# Patient Record
Sex: MALE | Race: WHITE | NOT HISPANIC OR LATINO | Employment: FULL TIME | URBAN - METROPOLITAN AREA
[De-identification: names, ages, dates, MRNs, and addresses within clinical notes are randomized per-mention and may not be internally consistent; named-entity substitution may affect disease eponyms.]

---

## 2017-03-20 ENCOUNTER — LAB CONVERSION - ENCOUNTER (OUTPATIENT)
Dept: OTHER | Facility: OTHER | Age: 57
End: 2017-03-20

## 2017-03-20 ENCOUNTER — ALLSCRIPTS OFFICE VISIT (OUTPATIENT)
Dept: OTHER | Facility: CLINIC | Age: 57
End: 2017-03-20

## 2017-03-20 DIAGNOSIS — K73.9 CHRONIC HEPATITIS (HCC): ICD-10-CM

## 2017-03-20 DIAGNOSIS — B19.20 VIRAL HEPATITIS C WITHOUT HEPATIC COMA: ICD-10-CM

## 2017-03-20 LAB
% CD4 (HELPER CELLS) (HISTORICAL): 19 % (ref 30–61)
ABSOLUTE CD4+ CELLS: 244 CELLS/UL (ref 490–1740)
LYMPHOCYTES # BLD AUTO: 1267 CELLS/UL (ref 850–3900)

## 2017-03-21 ENCOUNTER — LAB CONVERSION - ENCOUNTER (OUTPATIENT)
Dept: OTHER | Facility: OTHER | Age: 57
End: 2017-03-21

## 2017-03-21 ENCOUNTER — GENERIC CONVERSION - ENCOUNTER (OUTPATIENT)
Dept: OTHER | Facility: OTHER | Age: 57
End: 2017-03-21

## 2017-03-21 LAB
% CD4 (HELPER CELLS) (HISTORICAL): 19 % (ref 30–61)
ABSOLUTE CD4+ CELLS: 244 CELLS/UL (ref 490–1740)
HIV 1 RNA, QUANT. MOLECULAR METHOD (HISTORICAL): ABNORMAL LOG COPIES/ML
HIV-1 RNA BY PCR, QN (HISTORICAL): ABNORMAL COPIES/ML
LYMPHOCYTES # BLD AUTO: 1267 CELLS/UL (ref 850–3900)

## 2017-03-22 ENCOUNTER — GENERIC CONVERSION - ENCOUNTER (OUTPATIENT)
Dept: OTHER | Facility: OTHER | Age: 57
End: 2017-03-22

## 2017-03-28 ENCOUNTER — ALLSCRIPTS OFFICE VISIT (OUTPATIENT)
Dept: OTHER | Facility: CLINIC | Age: 57
End: 2017-03-28

## 2017-04-24 ENCOUNTER — GENERIC CONVERSION - ENCOUNTER (OUTPATIENT)
Dept: OTHER | Facility: OTHER | Age: 57
End: 2017-04-24

## 2017-04-24 DIAGNOSIS — E78.5 HYPERLIPIDEMIA: ICD-10-CM

## 2017-04-24 DIAGNOSIS — B20 HUMAN IMMUNODEFICIENCY VIRUS (HIV) DISEASE (HCC): ICD-10-CM

## 2017-04-24 DIAGNOSIS — I10 ESSENTIAL (PRIMARY) HYPERTENSION: ICD-10-CM

## 2017-04-24 DIAGNOSIS — Z11.3 ENCOUNTER FOR SCREENING FOR INFECTIONS WITH PREDOMINANTLY SEXUAL MODE OF TRANSMISSION: ICD-10-CM

## 2017-05-06 ENCOUNTER — HOSPITAL ENCOUNTER (OUTPATIENT)
Dept: ULTRASOUND IMAGING | Facility: HOSPITAL | Age: 57
Discharge: HOME | End: 2017-05-06
Payer: COMMERCIAL

## 2017-05-06 DIAGNOSIS — B19.20 VIRAL HEPATITIS C WITHOUT HEPATIC COMA: ICD-10-CM

## 2017-05-06 PROCEDURE — 76705 ECHO EXAM OF ABDOMEN: CPT

## 2017-06-02 ENCOUNTER — GENERIC CONVERSION - ENCOUNTER (OUTPATIENT)
Dept: OTHER | Facility: OTHER | Age: 57
End: 2017-06-02

## 2017-07-15 ENCOUNTER — APPOINTMENT (OUTPATIENT)
Dept: LAB | Facility: HOSPITAL | Age: 57
End: 2017-07-15
Payer: COMMERCIAL

## 2017-07-15 ENCOUNTER — TRANSCRIBE ORDERS (OUTPATIENT)
Dept: ADMINISTRATIVE | Facility: HOSPITAL | Age: 57
End: 2017-07-15

## 2017-07-15 DIAGNOSIS — I10 ESSENTIAL (PRIMARY) HYPERTENSION: ICD-10-CM

## 2017-07-15 DIAGNOSIS — B20 HUMAN IMMUNODEFICIENCY VIRUS (HIV) DISEASE (HCC): ICD-10-CM

## 2017-07-15 DIAGNOSIS — E78.5 HYPERLIPIDEMIA: ICD-10-CM

## 2017-07-15 DIAGNOSIS — Z11.3 ENCOUNTER FOR SCREENING FOR INFECTIONS WITH PREDOMINANTLY SEXUAL MODE OF TRANSMISSION: ICD-10-CM

## 2017-07-15 DIAGNOSIS — K73.9 CHRONIC HEPATITIS (HCC): ICD-10-CM

## 2017-07-15 DIAGNOSIS — B19.20 VIRAL HEPATITIS C WITHOUT HEPATIC COMA: ICD-10-CM

## 2017-07-15 LAB
ALBUMIN SERPL BCP-MCNC: 3.8 G/DL (ref 3.5–5)
ALP SERPL-CCNC: 95 U/L (ref 46–116)
ALT SERPL W P-5'-P-CCNC: 23 U/L (ref 12–78)
ANION GAP SERPL CALCULATED.3IONS-SCNC: 6 MMOL/L (ref 4–13)
AST SERPL W P-5'-P-CCNC: 26 U/L (ref 5–45)
BASOPHILS # BLD AUTO: 0.04 THOUSANDS/ΜL (ref 0–0.1)
BASOPHILS NFR BLD AUTO: 1 % (ref 0–1)
BILIRUB SERPL-MCNC: 0.6 MG/DL (ref 0.2–1)
BILIRUB UR QL STRIP: NEGATIVE
BUN SERPL-MCNC: 17 MG/DL (ref 5–25)
CALCIUM SERPL-MCNC: 8.9 MG/DL (ref 8.3–10.1)
CHLORIDE SERPL-SCNC: 105 MMOL/L (ref 100–108)
CHOLEST SERPL-MCNC: 159 MG/DL (ref 50–200)
CLARITY UR: CLEAR
CO2 SERPL-SCNC: 28 MMOL/L (ref 21–32)
COLOR UR: YELLOW
CREAT SERPL-MCNC: 0.91 MG/DL (ref 0.6–1.3)
EOSINOPHIL # BLD AUTO: 0.12 THOUSAND/ΜL (ref 0–0.61)
EOSINOPHIL NFR BLD AUTO: 2 % (ref 0–6)
ERYTHROCYTE [DISTWIDTH] IN BLOOD BY AUTOMATED COUNT: 13.5 % (ref 11.6–15.1)
GFR SERPL CREATININE-BSD FRML MDRD: >60 ML/MIN/1.73SQ M
GLUCOSE P FAST SERPL-MCNC: 107 MG/DL (ref 65–99)
GLUCOSE UR STRIP-MCNC: NEGATIVE MG/DL
HCT VFR BLD AUTO: 52.7 % (ref 36.5–49.3)
HDLC SERPL-MCNC: 35 MG/DL (ref 40–60)
HGB BLD-MCNC: 17.8 G/DL (ref 12–17)
HGB UR QL STRIP.AUTO: NEGATIVE
KETONES UR STRIP-MCNC: NEGATIVE MG/DL
LDLC SERPL CALC-MCNC: 105 MG/DL (ref 0–100)
LEUKOCYTE ESTERASE UR QL STRIP: NEGATIVE
LYMPHOCYTES # BLD AUTO: 1.11 THOUSANDS/ΜL (ref 0.6–4.47)
LYMPHOCYTES NFR BLD AUTO: 16 % (ref 14–44)
MCH RBC QN AUTO: 30.1 PG (ref 26.8–34.3)
MCHC RBC AUTO-ENTMCNC: 33.8 G/DL (ref 31.4–37.4)
MCV RBC AUTO: 89 FL (ref 82–98)
MONOCYTES # BLD AUTO: 0.85 THOUSAND/ΜL (ref 0.17–1.22)
MONOCYTES NFR BLD AUTO: 12 % (ref 4–12)
NEUTROPHILS # BLD AUTO: 4.68 THOUSANDS/ΜL (ref 1.85–7.62)
NEUTS SEG NFR BLD AUTO: 69 % (ref 43–75)
NITRITE UR QL STRIP: NEGATIVE
NRBC BLD AUTO-RTO: 0 /100 WBCS
PH UR STRIP.AUTO: 5.5 [PH] (ref 4.5–8)
PLATELET # BLD AUTO: 182 THOUSANDS/UL (ref 149–390)
PMV BLD AUTO: 9.7 FL (ref 8.9–12.7)
POTASSIUM SERPL-SCNC: 4.3 MMOL/L (ref 3.5–5.3)
PROT SERPL-MCNC: 8.1 G/DL (ref 6.4–8.2)
PROT UR STRIP-MCNC: NEGATIVE MG/DL
RBC # BLD AUTO: 5.92 MILLION/UL (ref 3.88–5.62)
SODIUM SERPL-SCNC: 139 MMOL/L (ref 136–145)
SP GR UR STRIP.AUTO: >=1.03 (ref 1–1.03)
TRIGL SERPL-MCNC: 97 MG/DL
UROBILINOGEN UR QL STRIP.AUTO: 0.2 E.U./DL
WBC # BLD AUTO: 6.83 THOUSAND/UL (ref 4.31–10.16)

## 2017-07-15 PROCEDURE — 86361 T CELL ABSOLUTE COUNT: CPT

## 2017-07-15 PROCEDURE — 87536 HIV-1 QUANT&REVRSE TRNSCRPJ: CPT

## 2017-07-15 PROCEDURE — 80061 LIPID PANEL: CPT

## 2017-07-15 PROCEDURE — 80053 COMPREHEN METABOLIC PANEL: CPT

## 2017-07-15 PROCEDURE — 85025 COMPLETE CBC W/AUTO DIFF WBC: CPT

## 2017-07-15 PROCEDURE — 87591 N.GONORRHOEAE DNA AMP PROB: CPT

## 2017-07-15 PROCEDURE — 87491 CHLMYD TRACH DNA AMP PROBE: CPT

## 2017-07-15 PROCEDURE — 36415 COLL VENOUS BLD VENIPUNCTURE: CPT

## 2017-07-15 PROCEDURE — 86592 SYPHILIS TEST NON-TREP QUAL: CPT

## 2017-07-15 PROCEDURE — 81003 URINALYSIS AUTO W/O SCOPE: CPT

## 2017-07-15 PROCEDURE — 82105 ALPHA-FETOPROTEIN SERUM: CPT

## 2017-07-16 LAB — RPR SER QL: NORMAL

## 2017-07-17 LAB
BASOPHILS # BLD AUTO: 0 X10E3/UL (ref 0–0.2)
BASOPHILS NFR BLD AUTO: 0 %
CD3+CD4+ CELLS # BLD: 172 /UL (ref 359–1519)
CD3+CD4+ CELLS NFR BLD: 15.6 % (ref 30.8–58.5)
CHLAMYDIA DNA CVX QL NAA+PROBE: NORMAL
EOSINOPHIL # BLD AUTO: 0.1 X10E3/UL (ref 0–0.4)
EOSINOPHIL NFR BLD AUTO: 1 %
ERYTHROCYTE [DISTWIDTH] IN BLOOD BY AUTOMATED COUNT: 14.1 % (ref 12.3–15.4)
HCT VFR BLD AUTO: 52.3 % (ref 37.5–51)
HGB BLD-MCNC: 18.1 G/DL (ref 12.6–17.7)
IMM GRANULOCYTES # BLD: 0 X10E3/UL (ref 0–0.1)
IMM GRANULOCYTES NFR BLD: 0 %
LYMPHOCYTES # BLD AUTO: 1.1 X10E3/UL (ref 0.7–3.1)
LYMPHOCYTES NFR BLD AUTO: 17 %
MCH RBC QN AUTO: 30.9 PG (ref 26.6–33)
MCHC RBC AUTO-ENTMCNC: 34.6 G/DL (ref 31.5–35.7)
MCV RBC AUTO: 89 FL (ref 79–97)
MONOCYTES # BLD AUTO: 0.8 X10E3/UL (ref 0.1–0.9)
MONOCYTES NFR BLD AUTO: 12 %
N GONORRHOEA DNA GENITAL QL NAA+PROBE: NORMAL
NEUTROPHILS # BLD AUTO: 4.6 X10E3/UL (ref 1.4–7)
NEUTROPHILS NFR BLD AUTO: 70 %
PLATELET # BLD AUTO: 170 X10E3/UL (ref 150–379)
RBC # BLD AUTO: 5.86 X10E6/UL (ref 4.14–5.8)
WBC # BLD AUTO: 6.6 X10E3/UL (ref 3.4–10.8)

## 2017-07-18 LAB
AFP-TM SERPL-MCNC: 4.8 NG/ML (ref 0–8.3)
HIV1 RNA # SERPL NAA+PROBE: 4080 COPIES/ML
HIV1 RNA SERPL NAA+PROBE-LOG#: 3.61 LOG10COPY/ML

## 2017-07-19 ENCOUNTER — GENERIC CONVERSION - ENCOUNTER (OUTPATIENT)
Dept: OTHER | Facility: OTHER | Age: 57
End: 2017-07-19

## 2017-07-24 ENCOUNTER — ALLSCRIPTS OFFICE VISIT (OUTPATIENT)
Dept: OTHER | Facility: CLINIC | Age: 57
End: 2017-07-24

## 2017-07-24 ENCOUNTER — GENERIC CONVERSION - ENCOUNTER (OUTPATIENT)
Dept: OTHER | Facility: OTHER | Age: 57
End: 2017-07-24

## 2017-08-05 ENCOUNTER — APPOINTMENT (OUTPATIENT)
Dept: LAB | Facility: HOSPITAL | Age: 57
End: 2017-08-05
Payer: COMMERCIAL

## 2017-08-05 ENCOUNTER — TRANSCRIBE ORDERS (OUTPATIENT)
Dept: ADMINISTRATIVE | Facility: HOSPITAL | Age: 57
End: 2017-08-05

## 2017-08-05 DIAGNOSIS — B20 HUMAN IMMUNODEFICIENCY VIRUS (HIV) DISEASE (HCC): Primary | ICD-10-CM

## 2017-08-05 DIAGNOSIS — B20 HUMAN IMMUNODEFICIENCY VIRUS (HIV) DISEASE (HCC): ICD-10-CM

## 2017-08-05 PROCEDURE — 87536 HIV-1 QUANT&REVRSE TRNSCRPJ: CPT

## 2017-08-05 PROCEDURE — 87900 PHENOTYPE INFECT AGENT DRUG: CPT

## 2017-08-05 PROCEDURE — 87901 NFCT AGT GNTYP ALYS HIV1 REV: CPT

## 2017-08-05 PROCEDURE — 36415 COLL VENOUS BLD VENIPUNCTURE: CPT

## 2017-08-08 ENCOUNTER — ALLSCRIPTS OFFICE VISIT (OUTPATIENT)
Dept: OTHER | Facility: CLINIC | Age: 57
End: 2017-08-08

## 2017-08-08 LAB
HIV1 RNA # SERPL NAA+PROBE: 70 COPIES/ML
HIV1 RNA SERPL NAA+PROBE-LOG#: 1.84 LOG10COPY/ML

## 2017-08-17 LAB
HIV GENOSURE: NORMAL
HIV RT+PR MUT DET ISLT: NORMAL

## 2017-08-23 ENCOUNTER — GENERIC CONVERSION - ENCOUNTER (OUTPATIENT)
Dept: OTHER | Facility: OTHER | Age: 57
End: 2017-08-23

## 2017-08-24 ENCOUNTER — ALLSCRIPTS OFFICE VISIT (OUTPATIENT)
Dept: OTHER | Facility: OTHER | Age: 57
End: 2017-08-24

## 2017-09-11 ENCOUNTER — GENERIC CONVERSION - ENCOUNTER (OUTPATIENT)
Dept: OTHER | Facility: OTHER | Age: 57
End: 2017-09-11

## 2017-09-15 ENCOUNTER — ALLSCRIPTS OFFICE VISIT (OUTPATIENT)
Dept: OTHER | Facility: CLINIC | Age: 57
End: 2017-09-15

## 2017-10-01 DIAGNOSIS — K73.9 CHRONIC HEPATITIS (HCC): ICD-10-CM

## 2017-10-01 DIAGNOSIS — K74.00 HEPATIC FIBROSIS: ICD-10-CM

## 2017-10-01 DIAGNOSIS — B19.20 VIRAL HEPATITIS C WITHOUT HEPATIC COMA: ICD-10-CM

## 2017-10-01 DIAGNOSIS — B20 HUMAN IMMUNODEFICIENCY VIRUS (HIV) DISEASE (HCC): ICD-10-CM

## 2017-10-28 ENCOUNTER — APPOINTMENT (OUTPATIENT)
Dept: LAB | Facility: HOSPITAL | Age: 57
End: 2017-10-28
Attending: INTERNAL MEDICINE
Payer: COMMERCIAL

## 2017-10-28 ENCOUNTER — TRANSCRIBE ORDERS (OUTPATIENT)
Dept: ADMINISTRATIVE | Facility: HOSPITAL | Age: 57
End: 2017-10-28

## 2017-10-28 DIAGNOSIS — B20 HUMAN IMMUNODEFICIENCY VIRUS (HIV) DISEASE (HCC): ICD-10-CM

## 2017-10-28 LAB
ALBUMIN SERPL BCP-MCNC: 3.8 G/DL (ref 3.5–5)
ALP SERPL-CCNC: 99 U/L (ref 46–116)
ALT SERPL W P-5'-P-CCNC: 23 U/L (ref 12–78)
ANION GAP SERPL CALCULATED.3IONS-SCNC: 6 MMOL/L (ref 4–13)
AST SERPL W P-5'-P-CCNC: 19 U/L (ref 5–45)
BASOPHILS # BLD AUTO: 0.04 THOUSANDS/ΜL (ref 0–0.1)
BASOPHILS NFR BLD AUTO: 1 % (ref 0–1)
BILIRUB SERPL-MCNC: 0.6 MG/DL (ref 0.2–1)
BUN SERPL-MCNC: 16 MG/DL (ref 5–25)
CALCIUM SERPL-MCNC: 8.9 MG/DL (ref 8.3–10.1)
CHLORIDE SERPL-SCNC: 104 MMOL/L (ref 100–108)
CO2 SERPL-SCNC: 30 MMOL/L (ref 21–32)
CREAT SERPL-MCNC: 1.08 MG/DL (ref 0.6–1.3)
EOSINOPHIL # BLD AUTO: 0.15 THOUSAND/ΜL (ref 0–0.61)
EOSINOPHIL NFR BLD AUTO: 3 % (ref 0–6)
ERYTHROCYTE [DISTWIDTH] IN BLOOD BY AUTOMATED COUNT: 13.8 % (ref 11.6–15.1)
GFR SERPL CREATININE-BSD FRML MDRD: 76 ML/MIN/1.73SQ M
GLUCOSE P FAST SERPL-MCNC: 99 MG/DL (ref 65–99)
HCT VFR BLD AUTO: 52.5 % (ref 36.5–49.3)
HGB BLD-MCNC: 17.8 G/DL (ref 12–17)
LYMPHOCYTES # BLD AUTO: 1.19 THOUSANDS/ΜL (ref 0.6–4.47)
LYMPHOCYTES NFR BLD AUTO: 22 % (ref 14–44)
MCH RBC QN AUTO: 29.8 PG (ref 26.8–34.3)
MCHC RBC AUTO-ENTMCNC: 33.9 G/DL (ref 31.4–37.4)
MCV RBC AUTO: 88 FL (ref 82–98)
MONOCYTES # BLD AUTO: 0.6 THOUSAND/ΜL (ref 0.17–1.22)
MONOCYTES NFR BLD AUTO: 11 % (ref 4–12)
NEUTROPHILS # BLD AUTO: 3.46 THOUSANDS/ΜL (ref 1.85–7.62)
NEUTS SEG NFR BLD AUTO: 63 % (ref 43–75)
NRBC BLD AUTO-RTO: 0 /100 WBCS
PLATELET # BLD AUTO: 196 THOUSANDS/UL (ref 149–390)
PMV BLD AUTO: 9.6 FL (ref 8.9–12.7)
POTASSIUM SERPL-SCNC: 4.2 MMOL/L (ref 3.5–5.3)
PROT SERPL-MCNC: 8.1 G/DL (ref 6.4–8.2)
RBC # BLD AUTO: 5.97 MILLION/UL (ref 3.88–5.62)
SODIUM SERPL-SCNC: 140 MMOL/L (ref 136–145)
WBC # BLD AUTO: 5.46 THOUSAND/UL (ref 4.31–10.16)

## 2017-10-28 PROCEDURE — 85025 COMPLETE CBC W/AUTO DIFF WBC: CPT

## 2017-10-28 PROCEDURE — 80053 COMPREHEN METABOLIC PANEL: CPT

## 2017-10-28 PROCEDURE — 86480 TB TEST CELL IMMUN MEASURE: CPT

## 2017-10-28 PROCEDURE — 36415 COLL VENOUS BLD VENIPUNCTURE: CPT

## 2017-10-28 PROCEDURE — 86361 T CELL ABSOLUTE COUNT: CPT

## 2017-10-28 PROCEDURE — 87536 HIV-1 QUANT&REVRSE TRNSCRPJ: CPT

## 2017-10-30 ENCOUNTER — GENERIC CONVERSION - ENCOUNTER (OUTPATIENT)
Dept: OTHER | Facility: OTHER | Age: 57
End: 2017-10-30

## 2017-10-30 LAB
BASOPHILS # BLD AUTO: 0 X10E3/UL (ref 0–0.2)
BASOPHILS NFR BLD AUTO: 1 %
CD3+CD4+ CELLS # BLD: 188 /UL (ref 359–1519)
CD3+CD4+ CELLS NFR BLD: 15.7 % (ref 30.8–58.5)
EOSINOPHIL # BLD AUTO: 0.1 X10E3/UL (ref 0–0.4)
EOSINOPHIL NFR BLD AUTO: 3 %
ERYTHROCYTE [DISTWIDTH] IN BLOOD BY AUTOMATED COUNT: 14.1 % (ref 12.3–15.4)
HCT VFR BLD AUTO: 51.4 % (ref 37.5–51)
HGB BLD-MCNC: 18.2 G/DL (ref 12.6–17.7)
IMM GRANULOCYTES # BLD: 0 X10E3/UL (ref 0–0.1)
IMM GRANULOCYTES NFR BLD: 0 %
LYMPHOCYTES # BLD AUTO: 1.2 X10E3/UL (ref 0.7–3.1)
LYMPHOCYTES NFR BLD AUTO: 23 %
MCH RBC QN AUTO: 30.9 PG (ref 26.6–33)
MCHC RBC AUTO-ENTMCNC: 35.4 G/DL (ref 31.5–35.7)
MCV RBC AUTO: 87 FL (ref 79–97)
MONOCYTES # BLD AUTO: 0.5 X10E3/UL (ref 0.1–0.9)
MONOCYTES NFR BLD AUTO: 10 %
NEUTROPHILS # BLD AUTO: 3.3 X10E3/UL (ref 1.4–7)
NEUTROPHILS NFR BLD AUTO: 63 %
PLATELET # BLD AUTO: 186 X10E3/UL (ref 150–379)
RBC # BLD AUTO: 5.89 X10E6/UL (ref 4.14–5.8)
WBC # BLD AUTO: 5.1 X10E3/UL (ref 3.4–10.8)

## 2017-10-31 ENCOUNTER — ALLSCRIPTS OFFICE VISIT (OUTPATIENT)
Dept: OTHER | Facility: CLINIC | Age: 57
End: 2017-10-31

## 2017-10-31 LAB
ANNOTATION COMMENT IMP: NORMAL
GAMMA INTERFERON BACKGROUND BLD IA-ACNC: 0.04 IU/ML
HIV1 RNA # SERPL NAA+PROBE: <20 COPIES/ML
HIV1 RNA SERPL NAA+PROBE-LOG#: NORMAL LOG10COPY/ML
M TB IFN-G BLD-IMP: NEGATIVE
M TB IFN-G CD4+ BCKGRND COR BLD-ACNC: <0.01 IU/ML
M TB IFN-G CD4+ T-CELLS BLD-ACNC: 0.03 IU/ML
MITOGEN IGNF BLD-ACNC: 9.69 IU/ML
QUANTIFERON-TB GOLD IN TUBE: NORMAL
SERVICE CMNT-IMP: NORMAL

## 2017-11-01 ENCOUNTER — TRANSCRIBE ORDERS (OUTPATIENT)
Dept: ADMINISTRATIVE | Facility: HOSPITAL | Age: 57
End: 2017-11-01

## 2017-11-01 DIAGNOSIS — F17.200 TOBACCO USE DISORDER: Primary | ICD-10-CM

## 2017-11-08 ENCOUNTER — HOSPITAL ENCOUNTER (OUTPATIENT)
Dept: ULTRASOUND IMAGING | Facility: HOSPITAL | Age: 57
Discharge: HOME | End: 2017-11-08
Payer: COMMERCIAL

## 2017-11-08 ENCOUNTER — HOSPITAL ENCOUNTER (OUTPATIENT)
Dept: CT IMAGING | Facility: HOSPITAL | Age: 57
Discharge: HOME | End: 2017-11-08
Payer: COMMERCIAL

## 2017-11-08 DIAGNOSIS — F17.200 TOBACCO USE DISORDER: ICD-10-CM

## 2017-11-08 DIAGNOSIS — B19.20 VIRAL HEPATITIS C WITHOUT HEPATIC COMA: ICD-10-CM

## 2017-11-08 DIAGNOSIS — K74.00 HEPATIC FIBROSIS: ICD-10-CM

## 2017-11-08 PROCEDURE — 76705 ECHO EXAM OF ABDOMEN: CPT

## 2017-11-14 ENCOUNTER — ALLSCRIPTS OFFICE VISIT (OUTPATIENT)
Dept: OTHER | Facility: CLINIC | Age: 57
End: 2017-11-14

## 2017-11-14 DIAGNOSIS — B20 HUMAN IMMUNODEFICIENCY VIRUS (HIV) DISEASE (HCC): ICD-10-CM

## 2018-01-01 DIAGNOSIS — B20 HUMAN IMMUNODEFICIENCY VIRUS (HIV) DISEASE (HCC): ICD-10-CM

## 2018-01-09 NOTE — PROGRESS NOTES
Assessment    1  HIV disease (042) (B20)   2  Nicotine dependence (305 1) (F17 200)    Plan    1  (1) LIPID PANEL, FASTING; Status:Active; Requested for:01May2017;     2  (1) CHLAMYDIA/GC AMPLIFIED DNA, PCR; Source:Urine, Unspecified Source;   Status:Active; Requested for:01May2017;     3  (1) RPR; Status:Active; Requested for:01May2017;     4  (1) CBC/PLT/DIFF; Status:Active; Requested for:01May2017;    5  (1) COMPREHENSIVE METABOLIC PANEL; Status:Active; Requested for:01May2017;    6  (1) HIV-1 RNA QUANTITATIVE; [Do Not Release]; Status:Active; Requested   for:01May2017;    7  (1) T LYMPH SUBSET (CD4); Status:Active; Requested for:01May2017;    8  (1) URINALYSIS (will reflex a microscopy if leukocytes, occult blood, protein or nitrites are   not within normal limits); Status:Active; Requested for:01May2017;     9  Follow-up visit in 3 months Evaluation and Treatment  Follow-up  Status: Hold For -   Scheduling  Requested for: 28Mar2017    Discussion/Summary    HIV-doing well on ART with an undetectable viral load and a CD4 count that remains in the mid 200s  He is a bit discouraged about the CD4 count not going higher  I explained to him that while it is not optimal, he will remain safe as long as the CD4 count stays above 200  He asked about how he can try to get the CD4 count higher  I explained to him that there is no well-defined medical intervention, however healthy lifestyle approaches may help  Nicotine dependence-patient not interested in quitting for now  I encouraged abstinence from tobacco use as soon as he is ready  Possible side effects of new medications were reviewed with the patient/guardian today  The treatment plan was reviewed with the patient/guardian  The patient/guardian understands and agrees with the treatment plan     Education   general HIV education  adherence  prevention care  Chief Complaint  Pt here for routine f/u  Pt c/o congestion x 4 days        History of Present Illness  Routine follow-up for HIV  Patient claims 100% adherence with Tivicay/Descovy/Prezista/Norvir  He denies any notable side effects  Since he quit smoking cigarettes he has now taken up cigars  He does not have any interest in stopping the cigars for now  Pain Assessment   the patient states they do not have pain  Abuse And Domestic Violence Screen    Yes, the patient is safe at home  The patient states no one is hurting them  Depression And Suicide Screen  No, the patient has not had thoughts of hurting themself  No, the patient has not felt depressed in the past 7 days  no fever no lethargy no depression no weight loss no cough no shortness of breath no thrush no nausea no vomiting no diarrhea      Active Problems    1  Acute bronchitis (466 0) (J20 9)   2  Chronic hepatitis (571 40) (K73 9)   3  Condyloma (078 11) (A63 0)   4  Dyslipidemia (272 4) (E78 5)   5  Encounter for screening examination for sexually transmitted disease (V74 5) (Z11 3)   6  Essential hypertension (401 9) (I10)   7  Fibrosis of liver (571 5) (K74 0)   8  Hepatitis B core antibody positive (795 79) (R76 8)   9  Hepatitis C virus infection (070 70) (B19 20)   10  HIV disease (042) (B20)   11  Impaired fasting glucose (790 21) (R73 01)   12  Need for prophylactic vaccination and inoculation against influenza (V04 81) (Z23)    Past Medical History    1  History of acute bronchitis (V12 69) (Z87 09)   2  History of Opioid dependence (304 00) (F11 20)    Surgical History    1  History of Laminectomy Lumbar    Family History  Mother    1  Family history of Alzheimer's disease (V17 2) (Z82 0)  Father    2  Family history of myocardial infarction (V17 3) (Z82 49)  Brother    3  Family history of prostate carcinoma (V16 42) (Z80 42)    Social History    · Current every day smoker (305 1) (F17 200)   · Non drinker / no alcohol use   · Sexually active    Current Meds   1  Descovy 200-25 MG Oral Tablet;  Take 1 tablet daily; Therapy: 63IBI8352 to (Evaluate:14May2017)  Requested for: 41DMZ2039; Last   Rx:49Bxi6618 Ordered   2  Imiquimod 5 % External Cream; APPLY TO AFFECTED AREAS THREE TIMES WEEKLY  [address] OFF AFTER 6-10 HOURS; Therapy: 70FOM2863 to (Last Rx:20Mar2017)  Requested for: 20Mar2017 Ordered   3  Lisinopril-Hydrochlorothiazide 10-12 5 MG Oral Tablet; TAKE 1 TABLET DAILY; Therapy: 83NZL8304 to (Evaluate:08Gms1121)  Requested for: 20Mar2017; Last   Rx:20Mar2017 Ordered   4  Norvir 100 MG Oral Tablet; TAKE 1 TABLET BY MOUTH EVERY TWELVE HOURS; Therapy: 70TWZ6983 to (Hellen Contras)  Requested for: 95FHP9472; Last   Rx:10Nov2016 Ordered   5  Prezista 600 MG Oral Tablet; TAKE 1 TABLET BY MOUTH EVERY TWELVE HOURS; Therapy: 64YLM5088 to (Hellen Contras)  Requested for: 76KUJ6967; Last   Rx:10Nov2016 Ordered   6  Tivicay 50 MG Oral Tablet; take 1 tablet by mouth twice a day; Therapy: 94FFQ0759 to (Hellen Contras)  Requested for: 60LHS9184; Last   Rx:10Nov2016 Ordered    Allergies    1  No Known Drug Allergies    Vitals  Signs   Recorded: 28Mar2017 05:05PM   Temperature: 97 7 F  Heart Rate: 85  Systolic: 092  Diastolic: 88  Height: 5 ft 8 in  Weight: 183 lb 2 oz  BMI Calculated: 27 84  BSA Calculated: 1 97  O2 Saturation: 97    Physical Exam    Constitutional   General appearance: No acute distress, well appearing and well nourished  Ears, Nose, Mouth, and Throat   Oropharynx: Normal with no erythema, edema, exudate or lesions  Pulmonary   Respiratory effort: No increased work of breathing or signs of respiratory distress  Auscultation of lungs: Clear to auscultation  Cardiovascular   Auscultation of heart: Normal rate and rhythm, normal S1 and S2, without murmurs  Examination of extremities for edema and/or varicosities: Normal     Abdomen   Abdomen: Non-tender, no masses  Liver and spleen: No hepatomegaly or splenomegaly  Lymphatic   Palpation of lymph nodes in neck: No lymphadenopathy  Future Appointments    Date/Time Provider Specialty Site   06/19/2017 02:00 PM ALLIE Chin Epidemiology/Public [facility] AT [facility]     Signatures   Electronically signed by : Ebony Mohr MD; Mar 28 2017  5:37PM EST                       (Author)

## 2018-01-10 NOTE — PROGRESS NOTES
Assessment    1  HIV disease (042) (B20)   2  Acute bronchitis (466 0) (J20 9)   3  Nicotine dependence (305 1) (F17 200)   4  Essential hypertension (401 9) (I10)    Plan    1  Mucus Relief ER 1200 MG Oral Tablet Extended Release 12 Hour; TAKE 1 TABLET   EVERY 12 HOURS AS NEEDED FOR CONGESTION    Discussion/Summary  Discussion Summary:   Lico Burch is a 62year old male here today for acute visit due to acute bronchitis  Continue supportive therapy  Ordered guaifenesin ER for congestion and cough  Instructed to use acetaminophen for pain  Educated to avoid NSAIDs and OTC cough medicine due to high BP  Instructed to return to clinic if symptoms worsen or do not continue to improve  Provided with return to work note  HIV: VL 70 CD4 172 ART Tivicay, Descovy, Norvir, and Prezista  Stressed the importance of adherence  HTN: /80  Continue Lisinopril/HCTZ  BP most likely elevated due to NSAID use  Will monitor and adjust Lisinopril/HCTZ dose accordingly at next PCP visit  Nicotine dependence: Counseled for greater the 15 minutes on the importance of smoking cessation  Educated that cough will last longer due to smoking  Advised to quit  Provided with nicotine gum at last PCP visit but has not quit at this time  Refer to smoking cessation program for additional support  PCP f/u scheduled 10/23/17  Counseling Documentation With Imm: The patient was counseled regarding instructions for management, prognosis, importance of compliance with treatment  Medication SE Review and Pt Understands Tx: Possible side effects of new medications were reviewed with the patient/guardian today  The treatment plan was reviewed with the patient/guardian  The patient/guardian understands and agrees with the treatment plan      Chief Complaint  Chief Complaint Free Text Note Form: Pt c/o body aches, chills, fever, nonproductive cough, headache and congestion x 4 days        History of Present Illness  HPI: Adriana Diaz is a 62year old  male who presents to the clinic today for acute evaluation of body aches, chills, subjective fever, nonproductive cough, headache and congestion x 4 days  PMHx signifcant for HIV, HCV with SVR s/p treatment, nicotine dependence and HTN  Symptoms are slowly improving  Is currently afebrile but continues to have a deep, barking cough and congestion  Hospital Based Practices Required Assessment:   Pain Assessment   the patient states they do not have pain  Abuse And Domestic Violence Screen    Yes, the patient is safe at home  The patient states no one is hurting them  Depression And Suicide Screen  No, the patient has not had thoughts of hurting themself  No, the patient has not felt depressed in the past 7 days  Bronchitis, Acute, Adult (Brief): The patient is being seen for an initial evaluation of acute bronchitis  Symptoms:  non-productive cough and stuffy nose, but no wheezing, no shortness of breath, no fatigue, no fever and no sore throat    The patient presents with complaints of chest pain (with cough)  The patient is currently experiencing symptoms  Associated symptoms:  no headache  Current treatment includes non-prescription cough suppressants and nonsteroidal anti-inflammatory drugs  Smoking Cessation (Brief): The patient is being seen for clinic follow-up for tobacco cessation assistance  The patient has low interest in quitting  Patient perceived smoking benefits include stress management  Patient recognizes that smoking cessation benefits include improved health  HIV Follow-up (Brief): The patient is being seen for a routine clinic follow-up of HIV infection  Symptoms:  cough, but no fever, no night sweats, no shortness of breath, no thrush, no nausea, no vomiting, no diarrhea and no headache  Current treatment includes antiretroviral regimen  By report, there is good compliance with treatment and good tolerance of treatment  Hypertension (Follow-Up):  The patient presents for follow-up of essential hypertension  He has no comorbid illnesses  Symptoms: denies dyspnea and denies lower extremity edema  Associated symptoms include no headache  Medications: Medication(s): a diuretic and an ACE inhibitor  Review of Systems  Focused-Male:   Constitutional: feeling poorly, but no fever and no chills  ENT: as noted in HPI  Cardiovascular: no complaints of slow or fast heart rate, no chest pain, no palpitations, no leg claudication or lower extremity edema  Respiratory: no complaints of shortness of breath, no wheezing or cough, no dyspnea on exertion, no orthopnea or PND  Gastrointestinal: no complaints of abdominal pain, no constipation, no nausea or vomiting, no diarrhea or bloody stools  Genitourinary: no complaints of dysuria or incontinence, no hesitancy, no nocturia, no genital lesion, no inadequacy of penile erection  Musculoskeletal: no complaints of arthralgia, no myalgia, no joint swelling or stiffness, no limb pain or swelling  Integumentary: no complaints of skin rash or lesion, no itching or dry skin, no skin wounds  Neurological: no complaints of headache, no confusion, no numbness or tingling, no dizziness or fainting  Active Problems    1  Acute bronchitis (466 0) (J20 9)   2  Chronic hepatitis (571 40) (K73 9)   3  Condyloma (078 11) (A63 0)   4  Dyslipidemia (272 4) (E78 5)   5  Encounter for screening colonoscopy (V76 51) (Z12 11)   6  Encounter for screening examination for sexually transmitted disease (V74 5) (Z11 3)   7  Essential hypertension (401 9) (I10)   8  Fibrosis of liver (571 5) (K74 0)   9  Hepatitis B core antibody positive (795 79) (R76 8)   10  Hepatitis C virus infection (070 70) (B19 20)   11  HIV disease (042) (B20)   12  Impaired fasting glucose (790 21) (R73 01)   13  Need for prophylactic vaccination and inoculation against influenza (V04 81) (Z23)   14   Nicotine dependence (305 1) (F17 200)    Past Medical History    1  History of acute bronchitis (V12 69) (Z87 09)   2  History of Opioid dependence (304 00) (F11 20)  Active Problems And Past Medical History Reviewed: The active problems and past medical history were reviewed and updated today  Family History  Mother    1  Family history of Alzheimer's disease (V17 2) (Z82 0)  Father    2  Family history of myocardial infarction (V17 3) (Z82 49)  Brother    3  Family history of prostate carcinoma (V16 42) (Z80 42)  Family History Reviewed: The family history was reviewed and updated today  Social History    · Current every day smoker (305 1) (F17 200)   · Non drinker / no alcohol use   · Sexually active  Social History Reviewed: The social history was reviewed and updated today  The social history was reviewed and is unchanged  Surgical History    1  History of Gastric Surgery   2  History of Laminectomy Lumbar  Surgical History Reviewed: The surgical history was reviewed and updated today  Current Meds   1  Descovy 200-25 MG Oral Tablet; take 1 tablet by mouth daily; Therapy: 61HGC5919 to (Evaluate:25Caw9020)  Requested for: 35Kvc6573; Last   Rx:22Aug2017 Ordered   2  Imiquimod 5 % External Cream; APPLY TO AFFECTED AREAS THREE TIMES WEEKLY  [address] OFF AFTER 6-10 HOURS; Therapy: 14TAJ8462 to (Last Rx:20Mar2017)  Requested for: 20Mar2017 Ordered   3  Lisinopril-Hydrochlorothiazide 10-12 5 MG Oral Tablet; take 1 tablet by mouth daily; Therapy: 63VJF6449 to (77 873 135)  Requested for: 62LHP7825; Last   Rx:79Fgw9098 Ordered   4  Nicotine Polacrilex 4 MG Mouth/Throat Gum; CHEW 1 PIECE SLOWLY AND   INTERMITTENTLY FOR 30 MINUTES  REPEAT EVERY 1-2 HOURS; MAXIMUM OF 24   PIECES/DAY; Therapy: 30VGO0701 to (77 873 135)  Requested for: 38ZXP2904; Last   Rx:22Nbe8598 Ordered   5  Norvir 100 MG Oral Tablet; TAKE 1 TABLET BY MOUTH EVERY TWELVE HOURS;    Therapy: 16GMX0357 to (Evaluate:42Gcm2033)  Requested for: 95Hdl9029; Last Rx: 52Qkc0954 Ordered   6  Prezista 600 MG Oral Tablet; TAKE 1 TABLET BY MOUTH EVERY TWELVE HOURS; Therapy: 48CAV5573 to (Evaluate:95Dkj2515)  Requested for: 68Mhn4214; Last   Rx:83Icw7912 Ordered   7  Tivicay 50 MG Oral Tablet; take 1 tablet by mouth twice a day; Therapy: 25SUC4138 to (Evaluate:74Hoh1832)  Requested for: 21Wjq9465; Last   Rx:59Pif2784 Ordered  Medication List Reviewed: The medication list was reviewed and updated today  Allergies    1  No Known Drug Allergies    Vitals  Signs   Recorded: 15Sep2017 09:52AM   Temperature: 97 9 F  Heart Rate: 92  Systolic: 833  Diastolic: 80  Height: 5 ft 8 in  Weight: 183 lb   BMI Calculated: 27 83  BSA Calculated: 1 97  O2 Saturation: 96    Physical Exam    Constitutional   General appearance: No acute distress, well appearing and well nourished  Eyes   Conjunctiva and lids: No swelling, erythema or discharge  Wears corrective lens  Ears, Nose, Mouth, and Throat   External inspection of ears and nose: Normal     Otoscopic examination: Tympanic membranes translucent with normal light reflex  Canals patent without erythema  Nasal mucosa, septum, and turbinates: Abnormal   There was clear rhinorrhea from both nares  The bilateral nasal mucosa was edematous and red  Oropharynx: Abnormal   Oral mucosa was moist, but was normal  The tongue was normal  The tonsils were normal    Pulmonary   Respiratory effort: No increased work of breathing or signs of respiratory distress  Auscultation of lungs: Clear to auscultation  Cardiovascular   Auscultation of heart: Normal rate and rhythm, normal S1 and S2, without murmurs  Examination of extremities for edema and/or varicosities: Normal     Abdomen   Abdomen: Non-tender, no masses  Liver and spleen: No hepatomegaly or splenomegaly  Lymphatic   Palpation of lymph nodes in neck: No lymphadenopathy      Psychiatric   Orientation to person, place, and time: Normal     Mood and affect: Normal  Attending Note  Collaborating Physician Note: Collaborating Physician: I agree with the Advanced Practitioner note        Future Appointments    Date/Time Provider Specialty Site   11/14/2017 04:45 PM Mel Candelario MD Infectious Disease [facility] AT [facility]   10/23/2017 02:00 PM ALLIE Stevens Epidemiology/Public [facility] [address] [place] [address]   Electronically signed by : Kiko Rangel; Sep 15 2017  1:48PM EST                       (Author)    Electronically signed by : Charly Abbott DO; Sep 15 2017  2:49PM EST                       (Author)

## 2018-01-10 NOTE — PROGRESS NOTES
History of Present Illness  [facility]: The patient is being seen regarding smoking cessation   He states the duration to be years   Smoking Cessation [facility]: The patient is being seen for clinic follow-up for tobacco cessation assistance  Current treatment includes: tobacco cessation program  The patient has not been able to quit  Physical Exam    Objective: Orientation: oriented to person, oriented to place and oriented to time  Appearance: well developed and appears healthy  Observed mood and affect: euthymic, but appropriate  Harm to self or others: denied  Substance abuse: none reported or observed  Assessment    1  Essential hypertension (401 9) (I10)   2  HIV disease (042) (B20)   3  Hepatitis C virus infection (070 70) (B19 20)   4  Nicotine dependence (305 1) (F17 200)   5  Dyslipidemia (272 4) (E78 5)    Plan    1  Mucus Relief ER 1200 MG Oral Tablet Extended Release 12 Hour    2  (1) AFP, SERUM; Status:Active; Requested PID:[id number];     3  Lisinopril-Hydrochlorothiazide 10-12 5 MG Oral Tablet; take 1 tablet by mouth   daily    4  US RIGHT UPPER QUADRANT; Status:Resulted - Requires Verification;   Done:   47SFU1697 08:04AM    5  Aspirin 81 MG Oral Tablet Delayed Release; TAKE 1 TABLET DAILY    6  Hepatitis B   7  Menactra Intramuscular Injectable   8  Menactra Intramuscular Injectable    9  * CT LUNG SCREENING PROGRAM; Status:Hold For - Scheduling; Requested   OOI:[id number];     [address] [facility]: Today, patient presents with desire to quit smoking but no success in doing so  Discussion Summary [facility]: The focus of [facility] consultation this day was smoking cessation  PT reported lack of success in quitting which was something he promised he would do before his next appt at his last one  [facility] empathized with PT and refrained from judging/shaming him   PT went on to express disappointment in himself for being unable to quit and said he will not promise anymore but is still very determined to quit  LEXI DICKSON [facility] provided some education on the addictive nature of the substances and encouragement never to stop trying  PT agreed  PT reported that the NRT supplies are on his dresser and he sees them regularly, he just has not been able to begin using them to quit        Future Appointments    Date/Time Provider Specialty Site   11/14/2017 04:45 PM Justin Martinez MD Infectious Disease [facility] AT [facility]   12/29/2017 11:30 AM ALLIE Gibson Epidemiology[facility] AT [id number] [place] [id number]   Electronically signed by : Mariajose Duran; Nov 10 2017  9:55AM EST                       (Author)

## 2018-01-10 NOTE — PROGRESS NOTES
Patient Health Assessment    Date:            08/23/2017  Blood Pressure:  142/95  Pulse:           76  Age:             62  Weight:          183 lbs  Height/Length:   5' 7"  Body Mass Index: 28 7  Provider:        [email or web address]  Clinic:          [facility]        Medical Alert: Tobacco User    ASC    High Blood Pressure    HIV/AIDS  Medications: Unknow ( Patient to bring Medication list)    Tivicay    Norvir    Prezista    OTHER    LISINOPRIL-HCTZ 10-12 5 MG TAB 10-12 5MG  Allergies:  Since Last Visit: Medical Alert: No Change    Medications: No Change    Allergies:        No Change  Pain Scale Type: Numeric Pain ScalePain Level: 0  Description:    Pt presented with a small lingual chip of resin done on #29  Pt has a very  heavy bite and severe grinding on his teeth  Etched and rinsed  Dried and  bonded  Placed bulk Alpha II A2 composite and cured  Reduced occlusion and  advised pt to wear his mouth guard everyday  Pt said that he usually doesn't  use his mouthguard very often  Emphasized the importance to use it to avoid  grinding his teeth and breaking restorations in the future  Pt agreed and  left in good health  NV: recall    LEE Greenberg    ----- Signed on Wednesday, August 23, 2017 at 9:04:21 AM  -----  ----- Provider: Daniel Carrington Dentist -- Clinic: [facility] -----

## 2018-01-11 NOTE — PROGRESS NOTES
Dx casts taken today  I will review after they are poured  Pt to set up  prophy and films, it is difficult for him to get off work so he will call  with his schedule and we will try to fit him into ours      ----- Signed on Friday, January 22, 2016 at 11:54:15 AM  -----  ----- Provider: [id number] - Garry Harrison, LOLA -- Clinic: [facility] -----

## 2018-01-11 NOTE — PROGRESS NOTES
Pt gave no indication of wanting to do the fixed prosthetics so the study  models were discarded      ----- Signed on Wednesday, May 11, 2016 at 2:31:43 PM  -----  ----- Provider: [id number] - Lv Narvaez DMD -- Clinic: [place] -----

## 2018-01-11 NOTE — PROGRESS NOTES
Assessment    1  HIV disease (042) (B20)   2  Nicotine dependence (305 1) (F17 200)   3  Cirrhosis (571 5) (K74 60)   4  Polycythemia (238 4) (D75 1)   5  Essential hypertension (401 9) (I10)    Plan    1  Follow-up visit in 3 months Evaluation and Treatment  Follow-up  Status: Hold For -   Scheduling  Requested for: 03AIS2832    2  (1) CBC/PLT/DIFF; Status:Active; Requested OSS:85GIT5651;    3  (1) COMPREHENSIVE METABOLIC PANEL; Status:Active; Requested XLZ:[id number];    4  (1) HEP B SURFACE ANTIBODY; Status:Active; Requested RTC:[id number];    5  (1) HIV-1 RNA QUANTITATIVE; [Do Not Release]; Status:Active; Requested   JQX:[id number];    6  (1) T LYMPH SUBSET (CD4); Status:Active; Requested DXD:[id number]; Discussion/Summary    HIV-improved adherence with ART with an undetectable viral load  His CD4 count has drifted below 200, however I expect a quick recovery and therefore will hold on Pneumocystis prophylaxis for now  Will continue the Prezista/Norvir/Tivicay/Descovy  Recheck labs in 2 months and follow up in 3 months  Stressed adherence  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  Right upper quadrant ultrasound for screening was negative and the alpha fetoprotein is pending  Will continue [facility]  screening for now  Nicotine dependence-he continues to smoke  I stressed the importance of tobacco cessation  He will meet with our smoking cessation coordinator to come up with a plan  Polycythemia-likely secondary to the nicotine dependence  Once again stressed the importance of tobacco cessation  We will continue to monitor the CBC with diff closely  Hypertension-asymptomatic  Discussed in detail with the primary who will address this issue  Possible side effects of new medications were reviewed with the patient/guardian today  The treatment plan was reviewed with the patient/guardian   The patient/guardian understands and agrees with the treatment plan   The patient was counseled regarding diagnostic results, instructions for management, prognosis, risks and benefits of treatment options, importance of compliance with treatment  Education   general HIV education  adherence  prevention care  Chief Complaint  Pt here for routine f/u  SPNS = 15      History of Present Illness  Routine follow-up for HIV  Patient claims 100% adherence with Prezista/Norvir/Tivicay/Descovy  He denies any notable side effects  He has continued to smoke but has a genuine interest in quitting  He recently had a CT scan for lung cancer screening  Pain Assessment   the patient states they do not have pain  Abuse And Domestic Violence Screen    Yes, the patient is safe at home  The patient states no one is hurting them  Depression And Suicide Screen  No, the patient has not had thoughts of hurting themself  No, the patient has not felt depressed in the past 7 days  The patient is being seen for a routine clinic follow-up of HIV infection  The patient is currently asymptomatic  Active Problems    1  Acute bronchitis (466 0) (J20 9)   2  Chronic hepatitis (571 40) (K73 9)   3  Condyloma (078 11) (A63 0)   4  Dyslipidemia (272 4) (E78 5)   5  Encounter for screening colonoscopy (V76 51) (Z12 11)   6  Encounter for screening examination for sexually transmitted disease (V74 5) (Z11 3)   7  Essential hypertension (401 9) (I10)   8  Fibrosis of liver (571 5) (K74 0)   9  Hepatitis B core antibody positive (795 79) (R76 8)   10  Hepatitis C virus infection (070 70) (B19 20)   11  HIV disease (042) (B20)   12  Impaired fasting glucose (790 21) (R73 01)   13  Need for prophylactic vaccination and inoculation against influenza (V04 81) (Z23)   14  Nicotine dependence (305 1) (F17 200)    Past Medical History    1  History of acute bronchitis (V12 69) (Z87 09)   2  History of Opioid dependence (304 00) (F11 20)    Surgical History    1  History of Gastric Surgery   2   History of Laminectomy Lumbar    Family History  Mother    1  Family history of Alzheimer's disease (V17 2) (Z82 0)  Father    2  Family history of myocardial infarction (V17 3) (Z82 49)  Brother    3  Family history of prostate carcinoma (V16 42) (Z80 42)    Social History    · Current every day smoker (305 1) (F17 200)   · Non drinker / no alcohol use   · Sexually active    Current Meds   1  Aspirin 81 MG Oral Tablet Delayed Release; TAKE 1 TABLET DAILY; Therapy: 70WDB9376 to (Sade Mendez)  Requested for: 31Oct2017; Last   Rx:31Oct2017 Ordered   2  Descovy 200-25 MG Oral Tablet; take 1 tablet by mouth daily; Therapy: 07ERR6385 to (Evaluate:29Wzt8121)  Requested for: 52Fll1811; Last   Rx:82Hdq6829 Ordered   3  Lisinopril-Hydrochlorothiazide 10-12 5 MG Oral Tablet; take 1 tablet by mouth daily; Therapy: 43BDJ5272 to (Evaluate:29Jan2018)  Requested for: 31Oct2017; Last   Rx:31Oct2017 Ordered   4  Nicotine Polacrilex 4 MG Mouth/Throat Gum; CHEW 1 PIECE SLOWLY AND   INTERMITTENTLY FOR 30 MINUTES  REPEAT EVERY 1-2 HOURS; MAXIMUM OF 24   PIECES/DAY; Therapy: 02UGQ4336 to ([id number])  Requested for: 92IIA6699; Last   Rx:[id number] Ordered   5  Norvir 100 MG Oral Tablet; TAKE 1 TABLET BY MOUTH EVERY TWELVE HOURS; Therapy: 66BRV0184 to (Evaluate:30Yof3717)  Requested for: 27Xrn1629; Last   Rx:61Tnf3788 Ordered   6  Prezista 600 MG Oral Tablet; TAKE 1 TABLET BY MOUTH EVERY TWELVE HOURS; Therapy: 78CAV3189 to (Evaluate:30Hqd1694)  Requested for: 33Nyr1012; Last   Rx:54Mbn5543 Ordered   7  Tivicay 50 MG Oral Tablet; take 1 tablet by mouth twice a day; Therapy: 76YOL8079 to (Reema Daly)  Requested for: 46Ywk1947; Last   Rx:50Eaf0267 Ordered    Allergies    1   No Known Drug Allergies    Vitals  Signs   Recorded: 88LHA9168 04:40PM   Temperature: 97 8 F  Heart Rate: 89  Systolic: 321  Diastolic: 94  Height: 5 ft 8 in  Weight: 188 lb 2 oz  BMI Calculated: 28 6  BSA Calculated: 1 99  O2 Saturation: 97    Physical Exam    Constitutional   General appearance: No acute distress, well appearing and well nourished  Ears, Nose, Mouth, and Throat   Nasal mucosa, septum, and turbinates: Normal without edema or erythema  Oropharynx: Normal with no erythema, edema, exudate or lesions  Pulmonary   Respiratory effort: No increased work of breathing or signs of respiratory distress  Auscultation of lungs: Clear to auscultation  Cardiovascular   Auscultation of heart: Normal rate and rhythm, normal S1 and S2, without murmurs  Examination of extremities for edema and/or varicosities: Normal     Abdomen   Abdomen: Non-tender, no masses  Liver and spleen: No hepatomegaly or splenomegaly  Lymphatic   Palpation of lymph nodes in neck: No lymphadenopathy         Future Appointments    Date/Time Provider Specialty Site   12/29/2017 11:30 AM ALLIE Chin [facility]iology[facility] AT [address] [facility]   Electronically signed by : Ebony Mohr MD; Nov 14 2017  5:08PM EST                       (Author)

## 2018-01-11 NOTE — PROGRESS NOTES
Patient Health Assessment    Date:            11/14/2016  Blood Pressure:  148/105  Pulse:           75  Age:             64  Weight:          180 lbs  Height/Length:   5' 9"  Body Mass Index: 26 6  Provider:        [id number]  Clinic:          [facility]: Tobacco User    ASC    Hepatitis C  Medications: Unknow ( Patient to bring Medication list)  Allergies:  Since Last Visit: Medical Alert: No Change    Medications: No Change    Allergies:        No Change  Pain Scale Type: Numeric Pain ScalePain Level: 0  Description:    S: Pt presented as emergency with pain on #13  Pt said that its been hurting  for a long time now but it got worse few days ago  O: Took PA and #13 widen PDL and bone loss  Clinically tooth is mobile  A: #13 Localized Severe Periodontitis    P: Explained to pt that tooth is very mobile, has lost lots of bone and it  was hopeless  Pt decided to extract it today  Ext #13    Patient presents for Ext #13  [address], patient denies any changes  Obtained consent, and Pre-Op BP WNL  Administered 2 carpules of 2 % Lidocaine w/ 1:100,000 epi via infiltrations  Adequate anesthesia obtained, reflected gingiva, elevated, and extracted # 13  with no complications   Upon dismissal, patient received POI, gauze  Pt has pain meds at home  Pt  left in good health  NV: recall    LEE Meredith    ----- Signed on Monday, November 14, 2016 at 3:22:05 PM  -----  ----- Provider: Rivera Cordero Dentist -- Clinic: [facility] -----

## 2018-01-11 NOTE — PROGRESS NOTES
Assessment    1  HIV disease (042) (B20)   2  Hepatitis C virus infection (070 70) (B19 20)   3  Impaired fasting glucose (790 21) (R73 01)   4  Fibrosis of liver (571 5) (K74 0)    Plan  Chronic hepatitis    · US RIGHT UPPER QUADRANT; Status:Active; Requested for:29Apr2016;   Chronic hepatitis, Fibrosis of liver    · (1) AFP, SERUM; Status:Active; Requested OTX:[id number];     Discussion/Summary    HIV: CD4 257 VL <20  Compliant with Norvir, Prezista, Tivicay, and Vired  Next ID appoint 6/14/16  HepC: On 24 week course of daclatasvir+SBV  FInishes 5/18  Compliant with treatment course  AFP and RUQ US ordered today for surveillance  Experiences mild GI symptoms; diarrhea X 3 days then constipation X 3 days  Uses Imodium PRN with good relief  Educated on increasing fiber and adding yogurt to diet to help promote a healthy bowel cycle  Fasting BGS 94 and HgbA1C 5 4  Improved from previous studies  Discussed eating a diet low in processed foods  Corrinne Ina enjoys baked goods as a snack quite frequently  Encouraged to substitute fresh fruit and vegetables to improve health  BP elevated 156/100, repeat 152/90  Last visit in March BP was 158/102  Mario states BP is always elevated when he is here because he is nervous  ,Requested BP be measured at home  Called in to report BP of 133/80 two hours later  Will review BP log at next visit  Health Maintenance: Has regular dental and vision care  Scheduled for tooth extraction 5/11/2016  Discussed colonoscopy, but currently uninsured  Willing to have screening done once health care insurance reinstated  Possible side effects of new medications were reviewed with the patient/guardian today  The treatment plan was reviewed with the patient/guardian   The patient/guardian understands and agrees with the treatment plan   The patient was counseled regarding diagnostic results, instructions for management, risk factor reductions, prognosis, impressions, risks and benefits of treatment options, importance of compliance with treatment  Topics Covered: reviewed motivations  Education   general HIV education  adherence  prevention care  Chief Complaint  Pt offers no c/o at this time  History of Present Illness  Kamryn Mendieta is here today for three month follow up  He is doing well and offers no complaints  Pain Assessment   the patient states they do not have pain  Abuse And Domestic Violence Screen    Yes, the patient is safe at home  The patient states no one is hurting them  Depression And Suicide Screen  No, the patient has not had thoughts of hurting themself  No, the patient has not felt depressed in the past 7 days  CD4: 257  VL: <20  Time spent: 15 minutes  Strength: good insight into disease process  Weakness: salvage ART therapy, multiple medications  Plan of Action: reveiwed the importance of adherece  SUBSTANCE ABUSE: not using ETOH  not using drugs  SMOKING: He is a current smoker, uses cigars, 5 cigars/week packs per day, for 41 years years and has thought about quitting  He has the following barriers: no will power  HOUSING: He has stable housing  There are 2 people living in the household (including children)  The household income is $1400 00/month  HEALTH MAINTENANCE: His last dental exam was scheduled 5/11/16  His last eye exam was 2015  Review of Systems    Constitutional: No fever or chills, feels well, no tiredness, no recent weight gain or weight loss  Eyes: No complaints of eye pain, no red eyes, no discharge from eyes, no itchy eyes  ENT: no complaints of earache, no hearing loss, no nosebleeds, no nasal discharge, no sore throat, no hoarseness  Cardiovascular: No complaints of slow heart rate, no fast heart rate, no chest pain, no palpitations, no leg claudication, no lower extremity  Respiratory: No complaints of shortness of breath, no wheezing, no cough, no SOB on exertion, no orthopnea or PND  Gastrointestinal: constipation, diarrhea and side effects of Hep C treatment  Genitourinary: No complaints of dysuria, no incontinence, no hesitancy, no nocturia, no genital lesion, no testicular pain  Musculoskeletal: No complaints of arthralgia, no myalgias, no joint swelling or stiffness, no limb pain or swelling  Integumentary: No complaints of skin rash or skin lesions, no itching, no skin wound, no dry skin  Neurological: No compliants of headache, no confusion, no convulsions, no numbness or tingling, no dizziness or fainting, no limb weakness, no difficulty walking  Psychiatric: Is not suicidal, no sleep disturbances, no anxiety or depression, no change in personality, no emotional problems  Endocrine: No complaints of proptosis, no hot flashes, no muscle weakness, no erectile dysfunction, no deepening of the voice, no feelings of weakness  Hematologic/Lymphatic: No complaints of swollen glands, no swollen glands in the neck, does not bleed easily, no easy bruising  Active Problems    1  Acute bronchitis (466 0) (J20 9)   2  Chronic hepatitis (571 40) (K73 9)   3  Dyslipidemia (272 4) (E78 5)   4  Fibrosis of liver (571 5) (K74 0)   5  Hepatitis B core antibody positive (795 79) (R76 8)   6  Hepatitis C virus infection (070 70) (B19 20)   7  HIV disease (042) (B20)   8  Impaired fasting glucose (790 21) (R73 01)    Past Medical History    1  History of acute bronchitis (V12 69) (Z87 09)   2  History of Opioid dependence (304 00) (F11 20)    Surgical History    1  History of Laminectomy Lumbar    Family History  Mother    1  Family history of Alzheimer's disease (V17 2) (Z82 0)  Father    2  Family history of myocardial infarction (V17 3) (Z82 49)  Brother    3  Family history of prostate carcinoma (V16 42) (Z80 42)    Social History    · Current every day smoker (305 1) (F17 200)   · Non drinker / no alcohol use   · Sexually active    Current Meds   1  Daklinza 60 MG Oral Tablet;  Take 1 tablet daily; Therapy: 21WBX5565 to (Evaluate:27Jan2016); Last Rx:90Fbu6877 Ordered   2  Loperamide HCl - 2 MG Oral Tablet; TAKE 2 TABLETS AFTER 1ST LOOSE STOOL, THEN   1 TABLET AFTER EACH SUBSEQUENT LOOSE STOOL (MAX 16MG/DAY); Therapy: 42NAB4698 to (Last Rx:62Fpx6652)  Requested for: 53BBA7438 Ordered   3  Norvir 100 MG Oral Tablet; TAKE 1 TABLET BY MOUTH EVERY TWELVE HOURS; Therapy: 48AKO6058 to (Evaluate:02Oct2016)  Requested for: 05Apr2016; Last   Rx:05Apr2016 Ordered   4  Prezista 600 MG Oral Tablet; TAKE 1 TABLET BY MOUTH EVERY TWELVE HOURS; Therapy: 02RSC2368 to (Evaluate:02Oct2016)  Requested for: 05Apr2016; Last   Rx:35Suo9050 Ordered   5  Sovaldi 400 MG Oral Tablet; Take 1 tablet daily; Therapy: 45UAF1717 to (Evaluate:27Jan2016); Last Rx:81Kwu0495 Ordered   6  Tivicay 50 MG Oral Tablet; take 1 tablet by mouth twice a day; Therapy: 23OAO3114 to (Evaluate:02Oct2016)  Requested for: 05Apr2016; Last   Rx:05Apr2016 Ordered   7  Viread 300 MG Oral Tablet; take 1 tablet by mouth daily; Therapy: 82VWE6646 to (Evaluate:02Oct2016)  Requested for: 05Apr2016; Last   Rx:74Eva8002 Ordered    Allergies    1  No Known Drug Allergies    Vitals  Signs [Data Includes: Current Encounter]   Recorded: 43VTA9195 09:29AM   Temperature: 97 9 F  Heart Rate: 76  Systolic: 583  Diastolic: 418  Weight: 582 lb 4 oz  BMI Calculated: 28 17  BSA Calculated: 1 98    Physical Exam    Constitutional   General appearance: No acute distress, well appearing and well nourished  Eyes   Conjunctiva and lids: No swelling, erythema or discharge  wears corrective lens  Pupils and irises: Equal, round and reactive to light  Ears, Nose, Mouth, and Throat   External inspection of ears and nose: Normal     Otoscopic examination: Tympanic membranes translucent with normal light reflex  Canals patent without erythema  Nasal mucosa, septum, and turbinates: Normal without edema or erythema      Oropharynx: Normal with no erythema, edema, exudate or lesions  Pulmonary   Respiratory effort: No increased work of breathing or signs of respiratory distress  Auscultation of lungs: Clear to auscultation  Cardiovascular   Auscultation of heart: Normal rate and rhythm, normal S1 and S2, without murmurs  Examination of extremities for edema and/or varicosities: Normal     Carotid pulses: Normal     Abdomen   Abdomen: Non-tender, no masses  Liver and spleen: No hepatomegaly or splenomegaly  Lymphatic   Palpation of lymph nodes in neck: No lymphadenopathy  Musculoskeletal   Gait and station: Normal     Digits and nails: Normal without clubbing or cyanosis  Inspection/palpation of joints, bones, and muscles: Normal     Muscle strength: Normal strength throughout  Skin   Skin and subcutaneous tissue: Normal without rashes or lesions  Neurologic   Cranial nerves: Cranial nerves 2-12 intact  Reflexes: 2+ and symmetric  Sensation: No sensory loss  Psychiatric   Orientation to person, place, and time: Normal     Mood and affect: Normal     Lips, Teeth and Gums: The lips were normal with no lesions  Examination of the teeth revealed dental caries and missing teeth  Examination of the gingiva showed no abnormalities  Attending Note  Collaborating Physician: I agree with the Advanced Practitioner note  Future Appointments    Date/Time Provider Specialty Site   06/14/2016 04:15 PM Kamla Brumfield MD Internal Medicine [facility] [place]   07/22/2016 09:30 AM ALLIE Oneal [facility]iology[facility] [place]     Signatures   Electronically signed by : Farzana Barrera;  Apr 29 2016  2:26PM EST                       (Author)    Electronically signed by : Anais Gerard DO; May  2 2016 10:06AM EST                       (Author)

## 2018-01-12 NOTE — PROGRESS NOTES
Assessment    1  Chronic hepatitis (571 40) (K73 9)   2  HIV disease (042) (B20)    Plan  Chronic hepatitis    · Loperamide HCl 2 MG TABS    Discussion/Summary    Jacqueline Bledsoe is a 55-year-old male who is here today for primary care follow-up  Offers no acute complaints and is feeling well  Recently started a new job working for [employer] and is happy with his new occupation  HIV: CD4 254 VL <20 Salvage ART Norvir, Prezista, Tivicay and Viread  Missed 4 doses of ART due to lapse in insurance coverage  Currently has SPBP but is working with case management to obtain more comprehensive medical coverage  Hepatitis C: Successfully completed 24 week treatment with Daclatasvir and Sofosbuvir  HCV VL < 15  HCV surveillance completed; AFP and RUQ US negative  HTN: Most likely related to anxiety associated with medical visits  Has taken blood pressure at different locations in the community and at home  Reports -130 and DBP 78-86  Denies family history of heart disease  Jacqueline Bledsoe is reluctant to start antihypertensives  Denies HA, vision changes, or CP  Will continue to monitor for now  Health Maintenance: Colonoscopy screening delayed due to lack of health insurance  Will assess for coverage at next PCP visit  Educated on the need to eat a diet high in fresh fruits, vegetables, and protein  Advised to avoid processed and high sodium foods  Encouraged to get daily exercise  Counseled in smoking cessation and advised to quit  Previously smoked cigarettes but has since transitioned to smoking 2 cigars a day  Reluctant to give up habit at this time  Declines further intervention  Primary care follow-up scheduled for 4 months  Possible side effects of new medications were reviewed with the patient/guardian today  The treatment plan was reviewed with the patient/guardian   The patient/guardian understands and agrees with the treatment plan   The patient was counseled regarding diagnostic results, risk factor reductions, patient and family education, impressions  Topics Covered: reviewed motivations, advised patient not to smoke while using NRT and reviewed NRT dosing information  Counseling   Patient reports there are no people in their life who should be tested for HIV  Education   general HIV education  adherence  prevention care  Chief Complaint  Pt offers no c/o at this time  Pt did miss approximately 4 days of HIV meds while waiting for SPBP coverage  History of Present Illness    Pain Assessment   the patient states they do not have pain  Abuse And Domestic Violence Screen    Yes, the patient is safe at home  The patient states no one is hurting them  Depression And Suicide Screen  No, the patient has not had thoughts of hurting themself  No, the patient has not felt depressed in the past 7 days  CD4: 254  VL: <20  Time spent: 20 minutes  Strength: Understands these process, strong desire to be healthy  Weakness: Fluctuating insurance coverage  Plan of Action: Encourage cooperation with case management to establish stable insurance coverage  SUBSTANCE ABUSE: ETOH use  amount: rarely  not using drugs  SMOKING: He is a current smoker, uses cigars, 10 cigars/week packs per day and for 30 years  SEXUALLY ACTIVE: He is sexually active and having vaginal sex  He never uses condoms  He has had one partners in the last 90 days  HOUSING: He has stable housing  There are 2 people living in the household (including children)  The household income is $2400 00/month  HEALTH MAINTENANCE: His last dental exam was 2016  His last eye exam was needs  Review of Systems    Constitutional: No fever or chills, feels well, no tiredness, no recent weight gain or weight loss  Eyes: No complaints of eye pain, no red eyes, no discharge from eyes, no itchy eyes  ENT: no complaints of earache, no hearing loss, no nosebleeds, no nasal discharge, no sore throat, no hoarseness  Cardiovascular: No complaints of slow heart rate, no fast heart rate, no chest pain, no palpitations, no leg claudication, no lower extremity  Respiratory: No complaints of shortness of breath, no wheezing, no cough, no SOB on exertion, no orthopnea or PND  Gastrointestinal: No complaints of abdominal pain, no constipation, no nausea or vomiting, no diarrhea or bloody stools  Genitourinary: No complaints of dysuria, no incontinence, no hesitancy, no nocturia, no genital lesion, no testicular pain  Musculoskeletal: No complaints of arthralgia, no myalgias, no joint swelling or stiffness, no limb pain or swelling  Integumentary: No complaints of skin rash or skin lesions, no itching, no skin wound, no dry skin  Neurological: No compliants of headache, no confusion, no convulsions, no numbness or tingling, no dizziness or fainting, no limb weakness, no difficulty walking  Psychiatric: Is not suicidal, no sleep disturbances, no anxiety or depression, no change in personality, no emotional problems  Endocrine: No complaints of proptosis, no hot flashes, no muscle weakness, no erectile dysfunction, no deepening of the voice, no feelings of weakness  Hematologic/Lymphatic: No complaints of swollen glands, no swollen glands in the neck, does not bleed easily, no easy bruising  Active Problems    1  Acute bronchitis (466 0) (J20 9)   2  Chronic hepatitis (571 40) (K73 9)   3  Dyslipidemia (272 4) (E78 5)   4  Fibrosis of liver (571 5) (K74 0)   5  Hepatitis B core antibody positive (795 79) (R76 8)   6  Hepatitis C virus infection (070 70) (B19 20)   7  HIV disease (042) (B20)   8  Impaired fasting glucose (790 21) (R73 01)    Past Medical History    1  History of acute bronchitis (V12 69) (Z87 09)   2  History of Opioid dependence (304 00) (F11 20)    The active problems and past medical history were reviewed and updated today  Surgical History    1   History of Laminectomy Lumbar    The surgical history was reviewed and updated today  Family History  Mother    1  Family history of Alzheimer's disease (V17 2) (Z82 0)  Father    2  Family history of myocardial infarction (V17 3) (Z82 49)  Brother    3  Family history of prostate carcinoma (A61 98) (Z80 42)    The family history was reviewed and updated today  Social History    · Current every day smoker (305 1) (F17 200)   · Non drinker / no alcohol use   · Sexually active  The social history was reviewed and updated today  The social history was reviewed and is unchanged  Current Meds   1  Loperamide HCl 2 MG TABS; TAKE 2 TABLETS AFTER 1ST LOOSE STOOL, THEN 1   TABLET AFTER EACH SUBSEQUENT LOOSE STOOL (MAX 16MG/DAY); Therapy: [id number] to (Last Rx:[id number])  Requested for: 54PNG7177 Ordered   2  Norvir 100 MG Oral Tablet; TAKE 1 TABLET BY MOUTH EVERY TWELVE HOURS; Therapy: 26KMI2396 to (Ruddy Lie)  Requested for: 05Apr2016; Last   Rx:[id number] Ordered   3  Prezista 600 MG Oral Tablet; TAKE 1 TABLET BY MOUTH EVERY TWELVE HOURS; Therapy: 65UXD6973 to (Evaluate:02Oct2016)  Requested for: 05Apr2016; Last   Rx:[id number] Ordered   4  Tivicay 50 MG Oral Tablet; take 1 tablet by mouth twice a day; Therapy: 80BKS8301 to (Evaluate:02Oct2016)  Requested for: 05Apr2016; Last   Rx:[id number] Ordered   5  Viread 300 MG Oral Tablet; take 1 tablet by mouth daily; Therapy: 81ZJU7988 to (Evaluate:02Oct2016)  Requested for: 05Apr2016; Last   Rx:[id number] Ordered    The medication list was reviewed and updated today  Allergies    1  No Known Drug Allergies    Vitals  Signs   Recorded: 61BFJ7014 60:73AF   Systolic: 965  Diastolic: 90  Recorded: 59QBL5060 36:04DA   Systolic: 528  Diastolic: 90  Heart Rate: 80  Temperature: 98 F  Weight: 188 lb 6 oz  BMI Calculated: 28 64  BSA Calculated: 1 99    Physical Exam    Constitutional   General appearance: No acute distress, well appearing and well nourished      Eyes   Conjunctiva and lids: No swelling, erythema or discharge  Wears corrective lenses  Pupils and irises: Equal, round and reactive to light  Ears, Nose, Mouth, and Throat   External inspection of ears and nose: Normal     Otoscopic examination: Tympanic membranes translucent with normal light reflex  Canals patent without erythema  Nasal mucosa, septum, and turbinates: Normal without edema or erythema  Oropharynx: Normal with no erythema, edema, exudate or lesions  Pulmonary   Respiratory effort: No increased work of breathing or signs of respiratory distress  Auscultation of lungs: Clear to auscultation  Cardiovascular   Auscultation of heart: Normal rate and rhythm, normal S1 and S2, without murmurs  Examination of extremities for edema and/or varicosities: Normal     Carotid pulses: Normal     Abdomen   Abdomen: Non-tender, no masses  Liver and spleen: No hepatomegaly or splenomegaly  Lymphatic   Palpation of lymph nodes in neck: No lymphadenopathy  Musculoskeletal   Gait and station: Normal     Digits and nails: Normal without clubbing or cyanosis  Inspection/palpation of joints, bones, and muscles: Normal     Muscle strength: Normal strength throughout  Skin   Skin and subcutaneous tissue: Normal without rashes or lesions  Neurologic   Cranial nerves: Cranial nerves 2-12 intact  Psychiatric   Orientation to person, place, and time: Normal     Mood and affect: Normal     Lips, Teeth and Gums: The lips were normal with no lesions  Examination of the teeth revealed dental caries and missing teeth  Examination of the gingiva showed no abnormalities        Results/Data  (Q) HCV RNA, QUANTITATIVE REAL TIME PCR [id number] 08:09AM Kelechi Thomas   REPORT COMMENT:  FASTING:YES     Test Name Result Flag Reference   HCV RNA PCR QT (IU/ML)   < 15   <15 Not Detected IU/mL   HCV RNA PCR QT $(LOG IU/ML)   <1 18   <1 18 Not Detected log IU/mL   This test was performed using the GIOVANA(R) AmpliPrep/GIOVANA(R)  TaqMan(R) HCV Test, v2 0  The performance characteristics of this assay have been determined by  [facility]  Performance characteristics refer to the analytical  performance of the test    For more information on this test, go to:  [email or web address]     (1) CBC/PLT/DIFF [id number] 08:00AM Donis Miranda     Test Name Result Flag Reference   WHITE BLOOD CELL COUNT 5 9 Thousand/uL  3 8-10 8   RED BLOOD CELL COUNT 6 02 Million/uL H 4 20-5 80   HEMOGLOBIN 18 1 g/dL H 13 2-17 1   HEMATOCRIT 55 0 % H 38 5-50 0   MCV 91 3 fL  80 0-100 0   MCH 30 1 pg  27 0-33 0   MCHC 32 9 g/dL  32 0-36 0   RDW 14 8 %  11 0-15 0   PLATELET COUNT 289 Thousand/uL  140-400   MPV 8 3 fL  7 5-11 5   ABSOLUTE NEUTROPHILS 3605 cells/uL  0775-5725   ABSOLUTE LYMPHOCYTES 1499 cells/uL  850-3900   ABSOLUTE MONOCYTES 690 cells/uL  200-950   ABSOLUTE EOSINOPHILS 94 cells/uL     ABSOLUTE BASOPHILS 12 cells/uL  0-200   NEUTROPHILS 61 1 %     LYMPHOCYTES 25 4 %     MONOCYTES 11 7 %     EOSINOPHILS 1 6 %     BASOPHILS 0 2 %       (1) COMPREHENSIVE METABOLIC PANEL [id number] 53:69OK Donis Miranda      Test Name Result Flag Reference   GLUCOSE 97 mg/dL  65-99   Fasting reference interval   UREA NITROGEN (BUN) 14 mg/dL  7-25   CREATININE 0 90 mg/dL  0 70-1 33   For patients >52years of age, the reference limit  for Creatinine is approximately 13% higher for people  identified as -American  eGFR NON-AFR   AMERICAN 95 mL/min/1 73m2  > OR = 60   eGFR AFRICAN AMERICAN 110 mL/min/1 73m2  > OR = 60   BUN/CREATININE RATIO   5-10   NOT APPLICABLE (calc)   SODIUM 137 mmol/L  135-146   POTASSIUM 4 2 mmol/L  3 5-5 3   CHLORIDE 105 mmol/L     CARBON DIOXIDE 25 mmol/L  20-31   CALCIUM 9 3 mg/dL  8 6-10 3   PROTEIN, TOTAL 7 7 g/dL  6 1-8 1   ALBUMIN 4 4 g/dL  3 6-5 1   GLOBULIN 3 3 g/dL (calc)  1 9-3 7   ALBUMIN/GLOBULIN RATIO 1 3 (calc)  1 0-2 5   BILIRUBIN, TOTAL 0 9 mg/dL  0 2-1 2   ALKALINE PHOSPHATASE 87 U/L     AST 30 U/L  10-35   ALT 18 U/L  9-46     (Q) LYMPHOCYTE SUBSET PANEL 5 [id number] 08:00AM Dustin Miranda     Test Name Result Flag Reference   % CD4 (HELPER CELLS) 16 % L 30-61   ABSOLUTE CD4+ CELLS 254 cells/uL L 490-1740   ABSOLUTE LYMPHOCYTES 1614 cells/uL  850-3900     (Q) HIV 1 RNA, QUANTITATIVE REAL TIME PCR [id number] 08:00AM Jim Miranda   REPORT COMMENT:  FASTING:YES     Test Name Result Flag Reference   HIV 1 RNA QN RT PCR$(COPIES/ML)  A <20   <20 Detected Copies/mL   HIV 1 RNA QN RT PCR$(LOG COPIES/ML)   <1 30   <1 30 Detected Log/cps/mL   This test was performed using the GIOVANA(R) AmpliPrep/ GIOVANA(R)   TaqMan(R) HIV-1 test kit version 2 0 ([facility]  )  (1) AFP, SERUM 10Jun2016 10:32AM Tavo Saul Order Number: [id number]     Test Name Result Flag Reference   AFP 6 9 ng/mL  0 0 - 8 3   Normal values apply only to males and to nonpregnant females  These results are not interpretable for pregnant females  Roche ECLIA methodology  Values obtained with different assay methods or kits cannot beused interchangeably  Results cannot be interpreted as absoluteevidence of the presence or absence of malignant disease  Performed at:  [address]  [id number]  : Yola Mcbride MD, Phone:  [phone]  [facility] [employer] [id number] 09:04AM Tavo Saul Order Number: [id number]   Performing Comments: HCV Surveilence   - Patient Instructions: To schedule this appointment, please contact Central Scheduling at [phone]  Test Name Result Flag Reference   US RIGHT UPPER QUADRANT (Report)     RIGHT UPPER QUADRANT ULTRASOUND     INDICATION: 59-year-old with history of chronic hepatitis        COMPARISON: None  TECHNIQUE:  Real-time ultrasound of the right upper quadrant was performed with a curvilinear transducer with both volumetric sweeps and still imaging techniques       FINDINGS: PANCREAS: Visualized portions of the pancreas are within normal limits  AORTA AND IVC: Visualized portions are normal for patient age  LIVER:   Size: Mildly enlarged  The liver measures 19 1 cm in the midclavicular line  Contour: Surface contour is smooth  Parenchyma: Echogenicity remains within normal limits  There is very mild coarsened echotexture  But otherwise normal hepatic echogenicity and contour, this is a nonspecific finding  Mild underlying fibrotic changes cannot be excluded  No evidence of suspicious mass  The main portal vein is patent and hepatopetal       BILIARY:   The gallbladder is normal in caliber  No wall thickening or pericholecystic fluid  No stones or sludge identified  No sonographic Sarmiento's sign  No intrahepatic biliary dilatation  CBD measures 4 mm  No choledocholithiasis  KIDNEY:    Right kidney measures 11 4 x 4 6 cm  Within normal limits  ASCITES:  None  IMPRESSION:     Mild hepatomegaly  Questionable mild coarsened echotexture with otherwise normal echogenicity  No evidence for underlying liver lesion or vascular alteration  Workstation performed: [id number]     Signed by:   Omari Jacobo MD   5/6/16     Attending Note  Collaborating Physician: I agree with the Advanced Practitioner note  Future Appointments    Date/Time Provider Specialty Site   09/27/2016 05:30 PM Hardie Phoenix, MD Internal Medicine [facility] [address] [address] AT [facility]   01/09/2017 02:00 PM ALLIE Rivas Epidemiology/Public Health [address] [facility] [place]   Electronically signed by : Con Randolph;  Aug 30 2016  3:08PM EST                       (Author)    Electronically signed by : Mario Acosta DO; Aug 30 2016  3:27PM EST                       (Author)

## 2018-01-12 NOTE — PROGRESS NOTES
History of Present Illness  [facility]:   He is being seen for an initial consultation  The patient is being seen regarding meeting new [facility] and reviewing [facility] services  Support/Coping: wife, daughter and in-laws  Physical Exam    Objective: Orientation: oriented to person, oriented to place and oriented to time  Appearance: well developed, well nourished and well groomed  Observed mood and affect: appropriate  Harm to self or others: none reported or observed  Substance abuse: denied current use but confirmed drug use in the past       Assessment    1  Essential hypertension (401 9) (I10)   2  Condyloma (078 11) (A63 0)   3  HIV disease (042) (B20)   4  Hepatitis C virus infection (070 70) (B19 20)    Plan    1  (1) AFP, SERUM; Status:Active; Requested for:20Mar2017;     2  Imiquimod 5 % External Cream; APPLY TO AFFECTED AREAS THREE TIMES   WEEKLY  [address] OFF AFTER 6-10 HOURS   3  Lisinopril-Hydrochlorothiazide 10-12 5 MG Oral Tablet; TAKE 1 TABLET DAILY   4  (1) COMPREHENSIVE METABOLIC PANEL; Status:Active; Requested for:24Apr2017;     5  US RIGHT UPPER QUADRANT; Status:Hold For - Scheduling; Requested   for:20Mar2017;     6  Hepatitis B    Discussion/Summary  [facility]: Today, patient presents with no major issues or concerns  Consider/focus/continue use natural supports regularly  The stage of change is maintenance  Behavioral recommendations: 1  Check in with [facility] at future appointments as needed  Discussion Summary [facility]:   [facility] introduced self to PT as well as reviewed available [facility] services  PT stated that he does not have any issues at this time  PT reported being safe at home and denies current substance use  PT stated that he had used drugs in the past and that was how he got HIV  PT concluded by stating that he likes [facility] and has had only positive experiences with everyone here        Future Appointments    Date/Time Provider Specialty Site   03/28/2017 05:30 PM [facility]   06/19/2017 02:00 PM ALLIE Tiwari Epidemiology/Public Health [facility]   Electronically signed by : Ellen Fallon Julien 87; Mar 21 2017  4:17PM EST                       (Author)

## 2018-01-13 VITALS
BODY MASS INDEX: 28.38 KG/M2 | HEIGHT: 68 IN | HEART RATE: 75 BPM | SYSTOLIC BLOOD PRESSURE: 150 MMHG | TEMPERATURE: 98 F | OXYGEN SATURATION: 98 % | DIASTOLIC BLOOD PRESSURE: 96 MMHG | WEIGHT: 187.25 LBS

## 2018-01-13 VITALS
HEIGHT: 68 IN | WEIGHT: 184.25 LBS | DIASTOLIC BLOOD PRESSURE: 78 MMHG | BODY MASS INDEX: 27.92 KG/M2 | SYSTOLIC BLOOD PRESSURE: 132 MMHG

## 2018-01-13 NOTE — PROGRESS NOTES
History of Present Illness  [facility]:   He is being seen in follow-up  The patient is being seen regarding wellness screener   Support/Coping: taking time for self care  [facility]:         1  I like who I am  Yes, describes me exactly (12)   2  I am not an easy person to get along with    No, doesn't describe me at all (22)   3  I am basically a healthy person    Somewhat describes me (31)   4  I give up to easily    Somewhat describes me (41)   5  I have difficulty concentrating    Somewhat describes me (51)   6  I am happy with my family relationships    Yes, describes me exactly (62)   7  I am comfortable being around people    Somewhat describes me (71)     8  Walking up a flight of stairs    Some (81)   9  Running the length of a football field    Lajean Cassette A Lot (90)     10  Sleeping    None (102)   11  Hurting or aching in any part of your body    Lajean Cassette A Lot (110)   12  Getting tired easily    Lajean Cassette A Lot (120)   13  Feeling depressed or sad    None (132)   14  Nervousness    None (142)     15  Socialize with other people (talk or visit with friends or relatives Some ([phone])   12  Take part in social, [employer], or recreation activities (meetings, Advent, movies, sports, parties)    Some (161)   17  Stay in your home, nursing home, or hospital because of sickness, injury, or other health problem None (172)   [address]     8 = 2   9 = 1   10 = 2   11 = 2   12 = 2   Sum = 9   PHYSICAL HEALTH SCORE 90      1 = 2   4 = 2   5 = 2   13 = 2   14 = 2   Sum = 10   MENTAL HEALTH SCORE 100      2 = 2   6 = 2   7 = 2   15 = 2   16 = 1   Sum = 9   SOCIAL HEALTH SCORE 90  Physical Health score = 90   Mental Health score = 100   Social Health score = 90   Sum = 280   GENERAL HEALTH SCORE 93 333      3 = 2   PERCEIVED HEALTH SCORE 100      1 = 2   2 = 2   4 = 2   6 = 2   7 = 2   Sum = 10   SELF-ESTEEM SCORE 100            2 = 2 and 0   5 = 2 and 0   7 = 2 and 0   10 = 2 and 0   12 = 2 and 0   14 = 2 and 0   Sum = 0   ANXIETY SCORE 0      4 = 2 and 0   5 = 2 and 0   10 = 2 and 0   12 = 2 and 0   13 = 2 and 0   Sum = 0   DEPRESSION SCORE 0      4 = 2, 0, 2 and 0   7 = 2 and 0   10 = 2 and 0   12 = 2 and 0   13 = 2 and 0   14 = 2 and 0   Sum = 0   ANXIETY-DEPRESSION ([facility]-AD) SCORE 0      11 = 2 and 0   PAIN SCORE 0      17 = 2 and 0   DISABILITY SCORE 0  Physical Exam    Objective: Orientation: oriented to person, oriented to place and oriented to time  Appearance: well developed, well nourished and appearance reflects stated age  Observed mood and affect: euthymic, but appropriate  Harm to self or others: None reported or observed  Substance abuse: None reported or observed  Plan    1  US RIGHT UPPER QUADRANT; Status:Active; Requested for:90Knj2696;     2  (1) AFP, SERUM; Status:Active; Requested RCU:[id number];     [address] [facility]: Today, patient presents with no major concerns  Patient will likely benefit from continued maintenance of wellbeing  The stage of change is maintenance  Behavioral recommendations: 1  F/U with [facility] as needed  2  Continue to use positive coping skills for stress reduction  3  Take time for self-care   Discussion Summary [facility]:   PT was seen for a behavioral health consultation with 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)  PT said he is also now able to take his medication regularly and feels that this will help to improve his overall health  [facility] encouraged PT to continue looking/applying for jobs and to use positive thinking and self-talk throughout his search  PT was asked to call [facility] if he notices that his stress leave is increasing  PT agreed to this plan        Future Appointments    Date/Time Provider Specialty Site   06/14/2016 04:15 PM Kamla Brumfield MD Internal Medicine [facility]   07/22/2016 09:30 AM ALLIE Oneal Epidemiology/Public Health [facility] [place]     Signatures   Electronically signed by : Yaneth Hernandez MS; Apr 29 2016 12:46PM EST                       (Author)

## 2018-01-13 NOTE — PROGRESS NOTES
Patient Health Assessment    Date:            04/24/2017  Blood Pressure:  133/81  Pulse:           68  Age:             56  Weight:          187 lbs  Height/Length:   5' 9"  Body Mass Index: 27 6  Provider:        [id number]  Clinic:          [facility]        Medical Alert: Tobacco User    [facility]    High Blood Pressure    HIV/AIDS  Medications: Unknow ( Patient to bring Medication list)    Tivicay    Norvir    Prezista    OTHER  Allergies:  Since Last Visit: Medical Alert: No Change    Medications: No Change    Allergies:        No Change  Pain Scale Type: Numeric Pain ScalePain Level: 0  Description:      Resin #28-O           #29-ODL    Pt presents for restorative #28,29    [address],  Patient denies any changes    Recurrent Caries/ defective restorations #28,29      Administered 1 carpule of 4% Septocaine with 1:100k epi via infiltration  Defective restoration removed and Caries excavated  Filling was Deep on #28 but   no pulp involvement noted  Placed LimeLite and cured  Etched and rinsed;  bond placed and light cured  Restored both teeth with Beautifil and bulk Alpha   II A-2 composite  Occlusion verified, contacts adequate, and restoration  polished with finishing burs  Explained that some sensitivity might happen  on #28 due to depth of the filling  If pain gets constant then the tooth  might need root canal  Pt agreed and left in good health  NV: rhiannon Ramirez    ----- Signed on Monday, April 24, 2017 at 2:38:32 PM  -----  ----- Provider: Naheed Hillman Dentist -- Clinic: [facility] -----

## 2018-01-13 NOTE — PROGRESS NOTES
Patient Health Assessment    Date:            02/19/2016  Blood Pressure:  129/82  Pulse:           85  Age:             55  Weight:          180 lbs  Height/Length:   5' 9"  Body Mass Index: 26 6  Provider:        [email or web address]  Clinic:          [facility] exam, adult rico, 5 pas  Medical Alert: Tobacco User    ASC    Hepatitis C  Medications: Unknown ( Patient to bring Medication list)  Allergies:      none  Since Last Visit: Medical Alert: Change    Medications: Change    Allergies:        No Change  Pain Scale Type: Numeric Pain ScalePain Level: 0  Description: UL pt reports gets loose and then tightens/ no dental pain  scaled, polished, piezo and flossed- moderate stains removed  IOE: soft palate appears very red, Left border tongue small white dot/ pt  reports biting tongue and cheeks a lot  moved to room #2 for dr Alan Raymundo exam    ----- Signed on Friday, February 19, 2016 at 9:52:05 AM  -----  ----- Provider: [email or web address] - Dustin Molina RDH -- Clinic: [facility] -----

## 2018-01-14 VITALS
DIASTOLIC BLOOD PRESSURE: 94 MMHG | OXYGEN SATURATION: 97 % | WEIGHT: 188.13 LBS | SYSTOLIC BLOOD PRESSURE: 142 MMHG | TEMPERATURE: 97.8 F | HEART RATE: 89 BPM | HEIGHT: 68 IN | BODY MASS INDEX: 28.51 KG/M2

## 2018-01-14 VITALS
WEIGHT: 183.13 LBS | SYSTOLIC BLOOD PRESSURE: 138 MMHG | TEMPERATURE: 97.7 F | HEIGHT: 68 IN | OXYGEN SATURATION: 97 % | BODY MASS INDEX: 27.76 KG/M2 | DIASTOLIC BLOOD PRESSURE: 88 MMHG | HEART RATE: 85 BPM

## 2018-01-14 VITALS
HEIGHT: 68 IN | DIASTOLIC BLOOD PRESSURE: 82 MMHG | TEMPERATURE: 97.8 F | WEIGHT: 184.13 LBS | BODY MASS INDEX: 27.91 KG/M2 | SYSTOLIC BLOOD PRESSURE: 154 MMHG | HEART RATE: 84 BPM

## 2018-01-14 VITALS
SYSTOLIC BLOOD PRESSURE: 146 MMHG | TEMPERATURE: 97.9 F | OXYGEN SATURATION: 96 % | WEIGHT: 183 LBS | BODY MASS INDEX: 27.74 KG/M2 | HEART RATE: 92 BPM | HEIGHT: 68 IN | DIASTOLIC BLOOD PRESSURE: 80 MMHG

## 2018-01-14 NOTE — PROGRESS NOTES
Assessment    1  Dyslipidemia (272 4) (E78 5)   2  HIV disease (042) (B20)    Discussion/Summary    Intervention Diet Prescription   Energy 1850 kcal   25kcal /74kg   Protein 80g   1 1g /74kg   Fluid 1850ml   25ml /74kg   Snack/Supplement Recommendations: cut back on portions of late night snacks Estimated Intake: 2200kcal 90g Protein   His current intake is meeting estimated nutrition needs  Goal #1 - Improve/Maintain  comprehend education  Goal initiated  Time Frame For Accomplishment: By next follow-up  Intervention Nutrition Education Provided  Person Educated: patient   Topics Discussed: Lab results and Weight management   Barriers To Learning: none  Readiness to Learn: Marginal Reception  Teaching Method: verbal   Evaluation Of Learning: verbalized/demonstrated understanding       History of Present Illness  Assessment: Clinical Data/Client History HIV - Yes  His socio-economic status includes  cooks and eats out  He lives in [facility]  Living Environment - He has access to stove and microwave  His functional status is  ambulatory, able to food shop, prepares own meals and & goes to his daughter's for meals  His activity level is  normal    He eats breakfast at  Breakfast sandwich or bagel & eggs & [employer], coffee  He eats lunch at  [place], water  He eats dinner at  [place], beans, salad, chips, water  He snacks Loves to snack @ night: cereal & milk,sandwich  His appetite is  good  Nutrition Diagnosis   Problem related to unintended weight gain  As evidenced by  excess calorie intake  Signs/Symptoms:  Patient Interview  Active Problems    1  Acute bronchitis (466 0) (J20 9)   2  Chronic hepatitis (571 40) (K73 9)   3  Dyslipidemia (272 4) (E78 5)   4  Fibrosis of liver (571 5) (K74 0)   5  Hepatitis B core antibody positive (795 79) (R76 8)   6  Hepatitis C virus infection (070 70) (B19 20)   7  HIV disease (042) (B20)   8   Impaired fasting glucose (790 21) (R73 01)    Past Medical History    1  History of acute bronchitis (V12 69) (Z87 09)   2  History of Opioid dependence (304 00) (F11 20)    Surgical History    1  History of Laminectomy Lumbar    Family History  Mother    1  Family history of Alzheimer's disease (V17 2) (Z82 0)  Father    2  Family history of myocardial infarction (V17 3) (Z82 49)  Brother    3  Family history of prostate carcinoma (V16 42) (Z80 42)    Social History    · Current every day smoker (305 1) (F17 200)   · Non drinker / no alcohol use   · Sexually active    Current Meds   1  Daklinza 60 MG Oral Tablet; Take 1 tablet daily; Therapy: 21TAR4508 to (Evaluate:27Jan2016); Last Rx:03Wsl2318 Ordered   2  Loperamide HCl - 2 MG Oral Tablet; TAKE 2 TABLETS AFTER 1ST LOOSE STOOL, THEN 1 TABLET   AFTER EACH SUBSEQUENT LOOSE STOOL (MAX 16MG/DAY); Therapy: 72ABJ6722 to (Last Rx:24Hoh4869)  Requested for: 69FRH2726 Ordered   3  Norvir 100 MG Oral Tablet; TAKE 1 TABLET BY MOUTH EVERY TWELVE HOURS; Therapy: 74SHW9158 to (Evaluate:02Oct2016)  Requested for: 05Apr2016; Last Rx:05Apr2016   Ordered   4  Prezista 600 MG Oral Tablet; TAKE 1 TABLET BY MOUTH EVERY TWELVE HOURS; Therapy: 21ILQ7151 to (Evaluate:02Oct2016)  Requested for: 05Apr2016; Last Rx:82Swv6017   Ordered   5  Sovaldi 400 MG Oral Tablet; Take 1 tablet daily; Therapy: 53BOB0936 to (Evaluate:27Jan2016); Last Rx:98Dyj6562 Ordered   6  Tivicay 50 MG Oral Tablet; take 1 tablet by mouth twice a day; Therapy: 81KKT9249 to (Evaluate:02Oct2016)  Requested for: 05Apr2016; Last Rx:05Apr2016   Ordered   7  Viread 300 MG Oral Tablet; take 1 tablet by mouth daily; Therapy: 77MVZ3776 to (Evaluate:02Oct2016)  Requested for: 48Xby6728; Last Rx:05Apr2016   Ordered    Allergies    1   No Known Drug Allergies    Vitals  Vitals [Data Includes: All]   Recorded: 12OXE5549 58:26KB   Systolic: 236  Diastolic: 92  Temperature: 98 1 F  Heart Rate: 104  Weight: 189 lb 5 oz  BMI Calculated: 28 79  BSA Calculated: 2  Recorded: 57YLD9672 31:31QO   Systolic: 761  Diastolic: 485  Temperature: 97 9 F  Heart Rate: 76  Weight: 185 lb 4 oz  BMI Calculated: 28 17  BSA Calculated: 1 98  Recorded: 39LLT5685 76:70PK   Systolic: 096  Diastolic: 025  Temperature: 97 7 F  Heart Rate: 92  Weight: 182 lb 6 oz  BMI Calculated: 27 73  BSA Calculated: 1 97  Recorded: 34OAZ6246 63:59OO   Systolic: 202  Diastolic: 86  Temperature: 98 2 F  Heart Rate: 80  Weight: 175 lb 2 oz  BMI Calculated: 26 63  BSA Calculated: 1 93  Recorded: 30UYE4628 13:28MH   Systolic: 822  Diastolic: 88  Temperature: 98 1 F  Heart Rate: 72  Weight: 178 lb 2 oz  BMI Calculated: 27 08  BSA Calculated: 1 95  Recorded: 41LRK4829 29:92WR   Systolic: 675  Diastolic: 80  Temperature: 98 2 F  Heart Rate: 92  Height: 5 ft 8 in  Weight: 173 lb 4 oz  BMI Calculated: 26 34  BSA Calculated: 1 92    Future Appointments    Date/Time Provider Specialty Site   07/19/2016 01:00 PM ALLIE [facility] [place]     Signatures   Electronically signed by : Angie Goncalves, YKVXORVILLE,ROLAN,QUKATHERIN; Jun 14 2016  4:32PM EST                       (Author)

## 2018-01-14 NOTE — PROGRESS NOTES
Patient Health Assessment    Date:            06/02/2017  Blood Pressure:  133/81  Pulse:           86  Age:             56  Weight:          187 lbs  Height/Length:   5' 9"  Body Mass Index: 27 6  Provider:        [id number]  Clinic:          [facility]  Patient Health Assessment    Date:            06/02/2017  Blood Pressure:  133/81  Pulse:           86  Age:             56  Weight:          187 lbs  Height/Length:   5' 9"  Body Mass Index: 27 6  Provider:        [email or web address]  Clinic:          [facility]        Medical Alert: Tobacco User    ASC    High Blood Pressure    HIV/AIDS  Medications: Unknow ( Patient to bring Medication list)    Tivicay    Norvir    Prezista    OTHER  Allergies:  Since Last Visit: Medical Alert: No Change    Medications: No Change    Allergies:        No Change  Pain Scale Type: Numeric Pain ScalePain Level: 0  Description:      Pt presents for MF resin #8, B(V) resin #19  PMH review, no changes  Applied  topical benzocaine, administered 1 0 carps 4% articaine 1:100k epi via local  infiltration  Preps with 245 carbide on high speed  Caries removed removed with   round carbide on slow speed  Isolation with cotton rolls and dri-angles  Etch with 37% H2PO4, rinse, dry  Applied Vividbond with 15 second scrubx2,  gentle air dry and light cured  Restored with Beautifil flowable and Alpha II  composite shade A3 and light cured  Refined with finishing burs, polished  with enhance point  Verified occlusion and contacts  Pt left satisfied and  ambulatory      NV: periodic exam, rico Milligan/HUGO    ----- Signed on Friday, June 02, 2017 at 3:49:14 PM  -----  ----- Provider: [email or web address] - Resident Two, Dentist -- Clinic: [facility] -----

## 2018-01-15 NOTE — PROGRESS NOTES
History of Present Illness  [facility]: The patient is being seen regarding checking in on [address] and completing annual [facility] screener   [facility] Profile[facility]:         1  I like who I am  Yes, describes me exactly (12)   2  I am not an easy person to get along with    No, doesn't describe me at all (22)   3  I am basically a healthy person    Somewhat describes me (31)   4  I give up to easily    No, doesn't describe me at all (42)   5  I have difficulty concentrating    No, doesn't describe me at all (52)   6  I am happy with my family relationships    Yes, describes me exactly (62)   7  I am comfortable being around people    Yes, describes me exactly (72)     8  Walking up a flight of stairs    None (82)   9  Running the length of a football field    Fabiana Skipper A Lot (90)     10  Sleeping    None (102)   11  Hurting or aching in any part of your body    Some (111)   12  Getting tired easily    None (122)   13  Feeling depressed or sad    None (132)   14  Nervousness    None (142)     15  Socialize with other people (talk or visit with friends or relatives A Lot ()   12  Take part in social, Yazidi, or recreation activities (meetings, Mormon, movies, sports, parties)    Some (161)   17  Stay in your home, nursing home, or hospital because of sickness, injury, or other health problem None (172)   [address]     8 = 2   9 = 0   10 = 2   11 = 1   12 = 2   Sum = 7   PHYSICAL HEALTH SCORE 70      1 = 2   4 = 2   5 = 2   13 = 2   14 = 2   Sum = 10   MENTAL HEALTH SCORE 100      2 = 2   6 = 2   7 = 2   15 = 2   16 = 1   Sum = 9   SOCIAL HEALTH SCORE 90     Physical Health score = 70   Mental Health score = 100   Social Health score = 90   Sum = 260   GENERAL HEALTH SCORE 87      3 = 1   PERCEIVED HEALTH SCORE 50      1 = 2   2 = 2   4 = 2   6 = 2   7 = 2   Sum = 10   SELF-ESTEEM SCORE 100            2 = 2 and 0   5 = 2 and 0   7 = 2 and 0   10 = 2 and 0   12 = 2 and 0   14 = 2 and 0   Sum = 0   ANXIETY SCORE 0      4 = 2 and 0   5 = 2 and 0   10 = 2 and 0   12 = 2 and 0   13 = 2 and 0   Sum = 0   DEPRESSION SCORE 0      4 = 2, 0, 2 and 0   7 = 2 and 0   10 = 2 and 0   12 = 2 and 0   13 = 2 and 0   14 = 2 and 0   Sum = 0   ANXIETY-DEPRESSION ([facility]-AD) SCORE 0      11 = 1 and 1   PAIN SCORE 50      17 = 2 and 0   DISABILITY SCORE 0  Smoking Cessation (Brief): The patient is being seen for a consultation regarding tobacco cessation assistance  The patient smokes cigars  The patient smokes 1-2 cigars per day  The patient has high interest in quitting  He has tried to quit several times  The patient has reduced tobacco use substantially  (PT reported being determined to quit smoking this time and will do it after his weekend away with friends coming up that very weekend)      Physical Exam    Objective: Orientation: oriented to person, oriented to place and oriented to time  Appearance: well developed, appears healthy, well nourished, well groomed and no decreased eye contact  Observed mood and affect: euthymic, but appropriate  Harm to self or others: none reported or observed  Substance abuse: none reported or observed  Assessment    1  HIV disease (042) (B20)   2  Nicotine dependence (305 1) (F17 200)   3  Hepatitis C virus infection (070 70) (B19 20)    Plan    1  Follow-up visit in 3 months Evaluation and Treatment  Follow-up  Status: Complete    Done: 77VXS3292    2  (1) CBC/PLT/DIFF; Status:Active; Requested for:01Oct2017;    3  (1) COMPREHENSIVE METABOLIC PANEL; Status:Active;  Requested for:01Oct2017;    4  (1) HIV-1 RNA QUANTITATIVE; [Do Not Release]; Status:Active; Requested for:01Oct2017;      5  (1) HIV-1 RNA QUANTITATIVE; [Do Not Release]; Status:Active; Requested for:15Oct2016;      6  (1) QUANTIFERON - TB GOLD; Status:Active; Requested for:01Oct2017;    7  (1) T LYMPH SUBSET (CD4); Status:Active; Requested for:01Oct2017;    8  (1) T LYMPH SUBSET (CD4); Status:Active; Requested AFZ:90NJK6951;    9  Hepatitis B    Discussion/Summary  [facility]: Today, patient presents with no issues or concerns  Discussion Summary [facility]:   [facility] met with PT for his [facility] consultation  [facility] services were reviewed  PT stated he has no issues or concerns  [facility] brought up smoking cessation per Dr's referral  PT stated that he has tried to quit multiple times with varying degrees of success however he is very determined this time and believes it will happen (See smoking cessation notes for details)  Future Appointments    Date/Time Provider Specialty Site   11/14/2017 04:45 PM Gil Matos MD Infectious Disease [facility] AT [facility]   10/23/2017 02:00 PM [address] AT [facility]   08/24/2017 04:00 PM DEANDRE Del Real   Gastroenterology Adult [facility]     Signatures   Electronically signed by : Norvell Snellen, Luite Julien 87; Aug 21 2017 10:55AM EST                       (Author)

## 2018-01-15 NOTE — PROGRESS NOTES
Review of casts:      Pt would do well w an implant in the area of 5 and one in the area of 21  He is functioning well w his present occlusion and this would complete the  arch integrity  He has a very heavy grinding bite and the prognosis on any prosthetic work will   be guarded  Second option would be to complete the arches w fixed bridges to replace 5 and   21, keeping the occlusion flat to allow for lateral motion without occlusal  interferences  The third option is to try to make two partials   The upper partial will  have 5 cracked off it frequently and the lower will not be worn for long before   the pt gives up on it  Grinders do not tolerate rpds well  The final option is to not fill the spaces and tx the other teeth prn  symptoms                      nv  prophy and selected pa films of the future abutments and or   the implant sites     ----- Signed on Wednesday, February 10, 2016 at 1:34:45 PM  -----  ----- Provider: [email or web address] Justo Jimenez DMD -- Clinic: [facility] -----

## 2018-01-15 NOTE — PROGRESS NOTES
Assessment    1  HIV disease (042) (B20)    Plan    1  (1) HEP C RNA PCR, QUANTITATIVE; Status:Active; Requested for:10Aug2016;     2  (1) HEP C PCR, QUALITATIVE; Status:Canceled;     3  Follow-up visit in 3 months Evaluation and Treatment  Follow-up  Status: Hold For -   Scheduling  Requested for: 55SWA7912   4  (1) CBC/PLT/DIFF; Status:Active; Requested for:43Tui4345;    5  (1) COMPREHENSIVE METABOLIC PANEL; Status:Active; Requested for:85Gwu9020;    6  (1) HIV-1 RNA QUANTITATIVE; [Do Not Release]; Status:Active; Requested   for:01Sep2016;    7  (1) HLA ; Status:Active; Requested for:01Sep2016;    8  (1) T LYMPH SUBSET (CD4); Status:Active; Requested for:01Sep2016;     Discussion/Summary    HIV-seems to be doing well on his salvage regimen with a rising CD4 count  His viral load is still pending, however he is tolerating the anti-retrovirals without notable side effects  Continue new current ART, follow-up pending HIV RNA, recheck labs in 2 months, and follow up in 3 months    Hep C virus-status post 24 week treatment with DAA regimen  The patient did not have a four-week hep C RNA check  Therefore we'll check a hep C RNA now, and then 12 week post treatment  Continue ultrasound and alpha-fetoprotein screening  Education   general HIV education  adherence  prevention care  Chief Complaint  Pt here for routine f/u  History of Present Illness  Routine follow-up for AIDS with HIV hep C coinfection  Patient is on a salvage regimen with Viread, Prezista, Norvir, Tivicay  He claims 100% adherence  He just completed a 24 week course of daclatasvir and sovaldi  [place] He feels well without any side effects  His alternating constipation and diarrhea has now resolved  Pain Assessment   the patient states they do not have pain  Abuse And Domestic Violence Screen    Yes, the patient is safe at home  The patient states no one is hurting them  Depression And Suicide Screen   No, the patient has not had thoughts of hurting themself  No, the patient has not felt depressed in the past 7 days  no fever no lethargy no depression no night sweats no cough no shortness of breath no thrush no nausea no vomiting no diarrhea      Active Problems     1  Acute bronchitis (466 0) (J20 9)   2  Chronic hepatitis (571 40) (K73 9)   3  Fibrosis of liver (571 5) (K74 0)   4  Hepatitis B core antibody positive (795 79) (R76 8)   5  Hepatitis C virus infection (070 70) (B19 20)   6  Impaired fasting glucose (790 21) (R73 01)    HIV disease (042) (B20)       Dyslipidemia (272 4) (E78 5)          Past Medical History    1  History of acute bronchitis (V12 69) (Z87 09)   2  History of Opioid dependence (304 00) (F11 20)    Surgical History    1  History of Laminectomy Lumbar    Family History  Mother    1  Family history of Alzheimer's disease (V17 2) (Z82 0)  Father    2  Family history of myocardial infarction (V17 3) (Z82 49)  Brother    3  Family history of prostate carcinoma (V16 42) (Z80 42)    Social History    · Current every day smoker (305 1) (F17 200)   · Non drinker / no alcohol use   · Sexually active    Current Meds   1  Daklinza 60 MG Oral Tablet; Take 1 tablet daily; Therapy: 53DMR8335 to (Evaluate:27Jan2016); Last Rx:47Sjl3292 Ordered   2  Loperamide HCl - 2 MG Oral Tablet; TAKE 2 TABLETS AFTER 1ST LOOSE STOOL, THEN   1 TABLET AFTER EACH SUBSEQUENT LOOSE STOOL (MAX 16MG/DAY); Therapy: 83AYO9593 to (Last Rx:58Ike8114)  Requested for: 84VZL0246 Ordered   3  Norvir 100 MG Oral Tablet; TAKE 1 TABLET BY MOUTH EVERY TWELVE HOURS; Therapy: 53ZOP8445 to (Evaluate:02Oct2016)  Requested for: 05Apr2016; Last   Rx:05Apr2016 Ordered   4  Prezista 600 MG Oral Tablet; TAKE 1 TABLET BY MOUTH EVERY TWELVE HOURS; Therapy: 10GFA2406 to (Evaluate:02Oct2016)  Requested for: 05Apr2016; Last   Rx:05Apr2016 Ordered   5  Sovaldi 400 MG Oral Tablet; Take 1 tablet daily; Therapy: 53OQI9439 to (Evaluate:27Jan2016);  Last Rx:30Dec2015 Ordered   6  Tivicay 50 MG Oral Tablet; take 1 tablet by mouth twice a day; Therapy: 16KEX5476 to (Evaluate:02Oct2016)  Requested for: 05Apr2016; Last   Rx:43Adt7798 Ordered   7  Viread 300 MG Oral Tablet; take 1 tablet by mouth daily; Therapy: 41TLK8347 to (Evaluate:02Oct2016)  Requested for: 05Apr2016; Last   Rx:70Oqk7778 Ordered    Allergies    1  No Known Drug Allergies    Vitals  Signs [Data Includes: Current Encounter]   Recorded: 68AWF4638 04:07PM   Temperature: 98 1 F  Heart Rate: 180  Systolic: 130  Diastolic: 92  Weight: 484 lb 5 oz  BMI Calculated: 28 79  BSA Calculated: 2    Physical Exam    Constitutional   General appearance: No acute distress, well appearing and well nourished  Ears, Nose, Mouth, and Throat   Oropharynx: Normal with no erythema, edema, exudate or lesions  Pulmonary   Respiratory effort: No increased work of breathing or signs of respiratory distress  Auscultation of lungs: Clear to auscultation  Cardiovascular   Auscultation of heart: Normal rate and rhythm, normal S1 and S2, without murmurs  Examination of extremities for edema and/or varicosities: Normal     Abdomen   Abdomen: Non-tender, no masses  Lymphatic   Palpation of lymph nodes in neck: No lymphadenopathy         Future Appointments    Date/Time Provider Specialty Site   07/19/2016 01:00 PM ALLIE [facility] [place]     Signatures   Electronically signed by : Rachel Lozada MD; Jun 14 2016  4:48PM EST                       (Author)

## 2018-01-15 NOTE — PROGRESS NOTES
Assessment    1  Chronic hepatitis (571 40) (K73 9)   2  Dyslipidemia (272 4) (E78 5)   3  Fibrosis of liver (571 5) (K74 0)   4  Hepatitis C virus infection (070 70) (B19 20)   5  Hepatitis B core antibody positive (795 79) (R76 8)   6  HIV disease (042) (B20)    Discussion/Summary    Mr Bria Kerr is a 51yo man with HIV/HCV co-infection    1  HIV on salvage with twice daily DRV/r and DTG plus TDF  HIV VL pending, CD4 rising to 257     2  HCV with severe fibrosis score 0 85 of FibroTest  Plts at 184  Initiated HCV treatment with daclatasvir + SBV, he has h/o failure versus futility of treatment with interferon and RBV 2001  For severe fibrosis and non-response to interferon/RBV will plan to treat for 24 weeks  Will review number of days remaining and calculate timing of SVR lab draw  3  Diarrhea occasional since starting HCV tx  Imodium for symptomatic control  4  Headache occasional since starting HCV tx, will use advil 200mg x1 prn     5  HCC surveillance due for AFP and RUQ u/s  Possible side effects of new medications were reviewed with the patient/guardian today  The treatment plan was reviewed with the patient/guardian  The patient/guardian understands and agrees with the treatment plan     Education   general HIV education  adherence  prevention care  Chief Complaint  Pt c/o productive cough and chest congestion x 2 weeks  Patient is here today for follow up of chronic conditions described in HPI  History of Present Illness    Pain Assessment   the patient states they do not have pain  Abuse And Domestic Violence Screen    Yes, the patient is safe at home  The patient states no one is hurting them  Depression And Suicide Screen  No, the patient has not had thoughts of hurting themself  Yes, the patient has felt depressed in the past 7 days  The patient is being seen for a routine clinic follow-up of HIV infection  The last clinic visit was 3 month(s) ago   Management changes made at the last visit include ordering HIV VL and CD4 count  The patient is currently asymptomatic  No exacerbating factors are noted  No relieving factors are noted  No associated symptoms are reported  Current treatment includes antiretroviral regimen and HCV tx  By report, there is good compliance with treatment, good tolerance of treatment and good symptom control  The initial diagnosis of HIV was 24 year(s) ago  The last CD4 count was 257 and performed on 3/11/16  The CD4 trend has been increasing  The last viral load was <20 and performed on 12/30/15  The viral load has been stable  Since diagnosis the disease has been improving  Recently, the disease has been improving  Disease complications:  cirrhosis  Pertinent medical history:  hepatitis B and hepatitis C, but no syphilis, no tuberculosis, no pneumonia, no herpes simplex, no alcohol dependency and no drug dependency  The patient is currently able to do activities of daily living without limitations  He was previously evaluated in this clinic 3 month(s) ago  Presenting symptoms included cough, shortness of breath and treated with azithromycin  Past evaluation has included CD4 count, plasma viral load, complete blood count, metabolic profile, liver function panel and hepatitis panel  Past treatment has included antiretroviral regimen of DRV/r bid/RAL/TDF  The patient is being seen for a routine clinic follow-up of cirrhosis of the liver  The last clinic visit was 3 month(s) ago  Management changes made at the last visit include adding HCV direct-acting antivirals and ordering CMP  The patient is currently asymptomatic  No associated symptoms are reported  Current treatment includes alcohol avoidance, hepatotoxic medications avoidance and antivirals  By report, there is good compliance with treatment, good tolerance of treatment and good symptom control  Initial diagnosis of cirrhosis was 15 year(s) ago and 2001 treated with interferon stopped futility  Since diagnosis the disease has been improving  Recently, the disease has been improving  There are no known disease complications  (Improved AST/ALT to 21 and 19)   The patient is being seen for follow-up of chronic hepatitis C  The patient reports doing well  He has had no significant interval events  The patient is currently asymptomatic  Associated symptoms: no pruritus, no depression, no confusion, no difficulty concentrating, no weight loss, no weight gain, no edema, no abdominal distention and no melena    (SBV/daclatasvir)   Disease management:  the patient is doing well with his goals  Due for: alpha-fetoprotein and abdominal ultrasound  The patient states his hyperlipidemia has been under good control since the last visit  He has no comorbid illnesses  He has no significant interval events  Symptoms: The patient is currently asymptomatic  Associated symptoms include no focal neurologic deficits and no memory loss  Medications: The patient is not currently on any medications for his hyperlipidemia  The patient is doing well with his hyperlipidemia goals  Review of Systems    Constitutional: No fever or chills, feels well, no tiredness, no recent weight gain or weight loss  Eyes: No complaints of eye pain, no red eyes, no discharge from eyes, no itchy eyes  ENT: no complaints of earache, no hearing loss, no nosebleeds, no nasal discharge, no sore throat, no hoarseness  Cardiovascular: No complaints of slow heart rate, no fast heart rate, no chest pain, no palpitations, no leg claudication, no lower extremity  Respiratory: No complaints of shortness of breath, no wheezing, no cough, no SOB on exertion, no orthopnea or PND  Gastrointestinal: No complaints of abdominal pain, no constipation, no nausea or vomiting, no diarrhea or bloody stools  Genitourinary: No complaints of dysuria, no incontinence, no hesitancy, no nocturia, no genital lesion, no testicular pain     Musculoskeletal: No complaints of arthralgia, no myalgias, no joint swelling or stiffness, no limb pain or swelling  Integumentary: No complaints of skin rash or skin lesions, no itching, no skin wound, no dry skin  Neurological: No compliants of headache, no confusion, no convulsions, no numbness or tingling, no dizziness or fainting, no limb weakness, no difficulty walking  Psychiatric: Is not suicidal, no sleep disturbances, no anxiety or depression, no change in personality, no emotional problems  Endocrine: No complaints of proptosis, no hot flashes, no muscle weakness, no erectile dysfunction, no deepening of the voice, no feelings of weakness  Hematologic/Lymphatic: No complaints of swollen glands, no swollen glands in the neck, does not bleed easily, no easy bruising  Active Problems    1  Acute bronchitis (466 0) (J20 9)   2  Chronic hepatitis (571 40) (K73 9)   3  Dyslipidemia (272 4) (E78 5)   4  Fibrosis of liver (571 5) (K74 0)   5  Hepatitis B core antibody positive (795 79) (R76 8)   6  Hepatitis C virus infection (070 70) (B19 20)   7  HIV disease (042) (B20)   8  Impaired fasting glucose (790 21) (R73 01)    Past Medical History    1  History of acute bronchitis (V12 69) (Z87 09)   2  History of Opioid dependence (304 00) (F11 20)    The active problems and past medical history were reviewed and updated today  Surgical History    1  History of Laminectomy Lumbar    The surgical history was reviewed and updated today  Family History    1  Family history of Alzheimer's disease (V17 2) (Z82 0)    2  Family history of myocardial infarction (V17 3) (Z82 49)    3  Family history of prostate carcinoma (P93 84) (Z80 42)    The family history was reviewed and updated today  Social History    · Current every day smoker (305 1) (F17 200)   · Non drinker / no alcohol use   · Sexually active  The social history was reviewed and updated today  The social history was reviewed and is unchanged  Current Meds   1  Daklinza 60 MG Oral Tablet; Take 1 tablet daily; Therapy: 67LBK8864 to (Evaluate:27Jan2016); Last Rx:[id number] Ordered   2  Loperamide HCl - 2 MG Oral Tablet; TAKE 2 TABLETS AFTER 1ST LOOSE STOOL, THEN   1 TABLET AFTER EACH SUBSEQUENT LOOSE STOOL (MAX 16MG/DAY); Therapy: 89XOZ9510 to (Last Rx:73Hjb9030)  Requested for: 05WWC0731 Ordered   3  Norvir 100 MG Oral Tablet; take 1 tablet by mouth twice a day; Therapy: 51DEA7907 to (60-26-81-34)  Requested for: 0490 39 07 81; Last   Rx:29Oct2015 Ordered   4  Prezista 600 MG Oral Tablet; TAKE 1 TABLET BY MOUTH EVERY TWELVE HOURS; Therapy: 32YRD0091 to (Rebecca Harper)  Requested for: 99VZR4628; Last   Rx:29Oct2015 Ordered   5  Sovaldi 400 MG Oral Tablet; Take 1 tablet daily; Therapy: 24USB9200 to (Evaluate:27Jan2016); Last Rx:59Ehk7368 Ordered   6  Tivicay 50 MG Oral Tablet; take 1 tablet by mouth twice a day; Therapy: 58QCB0586 to (Evaluate:22Dec2015)  Requested for: 93XLF9208; Last   Rx:25Jun2015 Ordered   7  Viread 300 MG Oral Tablet; take 1 tablet by mouth daily; Therapy: 25JTB0803 to (60-26-81-34)  Requested for: 0490 39 07 81; Last   Rx:29Oct2015 Ordered    The medication list was reviewed and updated today  Allergies    1  No Known Drug Allergies    Vitals  Signs [Data Includes: Current Encounter]   Recorded: 62TCU0608 12:59PM   Temperature: 97 7 F  Heart Rate: 92  Systolic: 333  Diastolic: 504  Weight: 900 lb 6 oz  BMI Calculated: 27 73  BSA Calculated: 1 97    Physical Exam    Constitutional   General appearance: No acute distress, well appearing and well nourished  Eyes   Conjunctiva and lids: No swelling, erythema or discharge  Pupils and irises: Equal, round and reactive to light  Ears, Nose, Mouth, and Throat   External inspection of ears and nose: Normal     Otoscopic examination: Tympanic membranes translucent with normal light reflex  Canals patent without erythema      Nasal mucosa, septum, and turbinates: Normal without edema or erythema  Oropharynx: Normal with no erythema, edema, exudate or lesions  Pulmonary   Respiratory effort: No increased work of breathing or signs of respiratory distress  Auscultation of lungs: Clear to auscultation  Cardiovascular   Palpation of heart: Normal PMI, no thrills  Auscultation of heart: Normal rate and rhythm, normal S1 and S2, without murmurs  Examination of extremities for edema and/or varicosities: Normal     Carotid pulses: Normal     Abdomen   Abdomen: Non-tender, no masses  Liver and spleen: No hepatomegaly or splenomegaly  Lymphatic   Palpation of lymph nodes in neck: No lymphadenopathy  Musculoskeletal   Gait and station: Normal     Digits and nails: Normal without clubbing or cyanosis  Inspection/palpation of joints, bones, and muscles: Normal     Muscle strength: Normal strength throughout  Skin   Skin and subcutaneous tissue: Normal without rashes or lesions  Neurologic   Cranial nerves: Cranial nerves 2-12 intact  Reflexes: 2+ and symmetric  Sensation: No sensory loss  Psychiatric   Orientation to person, place, and time: Normal     Mood and affect: Normal        Future Appointments    Date/Time Provider Specialty Site   04/29/2016 09:30 AM Earlyne Friday, [facility] [place]     HIV Health Forms    1  How often do you feel that you have difficulty taking your HIV medications on time? By 'on time' we mean no more than 2 hours before or 2 hours after the time your doctor told you to take it  (Que tan an menudo tiene usted dificulatad tomando dariusz medicamentos de VIH a tiempo? "A tiempo" queremos decir no mas de 2 horas antes o dos horas despues del tiempo que portillo doctor le dijo los tomara )   4  Never (Nunce)     2  On average, how many days per week would you say that you missed at least one dose of your HIV medication?  (En promedio, cuantos silva a la semana diria useted que dejo de wu al [facility] dosis de portillo medicamento de VIH?   6  Never (Nunce)     3  When was the last time you missed at least one dose of your HIV medication? (Cucando fue la ultima vez que usted dego de wu por lo menos jordan dosis de portillo medicamento de VIH?   6  Never (Nunce)   Score (Resultado) = 16        Signatures   Electronically signed by : Melisa Kumar MD PhD; Mar 15 2016  1:54PM EST                       (Author)

## 2018-01-15 NOTE — PROGRESS NOTES
History of Present Illness  [facility]: The patient is being seen regarding [facility] consultation and smoking cessation   Smoking Cessation [facility]: The patient is being seen for clinic follow-up for tobacco cessation assistance  Current treatment includes: tobacco cessation program and will use nicotine gum and Big Red cinnamon gum when quitting  By report, there is fair compliance with treatment  Patient interest in quitting is high  The patient has plans to quit in the near future  Physical Exam    Objective: Orientation: oriented to person, oriented to place and oriented to time  Appearance: well developed, appears healthy, well nourished, well groomed and no decreased eye contact  Observed mood and affect: euthymic, but appropriate  Harm to self or others: none reported or observed  Substance abuse: none reported or observed  Assessment    1  HIV disease (042) (B20)   2  Acute bronchitis (466 0) (J20 9)   3  Nicotine dependence (305 1) (F17 200)   4  Essential hypertension (401 9) (I10)    Plan    1  Mucus Relief ER 1200 MG Oral Tablet Extended Release 12 Hour; TAKE 1 TABLET   EVERY 12 HOURS AS NEEDED FOR CONGESTION    Discussion/Summary  [facility]: Today, patient presents with no major issues or concerns  Discussion Summary [facility]:   [facility] met with PT for his consultation  PT stated he has no issues or concerns  [facility] followed up on conversation from previous appointment by checking with PT how his weekend away with friends went  PT reported that it went very well and it was exactly what he needed i e  getting away for some rest/relaxation  [facility] then transitioned in to smoking cessation discussion as PT had stated that he was going to quit once he returns from his getaway as it would be too hard to be in close proximity with friends who still smoke while in his early stage of quitting   PT reported that he was unable to quit smoking as he said he would, however he promised that he will have quit by the next time he comes here which he says will be in October [facility] said she will make note of this promise and follow up at next appt  PT stated that he is very determined but had a lot going on in the last few weeks preventing him from following through on his previous promise  [facility] encouraged PT to take care of his health sooner rather than later as he reported that he has been sick for about 3 days before he called the clinic for this appt  [address] [place] [address] reminded PT that everyone at the clinic is here to help him and they will work to get him in as soon as possible if/when he calls  PT agreed but admitted to thinking the illness will just go away by him using Robitussin        Future Appointments    Date/Time Provider Specialty Site   11/14/2017 04:45 PM Lauren Chaudhary MD Infectious Disease [facility]   10/23/2017 02:00 PM [facility]   Electronically signed by : Braxton Astudillo MS; Sep 18 2017 10:51AM EST                       (Author)

## 2018-01-15 NOTE — PROGRESS NOTES
Patient Health Assessment    Date:            05/11/2016  Blood Pressure:  148/89  Pulse:           68  Age:             55  Weight:          180 lbs  Height/Length:   5' 9"  Body Mass Index: 26 6  Provider:        RONALD  Clinic:          [facility]  Medical Alert: Tobacco User    [facility]    Hepatitis C  Medications: Unknow ( Patient to bring Medication list)  Allergies:  Since Last Visit: Medical Alert: No Change    Medications: No Change    Allergies:        No Change  Pain Scale Type: Numeric Pain ScalePain Level: 0  Description:     Resin rebuilds of 23 and 29 etch vivid bond osmin A2  3 4 ml 4% barbara 100  epi  Pt let me know that he will always smoke and stopping will not happen    We will try to maintain the case w resins          nv  six month recare or pt will call    ----- Signed on Wednesday, May 11, 2016 at 9:47:13 AM  -----  ----- Provider: RONALD - Parminder Jin DMD -- Clinic: [address] [place] [address] -----

## 2018-01-15 NOTE — PROGRESS NOTES
History of Present Illness    Assessment:  Current dietary intake r/t unintentional wt gain  Nutrition Diagnosis Continue Previous Nutrition Diagnosis   Intervention Nutrition Education   Monitor And Evaluation Goal #1 - Reduce excessive calorie intake after dinner meal, Goal #1 is extended  Discussed wt gain as noted from previous dietitian encounter  Pt admits to excessive calorie intake "late night snacking" mostly on poor nutritional quality foods  Discussed need for energy during the day while active, options for different snack choices  BMI elevated to overweight, A1c WNL  Maintains he is a physically active person  Monitor labs, wt at next encounter  Active Problems    1  Acute bronchitis (466 0) (J20 9)   2  Chronic hepatitis (571 40) (K73 9)   3  Dyslipidemia (272 4) (E78 5)   4  Fibrosis of liver (571 5) (K74 0)   5  Hepatitis B core antibody positive (795 79) (R76 8)   6  Hepatitis C virus infection (070 70) (B19 20)   7  HIV disease (042) (B20)   8  Impaired fasting glucose (790 21) (R73 01)    Past Medical History    1  History of acute bronchitis (V12 69) (Z87 09)   2  History of Opioid dependence (304 00) (F11 20)    Surgical History    1  History of Laminectomy Lumbar    Family History  Mother    1  Family history of Alzheimer's disease (V17 2) (Z82 0)  Father    2  Family history of myocardial infarction (V17 3) (Z82 49)  Brother    3  Family history of prostate carcinoma (V16 42) (Z80 42)    Social History    · Current every day smoker (305 1) (F17 200)   · Non drinker / no alcohol use   · Sexually active    Current Meds   1  Norvir 100 MG Oral Tablet; TAKE 1 TABLET BY MOUTH EVERY TWELVE HOURS; Therapy: 31JHV2404 to [phone])  Requested for: 05Apr2016; Last Rx:05Apr2016   Ordered   2  Prezista 600 MG Oral Tablet; TAKE 1 TABLET BY MOUTH EVERY TWELVE HOURS; Therapy: 12JDY3339 to [phone])  Requested for: 05Apr2016; Last Rx:05Apr2016   Ordered   3   Tivicay 50 MG Oral Tablet; take 1 tablet by mouth twice a day; Therapy: 34GPB3279 to (Evaluate:02Oct2016)  Requested for: 05Apr2016; Last Rx:05Apr2016   Ordered   4  Viread 300 MG Oral Tablet; take 1 tablet by mouth daily; Therapy: 05IYT2056 to (Evaluate:02Oct2016)  Requested for: 05Apr2016; Last Rx:84Pck1926   Ordered    Allergies    1  No Known Drug Allergies    Future Appointments    Date/Time Provider Specialty Site   09/27/2016 05:30 PM Adithya Ford MD Internal Medicine [facility]    01/09/2017 02:00 PM [facility]     Signatures   Electronically signed by : ALEXANDER Joseph;  Aug 31 2016 12:19PM EST                       (Author)

## 2018-01-16 NOTE — PROGRESS NOTES
History of Present Illness    Assessment:  Oral Health Questionnaire  Nutrition Diagnosis No Nutrition Diagnosis Now   Pt currently reports no oral issues r/t pain, sensitivity, or bleeding gums  Pt indicates difficulty chewing r/t loss of teeth and poor dentition  Pt reports have 6 teeth removed >1 year r/t caps falling off and teeth in bad health  Pt has recorded encounters in [employer] r/t dental care over the past year  Pt is seen at [address] [place] [address] dental clinic   is Verónica Worley  Active Problems    1  Acute bronchitis (466 0) (J20 9)   2  Chronic hepatitis (571 40) (K73 9)   3  Dyslipidemia (272 4) (E78 5)   4  Fibrosis of liver (571 5) (K74 0)   5  Hepatitis B core antibody positive (795 79) (R76 8)   6  Hepatitis C virus infection (070 70) (B19 20)   7  HIV disease (042) (B20)   8  Impaired fasting glucose (790 21) (R73 01)   9  Need for prophylactic vaccination and inoculation against influenza (V04 81) (Z23)    Past Medical History    1  History of acute bronchitis (V12 69) (Z87 09)   2  History of Opioid dependence (304 00) (F11 20)    Surgical History    1  History of Laminectomy Lumbar    Family History  Mother    1  Family history of Alzheimer's disease (V17 2) (Z82 0)  Father    2  Family history of myocardial infarction (V17 3) (Z82 49)  Brother    3  Family history of prostate carcinoma (V16 42) (Z80 42)    Social History    · Current every day smoker (305 1) (F17 200)   · Non drinker / no alcohol use   · Sexually active    Current Meds   1  Descovy 200-25 MG Oral Tablet; Take 1 tablet daily; Therapy: 92PZX0433 to (Evaluate:77Lmb7225)  Requested for: 22BPZ8224; Last Rx:47Lec7302   Ordered   2  Norvir 100 MG Oral Tablet; TAKE 1 TABLET BY MOUTH EVERY TWELVE HOURS; Therapy: 29AUL4739 to (Carmen Mustafa)  Requested for: 69LWY8255; Last Rx:44Blj6670   Ordered   3  Prezista 600 MG Oral Tablet; TAKE 1 TABLET BY MOUTH EVERY TWELVE HOURS;    Therapy: 45QXQ9567 to (Dariela Melgoza)  Requested for: 70WQM0915; Last Rx:10Nov2016   Ordered   4  Tivicay 50 MG Oral Tablet; take 1 tablet by mouth twice a day; Therapy: 19NCA4930 to (Dariela Melgoza)  Requested for: 09OZJ6431; Last Rx:10Nov2016   Ordered    Allergies    1  No Known Drug Allergies    Future Appointments    Date/Time Provider Specialty Site   02/14/2017 05:15 PM Luis M Miranda MD Internal Medicine [facility]   01/09/2017 02:00 PM [facility]iology[facility]     Signatures   Electronically signed by :  ALEXANDER Longo; Nov 16 2016 12:44PM EST                       (Author)

## 2018-01-16 NOTE — PROGRESS NOTES
Assessment    1  Encounter for screening colonoscopy (V76 51) (Z12 11)   2  Fibrosis of liver (571 5) (K74 0)   3  Hepatitis C virus infection (070 70) (B19 20)   4  Nicotine dependence (305 1) (F17 200)   5  Dyslipidemia (272 4) (E78 5)   6  Essential hypertension (401 9) (I10)   7  HIV disease (042) (B20)    Plan   Dyslipidemia, Impaired fasting glucose    · 3 - Mary PASTOR, Rylee Lopezitian Co-Management  *  Status: Hold For - Scheduling   Requested for: 10TRU7955  are Referring to a non- Preferred Provider : Established Patient  Care Summary provided  : Yes  Encounter for screening colonoscopy    · *1 - Jus Hennessy 89  *Please schedule after August,  Pt would like to be seen in [facility]  Status: Hold For  - Scheduling  Requested for: 88DUW2849  Care Summary provided  : Yes  Essential hypertension    · Lisinopril-Hydrochlorothiazide 10-12 5 MG Oral Tablet; take 1 tablet by mouth  daily  HIV disease    · (Q) HIV 1 RNA, QN PCR W/REFLEX TO GENOTYPE; [Do Not Release]; Status:Active; Requested for:47Wwj9525;    · (Q) HIV1 INTEGRASE GENOTYPE; [Do Not Release]; Status:Active; Requested  for:16Rma0962;    · Menactra Intramuscular Injectable  Nicotine dependence    · Nicotine Polacrilex 4 MG Mouth/Throat Gum; CHEW 1 PIECE SLOWLY AND  INTERMITTENTLY FOR 30 MINUTES  REPEAT EVERY 1-2 HOURS; MAXIMUM OF 24  PIECES/DAY   · *1 - [facility]-Management  *  Status: Hold For -  Scheduling  Requested for: 49EXZ4805  Care Summary provided  : Yes    Follow-up visit in 3 months Evaluation and Treatment  Follow-up  Status: Hold For - Scheduling  Requested for: 59PAT4038  Ordered; For: HIV disease;  Ordered By: Carrillo Rodriguez  Performed:   Due: 28QPW0282  HIV disease (042) (B20)       Essential hypertension (401 9) (I10)          Discussion/Summary    Mick Rodriguez is a 59-year-old male who presents to the clinic for three-month follow-up of chronic conditions      HIV: CD4 172 VL 4080 ART Descovy, Tivicay, Norvir, and Prezista  Discussed elevated VL in detail  Alethea Borges denies missing any doses of medication  Recently switched from [facility] to [facility] but this should be irrelevant pertaining to bump in VL  Will check genotype and integrase to r/o resistance  Instructed to have lab work completed prior to ID appointment scheduled 8/1/17  HCV: SVR s/p treatment with Dakinza and sovaldi  Continue 6 month [facility] [address]  surveillance due to fibrotic liver disease  AFP 4 8 and RUQ US negative for mass or lesion  Next [facility] [address]  screening due in November  HTN: /90  Continue Lisinopril-HCTZ  Renal function stable  Creatinine 0 91, eGFR greater than 60  UA negative for protein  Educated to limit the amount of salt in his diet  Encouraged smoking cessation  Nicotine dependence: educated on the health risks associated with smoking, especially in the setting of hypertension and elevated lipids  Advised to quit  Discussed the need for behavioral modification in order to quit successfully  Ordered Nicorette gum today  Will refer [facility] with [facility]  Hyperlipidemia: total cholesterol 159 HDL 35 LDL 15 triglycerides 97  Educated on the importance of eating a heart healthy diet and getting adequate cardiac exercise  Health maintenance: Refer to GI today for colonoscopy  Up to date with dental and eye exam     PCP f/u scheduled for 3 months  Possible side effects of new medications were reviewed with the patient/guardian today  The treatment plan was reviewed with the patient/guardian  The patient/guardian understands and agrees with the treatment plan   The patient was counseled regarding instructions for management, risk factor reductions, risks and benefits of treatment options, importance of compliance with treatment  Topics Covered: advised patient not to smoke while using NRT  Education   general HIV education  adherence  prevention care        Chief Complaint  Pt c/o left side sore throat x 1 week that ended 2 days ago  History of Present Illness  Mr Minor Tristan Is a pleasant 80-year-old gentleman who presents to the clinic today with his 10year-old grandson for three-month primary care follow-up  Gely Blum describes an incident 2 weeks ago in which he experienced throat pain  Took an OTC NSAID and symptoms were relieved  Denies any additional symptoms  Past medical history significant for HIV, HCV SVR status post treatment, nicotine dependence, HTN, and hyperlipidemia  Has no acute complaints today and states he is feeling quite well  Pain Assessment   the patient states they do not have pain  Abuse And Domestic Violence Screen    Yes, the patient is safe at home  The patient states no one is hurting them  Depression And Suicide Screen  No, the patient has not had thoughts of hurting themself  No, the patient has not felt depressed in the past 7 days  The patient is being seen for a routine clinic follow-up of HIV infection  no fever no lethargy no depression no night sweats no weight loss no decreased appetite no cough no shortness of breath no thrush no nausea no vomiting no diarrhea no headache Current treatment includes antiretroviral regimen  By report, there is good compliance with treatment and good tolerance of treatment  CD4: 172  VL: 4080  Strength: Strong desire to be well  Weakness: On a multivitamin pill regiment  Plan of Action: Continued education on the importance of compliance  SUBSTANCE ABUSE: ETOH use  amount: rarely  not using drugs  SMOKING: He is a current smoker, uses cigars, 3/day packs per day, for 35 years and has thought about quitting  He has the following barriers:   SEXUALLY ACTIVE: He is sexually active and having vaginal sex, but not having oral sex and not having anal sex  He always uses condoms  He has had 1 partners in the last 90 days  HOUSING: He has stable housing   There are 2 people living in the household (including children)  The household income is $2200 00/month  HEALTH MAINTENANCE: His last dental exam was 6/2/2017  His last eye exam was 2016  The patient is being seen for 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  The patient reports doing well  There are no comorbid illnesses  Interval symptoms:  denies abdominal pain, denies abdominal swelling, denies nausea and denies vomiting  The patient is not currently on medication for this problem  Due for: alpha-fetoprotein, abdominal ultrasound and in November  The patient is being seen for clinic follow-up for tobacco cessation assistance  The patient has low interest in quitting  He has tried to quit several times  Patient perceived smoking benefits include stress management  Patient recognizes that smoking cessation benefits include saving money and improved health  Comorbid Illnesses: tobacco use and hypertension  Symptoms: denies chest pain, denies muscle pain and denies muscle weakness  Medications: The patient is not currently on any medications for his hyperlipidemia  Review of Systems    Constitutional: No fever or chills, feels well, no tiredness, no recent weight gain or weight loss  Eyes: No complaints of eye pain, no red eyes, no discharge from eyes, no itchy eyes  ENT: no complaints of earache, no hearing loss, no nosebleeds, no nasal discharge, no sore throat, no hoarseness  Cardiovascular: No complaints of slow heart rate, no fast heart rate, no chest pain, no palpitations, no leg claudication, no lower extremity  Respiratory: No complaints of shortness of breath, no wheezing, no cough, no SOB on exertion, no orthopnea or PND  Gastrointestinal: No complaints of abdominal pain, no constipation, no nausea or vomiting, no diarrhea or bloody stools  Genitourinary: No complaints of dysuria, no incontinence, no hesitancy, no nocturia, no genital lesion, no testicular pain     Musculoskeletal: No complaints of arthralgia, no myalgias, no joint swelling or stiffness, no limb pain or swelling  Integumentary: No complaints of skin rash or skin lesions, no itching, no skin wound, no dry skin  Neurological: No compliants of headache, no confusion, no convulsions, no numbness or tingling, no dizziness or fainting, no limb weakness, no difficulty walking  Psychiatric: Is not suicidal, no sleep disturbances, no anxiety or depression, no change in personality, no emotional problems  Endocrine: No complaints of proptosis, no hot flashes, no muscle weakness, no erectile dysfunction, no deepening of the voice, no feelings of weakness  Hematologic/Lymphatic: No complaints of swollen glands, no swollen glands in the neck, does not bleed easily, no easy bruising  Active Problems     1  Acute bronchitis (466 0) (J20 9)   2  Chronic hepatitis (571 40) (K73 9)   3  Condyloma (078 11) (A63 0)   4  Dyslipidemia (272 4) (E78 5)   5  Encounter for screening examination for sexually transmitted disease (V74 5) (Z11 3)   6  Fibrosis of liver (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 5) (K74 0)   7  Hepatitis B core antibody positive (795 79) (R76 8)   8  Hepatitis C virus infection (070 70) (B19 20)   9  Impaired fasting glucose (790 21) (R73 01)   10  Need for prophylactic vaccination and inoculation against influenza (V04 81) (Z23)   11  Nicotine dependence (305 1) (F17 200)    HIV disease (042) (B20)       Essential hypertension (401 9) (I10)          Past Medical History    1  History of acute bronchitis (V12 69) (Z87 09)   2  History of Opioid dependence (304 00) (F11 20)    The active problems and past medical history were reviewed and updated today  Surgical History    1  History of Laminectomy Lumbar    The surgical history was reviewed and updated today  Family History  Mother    1  Family history of Alzheimer's disease (V17 2) (Z82 0)  Father    2  Family history of myocardial infarction (V17 3) (Z82 49)  Brother    3   Family history of prostate carcinoma (V16 42) (Z80 42)    The family history was reviewed and updated today  Social History    · Current every day smoker (305 1) (F17 200)   · Non drinker / no alcohol use   · Sexually active  The social history was reviewed and updated today  The social history was reviewed and is unchanged  Current Meds   1  Descovy 200-25 MG Oral Tablet; take 1 tablet by mouth daily; Therapy: 95PPU1907 to (Evaluate:12Awf1711)  Requested for: 91FCV2039; Last   Rx:03May2017 Ordered   2  Imiquimod 5 % External Cream; APPLY TO AFFECTED AREAS THREE TIMES WEEKLY  [address] OFF AFTER 6-10 HOURS; Therapy: 06AIX5945 to (Last Rx:20Mar2017)  Requested for: 47Pie1442 Ordered   3  Lisinopril-Hydrochlorothiazide 10-12 5 MG Oral Tablet; take 1 tablet by mouth daily; Therapy: 23OCE3817 to (Evaluate:11Qwn4558)  Requested for: 24Eyd1880; Last   Rx:60Oxc9974 Ordered   4  Norvir 100 MG Oral Tablet; TAKE 1 TABLET BY MOUTH EVERY TWELVE HOURS; Therapy: 28YLZ6454 to (Evaluate:17Ucw9330)  Requested for: 48YXW7359; Last   Rx:03May2017 Ordered   5  Prezista 600 MG Oral Tablet; TAKE 1 TABLET BY MOUTH EVERY TWELVE HOURS; Therapy: 26IMD3178 to (Evaluate:99Fsp6896)  Requested for: 80UBY8957; Last   Rx:53Ncl7735 Ordered   6  Tivicay 50 MG Oral Tablet; take 1 tablet by mouth twice a day; Therapy: 25NLJ1718 to (Evaluate:93Vls9842)  Requested for: 54JAD4924; Last   IO:41GBA4148 Ordered    The medication list was reviewed and updated today  Allergies    1  No Known Drug Allergies    Vitals  Signs   Recorded: 24Jul2017 01:37PM   Temperature: 98 F  Heart Rate: 80  Systolic: 725  Diastolic: 90  Height: 5 ft 8 in  Weight: 182 lb 2 oz  BMI Calculated: 27 69  BSA Calculated: 1 96    Physical Exam    Constitutional   General appearance: No acute distress, well appearing and well nourished  Eyes   Conjunctiva and lids: No swelling, erythema or discharge  Pupils and irises: Equal, round and reactive to light      Ears, Nose, Mouth, and Throat External inspection of ears and nose: Normal     Otoscopic examination: Tympanic membranes translucent with normal light reflex  Canals patent without erythema  Nasal mucosa, septum, and turbinates: Normal without edema or erythema  Oropharynx: Normal with no erythema, edema, exudate or lesions  Pulmonary   Respiratory effort: No increased work of breathing or signs of respiratory distress  Auscultation of lungs: Clear to auscultation  Cardiovascular   Auscultation of heart: Normal rate and rhythm, normal S1 and S2, without murmurs  Examination of extremities for edema and/or varicosities: Normal     Abdomen   Abdomen: Non-tender, no masses  Liver and spleen: No hepatomegaly or splenomegaly  Lymphatic   Palpation of lymph nodes in neck: No lymphadenopathy  Musculoskeletal   Gait and station: Normal     Digits and nails: Normal without clubbing or cyanosis  Inspection/palpation of joints, bones, and muscles: Normal     Muscle strength: Normal strength throughout  Skin   Skin and subcutaneous tissue: Normal without rashes or lesions  Psychiatric   Orientation to person, place, and time: Normal     Mood and affect: Normal     Lips, Teeth and Gums: The lips were normal with no lesions  Examination of the teeth revealed normal dentition  Examination of the gingiva showed no abnormalities  Results/Data  (1) AFP, SERUM 30Cwl7477 07:53AM [facility] Order Number: [id number]     Test Name Result Flag Reference   AFP 4 8 ng/mL  0 0 - 8 3   Normal values apply only to males and to nonpregnant females  These results are not interpretable for pregnant females  Roche ECLIA methodology  Values obtained with different assay methods or kits cannot be  used interchangeably  Results cannot be interpreted as absolute  evidence of the presence or absence of malignant disease      Performed at:  [address] [place] [address]  [id number]  : Janna Prather MD, Phone:  [phone]     (1) CBC/PLT/DIFF [id number] 07:53AM Glory Melara Order Number: [id number]     Test Name Result Flag Reference   WBC COUNT 6 83 Thousand/uL  4 31-10 16   RBC COUNT 5 92 Million/uL H 3 88-5 62   HEMOGLOBIN 17 8 g/dL H 12 0-17 0   HEMATOCRIT 52 7 % H 36 5-49 3   MCV 89 fL  82-98   MCH 30 1 pg  26 8-34 3   MCHC 33 8 g/dL  31 4-37 4   RDW 13 5 %  11 6-15 1   MPV 9 7 fL  8 9-12 7   PLATELET COUNT 806 Thousands/uL  149-390   nRBC AUTOMATED 0 /100 WBCs     NEUTROPHILS RELATIVE PERCENT 69 %  43-75   LYMPHOCYTES RELATIVE PERCENT 16 %  14-44   MONOCYTES RELATIVE PERCENT 12 %  4-12   EOSINOPHILS RELATIVE PERCENT 2 %  0-6   BASOPHILS RELATIVE PERCENT 1 %  0-1   NEUTROPHILS ABSOLUTE COUNT 4 68 Thousands/? ??L  1 85-7 62   LYMPHOCYTES ABSOLUTE COUNT 1 11 Thousands/? ??L  0 60-4 47   MONOCYTES ABSOLUTE COUNT 0 85 Thousand/? ??L  0 17-1 22   EOSINOPHILS ABSOLUTE COUNT 0 12 Thousand/? ??L  0 00-0 61   BASOPHILS ABSOLUTE COUNT 0 04 Thousands/? ??L  0 00-0 10   - Patient Instructions: This bloodwork is non-fasting  Please drink two glasses of water morning of bloodwork       (1) COMPREHENSIVE METABOLIC PANEL 60RBJ3302 10:90QG Glory Adamsn Order Number: [id number]     Test Name Result Flag Reference   SODIUM 139 mmol/L  136-145   POTASSIUM 4 3 mmol/L  3 5-5 3   CHLORIDE 105 mmol/L  100-108   CARBON DIOXIDE 28 mmol/L  21-32   ANION GAP (CALC) 6 mmol/L  4-13   BLOOD UREA NITROGEN 17 mg/dL  5-25   CREATININE 0 91 mg/dL  0 60-1 30   Standardized to IDMS reference method   CALCIUM 8 9 mg/dL  8 3-10 1   BILI, TOTAL 0 60 mg/dL  0 20-1 00   ALK PHOSPHATAS 95 U/L     ALT (SGPT) 23 U/L  12-78   AST(SGOT) 26 U/L  5-45   ALBUMIN 3 8 g/dL  3 5-5 0   TOTAL PROTEIN 8 1 g/dL  6 4-8 2   eGFR Non-African American      >60 0 ml/min/1 [address] [facility] [employer] Disease Education Program recommendations are as follows:  GFR calculation is accurate only with a steady state creatinine  Chronic Kidney disease less than 60 ml/min/1 73 sq  meters  Kidney failure less than 15 ml/min/1 73 sq  meters  GLUCOSE FASTING 107 mg/dL H 65-99     (1) HIV-1 RNA QUANTITATIVE 22Lpm1938 07:53AM Romona Osgood Order Number: [id number]     Test Name Result Flag Reference   HIV-1 RNA QUANT 4080 copies/mL     The reportable range for this assay is 20 to 10,000,000  copies HIV-1 RNA/mL  HIV-1 RNA VIRAL LOAD LOG 3 611 koj93mmix/mL     Performed at:  [employer] [facility] [email or web address] [address]  [id number]  : Herminio Zepeda MD, Phone:  [phone]     (1) LIPID PANEL, FASTING [id number] 07:53AM Romona Osgood Order Number: [id number]     Test Name Result Flag Reference   CHOLESTEROL 159 mg/dL     HDL,DIRECT 35 mg/dL L 40-60   Specimen collection should occur prior to Metamizole administration due to the potential for falsely depressed results  LDL CHOLESTEROL CALCULATED 105 mg/dL H 0-100   - Patient Instructions: This is a fasting blood test  Water,black tea or black  coffee only after 9:00pm the night before test   Drink 2 glasses of water the morning of test       Triglyceride:         Normal              <150 mg/dl       Borderline High    150-199 mg/dl       High               200-499 mg/dl       Very High          >499 mg/dl  Cholesterol:         Desirable        <200 mg/dl      Borderline High  200-239 mg/dl      High             >239 mg/dl  HDL Cholesterol:        High    >59 mg/dL      Low     <41 mg/dL  LDL CALCULATED:    This screening LDL is a calculated result  It does not have the accuracy of the Direct Measured LDL in the monitoring of patients with hyperlipidemia and/or statin therapy  Direct Measure LDL (PID789) must be ordered separately in these patients  TRIGLYCERIDES 97 mg/dL  <=150   Specimen collection should occur prior to N-Acetylcysteine or Metamizole administration due to the potential for falsely depressed results       (1) T LYMPH SUBSET (CD4) Kate Yusuf 07:53AM [facility] Order Number: [id number]     Test Name Result Flag Reference   CD4 T CELL ABSOLUTE 172 /uL L 359 - 1519   CD4 % HELPER T CELL 15 6 % L 30 8 - 58 5   WBC (CD4/8) 6 6 x10E3/uL  3 4 - 10 8   RBC 5 86 x10E6/uL H 4 14 - 5 80   HGB (CD4/8) 18 1 g/dL H 12 6 - 17 7   HCT (CD4/8) 52 3 % H 37 5 - 51 0   MCV (CD4/8) 89 fL  79 - 97   MCH (CD4/8) 30 9 pg  26 6 - 33 0   MCHC (CD4/8) 34 6 g/dL  31 5 - 35 7   RDW (CD4/8) 14 1 %  12 3 - 15 4   PLT (CD4/8) 170 x10E3/uL  150 - 379   NEUTS (CD4/8) 70 %     LYMPHS (CD4/8) 17 %     MONOS (CD4/8) 12 %     EOS (CD4/8) 1 %     BASOS (CD4/8) 0 %     NEUTS,ABS  (CD4/8) 4 6 x10E3/uL  1 4 - 7 0   LYMP,ABS (CD4/8) 1 1 x10E3/uL  0 7 - 3 1   MONOS,ABS (CD4/8) 0 8 x10E3/uL  0 1 - 0 9   EOS, ABS (CD4/8) 0 1 x10E3/uL  0 0 - 0 4   BASO, ABS (CD4/8) 0 0 x10E3/uL  0 0 - 0 2   IIMM  GRANS,ABS (CD4/8) 0 0 x10E3/uL  0 0 - 0 1   IMM  GRANULOCYTES (CD4/8) 0 %     Performed at:  [email or web address] [address]  [id number]  : Francis Nettles MD, Phone:  [phone]     (1) URINALYSIS (will reflex a microscopy if leukocytes, occult blood, protein or nitrites are not within normal limits) [id number] 07:53AM [facility] Order Number: [id number]     Test Name Result Flag Reference   COLOR Yellow     CLARITY Clear     SPECIFIC GRAVITY UA >=1 030  1 003-1 030   PH UA 5 5  4 5-8 0   LEUKOCYTE ESTERASE UA Negative  Negative   NITRITE UA Negative  Negative   PROTEIN UA Negative mg/dl  Negative   GLUCOSE UA Negative mg/dl  Negative   KETONES UA Negative mg/dl  Negative   UROBILINOGEN UA 0 2 E U /dl  0 2, 1 0 E U /dl   BILIRUBIN UA Negative  Negative   BLOOD UA Negative  Negative     Attending Note  Agree with Advanced Practitioner Note Except: Appt with ID in <1 week        Future Appointments    Date/Time Provider Specialty Site   08/01/2017 05:30 PM Randy Galan MD Infectious Disease [facility] 10/23/2017 02:00 PM ALLIE Mensah Epidemiology/Public Health [address] [facility]   Electronically signed by : Simin Saavedra; Jul 24 2017  4:49PM EST                       (Author)    Electronically signed by : Tony Brand DO; Jul 25 2017 12:43PM EST                       (Author)

## 2018-01-17 NOTE — PROGRESS NOTES
Assessment    1  Essential hypertension (401 9) (I10)   2  HIV disease (042) (B20)   3  Hepatitis C virus infection (070 70) (B19 20)   4  Nicotine dependence (305 1) (F17 200)   5  Dyslipidemia (272 4) (E78 5)    Plan  Acute bronchitis    · Mucus Relief ER 1200 MG Oral Tablet Extended Release 12 Hour  Chronic hepatitis, Fibrosis of liver    · (1) AFP, SERUM; Status:Active; Requested TPU:[id number];   Essential hypertension    · Lisinopril-Hydrochlorothiazide 10-12 5 MG Oral Tablet; take 1 tablet by mouth  daily  Fibrosis of liver, Hepatitis C virus infection    · US RIGHT UPPER QUADRANT; Status:Hold For - Scheduling; Requested for:31Oct2017; Health Maintenance    · Aspirin 81 MG Oral Tablet Delayed Release; TAKE 1 TABLET DAILY  HIV disease    · Menactra Intramuscular Injectable  Nicotine dependence    · * CT LUNG SCREENING PROGRAM; Status:Hold For - Scheduling; Requested  for:31Oct2017;     Discussion/Summary    Ema Mariscal is a 62year old male here today for 3 month PCP f/u of chronic conditions  HIV: CD4 188 VL pending  ART Descovy, Norvir, Tivicay, and Prezista  Ema Mariscal denies missing any doses  Previous VL was elevated  Genotype and integrase negative for resistance  Stressed the importance of adherence  Reminded of scheduled ID appointment 11/14/17  HCV: SVR s/p treatment  Continue 6 month [address]  surveillance due to fibrotic liver disease  RUQ US and AFP ordered today  HTN: BP elevated 150/96  Mario states BP is 130/120-70/80 at home  Discussed the need for better BP control due to cardiac risk factors  Ema Mariscal currently take Lisinopril/HCTZ at HS  Would like to take medication in the AM to see if that makes BP more stable  Educated to eliminate salt in diet  Also educated Ema Mariscal that quitting smoking would improve BP  Advised to avoid NSAIDs  If BP continues to be elevated at upcoming ID clinic appointment, will increase lisinopril dose  Start low dose ASA for cardiac protection  Hyperlipidemia: Stable  Total cholesterol 159 HDL 35  triglycerides 97  Encouraged to eat a low fat/low cholesterol diet  Refer to dietician for additional education  Nicotine Dependence: Continues to smoke  Did not start nicotine replacement therapy  Counseled for greater then 15 minutes on the health risks associated with smoking  Advised to quit  Will order LDCT to screen for lung CA, as Jacqueline Bledsoe has smoke the equivalent of a pack a day for greater then 30 years and is at high risk for developing lung disease  Health Maintenance; Colonoscopy completed 9/11/17  Repeat in 5 years  Dental UTD> Needs eye exam      PCP f/u in 2 months  Possible side effects of new medications were reviewed with the patient/guardian today  The treatment plan was reviewed with the patient/guardian  The patient/guardian understands and agrees with the treatment plan   The patient was counseled regarding instructions for management, risks and benefits of treatment options, importance of compliance with treatment  Education   general HIV education  adherence  prevention care  Chief Complaint  Pt c/o headaches x few weeks  Patient is here today for follow up of chronic conditions described in HPI  History of Present Illness  Mr Buffy Simpson is a pleasant 62year old male here today for 3 month f/u of chronic conditions  PMHx significant for   HIV, HCV SVR s/p treatment, nicotine dependence, HTN, and hyperlipidemia  Jacqueline Bledsoe was last seen at the clinic 9/15/17 for URI with lingering cough  Prescribed Mucinex and advised to quit smoking  Jacqueline Bledsoe states symptoms have improved and he is no longer using Mucinex  However, he continues to struggle with smoking cessation and has not started nicotine replacement therapy  Pain Assessment   the patient states they do not have pain  Abuse And Domestic Violence Screen    Yes, the patient is safe at home  The patient states no one is hurting them  Depression And Suicide Screen   No, the patient has not had thoughts of hurting themself  No, the patient has not felt depressed in the past 7 days  The patient is being seen for a routine clinic follow-up of HIV infection  no fever no lethargy no depression no weight loss no decreased appetite no cough no shortness of breath no nausea no vomiting no diarrhea no headache Current treatment includes antiretroviral regimen  By report, there is good compliance with treatment and good tolerance of treatment  CD4: 188  VL: pending  Time spent: 20 minutes  Strength: strong desire to be well  Weakness: multiple pill regiment  Plan of Action: Close PCP f/u  SUBSTANCE ABUSE: ETOH use  amount: occas  not using drugs  SMOKING: He is a current smoker, uses cigars, 2 5 cigars/day packs per day, for 30 years and has thought about quitting  He has the following barriers:   HOUSING: He has stable housing  There are 2 people living in the household (including children)  The household income is $2200 00/month  HEALTH MAINTENANCE: His last dental exam was 8/2017  His last eye exam was needs  The patient is being seen for clinic follow-up for tobacco cessation assistance  The patient has high interest in quitting  He has tried to quit several times  Patient perceived smoking benefits include stress management and fitting in with peers  Patient recognizes that smoking cessation benefits include improved health, improved breathing and improved health of family members  Symptoms:  smoking addiction and cough  Current treatment includes nicotine gum and tobacco cessation program  By report, there is poor compliance with treatment  The patient has not been able to quit  The patient presents for follow-up of essential hypertension  Symptoms: denies impaired vision, denies dyspnea, denies chest pain and denies lower extremity edema  Associated symptoms include no headache  Home monitoring: The patient checks his blood pressure sporadically  Medications: the patient is adherent with his medication regimen  Review of Systems    Constitutional: No fever or chills, feels well, no tiredness, no recent weight gain or weight loss  Eyes: No complaints of eye pain, no red eyes, no discharge from eyes, no itchy eyes  ENT: no complaints of earache, no hearing loss, no nosebleeds, no nasal discharge, no sore throat, no hoarseness  Cardiovascular: No complaints of slow heart rate, no fast heart rate, no chest pain, no palpitations, no leg claudication, no lower extremity  Respiratory: No complaints of shortness of breath, no wheezing, no cough, no SOB on exertion, no orthopnea or PND  Gastrointestinal: No complaints of abdominal pain, no constipation, no nausea or vomiting, no diarrhea or bloody stools  Genitourinary: No complaints of dysuria, no incontinence, no hesitancy, no nocturia, no genital lesion, no testicular pain  Musculoskeletal: No complaints of arthralgia, no myalgias, no joint swelling or stiffness, no limb pain or swelling  Integumentary: No complaints of skin rash or skin lesions, no itching, no skin wound, no dry skin  Neurological: No compliants of headache, no confusion, no convulsions, no numbness or tingling, no dizziness or fainting, no limb weakness, no difficulty walking  Psychiatric: Is not suicidal, no sleep disturbances, no anxiety or depression, no change in personality, no emotional problems  Endocrine: No complaints of proptosis, no hot flashes, no muscle weakness, no erectile dysfunction, no deepening of the voice, no feelings of weakness  Hematologic/Lymphatic: No complaints of swollen glands, no swollen glands in the neck, does not bleed easily, no easy bruising  Active Problems    1  Acute bronchitis (466 0) (J20 9)   2  Chronic hepatitis (571 40) (K73 9)   3  Condyloma (078 11) (A63 0)   4  Dyslipidemia (272 4) (E78 5)   5  Encounter for screening colonoscopy (V76 51) (Z12 11)   6   Encounter for screening examination for sexually transmitted disease (V74 5) (Z11 3)   7  Essential hypertension (401 9) (I10)   8  Fibrosis of liver (571 5) (K74 0)   9  Hepatitis B core antibody positive (795 79) (R76 8)   10  Hepatitis C virus infection (070 70) (B19 20)   11  HIV disease (042) (B20)   12  Impaired fasting glucose (790 21) (R73 01)   13  Need for prophylactic vaccination and inoculation against influenza (V04 81) (Z23)   14  Nicotine dependence (305 1) (F17 200)    Past Medical History    1  History of acute bronchitis (V12 69) (Z87 09)   2  History of Opioid dependence (304 00) (F11 20)    The active problems and past medical history were reviewed and updated today  Surgical History    1  History of Gastric Surgery   2  History of Laminectomy Lumbar    The surgical history was reviewed and updated today  Family History  Mother    1  Family history of Alzheimer's disease (V17 2) (Z82 0)  Father    2  Family history of myocardial infarction (V17 3) (Z82 49)  Brother    3  Family history of prostate carcinoma (W70 22) (Z80 42)    The family history was reviewed and updated today  Social History    · Current every day smoker (305 1) (F17 200)   · Non drinker / no alcohol use   · Sexually active  The social history was reviewed and updated today  The social history was reviewed and is unchanged  Current Meds   1  Descovy 200-25 MG Oral Tablet; take 1 tablet by mouth daily; Therapy: 54MCQ4591 to (Evaluate:46Aqg6337)  Requested for: 47Tjm3563; Last   Rx:53Ppr6198 Ordered   2  Imiquimod 5 % External Cream; APPLY TO AFFECTED AREAS THREE TIMES WEEKLY  [address] OFF AFTER 6-10 HOURS; Therapy: 09XDC9093 to (Last Rx:20Mar2017)  Requested for: 20Mar2017 Ordered   3  Lisinopril-Hydrochlorothiazide 10-12 5 MG Oral Tablet; take 1 tablet by mouth daily; Therapy: 56QFF9134 to (0431 35 06 90)  Requested for: 69GPR2938; Last   Rx:75Chx3381 Ordered   4   Mucus Relief ER 1200 MG Oral Tablet Extended Release 12 Hour; TAKE 1 TABLET   EVERY 12 HOURS AS NEEDED FOR CONGESTION; Therapy: 85Nna3816 to (Evaluate:98Cdl7663)  Requested for: 53Ogk6087; Last   Rx:49Ygf8228 Ordered   5  Nicotine Polacrilex 4 MG Mouth/Throat Gum; CHEW 1 PIECE SLOWLY AND   INTERMITTENTLY FOR 30 MINUTES  REPEAT EVERY 1-2 HOURS; MAXIMUM OF 24   PIECES/DAY; Therapy: 48RZP3791 to (03 17 74 30 53)  Requested for: 23TGA8947; Last   Rx:86Mak2590 Ordered   6  Norvir 100 MG Oral Tablet; TAKE 1 TABLET BY MOUTH EVERY TWELVE HOURS; Therapy: 27ZKN5350 to (Evaluate:99Jjy2380)  Requested for: 60Bqy5479; Last   Rx:04Haj1097 Ordered   7  Prezista 600 MG Oral Tablet; TAKE 1 TABLET BY MOUTH EVERY TWELVE HOURS; Therapy: 01NOR6437 to (Evaluate:22Wom0431)  Requested for: 88Ffp6117; Last   Rx:74Yoh4354 Ordered   8  Tivicay 50 MG Oral Tablet; take 1 tablet by mouth twice a day; Therapy: 04ITE4444 to (Lilian Dietz)  Requested for: 43Kua7166; Last   Rx:95Dna6192 Ordered    The medication list was reviewed and updated today  Allergies    1  No Known Drug Allergies    Vitals  Signs   Recorded: 04GGD7910 01:04PM   Temperature: 98 F  Heart Rate: 75  Systolic: 339  Diastolic: 96  Height: 5 ft 8 in  Weight: 187 lb 4 oz  BMI Calculated: 28 47  BSA Calculated: 1 99  O2 Saturation: 98    Physical Exam    Constitutional   General appearance: No acute distress, well appearing and well nourished  Eyes   Conjunctiva and lids: No swelling, erythema or discharge  Pupils and irises: Equal, round and reactive to light  Ears, Nose, Mouth, and Throat   External inspection of ears and nose: Normal     Otoscopic examination: Tympanic membranes translucent with normal light reflex  Canals patent without erythema  Nasal mucosa, septum, and turbinates: Normal without edema or erythema  Oropharynx: Normal with no erythema, edema, exudate or lesions  Pulmonary   Respiratory effort: No increased work of breathing or signs of respiratory distress  Auscultation of lungs: Clear to auscultation  Cardiovascular   Auscultation of heart: Normal rate and rhythm, normal S1 and S2, without murmurs  Examination of extremities for edema and/or varicosities: Normal     Abdomen   Abdomen: Non-tender, no masses  Liver and spleen: No hepatomegaly or splenomegaly  Lymphatic   Palpation of lymph nodes in neck: No lymphadenopathy  Psychiatric   Orientation to person, place, and time: Normal     Mood and affect: Normal     Lips, Teeth and Gums: The lips were normal with no lesions  Examination of the teeth revealed missing teeth  Examination of the gingiva showed no abnormalities        Future Appointments    Date/Time Provider Specialty Site   11/14/2017 04:45 PM Elisha Ortega MD Infectious Disease ASC AT [facility]   12/29/2017 11:30 AM ALLIE No Epidemiology/Public Health [facility] AT [address] [address]   Electronically signed by : Jose Jensen; Oct 31 2017  4:04PM EST                       (Author)    Electronically signed by : Fredo Morales DO; Oct 31 2017  7:14PM EST                       (Author)

## 2018-01-17 NOTE — PROGRESS NOTES
Assessment   1  HIV disease (042) (B20)  2  Nicotine dependence (305 1) (F17 200)  3  Hepatitis C virus infection (070 70) (B19 20)    Plan   1  Follow-up visit in 3 months Evaluation and Treatment  Follow-up  Status: Hold For -   Scheduling  Requested for: 81Taj7153   2  (1) CBC/PLT/DIFF; Status:Active; Requested for:01Oct2017;   3  (1) COMPREHENSIVE METABOLIC PANEL; Status:Active; Requested for:01Oct2017;   4  (1) HIV-1 RNA QUANTITATIVE; [Do Not Release]; Status:Active; Requested for:01Oct2017;     5  (1) HIV-1 RNA QUANTITATIVE; [Do Not Release]; Status:Active; Requested for:15Oct2016;     6  (1) QUANTIFERON - TB GOLD; Status:Active; Requested for:01Oct2017;   7  (1) T LYMPH SUBSET (CD4); Status:Active; Requested for:01Oct2017;   8  (1) T LYMPH SUBSET (CD4); Status:Active; Requested YOU:77ETY2898;   9  Administer: Hepatitis B; inject 1  ml intramuscular; To Be Done: 07NMC3131    Discussion/Summary    AIDS-patient with bout of nonadherence per the labs  This certainly could explain his viral load going up to 4000 copies  His CD4 count has dropped to low 200 once again  I stressed the importance of adherence  The patient states that he will never let that happen again  We'll continue the current salvage regimen of Descovy/Norvir/Prezista and Tivicay  Follow-up on the resistance testing which is pending  If no resistance found, we'll continue the current ART, recheck labs in 2 months, follow-up in 3 months  Nicotine dependence-patient is now interested in smoking cessation  He will begin nicotine replacement therapy after a pending vacation with friends  Hepatitis C infection-patient with SVR after treatment  Patient has cirrhosis, so need to continue q 6 month [facility] [address]  screening  1    Possible side effects of new medications were reviewed with the patient/guardian today  The treatment plan was reviewed with the patient/guardian   The patient/guardian understands and agrees with the treatment plan   The patient was counseled regarding diagnostic results, instructions for management, prognosis, risks and benefits of treatment options, importance of compliance with treatment  Education   general HIV education  adherence  prevention care  1 Amended By: Alejandro Lang; Aug 08 2017 5:48 PM EST    Chief Complaint  Patient here for an HIV f/u  His SPNS is 16      History of Present Illness  Routine follow-up for HIV and AIDS  Patient missed 4 days in a row of ART before getting his labs  He is now back on treatment with 100% adherence  He denies any notable side effects  He only missed one day, and then got irritated that he continue taking medications, so he decided to stop altogether for the full 4 days  Pain Assessment   the patient states they do not have pain  Abuse And Domestic Violence Screen    Yes, the patient is safe at home  The patient states no one is hurting them  Depression And Suicide Screen  No, the patient has not had thoughts of hurting themself  No, the patient has not felt depressed in the past 7 days  The patient is being seen for a routine clinic follow-up of HIV infection  The patient is currently asymptomatic  Active Problems   1  Acute bronchitis (466 0) (J20 9)  2  Chronic hepatitis (571 40) (K73 9)  3  Condyloma (078 11) (A63 0)  4  Dyslipidemia (272 4) (E78 5)  5  Encounter for screening colonoscopy (V76 51) (Z12 11)  6  Encounter for screening examination for sexually transmitted disease (V74 5) (Z11 3)  7  Essential hypertension (401 9) (I10)  8  Fibrosis of liver (571 5) (K74 0)  9  Hepatitis B core antibody positive (795 79) (R76 8)  10  Hepatitis C virus infection (070 70) (B19 20)  11  HIV disease (042) (B20)  12  Impaired fasting glucose (790 21) (R73 01)  13  Need for prophylactic vaccination and inoculation against influenza (V04 81) (Z23)  14  Nicotine dependence (305 1) (F17 200)    Past Medical History   1  History of acute bronchitis (V12 69) (Z87 09)  2   History of Opioid dependence (304 00) (F11 20)    Surgical History   1  History of Laminectomy Lumbar    Family History  Mother   1  Family history of Alzheimer's disease (V17 2) (Z82 0)  Father   2  Family history of myocardial infarction (V17 3) (Z82 49)  Brother   3  Family history of prostate carcinoma (V16 42) (Z80 42)    Social History    · Current every day smoker (305 1) (F17 200)   · Non drinker / no alcohol use   · Sexually active    Current Meds  1  Descovy 200-25 MG Oral Tablet; take 1 tablet by mouth daily; Therapy: 68RLT2120 to (Evaluate:47Tls0502)  Requested for: 83OVP4983; Last   Rx:03May2017 Ordered  2  Imiquimod 5 % External Cream; APPLY TO AFFECTED AREAS THREE TIMES WEEKLY  [address] OFF AFTER 6-10 HOURS; Therapy: 14ERF2136 to (Last Rx:20Mar2017)  Requested for: 20Mar2017 Ordered  3  Lisinopril-Hydrochlorothiazide 10-12 5 MG Oral Tablet; take 1 tablet by mouth daily; Therapy: 79JYC5281 to (96 641935)  Requested for: 95QIU5164; Last   Rx:40Rkc3006 Ordered  4  Nicotine Polacrilex 4 MG Mouth/Throat Gum; CHEW 1 PIECE SLOWLY AND   INTERMITTENTLY FOR 30 MINUTES  REPEAT EVERY 1-2 HOURS; MAXIMUM OF 24   PIECES/DAY; Therapy: 31BCA2347 to (96 [id number])  Requested for: 62YGV9216; Last   Rx:26Vtr0763 Ordered  5  Norvir 100 MG Oral Tablet; TAKE 1 TABLET BY MOUTH EVERY TWELVE HOURS; Therapy: 22ZSV0147 to (Evaluate:84Mfr1989)  Requested for: 30ZYO1726; Last   Rx:03May2017 Ordered  6  Prezista 600 MG Oral Tablet; TAKE 1 TABLET BY MOUTH EVERY TWELVE HOURS; Therapy: 65QSZ5452 to (Evaluate:26Lna4940)  Requested for: 65WIT7550; Last   Rx:03May2017 Ordered  7  Tivicay 50 MG Oral Tablet; take 1 tablet by mouth twice a day; Therapy: 37QMA1201 to (Evaluate:96Pmt4587)  Requested for: 48OPU2272; Last   DQ:81GPE0503 Ordered    Allergies   1   No Known Drug Allergies    Vitals  Signs   Recorded: 75Hbn0376 05:23PM   Temperature: 98 1 F  Heart Rate: 90  Systolic: 034  Diastolic: 81  Height: 5 ft 8 in  Weight: 182 lb   BMI Calculated: 27 67  BSA Calculated: 1 96  O2 Saturation: 96    Physical Exam    Constitutional   General appearance: No acute distress, well appearing and well nourished  Ears, Nose, Mouth, and Throat   Oropharynx: Normal with no erythema, edema, exudate or lesions  Pulmonary   Respiratory effort: No increased work of breathing or signs of respiratory distress  Auscultation of lungs: Clear to auscultation  Cardiovascular   Auscultation of heart: Normal rate and rhythm, normal S1 and S2, without murmurs  Examination of extremities for edema and/or varicosities: Normal     Abdomen   Abdomen: Non-tender, no masses  Liver and spleen: No hepatomegaly or splenomegaly  Lymphatic   Palpation of lymph nodes in neck: No lymphadenopathy  Future Appointments    Date/Time Provider Specialty Site   10/23/2017 02:00 PM [address] AT [facility]   08/24/2017 04:00 PM DEANDRE Garnica   Gastroenterology Adult [facility]     Signatures   Electronically signed by : Karla Litten, MD; Aug  8 2017  5:47PM EST                       (Author)    Electronically signed by : Karla Litten, MD; Aug  8 2017  5:48PM EST                       (Author)

## 2018-01-17 NOTE — MISCELLANEOUS
Message  Return to work or school:   Rosangela Salazar is under my professional care   He was seen in my office on 9/15/17   He is able to return to work on  9/18/17            Signatures   Electronically signed by : Jose Daniel Granados; Sep 15 2017 10:10AM EST                       (Author)

## 2018-01-17 NOTE — PROGRESS NOTES
Assessment    1  HIV disease (042) (B20)   2  Essential hypertension (401 9) (I10)    Discussion/Summary    Intervention Diet Prescription   Energy 2050 kcal   25kcal /82kg   Protein 82g   1 0g /82kg   Fluid 2050ml   25ml /82kg <1 8g Na  Estimated Intake: 2200kcal 90g Protein   His current intake is meeting estimated nutrition needs  Goal #1 - Improve/Maintain  Reduce sodium intake  Goal initiated  Time Frame For Accomplishment: By next follow-up  Intervention Nutrition Education Provided  Person Educated: patient   Topics Discussed: Sodium   Barriers To Learning: none  Readiness to Learn: Marginal Reception  Teaching Method: verbal   Evaluation Of Learning: needs reinforcement   Merry Pena has maintained a steady body wt over year time period  # (BMI 27)  PT dietary recall indicates excessive sodium intake r/t needs and HTN  Merry Pena maintains physical activity through his job, golfing, and swimming  Encouraged PT to increase fruits, vegetables, reduce high sodium processed foods  Minimal reception at this time, Mario's young grandson was present in the room and somewhat of a distraction during encounter  PT has followed up on needed dental care, currently has a cracked tooth which he indicates he will obtain care for  Will continue to follow and provide interventions as needed  History of Present Illness  Assessment: Clinical Data/Client History HIV - Yes  AIDS - No    His socio-economic status includes  cooks and eats out  He lives in [facility] house  Living Environment - He has access to refrigerator, stove and microwave  His functional status is  ambulatory, able to food shop and prepares own meals  His activity level is  normal and Golfs, swims, physically active at work daily  He eats breakfast at  Does not eat breakfast regularly/ bagel with butter if he does  He eats lunch at  Floored sandwich, roast beef, cheese, large submarine size, water     He eats dinner at  Chicken or steak, pasta, salads, green beans  Soda 1-2x per week  His appetite is  good   He does not take supplements  He has problems with  PT has followed through with getting needed dental care as per last encounter  Eloy Erwin has a cracked tooth which he is going to obtain additional care for  Nutrition Diagnosis   Decreased sodium need r/t to altered BP regulation as evidenced by current HTN diagnosis, daily intake of lisinopril, PT dietary recall  Active Problems    1  Acute bronchitis (466 0) (J20 9)   2  Chronic hepatitis (571 40) (K73 9)   3  Condyloma (078 11) (A63 0)   4  Dyslipidemia (272 4) (E78 5)   5  Encounter for screening colonoscopy (V76 51) (Z12 11)   6  Encounter for screening examination for sexually transmitted disease (V74 5) (Z11 3)   7  Essential hypertension (401 9) (I10)   8  Fibrosis of liver (571 5) (K74 0)   9  Hepatitis B core antibody positive (795 79) (R76 8)   10  Hepatitis C virus infection (070 70) (B19 20)   11  HIV disease (042) (B20)   12  Impaired fasting glucose (790 21) (R73 01)   13  Need for prophylactic vaccination and inoculation against influenza (V04 81) (Z23)   14  Nicotine dependence (305 1) (F17 200)    Past Medical History    1  History of acute bronchitis (V12 69) (Z87 09)   2  History of Opioid dependence (304 00) (F11 20)    Surgical History    1  History of Laminectomy Lumbar    Family History  Mother    1  Family history of Alzheimer's disease (V17 2) (Z82 0)  Father    2  Family history of myocardial infarction (V17 3) (Z82 49)  Brother    3  Family history of prostate carcinoma (V16 42) (Z80 42)    Social History    · Current every day smoker (305 1) (F17 200)   · Non drinker / no alcohol use   · Sexually active    Current Meds   1  Descovy 200-25 MG Oral Tablet; take 1 tablet by mouth daily; Therapy: 16ONR8076 to (Evaluate:61Vwq8315)  Requested for: 80QMO4934; Last Rx:03May2017   Ordered   2   Imiquimod 5 % External Cream; APPLY TO AFFECTED AREAS THREE TIMES WEEKLY  [facility] OFF   AFTER 6-10 HOURS; Therapy: 87OJW6204 to (Last Rx:20Mar2017)  Requested for: 20Mar2017 Ordered   3  Lisinopril-Hydrochlorothiazide 10-12 5 MG Oral Tablet; take 1 tablet by mouth daily; Therapy: 50RLV6252 to ([id number])  Requested for: 52KDY3779; Last Rx:06Bzr5164   Ordered   4  Nicotine Polacrilex 4 MG Mouth/Throat Gum; CHEW 1 PIECE SLOWLY AND INTERMITTENTLY FOR 30   MINUTES  REPEAT EVERY 1-2 HOURS; MAXIMUM OF 24 PIECES/DAY; Therapy: 36DJP4331 to ([id number])  Requested for: 47CJM1778; Last Rx:81Jol9326 Ordered   5  Norvir 100 MG Oral Tablet; TAKE 1 TABLET BY MOUTH EVERY TWELVE HOURS; Therapy: 52KCD6247 to (Evaluate:76Yxa5860)  Requested for: 18CKU0139; Last Rx:56Qcv0122   Ordered   6  Prezista 600 MG Oral Tablet; TAKE 1 TABLET BY MOUTH EVERY TWELVE HOURS; Therapy: 90KGR4183 to (Evaluate:47Fbh3252)  Requested for: 25HCX6883; Last Rx:50Bvs9263   Ordered   7  Tivicay 50 MG Oral Tablet; take 1 tablet by mouth twice a day; Therapy: 81HNK5587 to (Evaluate:49Zef3704)  Requested for: 86JAU1978; Last MU:95OIY5184   Ordered    Allergies    1   No Known Drug Allergies    Vitals  Vitals   Recorded: 79CQQ6261 15:32JR   Systolic: 984  Diastolic: 90  Temperature: 98 F  Heart Rate: 80  Height: 5 ft 8 in  Weight: 182 lb 2 oz  BMI Calculated: 27 69  BSA Calculated: 1 96  Recorded: 07AJV6640 20:91WN   Systolic: 921  Diastolic: 88  Temperature: 97 7 F  Heart Rate: 85  Height: 5 ft 8 in  Weight: 183 lb 2 oz  BMI Calculated: 27 84  BSA Calculated: 1 97  O2 Saturation: 97  Recorded: 30NWO3579 76:42BW   Systolic: 782  Diastolic: 82  Temperature: 97 8 F  Heart Rate: 84  Height: 5 ft 8 in  Weight: 184 lb 2 oz  BMI Calculated: 28  BSA Calculated: 1 97  Recorded: 92CJB0041 18:75AF   Systolic: 979  Diastolic: 964  Temperature: 97 9 F  Heart Rate: 76  Weight: 187 lb 2 oz  BMI Calculated: 28 45  BSA Calculated: 1 99  O2 Saturation: 97  Recorded: 77MEI0368 38:76XM   Systolic: 325  Diastolic: 90  Recorded: 36QHV8630 61:45ZA   Systolic: 460  Diastolic: 90  Temperature: 98 F  Heart Rate: 80  Weight: 188 lb 6 oz  BMI Calculated: 28 64  BSA Calculated: 1 99  Recorded: 03YJL5572 04:94HL   Systolic: 641  Diastolic: 92  Temperature: 98 1 F  Heart Rate: 104  Weight: 189 lb 5 oz  BMI Calculated: 28 79  BSA Calculated: 2  Recorded: 09JUC2756 84:15NB   Systolic: 292  Diastolic: 360  Temperature: 97 9 F  Heart Rate: 76  Weight: 185 lb 4 oz  BMI Calculated: 28 17  BSA Calculated: 1 98  Recorded: 04SPO0948 11:50JO   Systolic: 958  Diastolic: 463  Temperature: 97 7 F  Heart Rate: 92  Weight: 182 lb 6 oz  BMI Calculated: 27 73  BSA Calculated: 1 97  Recorded: 65MIR8233 51:84BJ   Systolic: 254  Diastolic: 86  Temperature: 98 2 F  Heart Rate: 80  Weight: 175 lb 2 oz  BMI Calculated: 26 63  BSA Calculated: 1 93  Recorded: 63YXC0929 58:25TN   Systolic: 807  Diastolic: 88  Temperature: 98 1 F  Heart Rate: 72  Weight: 178 lb 2 oz  BMI Calculated: 27 08  BSA Calculated: 1 95  Recorded: 01FEM8413 47:92AR   Systolic: 353  Diastolic: 80  Temperature: 98 2 F  Heart Rate: 92  Height: 5 ft 8 in  Weight: 173 lb 4 oz  BMI Calculated: 26 34  BSA Calculated: 1 92    Future Appointments    Date/Time Provider Specialty Site   08/01/2017 05:30 PM Felipe Acosta MD Infectious Disease [facility] AT [facility]   10/23/2017 02:00 PM ALLIE Bal Epidemiology/Public Health [facility]     Signatures   Electronically signed by :  ALEXANDER Bernal; Jul 25 2017 11:45AM EST                       (Author)

## 2018-01-18 NOTE — PROGRESS NOTES
Assessment    1  Essential hypertension (401 9) (I10)   2  Condyloma (078 11) (A63 0)   3  HIV disease (042) (B20)   4  Hepatitis C virus infection (070 70) (B19 20)    Plan  Chronic hepatitis, Hepatitis C virus infection    · (1) AFP, SERUM; Status:Active; Requested for:20Mar2017;   Essential hypertension    · Imiquimod 5 % External Cream; APPLY TO AFFECTED AREAS THREE TIMES  WEEKLY  [address] OFF AFTER 6-10 HOURS   · Lisinopril-Hydrochlorothiazide 10-12 5 MG Oral Tablet; TAKE 1 TABLET DAILY   · (1) COMPREHENSIVE METABOLIC PANEL; Status:Active; Requested for:24Apr2017;   Hepatitis C virus infection    · US RIGHT UPPER QUADRANT; Status:Hold For - Scheduling; Requested  for:20Mar2017; HIV disease    · Hepatitis B    Discussion/Summary    Francine Webster is a 64year old male who presents to the clinic for PCP f/u  Is doing well and offers no acute complaints  HIV: CD4 and VL pending  Adherent with ART Descovy, Norvir, Prezista, and Truvada  Reminded of upcoming ID clinic appointment scheduled March 28  HCV: SVR status post treatment with Daklinza and Sovaldi  [address]  surveillance ordered  Check AFP and RUQ US  Getting medical insurance in one month  Will delay US into insurance coverage is available  HTN: BP continues to be elevated  Over the past year SBP has ranged between 150-170  Start Lisinorpil-HCTZ  Check CMP in one month  Instructed to take at night to prevent dizziness  May switch to taking in AM if side effects are manageable  Health maintenance: Due for colonoscopy  We'll schedule follow-up with GI in 2 months at patient's request Due for annual dental and eye exam  Patient will schedule appointments  Counseled on the importance of smoking cessation for greater than 15 minutes  Currently smokes 5 small cigars a day  Educated on the health risks of daily smoking  Advised to quit  Declines further intervention at this time  Assess for readiness at follow-up visits  PCP f/u scheduled for 3 months  Possible side effects of new medications were reviewed with the patient/guardian today  The treatment plan was reviewed with the patient/guardian  The patient/guardian understands and agrees with the treatment plan   The patient was counseled regarding instructions for management, risks and benefits of treatment options, importance of compliance with treatment  Education   general HIV education  adherence  prevention care  Chief Complaint  Pt offers no c/o at this time  History of Present Illness  Gely Blum is a pleasant 59-year-old gentleman who presents to clinic today for primary care follow-up  Past medical history significant for HIV, HCV  Gely Blum states he is doing well and offers no acute complaints  Blood pressure is elevated today 154/82  Denies family history of hypertension or heart disease  Pain Assessment   the patient states they do not have pain  Abuse And Domestic Violence Screen    Yes, the patient is safe at home  The patient states no one is hurting them  Depression And Suicide Screen  No, the patient has not had thoughts of hurting themself  No, the patient has not felt depressed in the past 7 days  The patient is being seen for a routine clinic follow-up of HIV infection  no fever no lethargy no depression no night sweats no weight loss no decreased appetite no cough no shortness of breath no skin lesions no mouth sores no thrush no nausea no vomiting no diarrhea no headache no visual changes Current treatment includes antiretroviral regimen  By report, there is good compliance with treatment and good tolerance of treatment  CD4: pending  VL: pending  Strength: strong desire to be well  Weakness: works long hours, complicated cocktail  Plan of Action: Reinforce the importance of adherence  SUBSTANCE ABUSE: ETOH use  amount: 6 pack/month  not using drugs     SMOKING: He is a current smoker, uses cigars, 4-5 cigars/day packs per day, for 30 years and has thought about quitting  He has the following barriers: "need something"  HOUSING: He has stable housing  There are 2 people living in the household (including children)  The household income is $2200 00/month  HEALTH MAINTENANCE: His last dental exam was 3/2016  His last eye exam was 3/2016  The patient is being seen for a routine clinic follow-up of condyloma  Symptoms:  multiple skin lesions and localized bleeding  The patient is currently experiencing symptoms  Current treatment includes cryotherapy  By report, there is fair tolerance of treatment and fair symptom control  The patient presents for follow-up of essential hypertension  He has no comorbid illnesses  Symptoms: denies impaired vision, denies dyspnea, denies chest pain, denies intermittent leg claudication and denies lower extremity edema  Associated symptoms include no headache  Home monitoring: The patient checks his blood pressure sporadically  Medications: The patient is not currently on any medications for his hypertension--lifestyle modification only  The patient is due for a serum creatinine  Review of Systems    Constitutional: No fever or chills, feels well, no tiredness, no recent weight gain or weight loss  Eyes: No complaints of eye pain, no red eyes, no discharge from eyes, no itchy eyes  ENT: no complaints of earache, no hearing loss, no nosebleeds, no nasal discharge, no sore throat, no hoarseness  Cardiovascular: No complaints of slow heart rate, no fast heart rate, no chest pain, no palpitations, no leg claudication, no lower extremity  Respiratory: No complaints of shortness of breath, no wheezing, no cough, no SOB on exertion, no orthopnea or PND  Gastrointestinal: No complaints of abdominal pain, no constipation, no nausea or vomiting, no diarrhea or bloody stools  Genitourinary: No complaints of dysuria, no incontinence, no hesitancy, no nocturia, no genital lesion, no testicular pain  Musculoskeletal: No complaints of arthralgia, no myalgias, no joint swelling or stiffness, no limb pain or swelling  Integumentary: No complaints of skin rash or skin lesions, no itching, no skin wound, no dry skin  Neurological: No compliants of headache, no confusion, no convulsions, no numbness or tingling, no dizziness or fainting, no limb weakness, no difficulty walking  Psychiatric: Is not suicidal, no sleep disturbances, no anxiety or depression, no change in personality, no emotional problems  Endocrine: No complaints of proptosis, no hot flashes, no muscle weakness, no erectile dysfunction, no deepening of the voice, no feelings of weakness  Hematologic/Lymphatic: No complaints of swollen glands, no swollen glands in the neck, does not bleed easily, no easy bruising  Active Problems    1  Acute bronchitis (466 0) (J20 9)   2  Chronic hepatitis (571 40) (K73 9)   3  Dyslipidemia (272 4) (E78 5)   4  Fibrosis of liver (571 5) (K74 0)   5  Hepatitis B core antibody positive (795 79) (R76 8)   6  Hepatitis C virus infection (070 70) (B19 20)   7  HIV disease (042) (B20)   8  Impaired fasting glucose (790 21) (R73 01)   9  Need for prophylactic vaccination and inoculation against influenza (V04 81) (Z23)    Past Medical History    1  History of acute bronchitis (V12 69) (Z87 09)   2  History of Opioid dependence (304 00) (F11 20)    The active problems and past medical history were reviewed and updated today  Surgical History    1  History of Laminectomy Lumbar    The surgical history was reviewed and updated today  Family History  Mother    1  Family history of Alzheimer's disease (V17 2) (Z82 0)  Father    2  Family history of myocardial infarction (V17 3) (Z82 49)  Brother    3  Family history of prostate carcinoma (G21 75) (Z80 42)    The family history was reviewed and updated today         Social History    · Current every day smoker (305 1) (F17 200)   · Non drinker / no alcohol use   · Sexually active  The social history was reviewed and updated today  The social history was reviewed and is unchanged  Current Meds   1  Descovy 200-25 MG Oral Tablet; Take 1 tablet daily; Therapy: 98UIM9809 to (Evaluate:49Saq8665)  Requested for: 22WDW8980; Last   Rx:75Pzb4490 Ordered   2  Norvir 100 MG Oral Tablet; TAKE 1 TABLET BY MOUTH EVERY TWELVE HOURS; Therapy: 77DJE9470 to (Andrei Gracie)  Requested for: 82GBZ4352; Last   Rx:10Nov2016 Ordered   3  Prezista 600 MG Oral Tablet; TAKE 1 TABLET BY MOUTH EVERY TWELVE HOURS; Therapy: 89IWO7577 to (Andrei Gracie)  Requested for: 59HZT0592; Last   Rx:10Nov2016 Ordered   4  Tivicay 50 MG Oral Tablet; take 1 tablet by mouth twice a day; Therapy: 97HJH0089 to (Covel Gracie)  Requested for: 21OJZ2816; Last   Rx:32Dgt5112 Ordered    The medication list was reviewed and updated today  Allergies    1  No Known Drug Allergies    Vitals  Signs   Recorded: 20Mar2017 01:57PM   Temperature: 97 8 F  Heart Rate: 84  Systolic: 071  Diastolic: 82  Height: 5 ft 8 in  Weight: 184 lb 2 oz  BMI Calculated: 28  BSA Calculated: 1 97    Physical Exam    Constitutional   General appearance: No acute distress, well appearing and well nourished  Eyes   Conjunctiva and lids: No swelling, erythema or discharge  Pupils and irises: Equal, round and reactive to light  Ears, Nose, Mouth, and Throat   External inspection of ears and nose: Normal     Otoscopic examination: Tympanic membranes translucent with normal light reflex  Canals patent without erythema  Nasal mucosa, septum, and turbinates: Normal without edema or erythema  Oropharynx: Normal with no erythema, edema, exudate or lesions  Pulmonary   Respiratory effort: No increased work of breathing or signs of respiratory distress  Auscultation of lungs: Clear to auscultation  Cardiovascular   Auscultation of heart: Normal rate and rhythm, normal S1 and S2, without murmurs  Examination of extremities for edema and/or varicosities: Normal     Abdomen   Abdomen: Non-tender, no masses  Liver and spleen: No hepatomegaly or splenomegaly  Lymphatic   Palpation of lymph nodes in neck: No lymphadenopathy  Musculoskeletal   Gait and station: Normal     Digits and nails: Normal without clubbing or cyanosis  Inspection/palpation of joints, bones, and muscles: Normal     Muscle strength: Normal strength throughout  Skin   Skin and subcutaneous tissue: Normal without rashes or lesions  Psychiatric   Orientation to person, place, and time: Normal     Mood and affect: Normal     Lips, Teeth and Gums: The lips were normal with no lesions  Examination of the teeth revealed dental caries and teeth discoloration  Examination of the gingiva showed no abnormalities  Attending Note  Collaborating Physician: I agree with the Advanced Practitioner note        Future Appointments    Date/Time Provider Specialty Site   03/28/2017 05:30 PM Mima Rashid MD Infectious Disease [address] AT [facility]   06/19/2017 02:00 PM ALLIE Altamirano Epidemiology/Public Health [address] [facility]   Electronically signed by : Alonzo Pang; Mar 20 2017  3:40PM EST                       (Author)    Electronically signed by : Iris Mendiola DO; Mar 21 2017 11:05AM EST                       (Author)

## 2018-01-18 NOTE — PROGRESS NOTES
Assessment    1  HIV disease (042) (B20)   2  Hepatitis C virus infection (070 70) (B19 20)   3  Need for prophylactic vaccination and inoculation against influenza (V04 81) (Z23)    Plan    1  (1) CBC/PLT/DIFF; Status:Active; Requested JNF:20OUO7195;    2  (1) COMPREHENSIVE METABOLIC PANEL; Status:Active; Requested UYT:33SDP3182;    3  (1) HIV-1 RNA QUANTITATIVE; [Do Not Release]; Status:Active; Requested   DTO:98MDI9878;    4  (1) T LYMPH SUBSET (CD4); Status:Active; Requested TYU:86HGA1843;    5  Hepatitis B; inject 1  ml intramuscular; To Be Done: 66JYF9956    Discussion/Summary    HIV-doing well on ART with an undetectable viral load and a CD4 count of greater than 250  His creatinine has risen slightly  We'll attempt to change the Viread to Descovy  Continue Prezista/Norvir/Tivicay for now  Recheck labs in 2 months and follow-up in 3 months  HCV-SVR status post 24 weeks of Solvadi/Dacladisvir  We'll need to continue [facility]  surveillance  Education   general HIV education  adherence  prevention care  Chief Complaint  Pt here for routine f/u  History of Present Illness  Routine follow-up for HIV/hep C coinfection  Patient completed his 24 weeks of DAA  He remains on Prezista/Norvir/Tivicay/Viread  He denies any missed doses  He denies any notable side effects  Pain Assessment   the patient states they do not have pain  Abuse And Domestic Violence Screen    Yes, the patient is safe at home  The patient states no one is hurting them  Depression And Suicide Screen  No, the patient has not had thoughts of hurting themself  No, the patient has not felt depressed in the past 7 days  no fever no night sweats no weight loss no cough no shortness of breath no thrush no nausea no vomiting no diarrhea      Active Problems    1  Acute bronchitis (466 0) (J20 9)   2  Chronic hepatitis (571 40) (K73 9)   3  Dyslipidemia (272 4) (E78 5)   4  Fibrosis of liver (571 5) (K74 0)   5   Hepatitis B core antibody positive (795 79) (R76 8)   6  Hepatitis C virus infection (070 70) (B19 20)   7  HIV disease (042) (B20)   8  Impaired fasting glucose (790 21) (R73 01)   9  Need for prophylactic vaccination and inoculation against influenza (V04 81) (Z23)    Past Medical History    1  History of acute bronchitis (V12 69) (Z87 09)   2  History of Opioid dependence (304 00) (F11 20)    Surgical History    1  History of Laminectomy Lumbar    Family History  Mother    1  Family history of Alzheimer's disease (V17 2) (Z82 0)  Father    2  Family history of myocardial infarction (V17 3) (Z82 49)  Brother    3  Family history of prostate carcinoma (V16 42) (Z80 42)    Social History    · Current every day smoker (305 1) (F17 200)   · Non drinker / no alcohol use   · Sexually active    Current Meds   1  Norvir 100 MG Oral Tablet; TAKE 1 TABLET BY MOUTH EVERY TWELVE HOURS; Therapy: 18SWP4016 to ([facility])  Requested for: 86HQD4719; Last   Rx:10Nov2016 Ordered   2  Prezista 600 MG Oral Tablet; TAKE 1 TABLET BY MOUTH EVERY TWELVE HOURS; Therapy: 42WLV7237 to ([facility])  Requested for: 18IVQ5394; Last   Rx:10Nov2016 Ordered   3  Tivicay 50 MG Oral Tablet; take 1 tablet by mouth twice a day; Therapy: 86NON6307 to ([facility])  Requested for: 43BLC2998; Last   Rx:10Nov2016 Ordered   4  Viread 300 MG Oral Tablet; take 1 tablet by mouth daily; Therapy: 59QFS0725 to ([facility])  Requested for: 95PSH0942; Last   Rx:10Nov2016 Ordered    Allergies    1  No Known Drug Allergies    Vitals  Signs   Recorded: 21CVQ5166 69:00QB   Systolic: 659  Diastolic: 411  Heart Rate: 76  Temperature: 97 9 F  O2 Saturation: 97  Weight: 187 lb 2 oz  BMI Calculated: 28 45  BSA Calculated: 1 99    Physical Exam    Constitutional   General appearance: No acute distress, well appearing and well nourished  Ears, Nose, Mouth, and Throat   Oropharynx: Normal with no erythema, edema, exudate or lesions      Pulmonary Respiratory effort: No increased work of breathing or signs of respiratory distress  Auscultation of lungs: Clear to auscultation  Cardiovascular   Auscultation of heart: Normal rate and rhythm, normal S1 and S2, without murmurs  Examination of extremities for edema and/or varicosities: Normal     Abdomen   Abdomen: Non-tender, no masses  Liver and spleen: No hepatomegaly or splenomegaly  Lymphatic   Palpation of lymph nodes in neck: No lymphadenopathy         Future Appointments    Date/Time Provider Specialty Site   01/09/2017 02:00 PM ALLIE Puckett Epidemiology/Public Health [address] [place] [address]   Electronically signed by : Mag Vargas MD; Nov 15 2016  5:46PM EST                       (Author)

## 2018-01-22 VITALS
BODY MASS INDEX: 27.58 KG/M2 | HEIGHT: 68 IN | WEIGHT: 182 LBS | DIASTOLIC BLOOD PRESSURE: 81 MMHG | TEMPERATURE: 98.1 F | SYSTOLIC BLOOD PRESSURE: 131 MMHG | HEART RATE: 90 BPM | OXYGEN SATURATION: 96 %

## 2018-01-22 VITALS
BODY MASS INDEX: 27.6 KG/M2 | SYSTOLIC BLOOD PRESSURE: 134 MMHG | WEIGHT: 182.13 LBS | DIASTOLIC BLOOD PRESSURE: 90 MMHG | TEMPERATURE: 98 F | HEART RATE: 80 BPM | HEIGHT: 68 IN

## 2018-02-10 ENCOUNTER — TRANSCRIBE ORDERS (OUTPATIENT)
Dept: ADMINISTRATIVE | Facility: HOSPITAL | Age: 58
End: 2018-02-10

## 2018-02-10 ENCOUNTER — APPOINTMENT (OUTPATIENT)
Dept: LAB | Facility: HOSPITAL | Age: 58
End: 2018-02-10
Payer: COMMERCIAL

## 2018-02-10 DIAGNOSIS — K74.00 HEPATIC FIBROSIS: ICD-10-CM

## 2018-02-10 DIAGNOSIS — B20 HUMAN IMMUNODEFICIENCY VIRUS (HIV) DISEASE (HCC): ICD-10-CM

## 2018-02-10 DIAGNOSIS — K73.9 CHRONIC HEPATITIS (HCC): ICD-10-CM

## 2018-02-10 LAB
AFP-TM SERPL-MCNC: 5 NG/ML (ref 0.5–8)
ALBUMIN SERPL BCP-MCNC: 3.7 G/DL (ref 3.5–5)
ALP SERPL-CCNC: 102 U/L (ref 46–116)
ALT SERPL W P-5'-P-CCNC: 31 U/L (ref 12–78)
ANION GAP SERPL CALCULATED.3IONS-SCNC: 9 MMOL/L (ref 4–13)
AST SERPL W P-5'-P-CCNC: 26 U/L (ref 5–45)
BASOPHILS # BLD AUTO: 0.04 THOUSANDS/ΜL (ref 0–0.1)
BASOPHILS NFR BLD AUTO: 1 % (ref 0–1)
BILIRUB SERPL-MCNC: 0.5 MG/DL (ref 0.2–1)
BUN SERPL-MCNC: 16 MG/DL (ref 5–25)
CALCIUM SERPL-MCNC: 8.9 MG/DL (ref 8.3–10.1)
CHLORIDE SERPL-SCNC: 105 MMOL/L (ref 100–108)
CO2 SERPL-SCNC: 26 MMOL/L (ref 21–32)
CREAT SERPL-MCNC: 1.09 MG/DL (ref 0.6–1.3)
EOSINOPHIL # BLD AUTO: 0.1 THOUSAND/ΜL (ref 0–0.61)
EOSINOPHIL NFR BLD AUTO: 2 % (ref 0–6)
ERYTHROCYTE [DISTWIDTH] IN BLOOD BY AUTOMATED COUNT: 13.5 % (ref 11.6–15.1)
GFR SERPL CREATININE-BSD FRML MDRD: 75 ML/MIN/1.73SQ M
GLUCOSE P FAST SERPL-MCNC: 114 MG/DL (ref 65–99)
HCT VFR BLD AUTO: 51.2 % (ref 36.5–49.3)
HGB BLD-MCNC: 17.4 G/DL (ref 12–17)
LYMPHOCYTES # BLD AUTO: 1.05 THOUSANDS/ΜL (ref 0.6–4.47)
LYMPHOCYTES NFR BLD AUTO: 23 % (ref 14–44)
MCH RBC QN AUTO: 30.1 PG (ref 26.8–34.3)
MCHC RBC AUTO-ENTMCNC: 34 G/DL (ref 31.4–37.4)
MCV RBC AUTO: 88 FL (ref 82–98)
MONOCYTES # BLD AUTO: 0.53 THOUSAND/ΜL (ref 0.17–1.22)
MONOCYTES NFR BLD AUTO: 12 % (ref 4–12)
NEUTROPHILS # BLD AUTO: 2.78 THOUSANDS/ΜL (ref 1.85–7.62)
NEUTS SEG NFR BLD AUTO: 61 % (ref 43–75)
NRBC BLD AUTO-RTO: 0 /100 WBCS
PLATELET # BLD AUTO: 211 THOUSANDS/UL (ref 149–390)
PMV BLD AUTO: 9.7 FL (ref 8.9–12.7)
POTASSIUM SERPL-SCNC: 4 MMOL/L (ref 3.5–5.3)
PROT SERPL-MCNC: 7.8 G/DL (ref 6.4–8.2)
RBC # BLD AUTO: 5.79 MILLION/UL (ref 3.88–5.62)
SODIUM SERPL-SCNC: 140 MMOL/L (ref 136–145)
WBC # BLD AUTO: 4.53 THOUSAND/UL (ref 4.31–10.16)

## 2018-02-10 PROCEDURE — 85025 COMPLETE CBC W/AUTO DIFF WBC: CPT

## 2018-02-10 PROCEDURE — 86706 HEP B SURFACE ANTIBODY: CPT

## 2018-02-10 PROCEDURE — 87536 HIV-1 QUANT&REVRSE TRNSCRPJ: CPT

## 2018-02-10 PROCEDURE — 80053 COMPREHEN METABOLIC PANEL: CPT

## 2018-02-10 PROCEDURE — 82105 ALPHA-FETOPROTEIN SERUM: CPT

## 2018-02-10 PROCEDURE — 36415 COLL VENOUS BLD VENIPUNCTURE: CPT

## 2018-02-10 PROCEDURE — 86361 T CELL ABSOLUTE COUNT: CPT

## 2018-02-11 LAB
BASOPHILS # BLD AUTO: 0 X10E3/UL (ref 0–0.2)
BASOPHILS NFR BLD AUTO: 1 %
CD3+CD4+ CELLS # BLD: 224 /UL (ref 359–1519)
CD3+CD4+ CELLS NFR BLD: 20.4 % (ref 30.8–58.5)
EOSINOPHIL # BLD AUTO: 0.1 X10E3/UL (ref 0–0.4)
EOSINOPHIL NFR BLD AUTO: 3 %
ERYTHROCYTE [DISTWIDTH] IN BLOOD BY AUTOMATED COUNT: 14.1 % (ref 12.3–15.4)
HBV SURFACE AB SER-ACNC: 5.14 MIU/ML
HCT VFR BLD AUTO: 50.7 % (ref 37.5–51)
HGB BLD-MCNC: 17.8 G/DL (ref 13–17.7)
IMM GRANULOCYTES # BLD: 0 X10E3/UL (ref 0–0.1)
IMM GRANULOCYTES NFR BLD: 1 %
LYMPHOCYTES # BLD AUTO: 1.1 X10E3/UL (ref 0.7–3.1)
LYMPHOCYTES NFR BLD AUTO: 24 %
MCH RBC QN AUTO: 31.3 PG (ref 26.6–33)
MCHC RBC AUTO-ENTMCNC: 35.1 G/DL (ref 31.5–35.7)
MCV RBC AUTO: 89 FL (ref 79–97)
MONOCYTES # BLD AUTO: 0.5 X10E3/UL (ref 0.1–0.9)
MONOCYTES NFR BLD AUTO: 12 %
NEUTROPHILS # BLD AUTO: 2.7 X10E3/UL (ref 1.4–7)
NEUTROPHILS NFR BLD AUTO: 59 %
PLATELET # BLD AUTO: 202 X10E3/UL (ref 150–379)
RBC # BLD AUTO: 5.68 X10E6/UL (ref 4.14–5.8)
WBC # BLD AUTO: 4.4 X10E3/UL (ref 3.4–10.8)

## 2018-02-13 LAB
HIV1 RNA # SERPL NAA+PROBE: <20 COPIES/ML
HIV1 RNA SERPL NAA+PROBE-LOG#: NORMAL LOG10COPY/ML

## 2018-02-14 RX ORDER — LISINOPRIL AND HYDROCHLOROTHIAZIDE 12.5; 1 MG/1; MG/1
1 TABLET ORAL DAILY
COMMUNITY
Start: 2017-03-20 | End: 2018-02-27 | Stop reason: SDUPTHER

## 2018-02-14 RX ORDER — ASPIRIN 81 MG/1
1 TABLET ORAL DAILY
COMMUNITY
Start: 2017-12-27 | End: 2018-10-03 | Stop reason: SDUPTHER

## 2018-02-14 RX ORDER — RITONAVIR 100 MG/1
TABLET ORAL 2 TIMES DAILY
COMMUNITY
Start: 2015-06-01 | End: 2018-04-10 | Stop reason: HOSPADM

## 2018-02-19 ENCOUNTER — OFFICE VISIT (OUTPATIENT)
Dept: SURGERY | Facility: CLINIC | Age: 58
End: 2018-02-19
Payer: COMMERCIAL

## 2018-02-19 VITALS
OXYGEN SATURATION: 98 % | SYSTOLIC BLOOD PRESSURE: 140 MMHG | WEIGHT: 189 LBS | HEIGHT: 69 IN | HEART RATE: 64 BPM | TEMPERATURE: 97.9 F | BODY MASS INDEX: 27.99 KG/M2 | DIASTOLIC BLOOD PRESSURE: 90 MMHG

## 2018-02-19 DIAGNOSIS — F17.200 NICOTINE DEPENDENCE, UNCOMPLICATED, UNSPECIFIED NICOTINE PRODUCT TYPE: Primary | ICD-10-CM

## 2018-02-19 DIAGNOSIS — K73.9 CHRONIC HEPATITIS (HCC): ICD-10-CM

## 2018-02-19 DIAGNOSIS — I10 ESSENTIAL HYPERTENSION: ICD-10-CM

## 2018-02-19 DIAGNOSIS — E78.5 DYSLIPIDEMIA: ICD-10-CM

## 2018-02-19 DIAGNOSIS — D75.1 POLYCYTHEMIA: ICD-10-CM

## 2018-02-19 DIAGNOSIS — B20 HIV DISEASE (HCC): ICD-10-CM

## 2018-02-19 PROBLEM — K74.60 CIRRHOSIS (HCC): Status: RESOLVED | Noted: 2017-11-14 | Resolved: 2018-02-19

## 2018-02-19 PROBLEM — A63.0 CONDYLOMA: Status: ACTIVE | Noted: 2017-03-20

## 2018-02-19 PROBLEM — K74.60 CIRRHOSIS (HCC): Status: ACTIVE | Noted: 2017-11-14

## 2018-02-19 PROCEDURE — 99214 OFFICE O/P EST MOD 30 MIN: CPT | Performed by: NURSE PRACTITIONER

## 2018-02-19 NOTE — ASSESSMENT & PLAN NOTE
Secondary to Hepatitis C  SVR s/p treatment  Continue 6 month [facility] [address]  surveillance  RUQ US negative for mass, completed 11/2017  AFP 5 0 completed 2/2018

## 2018-02-19 NOTE — ASSESSMENT & PLAN NOTE
Stable  Total cholesterol 159 HDL 35  triglycerides 97  Eat a low fat/low cholesterol diet  Follow up with dietician for additional education

## 2018-02-19 NOTE — PROGRESS NOTES
Assessment/Plan:  Problem List Items Addressed This Visit     Chronic hepatitis ([facility] )     Secondary to Hepatitis C  SVR s/p treatment  Continue 6 month [place] [address]  surveillance  RUQ US negative for mass, completed 2017  AFP 5 0 completed 2018  Dyslipidemia     Stable  Total cholesterol 159 HDL 35  triglycerides 97  Eat a low fat/low cholesterol diet  Follow up with dietician for additional education  Essential hypertension     Blood pressure:   BP today 130/90  BP Readings from Last 3 Encounters:   17 142/94   10/31/17 150/96   09/15/17 146/80     Improved  Lisinopril increased at last PCP visit  Continue current antihypertensive  Educated on the following lifestyle modifications to lower BP and decrease cardiovascular risk factors  limit alcohol intake, reduce salt in diet, maintain a healthy weight, engage in 30 minutes of cardiovascular exercise daily, and stressed the importance of smoking cessation  Relevant Medications    lisinopril-hydrochlorothiazide (PRINZIDE,ZESTORETIC) 10-12 5 MG per tablet    HIV disease ([place] [address] )       Cd4:  224  Viral load: <20   ART: Kristy Silva Jaymes Caul  Denies missing any doses  Denies side effects  Stressed the importance of adherence  Continue follow up with ID clinic  Reviewed most recent labs, including Cd4 and viral load  Discussed the risks and benefits of treatment options, instructions for management, importance of treatment adherence, and reduction of risk factor  Educated on possible  medication side effects  Counseled on routes of HIV transmission, including the risk of  infection  Emphasized that viral suppression is the best method to prevent HIV transmission  At this time pt denies the need for HIV testing of anyone in their life  Total encounter time was 45 minutes  Greater then 20 minutes were spent on counseling and patient education   Pt voices understanding and agreement with treatment plan  Relevant Medications    emtricitabine-tenofovir AF (DESCOVY) 200-25 MG tablet    ritonavir (NORVIR) 100 mg tablet    darunavir (PREZISTA) 600 mg tablet    dolutegravir (TIVICAY) 50 MG TABS    Nicotine dependence - Primary     Cut back on nicorette gum  Down to one cigar a day  Counseled for greater than 15 minutes on the importance of smoking cessation  Advised to quit  Educated on the increased risk of heart and lung disease associated with smoking  Referred to Gurwinder Aviles [address] [place] [address] for enrollment in smoking cessation program             Relevant Medications    nicotine polacrilex (NICORETTE) 4 mg gum    Polycythemia     Most likely due to nicotine dependence  Stressed the importance of cessation  Monitor CBC closely  Subjective:      Patient ID: John Louie is a 62 y o  male  Rojas Waer is here today for PCP f/u  Denied acute complaints  The following portions of the patient's history were reviewed and updated as appropriate: allergies, current medications, past family history, past medical history, past social history, past surgical history and problem list     Review of Systems   Constitutional: Negative for activity change, appetite change, chills, diaphoresis, fatigue, fever and unexpected weight change  HENT: Negative for congestion, dental problem, ear pain, hearing loss, mouth sores, rhinorrhea and sore throat  Eyes: Negative for pain, redness and visual disturbance  Respiratory: Negative for shortness of breath and wheezing  Cardiovascular: Negative for chest pain and leg swelling  Gastrointestinal: Negative for abdominal pain, constipation, diarrhea, nausea and vomiting  Endocrine: Negative for polydipsia, polyphagia and polyuria  Genitourinary: Negative for difficulty urinating and dysuria  Musculoskeletal: Negative for back pain, joint swelling and myalgias  Skin: Negative for rash  Neurological: Negative for syncope and headaches  Psychiatric/Behavioral: Negative for behavioral problems and suicidal ideas  Objective:      /90   Pulse 64   Temp 97 9 °F (36 6 °C)   Ht 5' 9" (1 753 m)   Wt 85 7 kg (189 lb)   SpO2 98%   BMI 27 91 kg/m²          Physical Exam   Constitutional: He is oriented to person, place, and time  He appears well-developed and well-nourished  No distress  HENT:   Head: Normocephalic  Right Ear: External ear normal    Left Ear: External ear normal    Nose: Nose normal    Mouth/Throat: Oropharynx is clear and moist  No oropharyngeal exudate  Eyes: Conjunctivae are normal  Pupils are equal, round, and reactive to light  Right eye exhibits no discharge  Left eye exhibits no discharge  Neck: Normal range of motion  No thyromegaly present  Cardiovascular: Normal rate, regular rhythm, normal heart sounds and intact distal pulses  No murmur heard  Pulmonary/Chest: Effort normal and breath sounds normal  He has no wheezes  Abdominal: Soft  Bowel sounds are normal  He exhibits no mass  There is no tenderness  Musculoskeletal: Normal range of motion  He exhibits no edema or tenderness  Lymphadenopathy:     He has no cervical adenopathy  Neurological: He is alert and oriented to person, place, and time  Skin: Skin is warm and dry  No rash noted  Psychiatric: He has a normal mood and affect   His behavior is normal

## 2018-02-19 NOTE — ASSESSMENT & PLAN NOTE
Cut back on nicorette gum  Down to one cigar a day  Counseled for greater than 15 minutes on the importance of smoking cessation  Advised to quit  Educated on the increased risk of heart and lung disease associated with smoking    Referred to Gurwinder Aviles [address] [place] [address] for enrollment in smoking cessation program

## 2018-02-19 NOTE — ASSESSMENT & PLAN NOTE
Blood pressure:   BP today 130/90  BP Readings from Last 3 Encounters:   11/14/17 142/94   10/31/17 150/96   09/15/17 146/80     Improved  Lisinopril increased at last PCP visit  Continue current antihypertensive  Educated on the following lifestyle modifications to lower BP and decrease cardiovascular risk factors  limit alcohol intake, reduce salt in diet, maintain a healthy weight, engage in 30 minutes of cardiovascular exercise daily, and stressed the importance of smoking cessation

## 2018-02-19 NOTE — PATIENT INSTRUCTIONS
Problem List Items Addressed This Visit     Chronic hepatitis ([place] [address] )     Secondary to Hepatitis C  SVR s/p treatment  Continue 6 month [place] [address]  surveillance  RUQ US negative for mass, completed 2017  AFP 5 0 completed 2018  Dyslipidemia     Stable  Total cholesterol 159 HDL 35  triglycerides 97  Eat a low fat/low cholesterol diet  Follow up with dietician for additional education  Essential hypertension     Blood pressure:   BP today 130/90  BP Readings from Last 3 Encounters:   17 142/94   10/31/17 150/96   09/15/17 146/80     Improved  Lisinopril increased at last PCP visit  Continue current antihypertensive  Educated on the following lifestyle modifications to lower BP and decrease cardiovascular risk factors  limit alcohol intake, reduce salt in diet, maintain a healthy weight, engage in 30 minutes of cardiovascular exercise daily, and stressed the importance of smoking cessation  Relevant Medications    lisinopril-hydrochlorothiazide (PRINZIDE,ZESTORETIC) 10-12 5 MG per tablet    HIV disease ([place] [address] )       Cd4:  224  Viral load: <20   ART: Kristy Silva Fidel Ni  Denies missing any doses  Denies side effects  Stressed the importance of adherence  Continue follow up with ID clinic  Reviewed most recent labs, including Cd4 and viral load  Discussed the risks and benefits of treatment options, instructions for management, importance of treatment adherence, and reduction of risk factor  Educated on possible  medication side effects  Counseled on routes of HIV transmission, including the risk of  infection  Emphasized that viral suppression is the best method to prevent HIV transmission  At this time pt denies the need for HIV testing of anyone in their life  Total encounter time was 45 minutes  Greater then 20 minutes were spent on counseling and patient education  Pt voices understanding and agreement with treatment plan  Relevant Medications    emtricitabine-tenofovir AF (DESCOVY) 200-25 MG tablet    ritonavir (NORVIR) 100 mg tablet    darunavir (PREZISTA) 600 mg tablet    dolutegravir (TIVICAY) 50 MG TABS    Nicotine dependence - Primary     Cut back on nicorette gum  Down to one cigar a day  Counseled for greater than 15 minutes on the importance of smoking cessation  Advised to quit  Educated on the increased risk of heart and lung disease associated with smoking  Referred to [facility] for enrollment in smoking cessation program             Relevant Medications    nicotine polacrilex (NICORETTE) 4 mg gum    Polycythemia     Most likely due to nicotine dependence  Stressed the importance of cessation  Monitor CBC closely  Chronic Hypertension   AMBULATORY CARE:   Hypertension  is high blood pressure (BP)  Your BP is the force of your blood moving against the walls of your arteries  Normal BP is less than 120/80  Prehypertension is between 120/80 and 139/89  Hypertension is 140/90 or higher  Hypertension causes your BP to get so high that your heart has to work much harder than normal  This can damage your heart  Chronic hypertension is a long-term condition that you can control with a healthy lifestyle or medicines  A controlled blood pressure helps protect your organs, such as your heart, lungs, brain, and kidneys  Common symptoms include the following:   · Headache     · Blurred vision    · Chest pain     · Dizziness or weakness     · Trouble breathing     · Nosebleeds  Call 911 for any of the following:   · You have discomfort in your chest that feels like squeezing, pressure, fullness, or pain  · You become confused or have difficulty speaking  · You suddenly feel lightheaded or have trouble breathing  · You have pain or discomfort in your back, neck, jaw, stomach, or arm  Seek care immediately if:   · You have a severe headache or vision loss      · You have weakness in an arm or leg   Contact your healthcare provider if:   · You feel faint, dizzy, confused, or drowsy  · You have been taking your BP medicine and your BP is still higher than your healthcare provider says it should be  · You have questions or concerns about your condition or care  Treatment for chronic hypertension  may include medicine to lower your BP and lower your cholesterol level  A low cholesterol level helps prevent heart disease and makes it easier to control your blood pressure  Heart disease can make your blood pressure harder to control  You may also need to make lifestyle changes  Take your medicine exactly as directed  Manage chronic hypertension:  Talk with your healthcare provider about these and other ways to manage hypertension:  · Take your BP at home  Sit and rest for 5 minutes before you take your BP  Extend your arm and support it on a flat surface  Your arm should be at the same level as your heart  Follow the directions that came with your BP monitor  If possible, take at least 2 BP readings each time  Take your BP at least twice a day at the same times each day, such as morning and evening  Keep a record of your BP readings and bring it to your follow-up visits  Ask your healthcare provider what your blood pressure should be  · Limit sodium (salt) as directed  Too much sodium can affect your fluid balance  Check labels to find low-sodium or no-salt-added foods  Some low-sodium foods use potassium salts for flavor  Too much potassium can also cause health problems  Your healthcare provider will tell you how much sodium and potassium are safe for you to have in a day  He or she may recommend that you limit sodium to 2,300 mg a day  · Follow the meal plan recommended by your healthcare provider  A dietitian or your provider can give you more information on low-sodium plans or the DASH (Dietary Approaches to Stop Hypertension) eating plan   The DASH plan is low in sodium, unhealthy fats, and total fat  It is high in potassium, calcium, and fiber  · Exercise to maintain a healthy weight  Exercise at least 30 minutes per day, on most days of the week  This will help decrease your blood pressure  Ask about the best exercise plan for you  · Decrease stress  This may help lower your BP  Learn ways to relax, such as deep breathing or listening to music  · Limit alcohol  Women should limit alcohol to 1 drink a day  Men should limit alcohol to 2 drinks a day  A drink of alcohol is 12 ounces of beer, 5 ounces of wine, or 1½ ounces of liquor  · Do not smoke  Nicotine and other chemicals in cigarettes and cigars can increase your BP and also cause lung damage  Ask your healthcare provider for information if you currently smoke and need help to quit  E-cigarettes or smokeless tobacco still contain nicotine  Talk to your healthcare provider before you use these products  Follow up with your healthcare provider as directed: You will need to return to have your BP checked and to have other lab tests done  Write down your questions so you remember to ask them during your visits  © 2017 [facility] Information is for End User's use only and may not be sold, redistributed or otherwise used for commercial purposes  All illustrations and images included in CareNotes® are the copyrighted property of A D A M , Inc  or Saud Galarza  The above information is an  only  It is not intended as medical advice for individual conditions or treatments  Talk to your doctor, nurse or pharmacist before following any medical regimen to see if it is safe and effective for you

## 2018-02-19 NOTE — PROGRESS NOTES
Assessment/Plan:      Diagnoses and all orders for this visit:    Nicotine dependence, uncomplicated, unspecified nicotine product type  -     nicotine polacrilex (NICORETTE) 4 mg gum; Chew 1 each (4 mg total) as needed for smoking cessation    HIV disease ([facility] )    Polycythemia    Essential hypertension    Chronic hepatitis ([facility] )    Dyslipidemia    Other orders  -     aspirin (ASPIRIN LOW DOSE) 81 mg EC tablet; Take 1 tablet by mouth daily  -     emtricitabine-tenofovir AF (DESCOVY) 200-25 MG tablet; Take 1 tablet by mouth daily  -     lisinopril-hydrochlorothiazide (PRINZIDE,ZESTORETIC) 10-12 5 MG per tablet; Take 1 tablet by mouth daily  -     Discontinue: nicotine polacrilex (NICORETTE) 4 mg gum; Chew  -     ritonavir (NORVIR) 100 mg tablet; Take by mouth Twice daily  -     darunavir (PREZISTA) 600 mg tablet; Take by mouth Twice daily  -     dolutegravir (TIVICAY) 50 MG TABS; Take 1 tablet by mouth 2 (two) times a day          Subjective:     Patient ID: Marilu Mahajan is a 62 y o  male  Reason for Visit: Nicotine Dependence    HPI    Smoking Cessation:     Impression: tobacco smoking uses cigars     Current Status: trying to quit and smoking less    Treatment Plan: uses nicotine gum to reduce urges and cigars used     Additional Treatments: tobacco cessation program    Patient Discussion: patient    Discussion     [place] [facility] with PT [facility] and smoking cessation  PT reported no new issues or concerns  PT stated that he knows [facility] [place] would be glad to learn that he is in the process of quitting  [facility] expressed gladness and encouraged continued discussion  PT stated that for the past 2-3 weeks he has been using the nicotine gum to help him reduce his cravings  With this PT has been able to reduce down to only 1-1 5 cigars per day  He has essentially eliminated the cigar he used to have with coffee after dinner and not only has 0 5 cigar later   PT is finding it difficult to eliminate his morning cigar which he uses on his 45 minute drive to work along with his morning coffee  PT tried eliminating his morning coffee but found that difficult to do  [address] [place] [address] congratulated PT for the progress made so far and encouraged sticking with it to attain complete cessation  [address] [place] [address] St and PT brainstormed alternative activities to engage in in place of smoking and to help break existing smoking associations  [address] [place] [address] also explored PT's motivation and elicited more change talk  PT reported begining by reducing smoking around his pregnant daughter who comes around often  He is also now concerned about being a good role model for his grandson and not having him think that smoking is cool  [address] [place] [address] affirmed those reasons to PT and encouraged continued efforts      Review of Systems      Objective:     Physical Exam

## 2018-02-19 NOTE — ASSESSMENT & PLAN NOTE
Cd4:  224  Viral load: <20   ART: Prezista, Tivicay, Descovy,Norvir  Denies missing any doses  Denies side effects  Stressed the importance of adherence  Continue follow up with ID clinic  Reviewed most recent labs, including Cd4 and viral load  Discussed the risks and benefits of treatment options, instructions for management, importance of treatment adherence, and reduction of risk factor  Educated on possible  medication side effects  Counseled on routes of HIV transmission, including the risk of  infection  Emphasized that viral suppression is the best method to prevent HIV transmission  At this time pt denies the need for HIV testing of anyone in their life  Total encounter time was 45 minutes  Greater then 20 minutes were spent on counseling and patient education  Pt voices understanding and agreement with treatment plan

## 2018-02-19 NOTE — PROGRESS NOTES
Assessment/Plan:     [facility]     Today patient present with   Chief Complaint   Patient presents with    Follow-up     Pt requesting refill on Nicorette  Patient would likely benefit from move from just reducing to quitting smoking  Stage of change: Preparation  Plan/ Behavioral Recommendations: replace smoking habits with healthier ones     Diagnoses and all orders for this visit:    Nicotine dependence, uncomplicated, unspecified nicotine product type  -     nicotine polacrilex (NICORETTE) 4 mg gum; Chew 1 each (4 mg total) as needed for smoking cessation    HIV disease ([facility] )    Polycythemia    Essential hypertension    [facility]ronic [facility] )    Dyslipidemia    Other orders  -     aspirin (ASPIRIN LOW DOSE) 81 mg EC tablet; Take 1 tablet by mouth daily  -     emtricitabine-tenofovir AF (DESCOVY) 200-25 MG tablet; Take 1 tablet by mouth daily  -     lisinopril-hydrochlorothiazide (PRINZIDE,ZESTORETIC) 10-12 5 MG per tablet; Take 1 tablet by mouth daily  -     Discontinue: nicotine polacrilex (NICORETTE) 4 mg gum; Chew  -     ritonavir (NORVIR) 100 mg tablet; Take by mouth Twice daily  -     darunavir (PREZISTA) 600 mg tablet; Take by mouth Twice daily  -     dolutegravir (TIVICAY) 50 MG TABS; Take 1 tablet by mouth 2 (two) times a day          Discussion:     Patient mood is stable  He is excited with anticipation for his new granddaughter to be born in June (2nd grandchild)  Daughter and her family live near him so he is involved in their lives  Subjective:     Patient ID: Eulalio Houston is a 62 y o  male  HPI    History of Present Illness: The patient is seeing the Gurwinder [facility] today for a routine behavioral health follow up      Review of Systems      Objective:     Physical Exam      [facility]    Orientation     Person: yes    Place: yes    Time: yes    Appearance    Well Developed: yes healthy    Uncomfortable: no    Normal Body Odor: yes    Smells of Feces: no    Smells of Urine: no    Disheveled: no    Well Nourished: yes weight WNL of ideal    Grooming Unkempt: no    Poor Eye Contact: no    Hirsute: no    Looks Tired: no    Acutely Exhausted: no    Mood and Affect:     Appropriate: yes    Euthymicyes    Irritable: no    Angry: no    Anxious: no    Depressed:no    Blunted:no    Labile: no    Restricted: no    Harm to Self or Others: denied     Substance Abuse: none reported or observed

## 2018-02-27 DIAGNOSIS — I10 ESSENTIAL HYPERTENSION: Primary | ICD-10-CM

## 2018-02-27 RX ORDER — LISINOPRIL AND HYDROCHLOROTHIAZIDE 12.5; 1 MG/1; MG/1
1 TABLET ORAL DAILY
Qty: 30 TABLET | Refills: 3 | Status: SHIPPED | OUTPATIENT
Start: 2018-02-27 | End: 2018-06-20 | Stop reason: SDUPTHER

## 2018-03-20 ENCOUNTER — TELEPHONE (OUTPATIENT)
Dept: SURGERY | Facility: CLINIC | Age: 58
End: 2018-03-20

## 2018-03-22 DIAGNOSIS — F17.200 NICOTINE DEPENDENCE, UNCOMPLICATED, UNSPECIFIED NICOTINE PRODUCT TYPE: ICD-10-CM

## 2018-04-09 RX ORDER — AMOXICILLIN 500 MG/1
CAPSULE ORAL
Refills: 0 | COMMUNITY
Start: 2018-03-19 | End: 2019-01-28 | Stop reason: HOSPADM

## 2018-04-09 RX ORDER — IBUPROFEN 600 MG/1
600 TABLET ORAL EVERY 6 HOURS PRN
Refills: 0 | COMMUNITY
Start: 2018-03-19 | End: 2019-02-01 | Stop reason: HOSPADM

## 2018-04-10 ENCOUNTER — OFFICE VISIT (OUTPATIENT)
Dept: SURGERY | Facility: CLINIC | Age: 58
End: 2018-04-10
Payer: COMMERCIAL

## 2018-04-10 VITALS
SYSTOLIC BLOOD PRESSURE: 130 MMHG | TEMPERATURE: 98.2 F | HEIGHT: 68 IN | OXYGEN SATURATION: 96 % | HEART RATE: 90 BPM | DIASTOLIC BLOOD PRESSURE: 90 MMHG | WEIGHT: 188.6 LBS | BODY MASS INDEX: 28.58 KG/M2

## 2018-04-10 DIAGNOSIS — B20 HIV DISEASE (HCC): Primary | ICD-10-CM

## 2018-04-10 DIAGNOSIS — D75.1 POLYCYTHEMIA: ICD-10-CM

## 2018-04-10 DIAGNOSIS — I10 ESSENTIAL HYPERTENSION: ICD-10-CM

## 2018-04-10 DIAGNOSIS — F17.298 OTHER TOBACCO PRODUCT NICOTINE DEPENDENCE WITH OTHER NICOTINE-INDUCED DISORDER: ICD-10-CM

## 2018-04-10 DIAGNOSIS — K74.69 OTHER CIRRHOSIS OF LIVER (HCC): ICD-10-CM

## 2018-04-10 PROCEDURE — 99215 OFFICE O/P EST HI 40 MIN: CPT | Performed by: INTERNAL MEDICINE

## 2018-04-10 NOTE — PROGRESS NOTES
Assessment:     Dyslipidemia, Hypertension    Nutrition Diagnosis    Problem: Altered nutrition related laboratory values    Related to: Lack of nutrition related education and Denial of need to change    As Evidenced By: Patient Interview, Dietary Recall, Elevated lipid panel and Elevated blood pressure    Nutrition Education Intervention: Provided     Person Educated: Patient    Topics Discussed: Nutrition intake as it relates to dyslipidemia and hypertension    Teaching Method: Verbal and Written    Goals    Goal #1 Improved quality of intake r/t elevated lipids and blood pressure    Initiated     Goal #2 N/A    N/A    Visit Summary    Continued with nutrition education r/t to [facility] elevated lipids and blood pressure  PT admits to high intake of processed foods like cookies, cakes, sausage, and other processed foods  Limited intake of adequate amounts of fruits and vegetables  PT is contemplative regarding topics and was engaged in discussion  Provided [facility] educational material on hearth healthy eating with reducing sodium  Discussed the possibility of he and his spouse attempting dietary changes together to increase success  Offered services through clinic for this, PT declined at this time  Will continue to provided education and interventions      Rylee Hernandez RD,LDN,CHC

## 2018-04-10 NOTE — PROGRESS NOTES
Progress Note - Infectious Disease   Joselito Chavez 62 y o  male MRN: [id number]  Unit/Bed#:  Encounter: [id number]      Impression/Plan:  1  HIV-improved adherence with ART with an undetectable viral load  His CD4 count is now above 200 again  Will continue the Tivicay and Descovy, but change the Prezista/Norvir to Prezcobix  Recheck labs in 2 months and follow up in 3 months  Stressed adherence      2  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  Right upper quadrant ultrasound for screening was negative and the alpha fetoprotein negative on the last check  Will continue [facility]  screening for now      3  Nicotine dependence-he continues to smoke  I stressed the importance of tobacco cessation       4   Polycythemia-likely secondary to the nicotine dependence  Once again stressed the importance of tobacco cessation  We will continue to monitor the CBC with diff closely      5  Hypertension-asymptomatic  Discussed in detail with the primary  Patient was provided medication, adherence and prevention education    Subjective:  Routine follow-up for HIV  Patient claims 100% adherence with Tivicay/Descovy/Prezista/Norvir  Patient denies any notable side effects  Overall the feeling well  The patient denies any fever chills or sweats, denies any nausea vomiting or diarrhea, denies any cough or shortness of breath  He has occasional abdominal pain when he takes his meds  He is still smoking cigars  ROS: A complete 12 point ROS is negative other than that noted in the HPI    Objective:  Vitals:  Vitals:    04/10/18 1702   BP: 130/90   Pulse: 90   Temp: 98 2 °F (36 8 °C)   SpO2: 96%   Weight: 85 5 kg (188 lb 9 6 oz)   Height: 5' 7 75" (1 721 m)       Physical Exam:   General Appearance:  Alert, interactive, appearing well,  nontoxic, no acute distress  Neck:   Supple without lymphadenopathy, no thyromegaly or masses   Throat: Oropharynx moist without lesions      Lungs:   Clear to auscultation bilaterally; no wheezes, rhonchi or rales; respirations unlabored   Heart:  RRR; no murmur, rub or gallop   Abdomen:   Soft, non-tender, non-distended, positive bowel sounds  Extremities: No clubbing, cyanosis or edema   Skin: No new rashes or lesions  No draining wounds noted         Lab Results   Component Value Date     02/10/2018    K 4 0 02/10/2018     02/10/2018    CO2 26 02/10/2018    ANIONGAP 9 02/10/2018    BUN 16 02/10/2018    CREATININE 1 09 02/10/2018    GLUCOSE 102 06/10/2016    GLUF 114 (H) 02/10/2018    CALCIUM 8 9 02/10/2018    AST 26 02/10/2018    ALT 31 02/10/2018    ALKPHOS 102 02/10/2018    PROT 7 8 02/10/2018    BILITOT 0 50 02/10/2018    EGFR 75 02/10/2018     Lab Results   Component Value Date    WBC 4 53 02/10/2018    HGB 17 4 (H) 02/10/2018    HGB 17 8 (H) 02/10/2018    HCT 51 2 (H) 02/10/2018    HCT 50 7 02/10/2018    MCV 88 02/10/2018    MCV 89 02/10/2018     02/10/2018     02/10/2018     No results found for: HEPCAB  Lab Results   Component Value Date    HEPBCAB REACTIVE (A) 10/08/2015    HEPBCAB REACTIVE (A) 10/08/2015    HEPBCAB REACTIVE (A) 10/08/2015    HEPBCAB REACTIVE (A) 10/08/2015     Lab Results   Component Value Date    RPR Non-Reactive 07/15/2017     CD4 T Cell Abs   Date/Time Value Ref Range Status   02/10/2018 08:05  (L) 359 - 1519 /uL Final     HIV-1 RNA Viral Load Log   Date/Time Value Ref Range Status   02/10/2018 08:05 AM COMMENT [id number]/mL Final     Comment:     Unable to calculate result since non-numeric result obtained for  component test      Labs, Imaging, & Other studies:   All pertinent labs and imaging studies were personally reviewed      Current Outpatient Prescriptions:     aspirin (ASPIRIN LOW DOSE) 81 mg EC tablet, Take 1 tablet by mouth daily, Disp: , Rfl:     darunavir (PREZISTA) 600 mg tablet, Take by mouth Twice daily, Disp: , Rfl:     dolutegravir (TIVICAY) 50 MG TABS, Take 1 tablet by mouth 2 (two) times a day, Disp: , Rfl:     emtricitabine-tenofovir AF (DESCOVY) 200-25 MG tablet, Take 1 tablet by mouth daily, Disp: , Rfl:     ibuprofen (MOTRIN) 600 mg tablet, Take 600 mg by mouth every 6 (six) hours as needed, Disp: , Rfl: 0    lisinopril-hydrochlorothiazide (PRINZIDE,ZESTORETIC) 10-12 5 MG per tablet, TAKE 1 TABLET BY MOUTH DAILY, Disp: 30 tablet, Rfl: 3    nicotine polacrilex (NICORETTE) 4 mg gum, CHEW 1 PIECE OF GUM AS DIRECTED AS NEEDED FOR SMOKING CESSATION, Disp: 90 each, Rfl: 3    ritonavir (NORVIR) 100 mg tablet, Take by mouth Twice daily, Disp: , Rfl:     amoxicillin (AMOXIL) 500 mg capsule, TAKE ONE CAPSULE BY MOUTH EVERY 8 HOURS UNTIL FINISHED, Disp: , Rfl: 0

## 2018-04-16 ENCOUNTER — TELEPHONE (OUTPATIENT)
Dept: SURGERY | Facility: CLINIC | Age: 58
End: 2018-04-16

## 2018-04-17 NOTE — TELEPHONE ENCOUNTER
Called and left message for patient to move his appointment with [facility] from May until the end of June

## 2018-06-20 DIAGNOSIS — B20 HIV (HUMAN IMMUNODEFICIENCY VIRUS INFECTION) (HCC): Primary | ICD-10-CM

## 2018-06-20 DIAGNOSIS — I10 ESSENTIAL HYPERTENSION: ICD-10-CM

## 2018-06-22 RX ORDER — LISINOPRIL AND HYDROCHLOROTHIAZIDE 12.5; 1 MG/1; MG/1
1 TABLET ORAL DAILY
Qty: 30 TABLET | Refills: 3 | Status: SHIPPED | OUTPATIENT
Start: 2018-06-22 | End: 2018-10-10 | Stop reason: SDUPTHER

## 2018-06-22 RX ORDER — DOLUTEGRAVIR SODIUM 50 MG/1
TABLET, FILM COATED ORAL
Qty: 60 TABLET | Refills: 3 | Status: SHIPPED | OUTPATIENT
Start: 2018-06-22 | End: 2018-10-10 | Stop reason: SDUPTHER

## 2018-06-22 RX ORDER — EMTRICITABINE AND TENOFOVIR ALAFENAMIDE 200; 25 MG/1; MG/1
1 TABLET ORAL DAILY
Qty: 30 TABLET | Refills: 3 | Status: SHIPPED | OUTPATIENT
Start: 2018-06-22 | End: 2018-10-10 | Stop reason: SDUPTHER

## 2018-06-23 ENCOUNTER — APPOINTMENT (OUTPATIENT)
Dept: LAB | Facility: HOSPITAL | Age: 58
End: 2018-06-23
Attending: INTERNAL MEDICINE
Payer: COMMERCIAL

## 2018-06-23 DIAGNOSIS — B20 HIV (HUMAN IMMUNODEFICIENCY VIRUS INFECTION) (HCC): ICD-10-CM

## 2018-06-23 LAB
ALBUMIN SERPL BCP-MCNC: 3.7 G/DL (ref 3.5–5)
ALP SERPL-CCNC: 91 U/L (ref 46–116)
ALT SERPL W P-5'-P-CCNC: 26 U/L (ref 12–78)
ANION GAP SERPL CALCULATED.3IONS-SCNC: 8 MMOL/L (ref 4–13)
AST SERPL W P-5'-P-CCNC: 21 U/L (ref 5–45)
BASOPHILS # BLD AUTO: 0.04 THOUSANDS/ΜL (ref 0–0.1)
BASOPHILS NFR BLD AUTO: 1 % (ref 0–1)
BILIRUB SERPL-MCNC: 0.7 MG/DL (ref 0.2–1)
BUN SERPL-MCNC: 15 MG/DL (ref 5–25)
CALCIUM SERPL-MCNC: 8.9 MG/DL (ref 8.3–10.1)
CHLORIDE SERPL-SCNC: 104 MMOL/L (ref 100–108)
CO2 SERPL-SCNC: 28 MMOL/L (ref 21–32)
CREAT SERPL-MCNC: 1.01 MG/DL (ref 0.6–1.3)
EOSINOPHIL # BLD AUTO: 0.14 THOUSAND/ΜL (ref 0–0.61)
EOSINOPHIL NFR BLD AUTO: 2 % (ref 0–6)
ERYTHROCYTE [DISTWIDTH] IN BLOOD BY AUTOMATED COUNT: 14.3 % (ref 11.6–15.1)
GFR SERPL CREATININE-BSD FRML MDRD: 82 ML/MIN/1.73SQ M
GLUCOSE P FAST SERPL-MCNC: 117 MG/DL (ref 65–99)
HCT VFR BLD AUTO: 53.5 % (ref 36.5–49.3)
HGB BLD-MCNC: 18.2 G/DL (ref 12–17)
IMM GRANULOCYTES # BLD AUTO: 0.03 THOUSAND/UL (ref 0–0.2)
IMM GRANULOCYTES NFR BLD AUTO: 1 % (ref 0–2)
LYMPHOCYTES # BLD AUTO: 1.14 THOUSANDS/ΜL (ref 0.6–4.47)
LYMPHOCYTES NFR BLD AUTO: 17 % (ref 14–44)
MCH RBC QN AUTO: 30 PG (ref 26.8–34.3)
MCHC RBC AUTO-ENTMCNC: 34 G/DL (ref 31.4–37.4)
MCV RBC AUTO: 88 FL (ref 82–98)
MONOCYTES # BLD AUTO: 0.79 THOUSAND/ΜL (ref 0.17–1.22)
MONOCYTES NFR BLD AUTO: 12 % (ref 4–12)
NEUTROPHILS # BLD AUTO: 4.51 THOUSANDS/ΜL (ref 1.85–7.62)
NEUTS SEG NFR BLD AUTO: 67 % (ref 43–75)
NRBC BLD AUTO-RTO: 0 /100 WBCS
PLATELET # BLD AUTO: 187 THOUSANDS/UL (ref 149–390)
PMV BLD AUTO: 9.7 FL (ref 8.9–12.7)
POTASSIUM SERPL-SCNC: 4.1 MMOL/L (ref 3.5–5.3)
PROT SERPL-MCNC: 7.9 G/DL (ref 6.4–8.2)
RBC # BLD AUTO: 6.07 MILLION/UL (ref 3.88–5.62)
SODIUM SERPL-SCNC: 140 MMOL/L (ref 136–145)
WBC # BLD AUTO: 6.65 THOUSAND/UL (ref 4.31–10.16)

## 2018-06-23 PROCEDURE — 86360 T CELL ABSOLUTE COUNT/RATIO: CPT

## 2018-06-23 PROCEDURE — 87536 HIV-1 QUANT&REVRSE TRNSCRPJ: CPT

## 2018-06-23 PROCEDURE — 80053 COMPREHEN METABOLIC PANEL: CPT

## 2018-06-23 PROCEDURE — 85025 COMPLETE CBC W/AUTO DIFF WBC: CPT

## 2018-06-23 PROCEDURE — 36415 COLL VENOUS BLD VENIPUNCTURE: CPT

## 2018-06-24 LAB
BASOPHILS # BLD AUTO: 0 X10E3/UL (ref 0–0.2)
BASOPHILS NFR BLD AUTO: 0 %
CD3+CD4+ CELLS # BLD: 250 /UL (ref 359–1519)
CD3+CD4+ CELLS NFR BLD: 20.8 % (ref 30.8–58.5)
CD3+CD4+ CELLS/CD3+CD8+ CLL BLD: 0.4 % (ref 0.92–3.72)
CD3+CD8+ CELLS # BLD: 631 /UL (ref 109–897)
CD3+CD8+ CELLS NFR BLD: 52.6 % (ref 12–35.5)
EOSINOPHIL # BLD AUTO: 0.1 X10E3/UL (ref 0–0.4)
EOSINOPHIL NFR BLD AUTO: 2 %
ERYTHROCYTE [DISTWIDTH] IN BLOOD BY AUTOMATED COUNT: 14.7 % (ref 12.3–15.4)
HCT VFR BLD AUTO: 53.4 % (ref 37.5–51)
HGB BLD-MCNC: 19 G/DL (ref 13–17.7)
IMM GRANULOCYTES # BLD: 0 X10E3/UL (ref 0–0.1)
IMM GRANULOCYTES NFR BLD: 1 %
LYMPHOCYTES # BLD AUTO: 1.2 X10E3/UL (ref 0.7–3.1)
LYMPHOCYTES NFR BLD AUTO: 19 %
MCH RBC QN AUTO: 31.2 PG (ref 26.6–33)
MCHC RBC AUTO-ENTMCNC: 35.6 G/DL (ref 31.5–35.7)
MCV RBC AUTO: 88 FL (ref 79–97)
MONOCYTES # BLD AUTO: 0.6 X10E3/UL (ref 0.1–0.9)
MONOCYTES NFR BLD AUTO: 10 %
NEUTROPHILS # BLD AUTO: 4.2 X10E3/UL (ref 1.4–7)
NEUTROPHILS NFR BLD AUTO: 68 %
PLATELET # BLD AUTO: 184 X10E3/UL (ref 150–379)
RBC # BLD AUTO: 6.09 X10E6/UL (ref 4.14–5.8)
WBC # BLD AUTO: 6.1 X10E3/UL (ref 3.4–10.8)

## 2018-06-26 ENCOUNTER — OFFICE VISIT (OUTPATIENT)
Dept: SURGERY | Facility: CLINIC | Age: 58
End: 2018-06-26
Payer: COMMERCIAL

## 2018-06-26 VITALS
WEIGHT: 186 LBS | SYSTOLIC BLOOD PRESSURE: 130 MMHG | DIASTOLIC BLOOD PRESSURE: 90 MMHG | HEART RATE: 68 BPM | BODY MASS INDEX: 28.49 KG/M2 | TEMPERATURE: 98.9 F

## 2018-06-26 DIAGNOSIS — D75.1 POLYCYTHEMIA: ICD-10-CM

## 2018-06-26 DIAGNOSIS — K74.69 OTHER CIRRHOSIS OF LIVER (HCC): ICD-10-CM

## 2018-06-26 DIAGNOSIS — B20 HIV DISEASE (HCC): Primary | ICD-10-CM

## 2018-06-26 DIAGNOSIS — F17.298 OTHER TOBACCO PRODUCT NICOTINE DEPENDENCE WITH OTHER NICOTINE-INDUCED DISORDER: ICD-10-CM

## 2018-06-26 DIAGNOSIS — I10 ESSENTIAL HYPERTENSION: ICD-10-CM

## 2018-06-26 DIAGNOSIS — K73.9 CHRONIC HEPATITIS (HCC): ICD-10-CM

## 2018-06-26 PROBLEM — A63.0 CONDYLOMA: Status: RESOLVED | Noted: 2017-03-20 | Resolved: 2018-06-26

## 2018-06-26 LAB
HIV1 RNA # SERPL NAA+PROBE: <20 COPIES/ML
HIV1 RNA SERPL NAA+PROBE-LOG#: NORMAL LOG10COPY/ML

## 2018-06-26 PROCEDURE — 99214 OFFICE O/P EST MOD 30 MIN: CPT | Performed by: NURSE PRACTITIONER

## 2018-06-26 NOTE — ASSESSMENT & PLAN NOTE
Lab Results   Component Value Date    WBC 6 65 06/23/2018    HGB 18 2 (H) 06/23/2018    HGB 19 0 (H) 06/23/2018    HCT 53 5 (H) 06/23/2018    HCT 53 4 (H) 06/23/2018    MCV 88 06/23/2018    MCV 88 06/23/2018     06/23/2018     06/23/2018     Stable  Most likely due to nicotine dependence  Stressed the importance of cessation

## 2018-06-26 NOTE — ASSESSMENT & PLAN NOTE
Cd4:    CD4 T Cell Abs   Date/Time Value Ref Range Status   2018 07:56  (L) 359 - 1519 /uL Final      Viral load: <20   ART: Tivicay and Descovy  Denies side effects  Stressed the importance of adherence  Continue follow up with ID clinic  Reviewed most recent labs, including Cd4 and viral load  Discussed the risks and benefits of treatment options, instructions for management, importance of treatment adherence, and reduction of risk factor  Educated on possible  medication side effects  Counseled on routes of HIV transmission, including the risk of  infection  Emphasized that viral suppression is the best method to prevent HIV transmission  At this time pt denies the need for HIV testing of anyone in their life  Total encounter time was 45 minutes  Greater then 20 minutes were spent on counseling and patient education  Pt voices understanding and agreement with treatment plan

## 2018-06-26 NOTE — PATIENT INSTRUCTIONS

## 2018-06-26 NOTE — ASSESSMENT & PLAN NOTE
Secondary to hepatitis-C  SVR s/p treatment  AFP and right upper quadrant ultrasound ordered today for 6 month [facility]  surveillance

## 2018-06-26 NOTE — ASSESSMENT & PLAN NOTE
Using Nicorette gum to reduce cigarettes intake  Stopped using gum for a while due to dental issues  Will restart using Nicorette now that dental issues are resolving  Counseled for greater than 15 minutes on the importance of smoking cessation  Advised to quit  Educated on the increased risk of heart and lung disease associated with smoking    Referred to [facility] for enrollment in smoking cessation program

## 2018-06-26 NOTE — PROGRESS NOTES
Assessment/Plan:    HIV disease ([place] [address] )    Cd4:    CD4 T Cell Abs   Date/Time Value Ref Range Status   2018 07:56  (L) 359 - 1519 /uL Final      Viral load: <20   ART: Tivicay and Descovy  Denies side effects  Stressed the importance of adherence  Continue follow up with ID clinic  Reviewed most recent labs, including Cd4 and viral load  Discussed the risks and benefits of treatment options, instructions for management, importance of treatment adherence, and reduction of risk factor  Educated on possible  medication side effects  Counseled on routes of HIV transmission, including the risk of  infection  Emphasized that viral suppression is the best method to prevent HIV transmission  At this time pt denies the need for HIV testing of anyone in their life  Total encounter time was 45 minutes  Greater then 20 minutes were spent on counseling and patient education  Pt voices understanding and agreement with treatment plan  Chronic hepatitis ([place] [address] )  Secondary to hepatitis-C  SVR s/p treatment  AFP and right upper quadrant ultrasound ordered today for 6 month [place] [address]  surveillance  Other cirrhosis of liver ([place] [address] )  Compensated  Continue 6 month [place] [address]  surveillance  Essential hypertension  Blood pressure:   BP Readings from Last 3 Encounters:   18 130/90   04/10/18 130/90   18 140/90       Continue lisinopril-HCTZ  BP to be evaluated at ID clinic visit 7/3/18  May need to increase medication dose to reach goal BP  Educated on the following lifestyle modifications to lower BP and decrease cardiovascular risk factors  limit alcohol intake, reduce salt in diet, maintain a healthy weight, engage in 30 minutes of cardiovascular exercise daily, and not smoke  Nicotine dependence  Using Nicorette gum to reduce cigarettes intake  Stopped using gum for a while due to dental issues  Will restart using Nicorette now that dental issues are resolving      Counseled for greater than 15 minutes on the importance of smoking cessation  Advised to quit  Educated on the increased risk of heart and lung disease associated with smoking  Referred to [facility] [address] for enrollment in smoking cessation program        Polycythemia    Lab Results   Component Value Date    WBC 6 65 06/23/2018    HGB 18 2 (H) 06/23/2018    HGB 19 0 (H) 06/23/2018    HCT 53 5 (H) 06/23/2018    HCT 53 4 (H) 06/23/2018    MCV 88 06/23/2018    MCV 88 06/23/2018     06/23/2018     06/23/2018     Stable  Most likely due to nicotine dependence  Stressed the importance of cessation  Diagnoses and all orders for this visit:    HIV disease ([facility] )    Other tobacco product nicotine dependence with other nicotine-induced disorder    Polycythemia    Chronic hepatitis ([facility] )    Other cirrhosis of liver ([facility] )    Essential hypertension          Subjective:      Patient ID: Ephraim Sykes is a 62 y o  male  Mariama Worley is here today for 3 month PCP follow up  PMHx significant for HIV, HCV SVR s/p treatment, cirrhosis,HTN, and hyperlipidemia  Mariama Worley is doing well  Just left dentist office after having upper tooth extraction  Reports elevated blood pressure of 180/90 at dental office, but thinks this is most likely due to anxiety about upcoming extraction  The following portions of the patient's history were reviewed and updated as appropriate: allergies, current medications, past family history, past medical history, past social history, past surgical history and problem list     Review of Systems   Constitutional: Negative for activity change, appetite change, chills, diaphoresis, fatigue, fever and unexpected weight change  HENT: Negative for congestion, dental problem, ear pain, hearing loss, mouth sores, rhinorrhea and sore throat  Eyes: Negative for pain, redness and visual disturbance  Respiratory: Negative for shortness of breath and wheezing      Cardiovascular: Negative for chest pain and leg swelling  Gastrointestinal: Negative for abdominal pain, constipation, diarrhea, nausea and vomiting  Endocrine: Negative for polydipsia, polyphagia and polyuria  Genitourinary: Negative for difficulty urinating and dysuria  Musculoskeletal: Negative for back pain, joint swelling and myalgias  Skin: Negative for rash  Neurological: Negative for syncope and headaches  Psychiatric/Behavioral: Negative for behavioral problems and suicidal ideas  Lab Results   Component Value Date     06/23/2018    K 4 1 06/23/2018     06/23/2018    CO2 28 06/23/2018    ANIONGAP 8 06/23/2018    BUN 15 06/23/2018    CREATININE 1 01 06/23/2018    GLUCOSE 102 06/10/2016    GLUF 117 (H) 06/23/2018    CALCIUM 8 9 06/23/2018    AST 21 06/23/2018    ALT 26 06/23/2018    ALKPHOS 91 06/23/2018    PROT 7 9 06/23/2018    BILITOT 0 70 06/23/2018    EGFR 82 06/23/2018     Lab Results   Component Value Date    WBC 6 65 06/23/2018    HGB 18 2 (H) 06/23/2018    HGB 19 0 (H) 06/23/2018    HCT 53 5 (H) 06/23/2018    HCT 53 4 (H) 06/23/2018    MCV 88 06/23/2018    MCV 88 06/23/2018     06/23/2018     06/23/2018       Objective:      /90   Pulse 68   Temp 98 9 °F (37 2 °C)   Wt 84 4 kg (186 lb)   BMI 28 49 kg/m²          Physical Exam   Constitutional: He is oriented to person, place, and time  He appears well-developed and well-nourished  No distress  HENT:   Head: Normocephalic  Right Ear: External ear normal    Left Ear: External ear normal    Nose: Nose normal    Mouth/Throat: Oropharynx is clear and moist  No oropharyngeal exudate  Eyes: Conjunctivae are normal  Pupils are equal, round, and reactive to light  Right eye exhibits no discharge  Left eye exhibits no discharge  Neck: Normal range of motion  No thyromegaly present  Cardiovascular: Normal rate, regular rhythm, normal heart sounds and intact distal pulses  No murmur heard    Pulmonary/Chest: Effort normal and breath sounds normal  He has no wheezes  Abdominal: Soft  Bowel sounds are normal  He exhibits no mass  There is no tenderness  Musculoskeletal: Normal range of motion  He exhibits no edema or tenderness  Lymphadenopathy:     He has no cervical adenopathy  Neurological: He is alert and oriented to person, place, and time  Skin: Skin is warm and dry  No rash noted  Psychiatric: He has a normal mood and affect   His behavior is normal

## 2018-06-26 NOTE — ASSESSMENT & PLAN NOTE
Blood pressure:   BP Readings from Last 3 Encounters:   06/26/18 130/90   04/10/18 130/90   02/19/18 140/90       Continue lisinopril-HCTZ  BP to be evaluated at ID clinic visit 7/3/18  May need to increase medication dose to reach goal BP  Educated on the following lifestyle modifications to lower BP and decrease cardiovascular risk factors  limit alcohol intake, reduce salt in diet, maintain a healthy weight, engage in 30 minutes of cardiovascular exercise daily, and not smoke

## 2018-07-23 DIAGNOSIS — B20 HIV DISEASE (HCC): ICD-10-CM

## 2018-07-23 RX ORDER — DARUNAVIR ETHANOLATE AND COBICISTAT 800; 150 MG/1; MG/1
1 TABLET, FILM COATED ORAL DAILY
Qty: 30 TABLET | Refills: 3 | Status: SHIPPED | OUTPATIENT
Start: 2018-07-23 | End: 2018-11-07 | Stop reason: SDUPTHER

## 2018-07-31 ENCOUNTER — OFFICE VISIT (OUTPATIENT)
Dept: SURGERY | Facility: CLINIC | Age: 58
End: 2018-07-31
Payer: COMMERCIAL

## 2018-07-31 VITALS
WEIGHT: 185 LBS | BODY MASS INDEX: 28.04 KG/M2 | DIASTOLIC BLOOD PRESSURE: 96 MMHG | HEART RATE: 89 BPM | HEIGHT: 68 IN | SYSTOLIC BLOOD PRESSURE: 146 MMHG | TEMPERATURE: 98.6 F | OXYGEN SATURATION: 97 %

## 2018-07-31 DIAGNOSIS — D75.1 POLYCYTHEMIA: ICD-10-CM

## 2018-07-31 DIAGNOSIS — B20 HIV (HUMAN IMMUNODEFICIENCY VIRUS INFECTION) (HCC): Primary | ICD-10-CM

## 2018-07-31 DIAGNOSIS — D75.1 POLYCYTHEMIA: Primary | ICD-10-CM

## 2018-07-31 DIAGNOSIS — Z11.3 ENCOUNTER FOR SCREENING FOR INFECTIONS WITH A PREDOMINANTLY SEXUAL MODE OF TRANSMISSION: ICD-10-CM

## 2018-07-31 DIAGNOSIS — F17.298 OTHER TOBACCO PRODUCT NICOTINE DEPENDENCE WITH OTHER NICOTINE-INDUCED DISORDER: ICD-10-CM

## 2018-07-31 DIAGNOSIS — K74.69 OTHER CIRRHOSIS OF LIVER (HCC): ICD-10-CM

## 2018-07-31 DIAGNOSIS — K73.9 CHRONIC HEPATITIS (HCC): ICD-10-CM

## 2018-07-31 DIAGNOSIS — Z20.2 CONTACT WITH AND (SUSPECTED) EXPOSURE TO INFECTIONS WITH A PREDOMINANTLY SEXUAL MODE OF TRANSMISSION: ICD-10-CM

## 2018-07-31 DIAGNOSIS — I10 ESSENTIAL HYPERTENSION: ICD-10-CM

## 2018-07-31 PROCEDURE — 99215 OFFICE O/P EST HI 40 MIN: CPT | Performed by: INTERNAL MEDICINE

## 2018-07-31 NOTE — PROGRESS NOTES
Progress Note - Infectious Disease   Arnulfo Keller 62 y o  male MRN: [id number]  Unit/Bed#:  Encounter: [id number]      Impression/Plan:  1  HIV-improved adherence with ART with an undetectable viral load  His CD4 count is 250  Will continue the Tivicay, Descovy, Prezcobix  Recheck labs in 2 months and follow up in 3 months  Stressed adherence      2  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  Right upper quadrant ultrasound for screening was negative and the alpha fetoprotein negative on the last check  Will continue [facility]  screening for now      3  Nicotine dependence-he continues to smoke  I stressed the importance of tobacco cessation       4   Polycythemia-likely secondary to the nicotine dependence  Once again stressed the importance of tobacco cessation  We will continue to monitor the CBC with diff closely      5  Hypertension-asymptomatic  Discussed in detail with the primary who will intervene  Patient was provided medication, adherence and prevention education    Subjective:  Routine follow-up for HIV  Patient claims 100% adherence with Prezcobix/Tivicay/Descovy  Patient denies any notable side effects  Overall the feeling well  The patient denies any fever chills or sweats, denies any nausea vomiting or diarrhea, denies any cough or shortness of breath  He states that he is having much less GI symptoms with the Prezcobix and appreciates the change  ROS: A complete 12 point ROS is negative other than that noted in the HPI    Objective:  Vitals:  Vitals:    07/31/18 1719   BP: 150/100   Pulse: 89   Temp: 98 6 °F (37 °C)   SpO2: 97%   Weight: 83 9 kg (185 lb)   Height: 5' 7 75" (1 721 m)       Physical Exam:   General Appearance:  Alert, interactive, appearing well,  nontoxic, no acute distress  Neck:   Supple without lymphadenopathy, no thyromegaly or masses   Throat: Oropharynx moist without lesions      Lungs:   Clear to auscultation bilaterally; no wheezes, rhonchi or rales; respirations unlabored   Heart:  RRR; no murmur, rub or gallop   Abdomen:   Soft, non-tender, non-distended, positive bowel sounds  Extremities: No clubbing, cyanosis or edema   Skin: No new rashes or lesions  No draining wounds noted         Lab Results   Component Value Date     06/23/2018    K 4 1 06/23/2018     06/23/2018    CO2 28 06/23/2018    ANIONGAP 8 06/23/2018    BUN 15 06/23/2018    CREATININE 1 01 06/23/2018    GLUCOSE 102 06/10/2016    GLUF 117 (H) 06/23/2018    CALCIUM 8 9 06/23/2018    AST 21 06/23/2018    ALT 26 06/23/2018    ALKPHOS 91 06/23/2018    PROT 7 9 06/23/2018    BILITOT 0 70 06/23/2018    EGFR 82 06/23/2018     Lab Results   Component Value Date    WBC 6 65 06/23/2018    HGB 18 2 (H) 06/23/2018    HGB 19 0 (H) 06/23/2018    HCT 53 5 (H) 06/23/2018    HCT 53 4 (H) 06/23/2018    MCV 88 06/23/2018    MCV 88 06/23/2018     06/23/2018     06/23/2018       Lab Results   Component Value Date    RPR Non-Reactive 07/15/2017     CD4 T Cell Abs   Date/Time Value Ref Range Status   06/23/2018 07:56  (L) 359 - 1519 /uL Final     HIV-1 RNA Viral Load Log   Date/Time Value Ref Range Status   06/23/2018 07:56 AM COMMENT [id number]/mL Final     Comment:     Unable to calculate result since non-numeric result obtained for  component test        Labs, Imaging, & Other studies:   All pertinent labs and imaging studies were personally reviewed      Current Outpatient Prescriptions:     aspirin (ASPIRIN LOW DOSE) 81 mg EC tablet, Take 1 tablet by mouth daily, Disp: , Rfl:     DESCOVY 200-25 MG tablet, TAKE 1 TABLET BY MOUTH DAILY, Disp: 30 tablet, Rfl: 3    ibuprofen (MOTRIN) 600 mg tablet, Take 600 mg by mouth every 6 (six) hours as needed, Disp: , Rfl: 0    lisinopril-hydrochlorothiazide (PRINZIDE,ZESTORETIC) 10-12 5 MG per tablet, TAKE 1 TABLET BY MOUTH DAILY, Disp: 30 tablet, Rfl: 3    nicotine polacrilex (NICORETTE) 4 mg gum, CHEW 1 PIECE OF GUM AS DIRECTED AS NEEDED FOR SMOKING CESSATION, Disp: 90 each, Rfl: 3    PREZCOBIX 800-150 MG TABS, TAKE 1 TABLET BY MOUTH DAILY, Disp: 30 tablet, Rfl: 3    TIVICAY 50 MG TABS, TAKE 1 TABLET BY MOUTH TWICE A DAY, Disp: 60 tablet, Rfl: 3    amoxicillin (AMOXIL) 500 mg capsule, TAKE ONE CAPSULE BY MOUTH EVERY 8 HOURS UNTIL FINISHED, Disp: , Rfl: 0

## 2018-08-22 ENCOUNTER — TELEPHONE (OUTPATIENT)
Dept: SURGERY | Facility: CLINIC | Age: 58
End: 2018-08-22

## 2018-09-06 ENCOUNTER — OFFICE VISIT (OUTPATIENT)
Dept: HEMATOLOGY ONCOLOGY | Facility: CLINIC | Age: 58
End: 2018-09-06
Payer: COMMERCIAL

## 2018-09-06 VITALS
TEMPERATURE: 98.8 F | DIASTOLIC BLOOD PRESSURE: 92 MMHG | RESPIRATION RATE: 18 BRPM | HEIGHT: 67 IN | SYSTOLIC BLOOD PRESSURE: 154 MMHG | HEART RATE: 80 BPM | BODY MASS INDEX: 28.56 KG/M2 | WEIGHT: 182 LBS

## 2018-09-06 DIAGNOSIS — F17.200 SMOKER: ICD-10-CM

## 2018-09-06 DIAGNOSIS — D75.1 POLYCYTHEMIA: Primary | ICD-10-CM

## 2018-09-06 DIAGNOSIS — K74.60 HEPATIC CIRRHOSIS, UNSPECIFIED HEPATIC CIRRHOSIS TYPE, UNSPECIFIED WHETHER ASCITES PRESENT (HCC): ICD-10-CM

## 2018-09-06 PROCEDURE — 99203 OFFICE O/P NEW LOW 30 MIN: CPT | Performed by: INTERNAL MEDICINE

## 2018-09-06 NOTE — PROGRESS NOTES
HEMATOLOGY / ONCOLOGY CLINIC NOTE    Primary Care Provider: Tennille Jonas  Referring Provider: Ayush Smith  MRN: [id number]  : 1960    Reason for Encounter:  Chief Complaint   Patient presents with    Consult     patient is here to establish care         Hematology / Oncology History:     Mehran Ramos is a 62 y o  male who came in  To establish care with hematology    1,  Polycythemia  -  Chronic, ongoing for at least 2 years, hemoglobin around 18, stable  Long-term smoker      Interval History:     :  Patient is a 60-year-old male, history of hypertension, hepatitis-C, HIV, on HARRT  Treatment,  Cirrhosis, referred to Hematology for polycythemia  Patient is not aware of  Previous hematology oncology issues  Patient reported never have a bleeding, arterial or venous thrombosis in the past   Generally his health is at baseline  No constitutional symptoms, weight is stable, no fever chill night sweats, no lumps bumps  No long-term allergies skin rash, no skin color change  not taking hormone replacement therapy or on diuretics  No family history of malignancies  As above, long-term smoker  Used to smoke cigarettes 2 packs a day for 30 years, for past 3 years switched to cigar, about 4 5 cigars a day  Problem list:     Patient Active Problem List   Diagnosis    Chronic hepatitis ([facility] )    Other cirrhosis of liver (HCC)    Dyslipidemia    Essential hypertension    Hepatitis B core antibody positive    [facility]    Nicotine dependence    Polycythemia       Assessment / Plan:     1  Polycythemia  -      Chronic, hemoglobin 18  Possible secondary polycythemia  No alarming signs and symptoms consistent with malignancy  -       Oxygen saturation 95%   In room air, no evidence suggesting sleep apnea      -     Usually for secondary polycythemia, no need management, until hemoglobin is more than 20 or hematocrit more than 60,  Or patient has any signs or symptoms suggesting hyperviscosity  Patient aware of the importance of smoking cessation  He is working on it now  -       We will proceed with workups listed as below  Will follow with patient in 4 weeks  - KEYANNA 2; Future  - BCR/ABL, PCR; Future  - Erythropoietin; Future  - CBC and differential; Future  - UA (URINE) with reflex to Microscopic; Future  - Comprehensive metabolic panel; Future  - US abdomen complete; Future    2  Hepatic cirrhosis, unspecified hepatic cirrhosis type, unspecified whether ascites present ([place] [id number] )           40       minutes were spent face to face with patient with greater than 50% of the time spent in counseling or coordination of care including discussions of treatment instructions  All of the patient's questions were answered to their satisfactory during this discussion  Advised pt to call if there is any further questions  PHYSICIAL EXAMINATION:       Vital Signs:   [unfilled]  Body mass index is 28 51 kg/m²  Body surface area is 1 94 meters squared  GEN: Alert, awake oriented x3, in no acute distress  HEENT- No pallor, icterus, cyanosis, no oral mucosal lesions,   LAD - no palpable cervical, clavicle, axillary, inguinal LAD  Heart- normal S1 S2, regular rate and rhythm, No murmur, rubs  Lungs- decreased breathing sound bilateral    Abdomen- soft, Non tender, bowel sounds present  Extremities- No cyanosis, clubbing, edema  Neuro- No focal neurological deficit           PAST MEDICAL HISTORY:   has a past medical history of Opioid dependence ([facility] )  PAST SURGICAL HISTORY:   has a past surgical history that includes Stomach surgery and Lumbar laminectomy      CURRENT MEDICATIONS:   Current Outpatient Prescriptions   Medication Sig Dispense Refill    amoxicillin (AMOXIL) 500 mg capsule TAKE ONE CAPSULE BY MOUTH EVERY 8 HOURS UNTIL FINISHED  0    aspirin (ASPIRIN LOW DOSE) 81 mg EC tablet Take 1 tablet by mouth daily      DESCOVY 200-25 MG tablet TAKE 1 TABLET BY MOUTH DAILY 30 tablet 3    ibuprofen (MOTRIN) 600 mg tablet Take 600 mg by mouth every 6 (six) hours as needed  0    lisinopril-hydrochlorothiazide (PRINZIDE,ZESTORETIC) 10-12 5 MG per tablet TAKE 1 TABLET BY MOUTH DAILY 30 tablet 3    nicotine polacrilex (NICORETTE) 4 mg gum CHEW 1 PIECE OF GUM AS DIRECTED AS NEEDED FOR SMOKING CESSATION 90 each 3    PREZCOBIX 800-150 MG TABS TAKE 1 TABLET BY MOUTH DAILY 30 tablet 3    TIVICAY 50 MG TABS TAKE 1 TABLET BY MOUTH TWICE A DAY 60 tablet 3     No current facility-administered medications for this visit  [unfilled]    SOCIAL HISTORY:   reports that he has been smoking Cigars  He has been smoking about 7 00 packs per day  He has never used smokeless tobacco  He reports that he does not use drugs  His alcohol history is not on file  FAMILY HISTORY:  family history includes Alzheimer's disease in his mother; Heart attack in his father; Prostate cancer in his brother  ALLERGIES:  has No Known Allergies  REVIEW OF SYSTEMS:  Please note that a 14-point review of systems was performed to include Constitutional, HEENT, Respiratory, CVS, GI, , Musculoskeletal, Integumentary, Neurologic, Rheumatologic, Endocrinologic, Psychiatric, Lymphatic, and Hematologic/Oncologic systems were reviewed and are negative unless otherwise stated in HPI  Positive and negative findings pertinent to this evaluation are incorporated into the history of present illness              LAB:  Lab Results   Component Value Date    WBC 6 65 06/23/2018    WBC 6 1 06/23/2018    HGB 18 2 (H) 06/23/2018    HGB 19 0 (H) 06/23/2018    HCT 53 5 (H) 06/23/2018    HCT 53 4 (H) 06/23/2018    MCV 88 06/23/2018    MCV 88 06/23/2018     06/23/2018     06/23/2018     Lab Results   Component Value Date     06/23/2018    K 4 1 06/23/2018     06/23/2018    CO2 28 06/23/2018    BUN 15 06/23/2018    CREATININE 1 01 06/23/2018    GLUF 117 (H) 06/23/2018    CALCIUM 8 9 06/23/2018 AST 21 06/23/2018    ALT 26 06/23/2018    ALKPHOS 91 06/23/2018    PROT 7 4 03/18/2017    BILITOT 1 1 03/18/2017    EGFR 82 06/23/2018       IMAGING:  US abdomen complete    (Results Pending)     No results found

## 2018-09-18 ENCOUNTER — TELEPHONE (OUTPATIENT)
Dept: SURGERY | Facility: CLINIC | Age: 58
End: 2018-09-18

## 2018-09-18 NOTE — TELEPHONE ENCOUNTER
In checking patient's appointment desk, his appointment with Chase Lindsay is scheduled for 9/25  Called and left message for patient to see if that was the appointment he needed to R/S  Awaiting call back

## 2018-09-18 NOTE — TELEPHONE ENCOUNTER
Patient called back  He can't have two appointments in the same day because of his work   Per patient, R/S his appointment with [facility] for the following week, Oct 3 at 10am

## 2018-09-25 ENCOUNTER — HOSPITAL ENCOUNTER (OUTPATIENT)
Dept: ULTRASOUND IMAGING | Facility: HOSPITAL | Age: 58
Discharge: HOME | End: 2018-09-25
Attending: INTERNAL MEDICINE
Payer: COMMERCIAL

## 2018-09-25 DIAGNOSIS — D75.1 POLYCYTHEMIA: ICD-10-CM

## 2018-09-25 PROCEDURE — 76700 US EXAM ABDOM COMPLETE: CPT

## 2018-09-29 ENCOUNTER — APPOINTMENT (OUTPATIENT)
Dept: LAB | Facility: HOSPITAL | Age: 58
End: 2018-09-29
Payer: COMMERCIAL

## 2018-09-29 DIAGNOSIS — K73.9 CHRONIC HEPATITIS (HCC): ICD-10-CM

## 2018-09-29 DIAGNOSIS — Z20.2 CONTACT WITH AND (SUSPECTED) EXPOSURE TO INFECTIONS WITH A PREDOMINANTLY SEXUAL MODE OF TRANSMISSION: ICD-10-CM

## 2018-09-29 DIAGNOSIS — Z11.3 ENCOUNTER FOR SCREENING FOR INFECTIONS WITH A PREDOMINANTLY SEXUAL MODE OF TRANSMISSION: ICD-10-CM

## 2018-09-29 DIAGNOSIS — B20 HIV DISEASE (HCC): ICD-10-CM

## 2018-09-29 DIAGNOSIS — B20 HIV (HUMAN IMMUNODEFICIENCY VIRUS INFECTION) (HCC): ICD-10-CM

## 2018-09-29 DIAGNOSIS — I10 ESSENTIAL HYPERTENSION: ICD-10-CM

## 2018-09-29 DIAGNOSIS — D75.1 POLYCYTHEMIA: ICD-10-CM

## 2018-09-29 LAB
AFP-TM SERPL-MCNC: 4.8 NG/ML (ref 0.5–8)
ALBUMIN SERPL BCP-MCNC: 3.8 G/DL (ref 3.5–5)
ALP SERPL-CCNC: 98 U/L (ref 46–116)
ALT SERPL W P-5'-P-CCNC: 25 U/L (ref 12–78)
ANION GAP SERPL CALCULATED.3IONS-SCNC: 10 MMOL/L (ref 4–13)
AST SERPL W P-5'-P-CCNC: 19 U/L (ref 5–45)
BACTERIA UR QL AUTO: ABNORMAL /HPF
BASOPHILS # BLD AUTO: 0.04 THOUSANDS/ΜL (ref 0–0.1)
BASOPHILS NFR BLD AUTO: 1 % (ref 0–1)
BILIRUB SERPL-MCNC: 0.5 MG/DL (ref 0.2–1)
BILIRUB UR QL STRIP: NEGATIVE
BUN SERPL-MCNC: 17 MG/DL (ref 5–25)
CALCIUM SERPL-MCNC: 8.9 MG/DL (ref 8.3–10.1)
CHLORIDE SERPL-SCNC: 103 MMOL/L (ref 100–108)
CLARITY UR: CLEAR
CO2 SERPL-SCNC: 26 MMOL/L (ref 21–32)
COLOR UR: YELLOW
CREAT SERPL-MCNC: 0.98 MG/DL (ref 0.6–1.3)
EOSINOPHIL # BLD AUTO: 0.13 THOUSAND/ΜL (ref 0–0.61)
EOSINOPHIL NFR BLD AUTO: 2 % (ref 0–6)
ERYTHROCYTE [DISTWIDTH] IN BLOOD BY AUTOMATED COUNT: 13.9 % (ref 11.6–15.1)
EST. AVERAGE GLUCOSE BLD GHB EST-MCNC: 111 MG/DL
GFR SERPL CREATININE-BSD FRML MDRD: 85 ML/MIN/1.73SQ M
GLUCOSE P FAST SERPL-MCNC: 112 MG/DL (ref 65–99)
GLUCOSE UR STRIP-MCNC: NEGATIVE MG/DL
HBA1C MFR BLD: 5.5 % (ref 4.2–6.3)
HCT VFR BLD AUTO: 52.3 % (ref 36.5–49.3)
HGB BLD-MCNC: 17.9 G/DL (ref 12–17)
HGB UR QL STRIP.AUTO: NEGATIVE
IMM GRANULOCYTES # BLD AUTO: 0.03 THOUSAND/UL (ref 0–0.2)
IMM GRANULOCYTES NFR BLD AUTO: 1 % (ref 0–2)
KETONES UR STRIP-MCNC: ABNORMAL MG/DL
LEUKOCYTE ESTERASE UR QL STRIP: NEGATIVE
LYMPHOCYTES # BLD AUTO: 1.24 THOUSANDS/ΜL (ref 0.6–4.47)
LYMPHOCYTES NFR BLD AUTO: 21 % (ref 14–44)
MCH RBC QN AUTO: 30.6 PG (ref 26.8–34.3)
MCHC RBC AUTO-ENTMCNC: 34.2 G/DL (ref 31.4–37.4)
MCV RBC AUTO: 89 FL (ref 82–98)
MONOCYTES # BLD AUTO: 0.69 THOUSAND/ΜL (ref 0.17–1.22)
MONOCYTES NFR BLD AUTO: 12 % (ref 4–12)
NEUTROPHILS # BLD AUTO: 3.83 THOUSANDS/ΜL (ref 1.85–7.62)
NEUTS SEG NFR BLD AUTO: 63 % (ref 43–75)
NITRITE UR QL STRIP: NEGATIVE
NON-SQ EPI CELLS URNS QL MICRO: ABNORMAL /HPF
NRBC BLD AUTO-RTO: 0 /100 WBCS
PH UR STRIP.AUTO: 5.5 [PH] (ref 4.5–8)
PLATELET # BLD AUTO: 209 THOUSANDS/UL (ref 149–390)
PMV BLD AUTO: 9.7 FL (ref 8.9–12.7)
POTASSIUM SERPL-SCNC: 3.9 MMOL/L (ref 3.5–5.3)
PROT SERPL-MCNC: 7.8 G/DL (ref 6.4–8.2)
PROT UR STRIP-MCNC: NEGATIVE MG/DL
RBC # BLD AUTO: 5.85 MILLION/UL (ref 3.88–5.62)
RBC #/AREA URNS AUTO: ABNORMAL /HPF
SODIUM SERPL-SCNC: 139 MMOL/L (ref 136–145)
SP GR UR STRIP.AUTO: >=1.03 (ref 1–1.03)
UROBILINOGEN UR QL STRIP.AUTO: 0.2 E.U./DL
WBC # BLD AUTO: 5.96 THOUSAND/UL (ref 4.31–10.16)
WBC #/AREA URNS AUTO: ABNORMAL /HPF

## 2018-09-29 PROCEDURE — 83036 HEMOGLOBIN GLYCOSYLATED A1C: CPT

## 2018-09-29 PROCEDURE — 81206 BCR/ABL1 GENE MAJOR BP: CPT

## 2018-09-29 PROCEDURE — 87536 HIV-1 QUANT&REVRSE TRNSCRPJ: CPT

## 2018-09-29 PROCEDURE — 81207 BCR/ABL1 GENE MINOR BP: CPT

## 2018-09-29 PROCEDURE — 80053 COMPREHEN METABOLIC PANEL: CPT

## 2018-09-29 PROCEDURE — 36415 COLL VENOUS BLD VENIPUNCTURE: CPT

## 2018-09-29 PROCEDURE — 87591 N.GONORRHOEAE DNA AMP PROB: CPT

## 2018-09-29 PROCEDURE — 82105 ALPHA-FETOPROTEIN SERUM: CPT

## 2018-09-29 PROCEDURE — 86592 SYPHILIS TEST NON-TREP QUAL: CPT

## 2018-09-29 PROCEDURE — 85025 COMPLETE CBC W/AUTO DIFF WBC: CPT

## 2018-09-29 PROCEDURE — 87491 CHLMYD TRACH DNA AMP PROBE: CPT

## 2018-09-29 PROCEDURE — 81001 URINALYSIS AUTO W/SCOPE: CPT

## 2018-09-29 PROCEDURE — 82668 ASSAY OF ERYTHROPOIETIN: CPT

## 2018-09-29 PROCEDURE — 81270 JAK2 GENE: CPT

## 2018-09-29 PROCEDURE — 86360 T CELL ABSOLUTE COUNT/RATIO: CPT

## 2018-09-30 LAB
BASOPHILS # BLD AUTO: 0 X10E3/UL (ref 0–0.2)
BASOPHILS NFR BLD AUTO: 0 %
CD3+CD4+ CELLS # BLD: 246 /UL (ref 359–1519)
CD3+CD4+ CELLS NFR BLD: 20.5 % (ref 30.8–58.5)
CD3+CD4+ CELLS/CD3+CD8+ CLL BLD: 0.35 % (ref 0.92–3.72)
CD3+CD8+ CELLS # BLD: 694 /UL (ref 109–897)
CD3+CD8+ CELLS NFR BLD: 57.8 % (ref 12–35.5)
EOSINOPHIL # BLD AUTO: 0.2 X10E3/UL (ref 0–0.4)
EOSINOPHIL NFR BLD AUTO: 3 %
ERYTHROCYTE [DISTWIDTH] IN BLOOD BY AUTOMATED COUNT: 14.3 % (ref 12.3–15.4)
HCT VFR BLD AUTO: 51.5 % (ref 37.5–51)
HGB BLD-MCNC: 18 G/DL (ref 13–17.7)
IMM GRANULOCYTES # BLD: 0 X10E3/UL (ref 0–0.1)
IMM GRANULOCYTES NFR BLD: 0 %
LYMPHOCYTES # BLD AUTO: 1.2 X10E3/UL (ref 0.7–3.1)
LYMPHOCYTES NFR BLD AUTO: 21 %
MCH RBC QN AUTO: 30.7 PG (ref 26.6–33)
MCHC RBC AUTO-ENTMCNC: 35 G/DL (ref 31.5–35.7)
MCV RBC AUTO: 88 FL (ref 79–97)
MONOCYTES # BLD AUTO: 0.7 X10E3/UL (ref 0.1–0.9)
MONOCYTES NFR BLD AUTO: 12 %
NEUTROPHILS # BLD AUTO: 3.7 X10E3/UL (ref 1.4–7)
NEUTROPHILS NFR BLD AUTO: 64 %
PLATELET # BLD AUTO: 211 X10E3/UL (ref 150–379)
RBC # BLD AUTO: 5.86 X10E6/UL (ref 4.14–5.8)
WBC # BLD AUTO: 5.7 X10E3/UL (ref 3.4–10.8)

## 2018-10-01 LAB — RPR SER QL: NORMAL

## 2018-10-02 LAB
CHLAMYDIA DNA CVX QL NAA+PROBE: NORMAL
EPO SERPL-ACNC: 8.7 MIU/ML (ref 2.6–18.5)
N GONORRHOEA DNA GENITAL QL NAA+PROBE: NORMAL

## 2018-10-03 ENCOUNTER — OFFICE VISIT (OUTPATIENT)
Dept: SURGERY | Facility: CLINIC | Age: 58
End: 2018-10-03
Payer: COMMERCIAL

## 2018-10-03 VITALS
WEIGHT: 186.6 LBS | DIASTOLIC BLOOD PRESSURE: 82 MMHG | BODY MASS INDEX: 29.29 KG/M2 | HEIGHT: 67 IN | OXYGEN SATURATION: 97 % | HEART RATE: 78 BPM | TEMPERATURE: 97.8 F | SYSTOLIC BLOOD PRESSURE: 112 MMHG

## 2018-10-03 DIAGNOSIS — A63.0 CONDYLOMA: ICD-10-CM

## 2018-10-03 DIAGNOSIS — K74.69 OTHER CIRRHOSIS OF LIVER (HCC): ICD-10-CM

## 2018-10-03 DIAGNOSIS — F17.298 OTHER TOBACCO PRODUCT NICOTINE DEPENDENCE WITH OTHER NICOTINE-INDUCED DISORDER: ICD-10-CM

## 2018-10-03 DIAGNOSIS — B20 HIV DISEASE (HCC): ICD-10-CM

## 2018-10-03 DIAGNOSIS — K73.9 CHRONIC HEPATITIS (HCC): ICD-10-CM

## 2018-10-03 DIAGNOSIS — D75.1 POLYCYTHEMIA: ICD-10-CM

## 2018-10-03 DIAGNOSIS — E78.5 DYSLIPIDEMIA: ICD-10-CM

## 2018-10-03 DIAGNOSIS — I10 ESSENTIAL HYPERTENSION: ICD-10-CM

## 2018-10-03 DIAGNOSIS — Z23 NEED FOR INFLUENZA VACCINATION: Primary | ICD-10-CM

## 2018-10-03 LAB — SCAN RESULT: NORMAL

## 2018-10-03 PROCEDURE — 99214 OFFICE O/P EST MOD 30 MIN: CPT | Performed by: NURSE PRACTITIONER

## 2018-10-03 PROCEDURE — 90471 IMMUNIZATION ADMIN: CPT

## 2018-10-03 PROCEDURE — 90682 RIV4 VACC RECOMBINANT DNA IM: CPT

## 2018-10-03 RX ORDER — ASPIRIN 81 MG/1
81 TABLET ORAL DAILY
Qty: 30 TABLET | Refills: 5 | Status: SHIPPED | OUTPATIENT
Start: 2018-10-03 | End: 2019-02-01 | Stop reason: SDUPTHER

## 2018-10-03 RX ORDER — IMIQUIMOD 12.5 MG/.25G
1 CREAM TOPICAL 3 TIMES WEEKLY
Qty: 12 EACH | Refills: 0 | Status: SHIPPED | OUTPATIENT
Start: 2018-10-03 | End: 2019-02-13 | Stop reason: HOSPADM

## 2018-10-03 NOTE — ASSESSMENT & PLAN NOTE
Compensated  AFP 9/2018 4 8 RUQ US negative for mass, no change from previous, Continue 6 month [facility]  surveillance

## 2018-10-03 NOTE — ASSESSMENT & PLAN NOTE
No results found for: [id number]  CD4 ABS   Date/Time Value Ref Range Status   09/29/2018 07:31  (L) 359 - 1519 /uL Final     HIV-1 RNA by PCR, Qn   Date/Time Value Ref Range Status   06/23/2018 07:56 AM <20 copies/mL Final     Comment:     HIV-1 RNA not detected  The reportable range for this assay is 20 to 10,000,000  copies HIV-1 RNA/mL  HIV-1 RNA Viral Load Log   Date/Time Value Ref Range Status   06/23/2018 07:56 AM COMMENT [id number]/mL Final     Comment:     Unable to calculate result since non-numeric result obtained for  component test            Current Outpatient Prescriptions:     DESCOVY 200-25 MG tablet, TAKE 1 TABLET BY MOUTH DAILY, Disp: 30 tablet, Rfl: 3    ibuprofen (MOTRIN) 600 mg tablet, Take 600 mg by mouth every 6 (six) hours as needed, Disp: , Rfl: 0    lisinopril-hydrochlorothiazide (PRINZIDE,ZESTORETIC) 10-12 5 MG per tablet, TAKE 1 TABLET BY MOUTH DAILY, Disp: 30 tablet, Rfl: 3    nicotine polacrilex (NICORETTE) 4 mg gum, CHEW 1 PIECE OF GUM AS DIRECTED AS NEEDED FOR SMOKING CESSATION, Disp: 90 each, Rfl: 3    PREZCOBIX 800-150 MG TABS, TAKE 1 TABLET BY MOUTH DAILY, Disp: 30 tablet, Rfl: 3    TIVICAY 50 MG TABS, TAKE 1 TABLET BY MOUTH TWICE A DAY, Disp: 60 tablet, Rfl: 3    amoxicillin (AMOXIL) 500 mg capsule, TAKE ONE CAPSULE BY MOUTH EVERY 8 HOURS UNTIL FINISHED, Disp: , Rfl: 0    aspirin (ASPIRIN LOW DOSE) 81 mg EC tablet, Take 1 tablet by mouth daily, Disp: , Rfl:     Denies missing any doses, claims 100% adherence  Reminded of ID clinic appointment 10/30/18  Denies side effects  Stressed the importance of adherence  Continue follow up with ID clinic  Reviewed most recent labs, including Cd4 and viral load  Discussed the risks and benefits of treatment options, instructions for management, importance of treatment adherence, and reduction of risk factor  Educated on possible  medication side effects       Counseled on routes of HIV transmission, including the risk of  infection  Emphasized that viral suppression is the best method to prevent HIV transmission  At this time pt denies the need for HIV testing of anyone in their life  Total encounter time was 45 minutes  Greater then 20 minutes were spent on counseling and patient education  Pt voices understanding and agreement with treatment plan

## 2018-10-03 NOTE — ASSESSMENT & PLAN NOTE
Repeat lipid panel with next labs  Eat a low-fat low-cholesterol diet  Follow-up with dietitian for additional education

## 2018-10-03 NOTE — ASSESSMENT & PLAN NOTE
Using nicotine gum in an effort to decrease amount smoking  Counseled for greater than 15 minutes on the importance of smoking cessation  Advised to quit  Educated on the increased risk of heart and lung disease associated with smoking    Referred to Gurwinder Aviles [address] [place] [address] for enrollment in smoking cessation program

## 2018-10-03 NOTE — PROGRESS NOTES
Assessment/Plan:    HIV disease ([facility] )  No results found for: [id number]  CD4 ABS   Date/Time Value Ref Range Status   09/29/2018 07:31  (L) 359 - 1519 /uL Final     HIV-1 RNA by PCR, Qn   Date/Time Value Ref Range Status   06/23/2018 07:56 AM <20 copies/mL Final     Comment:     HIV-1 RNA not detected  The reportable range for this assay is 20 to 10,000,000  copies HIV-1 RNA/mL  HIV-1 RNA Viral Load Log   Date/Time Value Ref Range Status   06/23/2018 07:56 AM COMMENT [id number]/mL Final     Comment:     Unable to calculate result since non-numeric result obtained for  component test            Current Outpatient Prescriptions:     DESCOVY 200-25 MG tablet, TAKE 1 TABLET BY MOUTH DAILY, Disp: 30 tablet, Rfl: 3    ibuprofen (MOTRIN) 600 mg tablet, Take 600 mg by mouth every 6 (six) hours as needed, Disp: , Rfl: 0    lisinopril-hydrochlorothiazide (PRINZIDE,ZESTORETIC) 10-12 5 MG per tablet, TAKE 1 TABLET BY MOUTH DAILY, Disp: 30 tablet, Rfl: 3    nicotine polacrilex (NICORETTE) 4 mg gum, CHEW 1 PIECE OF GUM AS DIRECTED AS NEEDED FOR SMOKING CESSATION, Disp: 90 each, Rfl: 3    PREZCOBIX 800-150 MG TABS, TAKE 1 TABLET BY MOUTH DAILY, Disp: 30 tablet, Rfl: 3    TIVICAY 50 MG TABS, TAKE 1 TABLET BY MOUTH TWICE A DAY, Disp: 60 tablet, Rfl: 3    amoxicillin (AMOXIL) 500 mg capsule, TAKE ONE CAPSULE BY MOUTH EVERY 8 HOURS UNTIL FINISHED, Disp: , Rfl: 0    aspirin (ASPIRIN LOW DOSE) 81 mg EC tablet, Take 1 tablet by mouth daily, Disp: , Rfl:     Denies missing any doses, claims 100% adherence  Reminded of ID clinic appointment 10/30/18  Denies side effects  Stressed the importance of adherence  Continue follow up with ID clinic  Reviewed most recent labs, including Cd4 and viral load  Discussed the risks and benefits of treatment options, instructions for management, importance of treatment adherence, and reduction of risk factor  Educated on possible  medication side effects       Counseled on routes of HIV transmission, including the risk of  infection  Emphasized that viral suppression is the best method to prevent HIV transmission  At this time pt denies the need for HIV testing of anyone in their life  Total encounter time was 45 minutes  Greater then 20 minutes were spent on counseling and patient education  Pt voices understanding and agreement with treatment plan  Essential hypertension  Blood pressure:   BP Readings from Last 3 Encounters:   10/03/18 112/82   18 154/92   18 146/96       Continue lisinopril-HCTZ and ASA for cardiac protection  Educated on the following lifestyle modifications to lower BP and decrease cardiovascular risk factors  limit alcohol intake, reduce salt in diet, maintain a healthy weight, engage in 30 minutes of cardiovascular exercise daily, and not smoke  Polycythemia  Lab Results   Component Value Date    WBC 5 96 2018    WBC 5 7 2018    HGB 17 9 (H) 2018    HGB 18 0 (H) 2018    HCT 52 3 (H) 2018    HCT 51 5 (H) 2018    MCV 89 2018    MCV 88 2018     2018     2018     Most likely secondary to long history of nicotine dependence  Workup being completed by hem/oncology  Follow up appointment scheduled next month  Nicotine dependence  Using nicotine gum in an effort to decrease amount smoking  Counseled for greater than 15 minutes on the importance of smoking cessation  Advised to quit  Educated on the increased risk of heart and lung disease associated with smoking  Referred to [facility] [address] [place] for enrollment in smoking cessation program        Other cirrhosis of liver ([facility] )  Compensated  AFP 2018 4 8 RUQ US negative for mass, no change from previous, Continue 6 month [facility]  surveillance  Chronic hepatitis ([facility] )  Secondary to hepatitis-C  SVR status post treatment  Compensated cirrhosis [facility]      Dyslipidemia    Repeat lipid panel with next labs  Eat a low-fat low-cholesterol diet  Follow-up with dietitian for additional education  Diagnoses and all orders for this visit:    Need for influenza vaccination  -     influenza vaccine, [id number], quadrivalent, recombinant, PF, 0 5 mL, for patients 18 yr+ (FLUBLOK)    HIV disease ([email or web address] 75 )    Essential hypertension    Polycythemia    Other tobacco product nicotine dependence with other nicotine-induced disorder    Other cirrhosis of liver (HCC)    Chronic hepatitis ([email or web address] 75 )    Dyslipidemia    Other orders  -     aspirin (ASPIRIN LOW DOSE) 81 mg EC tablet; Take 1 tablet (81 mg total) by mouth daily  -     imiquimod (ALDARA) 5 % cream; Apply 1 packet topically 3 (three) times a week  -     Lipid Panel with Direct LDL reflex; Future          Subjective:      Patient ID: Lisa Wooten is a 62 y o  male  Brissa Strauss is here today for 3 month PCP follow up  PMHx significant for HIV, HCV SVR s/p treatment, cirrhosis,HTN, and hyperlipidemia  Brisas Strauss is doing well and denies any acute complaints  The following portions of the patient's history were reviewed and updated as appropriate: allergies, current medications, past family history, past medical history, past social history, past surgical history and problem list     Review of Systems   Constitutional: Negative for activity change, appetite change, chills, diaphoresis, fatigue, fever and unexpected weight change  HENT: Negative for congestion, dental problem, ear pain, hearing loss, mouth sores, rhinorrhea and sore throat  Eyes: Negative for pain, redness and visual disturbance  Respiratory: Negative for shortness of breath and wheezing  Cardiovascular: Negative for chest pain and leg swelling  Gastrointestinal: Negative for abdominal pain, constipation, diarrhea, nausea and vomiting  Endocrine: Negative for polydipsia, polyphagia and polyuria  Genitourinary: Negative for difficulty urinating and dysuria     Musculoskeletal: Negative for back pain, joint swelling and myalgias  Skin: Negative for rash  Condyloma around right middle finger   Neurological: Negative for syncope and headaches  Psychiatric/Behavioral: Negative for behavioral problems and suicidal ideas  Objective:      /82   Pulse 78   Temp 97 8 °F (36 6 °C)   Ht 5' 7" (1 702 m)   Wt 84 6 kg (186 lb 9 6 oz)   SpO2 97%   BMI 29 23 kg/m²       Lab Results   Component Value Date     09/29/2018    K 3 9 09/29/2018     09/29/2018    CO2 26 09/29/2018    BUN 17 09/29/2018    CREATININE 0 98 09/29/2018    GLUF 112 (H) 09/29/2018    CALCIUM 8 9 09/29/2018    AST 19 09/29/2018    ALT 25 09/29/2018    ALKPHOS 98 09/29/2018    PROT 7 4 03/18/2017    BILITOT 1 1 03/18/2017    EGFR 85 09/29/2018     Lab Results   Component Value Date    WBC 5 96 09/29/2018    WBC 5 7 09/29/2018    HGB 17 9 (H) 09/29/2018    HGB 18 0 (H) 09/29/2018    HCT 52 3 (H) 09/29/2018    HCT 51 5 (H) 09/29/2018    MCV 89 09/29/2018    MCV 88 09/29/2018     09/29/2018     09/29/2018     No results found for: HEPCAB  Lab Results   Component Value Date    HEPBCAB REACTIVE (A) 10/08/2015    HEPBCAB REACTIVE (A) 10/08/2015    HEPBCAB REACTIVE (A) 10/08/2015    HEPBCAB REACTIVE (A) 10/08/2015     Lab Results   Component Value Date    RPR Non-Reactive 09/29/2018            Physical Exam   Constitutional: He is oriented to person, place, and time  He appears well-developed and well-nourished  No distress  HENT:   Head: Normocephalic  Right Ear: External ear normal    Left Ear: External ear normal    Nose: Nose normal    Mouth/Throat: Oropharynx is clear and moist  No oropharyngeal exudate  Eyes: Pupils are equal, round, and reactive to light  Conjunctivae are normal  Right eye exhibits no discharge  Left eye exhibits no discharge  Neck: Normal range of motion  No thyromegaly present     Cardiovascular: Normal rate, regular rhythm, normal heart sounds and intact distal pulses  No murmur heard  Pulmonary/Chest: Effort normal and breath sounds normal  He has no wheezes  Abdominal: Soft  Bowel sounds are normal  He exhibits no mass  There is no tenderness  Musculoskeletal: Normal range of motion  He exhibits no edema or tenderness  Lymphadenopathy:     He has no cervical adenopathy  Neurological: He is alert and oriented to person, place, and time  Skin: Skin is warm and dry  No rash noted  Psychiatric: He has a normal mood and affect   His behavior is normal

## 2018-10-03 NOTE — ASSESSMENT & PLAN NOTE
Lab Results   Component Value Date    WBC 5 96 09/29/2018    WBC 5 7 09/29/2018    HGB 17 9 (H) 09/29/2018    HGB 18 0 (H) 09/29/2018    HCT 52 3 (H) 09/29/2018    HCT 51 5 (H) 09/29/2018    MCV 89 09/29/2018    MCV 88 09/29/2018     09/29/2018     09/29/2018     Most likely secondary to long history of nicotine dependence  Workup being completed by hem/oncology  Follow up appointment scheduled next month

## 2018-10-03 NOTE — ASSESSMENT & PLAN NOTE
Secondary to hepatitis-C  SVR status post treatment  Compensated cirrhosis continue 6 month [facility]  surveillance

## 2018-10-03 NOTE — ASSESSMENT & PLAN NOTE
Blood pressure:   BP Readings from Last 3 Encounters:   10/03/18 112/82   09/06/18 154/92   07/31/18 146/96       Continue lisinopril-HCTZ and ASA for cardiac protection  Educated on the following lifestyle modifications to lower BP and decrease cardiovascular risk factors  limit alcohol intake, reduce salt in diet, maintain a healthy weight, engage in 30 minutes of cardiovascular exercise daily, and not smoke

## 2018-10-03 NOTE — PATIENT INSTRUCTIONS

## 2018-10-04 LAB
HIV1 RNA # SERPL NAA+PROBE: <20 COPIES/ML
HIV1 RNA SERPL NAA+PROBE-LOG#: NORMAL LOG10COPY/ML

## 2018-10-05 ENCOUNTER — TELEPHONE (OUTPATIENT)
Dept: HEMATOLOGY ONCOLOGY | Facility: CLINIC | Age: 58
End: 2018-10-05

## 2018-10-10 DIAGNOSIS — B20 HIV (HUMAN IMMUNODEFICIENCY VIRUS INFECTION) (HCC): ICD-10-CM

## 2018-10-10 DIAGNOSIS — I10 ESSENTIAL HYPERTENSION: ICD-10-CM

## 2018-10-10 RX ORDER — EMTRICITABINE AND TENOFOVIR ALAFENAMIDE 200; 25 MG/1; MG/1
1 TABLET ORAL DAILY
Qty: 30 TABLET | Refills: 5 | Status: SHIPPED | OUTPATIENT
Start: 2018-10-10 | End: 2019-02-01 | Stop reason: SDUPTHER

## 2018-10-10 RX ORDER — DOLUTEGRAVIR SODIUM 50 MG/1
TABLET, FILM COATED ORAL
Qty: 60 TABLET | Refills: 5 | Status: SHIPPED | OUTPATIENT
Start: 2018-10-10 | End: 2019-02-01 | Stop reason: SDUPTHER

## 2018-10-10 RX ORDER — LISINOPRIL AND HYDROCHLOROTHIAZIDE 12.5; 1 MG/1; MG/1
1 TABLET ORAL DAILY
Qty: 30 TABLET | Refills: 5 | Status: SHIPPED | OUTPATIENT
Start: 2018-10-10 | End: 2019-02-01 | Stop reason: SDUPTHER

## 2018-10-11 LAB — SCAN RESULT: NORMAL

## 2018-10-30 ENCOUNTER — OFFICE VISIT (OUTPATIENT)
Dept: SURGERY | Facility: CLINIC | Age: 58
End: 2018-10-30
Payer: COMMERCIAL

## 2018-10-30 VITALS
HEIGHT: 67 IN | SYSTOLIC BLOOD PRESSURE: 100 MMHG | BODY MASS INDEX: 29.7 KG/M2 | WEIGHT: 189.2 LBS | DIASTOLIC BLOOD PRESSURE: 80 MMHG | HEART RATE: 81 BPM | OXYGEN SATURATION: 96 % | TEMPERATURE: 98 F

## 2018-10-30 DIAGNOSIS — K74.69 OTHER CIRRHOSIS OF LIVER (HCC): ICD-10-CM

## 2018-10-30 DIAGNOSIS — I10 ESSENTIAL HYPERTENSION: ICD-10-CM

## 2018-10-30 DIAGNOSIS — B20 HIV (HUMAN IMMUNODEFICIENCY VIRUS INFECTION) (HCC): Primary | ICD-10-CM

## 2018-10-30 DIAGNOSIS — F17.298 OTHER TOBACCO PRODUCT NICOTINE DEPENDENCE WITH OTHER NICOTINE-INDUCED DISORDER: ICD-10-CM

## 2018-10-30 DIAGNOSIS — D75.1 POLYCYTHEMIA: ICD-10-CM

## 2018-10-30 PROCEDURE — 99215 OFFICE O/P EST HI 40 MIN: CPT | Performed by: INTERNAL MEDICINE

## 2018-10-30 NOTE — PROGRESS NOTES
Progress Note - Infectious Disease   Yehuda Esqueda 62 y o  male MRN: [id number]  Unit/Bed#:  Encounter: [id number]      Impression/Plan:  1   HIV-improved adherence with ART with an undetectable viral load  His CD4 count is in the 200s   Will continue the Tivicay, Descovy, Prezcobix  Recheck labs in 2 months and follow up in 3 months   Stressed adherence      2  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  Right upper quadrant ultrasound for screening was negative and the alpha fetoprotein negative on the last check  Continue Q 6 month hepatocellular carcinoma screening      3   Nicotine dependence-he continues to smoke  I stressed the importance of tobacco cessation       4   Polycythemia-likely secondary to the nicotine dependence  Seen by Hematology Oncology who agree with my assessment  Stressed the importance of tobacco cessation     5   Hypertension-asymptomatic and much improved  Discussed in detail with the primary      Patient was provided medication, adherence and prevention education    Subjective:  Routine follow-up for HIV  Patient claims 100% adherence with Prezcobix, Tivicay, Descovy   Patient denies any notable side effects  Overall the feeling well  The patient denies any fever chills or sweats, denies any nausea vomiting or diarrhea, denies any cough or shortness of breath  ROS: A complete 12 point ROS is negative other than that noted in the HPI    Followup portions patient history reviewed and updated as: Allergies, current medications, past medical history, past social history, past surgical history, and the problem list    Objective:  Vitals:  Vitals:    10/30/18 1725   BP: 100/80   Pulse: 81   Temp: 98 °F (36 7 °C)   SpO2: 96%   Weight: 85 8 kg (189 lb 3 2 oz)   Height: 5' 7" (1 702 m)       Physical Exam:   General Appearance:  Alert, interactive, appearing well,  nontoxic, no acute distress     Neck:   Supple without lymphadenopathy, no thyromegaly or masses   Throat: Oropharynx moist without lesions  Lungs:   Clear to auscultation bilaterally; no wheezes, rhonchi or rales; respirations unlabored   Heart:  RRR; no murmur, rub or gallop   Abdomen:   Soft, non-tender, non-distended, positive bowel sounds  Extremities: No clubbing, cyanosis or edema   Skin: No new rashes or lesions  No draining wounds noted  Labs, Imaging, & Other studies:   All pertinent labs and imaging studies were personally reviewed    Lab Results   Component Value Date     09/29/2018    K 3 9 09/29/2018     09/29/2018    CO2 26 09/29/2018    BUN 17 09/29/2018    CREATININE 0 98 09/29/2018    GLUF 112 (H) 09/29/2018    CALCIUM 8 9 09/29/2018    AST 19 09/29/2018    ALT 25 09/29/2018    ALKPHOS 98 09/29/2018    PROT 7 4 03/18/2017    BILITOT 1 1 03/18/2017    EGFR 85 09/29/2018     Lab Results   Component Value Date    WBC 5 96 09/29/2018    WBC 5 7 09/29/2018    HGB 17 9 (H) 09/29/2018    HGB 18 0 (H) 09/29/2018    HCT 52 3 (H) 09/29/2018    HCT 51 5 (H) 09/29/2018    MCV 89 09/29/2018    MCV 88 09/29/2018     09/29/2018     09/29/2018     No results found for: HEPCAB  Lab Results   Component Value Date    HEPBCAB REACTIVE (A) 10/08/2015    HEPBCAB REACTIVE (A) 10/08/2015    HEPBCAB REACTIVE (A) 10/08/2015    HEPBCAB REACTIVE (A) 10/08/2015     Lab Results   Component Value Date    RPR Non-Reactive 09/29/2018     No results found for: [id number]  CD4 ABS   Date/Time Value Ref Range Status   09/29/2018 07:31  (L) 359 - 1519 /uL Final     HIV-1 RNA by PCR, Qn   Date/Time Value Ref Range Status   09/29/2018 07:31 AM <20 copies/mL Final     Comment:     HIV-1 RNA not detected  The reportable range for this assay is 20 to 10,000,000  copies HIV-1 RNA/mL       HIV-1 RNA Viral Load Log   Date/Time Value Ref Range Status   09/29/2018 07:31 AM COMMENT [id number]/mL Final     Comment:     Unable to calculate result since non-numeric result obtained for  component test            Current Outpatient Prescriptions:     aspirin (ASPIRIN LOW DOSE) 81 mg EC tablet, Take 1 tablet (81 mg total) by mouth daily, Disp: 30 tablet, Rfl: 5    DESCOVY 200-25 MG tablet, TAKE 1 TABLET BY MOUTH DAILY, Disp: 30 tablet, Rfl: 5    ibuprofen (MOTRIN) 600 mg tablet, Take 600 mg by mouth every 6 (six) hours as needed, Disp: , Rfl: 0    imiquimod (ALDARA) 5 % cream, Apply 1 packet topically 3 (three) times a week, Disp: 12 each, Rfl: 0    lisinopril-hydrochlorothiazide (PRINZIDE,ZESTORETIC) 10-12 5 MG per tablet, TAKE 1 TABLET BY MOUTH DAILY, Disp: 30 tablet, Rfl: 5    nicotine polacrilex (NICORETTE) 4 mg gum, CHEW 1 PIECE OF GUM AS DIRECTED AS NEEDED FOR SMOKING CESSATION, Disp: 90 each, Rfl: 3    PREZCOBIX 800-150 MG TABS, TAKE 1 TABLET BY MOUTH DAILY, Disp: 30 tablet, Rfl: 3    TIVICAY 50 MG TABS, TAKE 1 TABLET BY MOUTH TWICE A DAY, Disp: 60 tablet, Rfl: 5    amoxicillin (AMOXIL) 500 mg capsule, TAKE ONE CAPSULE BY MOUTH EVERY 8 HOURS UNTIL FINISHED, Disp: , Rfl: 0

## 2018-11-07 DIAGNOSIS — B20 HIV DISEASE (HCC): ICD-10-CM

## 2018-11-09 RX ORDER — DARUNAVIR ETHANOLATE AND COBICISTAT 800; 150 MG/1; MG/1
1 TABLET, FILM COATED ORAL DAILY
Qty: 30 TABLET | Refills: 5 | Status: SHIPPED | OUTPATIENT
Start: 2018-11-09 | End: 2019-02-01 | Stop reason: SDUPTHER

## 2018-11-26 ENCOUNTER — TELEPHONE (OUTPATIENT)
Dept: SURGERY | Facility: CLINIC | Age: 58
End: 2018-11-26

## 2018-11-27 ENCOUNTER — OFFICE VISIT (OUTPATIENT)
Dept: SURGERY | Facility: CLINIC | Age: 58
End: 2018-11-27
Payer: COMMERCIAL

## 2018-11-27 VITALS
BODY MASS INDEX: 29.73 KG/M2 | SYSTOLIC BLOOD PRESSURE: 138 MMHG | HEIGHT: 67 IN | TEMPERATURE: 98 F | WEIGHT: 189.4 LBS | HEART RATE: 96 BPM | DIASTOLIC BLOOD PRESSURE: 90 MMHG | OXYGEN SATURATION: 96 %

## 2018-11-27 DIAGNOSIS — R05.9 COUGH: Primary | ICD-10-CM

## 2018-11-27 DIAGNOSIS — J40 BRONCHITIS: ICD-10-CM

## 2018-11-27 DIAGNOSIS — B20 HIV DISEASE (HCC): ICD-10-CM

## 2018-11-27 DIAGNOSIS — F17.298 OTHER TOBACCO PRODUCT NICOTINE DEPENDENCE WITH OTHER NICOTINE-INDUCED DISORDER: ICD-10-CM

## 2018-11-27 PROCEDURE — 99214 OFFICE O/P EST MOD 30 MIN: CPT | Performed by: NURSE PRACTITIONER

## 2018-11-27 RX ORDER — ALBUTEROL SULFATE 90 UG/1
2 AEROSOL, METERED RESPIRATORY (INHALATION) EVERY 6 HOURS PRN
Qty: 18 G | Refills: 0 | Status: SHIPPED | OUTPATIENT
Start: 2018-11-27 | End: 2019-08-12 | Stop reason: SDUPTHER

## 2018-11-27 RX ORDER — PREDNISONE 10 MG/1
TABLET ORAL
Qty: 21 TABLET | Refills: 0 | Status: SHIPPED | OUTPATIENT
Start: 2018-11-27 | End: 2019-01-28 | Stop reason: HOSPADM

## 2018-11-27 NOTE — ASSESSMENT & PLAN NOTE
No results found for: [id number]  CD4 ABS   Date/Time Value Ref Range Status   2018 07:31  (L) 359 - 1519 /uL Final     HIV-1 RNA by PCR, Qn   Date/Time Value Ref Range Status   2018 07:31 AM <20 copies/mL Final     Comment:     HIV-1 RNA not detected  The reportable range for this assay is 20 to 10,000,000  copies HIV-1 RNA/mL  HIV-1 RNA Viral Load Log   Date/Time Value Ref Range Status   2018 07:31 AM COMMENT [id number]/mL Final     Comment:     Unable to calculate result since non-numeric result obtained for  component test          ART: tivicay, Descovy, Prezcobix  Claims 100% adherence  Denies side effects  Stressed the importance of adherence  Continue follow up with ID clinic  Reviewed most recent labs, including Cd4 and viral load  Discussed the risks and benefits of treatment options, instructions for management, importance of treatment adherence, and reduction of risk factor  Educated on possible  medication side effects  Counseled on routes of HIV transmission, including the risk of  infection  Emphasized that viral suppression is the best method to prevent HIV transmission  At this time pt denies the need for HIV testing of anyone in their life  Total encounter time was 45 minutes  Greater then 20 minutes were spent on counseling and patient education  Pt voices understanding and agreement with treatment plan

## 2018-11-27 NOTE — PATIENT INSTRUCTIONS
Afrin X 12 hours, do not use more then three days  Muscinex 12 hour 1200mg   Prednisone taper  Ventolin inhaler    If symptoms do not improve in 72 hours get chest xray

## 2018-11-27 NOTE — PROGRESS NOTES
Assessment/Plan:    URI/Bronchitis  Educated to quit smoking  Ordered prednisone taper and ventolin inhaler  Instructed to continue OTC guafenisen and add afrin for congestion  Instructed not to use Afrin for greater then three days  Given slip to get chest xray if symptoms do not continue to improve or fever returns  Ronaldo Mcconnellex continues to work and will not take time off  Educated to rest when able, increase fluids, and call clinic if symptoms worsen  HIV disease ([facility] [address] )  No results found for: TM6SXFI  CD4 ABS   Date/Time Value Ref Range Status   2018 07:31  (L) 359 - 1519 /uL Final     HIV-1 RNA by PCR, Qn   Date/Time Value Ref Range Status   2018 07:31 AM <20 copies/mL Final     Comment:     HIV-1 RNA not detected  The reportable range for this assay is 20 to 10,000,000  copies HIV-1 RNA/mL  HIV-1 RNA Viral Load Log   Date/Time Value Ref Range Status   2018 07:31 AM COMMENT [id number]/mL Final     Comment:     Unable to calculate result since non-numeric result obtained for  component test          ART: tivicay, Descovy, Prezcobix  Claims 100% adherence  Denies side effects  Stressed the importance of adherence  Continue follow up with ID clinic  Reviewed most recent labs, including Cd4 and viral load  Discussed the risks and benefits of treatment options, instructions for management, importance of treatment adherence, and reduction of risk factor  Educated on possible  medication side effects  Counseled on routes of HIV transmission, including the risk of  infection  Emphasized that viral suppression is the best method to prevent HIV transmission  At this time pt denies the need for HIV testing of anyone in their life  Total encounter time was 45 minutes  Greater then 20 minutes were spent on counseling and patient education  Pt voices understanding and agreement with treatment plan          Nicotine dependence  Educated to quit, especially in the setting of bronchitis  Counseled for greater than 15 minutes on the importance of smoking cessation  Advised to quit  Educated on the increased risk of heart and lung disease associated with smoking  Referred to [facility] for enrollment in smoking cessation program           Diagnoses and all orders for this visit:    Cough  -     albuterol (VENTOLIN HFA) 90 mcg/act inhaler; Inhale 2 puffs every 6 (six) hours as needed for wheezing  -     predniSONE 10 mg tablet; 6 tabs by mouth q daily X 1 day, 5 tabs by mouth X 1 day, 4 tabs by mouth X 1 day, 3 tabs by mouth X1 day, 2 tabs by mouth daily X 1 day 1 tab by mouth X 1 day  -     XR chest pa & lateral; Future    HIV disease (HCC)    Other tobacco product nicotine dependence with other nicotine-induced disorder    Bronchitis          Subjective:      Patient ID: Lillie Tavares is a 62 y o  male  Noe Han is here today for acute visit due to congestion, cough, and headache  Symptoms first occurred 10 days ago  Reports he initially had a fever that resolved after 24 hours  Denies fatigue  Noe Han states he has been able to continue working through out his illness  Admits to SOB that worsens with activity  PMHx significant for HIV, HCV SVR s/p treatment, cirrhosis,HTN,hyperlipidemia and nicotine dependence  The following portions of the patient's history were reviewed and updated as appropriate: allergies, current medications, past family history, past medical history, past social history, past surgical history and problem list     Review of Systems   Constitutional: Negative for activity change, appetite change, chills, diaphoresis, fatigue, fever and unexpected weight change  HENT: Positive for congestion, rhinorrhea and sinus pressure  Negative for dental problem, ear pain, hearing loss, mouth sores, sinus pain and sore throat  Eyes: Negative for pain, redness and visual disturbance  Respiratory: Negative for shortness of breath and wheezing      Cardiovascular: Negative for chest pain and leg swelling  Gastrointestinal: Negative for abdominal pain, constipation, diarrhea, nausea and vomiting  Endocrine: Negative for polydipsia, polyphagia and polyuria  Genitourinary: Negative for difficulty urinating and dysuria  Musculoskeletal: Negative for back pain, joint swelling and myalgias  Skin: Negative for rash  Neurological: Negative for syncope and headaches  Psychiatric/Behavioral: Negative for behavioral problems and suicidal ideas  Objective:      /90   Pulse 96   Temp 98 °F (36 7 °C)   Ht 5' 7" (1 702 m)   Wt 85 9 kg (189 lb 6 4 oz)   SpO2 96%   BMI 29 66 kg/m²          Physical Exam   Constitutional: He is oriented to person, place, and time  He appears well-developed and well-nourished  No distress  HENT:   Head: Normocephalic  Right Ear: External ear normal    Left Ear: External ear normal    Nose: Rhinorrhea present  No nose lacerations or nasal deformity  Mouth/Throat: Oropharynx is clear and moist  Mucous membranes are not pale and not dry  No oropharyngeal exudate or tonsillar abscesses  Eyes: Pupils are equal, round, and reactive to light  Conjunctivae are normal  Right eye exhibits no discharge  Left eye exhibits no discharge  Neck: Normal range of motion  No thyromegaly present  Cardiovascular: Normal rate, regular rhythm, normal heart sounds and intact distal pulses  No murmur heard  Pulmonary/Chest: Effort normal and breath sounds normal  He has no wheezes  Abdominal: Soft  Bowel sounds are normal  He exhibits no mass  There is no tenderness  Musculoskeletal: Normal range of motion  He exhibits no edema or tenderness  Lymphadenopathy:     He has no cervical adenopathy  Neurological: He is alert and oriented to person, place, and time  Skin: Skin is warm and dry  No rash noted  Psychiatric: He has a normal mood and affect   His behavior is normal

## 2018-11-27 NOTE — ASSESSMENT & PLAN NOTE
Educated to quit, especially in the setting of bronchitis  Counseled for greater than 15 minutes on the importance of smoking cessation  Advised to quit  Educated on the increased risk of heart and lung disease associated with smoking    Referred to [facility] for enrollment in smoking cessation program

## 2019-01-25 ENCOUNTER — HOSPITAL ENCOUNTER (INPATIENT)
Facility: HOSPITAL | Age: 59
LOS: 3 days | Discharge: HOME | DRG: 174 | End: 2019-01-28
Attending: EMERGENCY MEDICINE | Admitting: GENERAL PRACTICE
Payer: COMMERCIAL

## 2019-01-25 ENCOUNTER — APPOINTMENT (EMERGENCY)
Dept: CT IMAGING | Facility: HOSPITAL | Age: 59
DRG: 174 | End: 2019-01-25
Payer: COMMERCIAL

## 2019-01-25 ENCOUNTER — APPOINTMENT (EMERGENCY)
Dept: RADIOLOGY | Facility: HOSPITAL | Age: 59
DRG: 174 | End: 2019-01-25
Payer: COMMERCIAL

## 2019-01-25 DIAGNOSIS — E78.5 DYSLIPIDEMIA: ICD-10-CM

## 2019-01-25 DIAGNOSIS — I10 ESSENTIAL HYPERTENSION: ICD-10-CM

## 2019-01-25 DIAGNOSIS — Z72.0 TOBACCO ABUSE: ICD-10-CM

## 2019-01-25 DIAGNOSIS — I21.4 NSTEMI (NON-ST ELEVATED MYOCARDIAL INFARCTION) (HCC): Primary | ICD-10-CM

## 2019-01-25 PROBLEM — R07.9 CHEST PAIN: Status: ACTIVE | Noted: 2019-01-25

## 2019-01-25 LAB
ALBUMIN SERPL BCP-MCNC: 3.9 G/DL (ref 3.5–5)
ALP SERPL-CCNC: 96 U/L (ref 46–116)
ALT SERPL W P-5'-P-CCNC: 11 U/L (ref 12–78)
ANION GAP SERPL CALCULATED.3IONS-SCNC: 10 MMOL/L (ref 4–13)
APTT PPP: 32 SECONDS (ref 26–38)
AST SERPL W P-5'-P-CCNC: 16 U/L (ref 5–45)
ATRIAL RATE: 93 BPM
BASOPHILS # BLD AUTO: 0.04 THOUSANDS/ΜL (ref 0–0.1)
BASOPHILS NFR BLD AUTO: 0 % (ref 0–1)
BILIRUB SERPL-MCNC: 0.4 MG/DL (ref 0.2–1)
BUN SERPL-MCNC: 18 MG/DL (ref 5–25)
CALCIUM SERPL-MCNC: 8.8 MG/DL (ref 8.3–10.1)
CHLORIDE SERPL-SCNC: 103 MMOL/L (ref 100–108)
CO2 SERPL-SCNC: 26 MMOL/L (ref 21–32)
CREAT SERPL-MCNC: 0.94 MG/DL (ref 0.6–1.3)
DEPRECATED D DIMER PPP: 627 NG/ML (FEU)
EOSINOPHIL # BLD AUTO: 0.09 THOUSAND/ΜL (ref 0–0.61)
EOSINOPHIL NFR BLD AUTO: 1 % (ref 0–6)
ERYTHROCYTE [DISTWIDTH] IN BLOOD BY AUTOMATED COUNT: 14.3 % (ref 11.6–15.1)
GFR SERPL CREATININE-BSD FRML MDRD: 89 ML/MIN/1.73SQ M
GLUCOSE SERPL-MCNC: 102 MG/DL (ref 65–140)
HCT VFR BLD AUTO: 47.2 % (ref 36.5–49.3)
HGB BLD-MCNC: 15.6 G/DL (ref 12–17)
IMM GRANULOCYTES # BLD AUTO: 0.05 THOUSAND/UL (ref 0–0.2)
IMM GRANULOCYTES NFR BLD AUTO: 1 % (ref 0–2)
INR PPP: 1.06 (ref 0.86–1.17)
LYMPHOCYTES # BLD AUTO: 1.01 THOUSANDS/ΜL (ref 0.6–4.47)
LYMPHOCYTES NFR BLD AUTO: 10 % (ref 14–44)
MCH RBC QN AUTO: 29.5 PG (ref 26.8–34.3)
MCHC RBC AUTO-ENTMCNC: 33.1 G/DL (ref 31.4–37.4)
MCV RBC AUTO: 89 FL (ref 82–98)
MONOCYTES # BLD AUTO: 1.1 THOUSAND/ΜL (ref 0.17–1.22)
MONOCYTES NFR BLD AUTO: 11 % (ref 4–12)
NEUTROPHILS # BLD AUTO: 7.64 THOUSANDS/ΜL (ref 1.85–7.62)
NEUTS SEG NFR BLD AUTO: 77 % (ref 43–75)
NRBC BLD AUTO-RTO: 0 /100 WBCS
NT-PROBNP SERPL-MCNC: 468 PG/ML
P AXIS: 67 DEGREES
PLATELET # BLD AUTO: 170 THOUSANDS/UL (ref 149–390)
PLATELET # BLD AUTO: 185 THOUSANDS/UL (ref 149–390)
PMV BLD AUTO: 10 FL (ref 8.9–12.7)
PMV BLD AUTO: 10.1 FL (ref 8.9–12.7)
POTASSIUM SERPL-SCNC: 4 MMOL/L (ref 3.5–5.3)
PR INTERVAL: 146 MS
PROT SERPL-MCNC: 7.6 G/DL (ref 6.4–8.2)
PROTHROMBIN TIME: 13.7 SECONDS (ref 11.8–14.2)
QRS AXIS: 17 DEGREES
QRSD INTERVAL: 92 MS
QT INTERVAL: 374 MS
QTC INTERVAL: 465 MS
RBC # BLD AUTO: 5.29 MILLION/UL (ref 3.88–5.62)
SODIUM SERPL-SCNC: 139 MMOL/L (ref 136–145)
T WAVE AXIS: 44 DEGREES
TROPONIN I SERPL-MCNC: 0.14 NG/ML
TROPONIN I SERPL-MCNC: 0.15 NG/ML
TROPONIN I SERPL-MCNC: 0.16 NG/ML
VENTRICULAR RATE: 93 BPM
WBC # BLD AUTO: 9.93 THOUSAND/UL (ref 4.31–10.16)

## 2019-01-25 PROCEDURE — 84484 ASSAY OF TROPONIN QUANT: CPT | Performed by: PHYSICIAN ASSISTANT

## 2019-01-25 PROCEDURE — 99223 1ST HOSP IP/OBS HIGH 75: CPT | Performed by: NURSE PRACTITIONER

## 2019-01-25 PROCEDURE — 71045 X-RAY EXAM CHEST 1 VIEW: CPT

## 2019-01-25 PROCEDURE — 84484 ASSAY OF TROPONIN QUANT: CPT | Performed by: NURSE PRACTITIONER

## 2019-01-25 PROCEDURE — 93010 ELECTROCARDIOGRAM REPORT: CPT | Performed by: INTERNAL MEDICINE

## 2019-01-25 PROCEDURE — 99285 EMERGENCY DEPT VISIT HI MDM: CPT

## 2019-01-25 PROCEDURE — 85049 AUTOMATED PLATELET COUNT: CPT | Performed by: NURSE PRACTITIONER

## 2019-01-25 PROCEDURE — 36415 COLL VENOUS BLD VENIPUNCTURE: CPT | Performed by: PHYSICIAN ASSISTANT

## 2019-01-25 PROCEDURE — 85379 FIBRIN DEGRADATION QUANT: CPT | Performed by: PHYSICIAN ASSISTANT

## 2019-01-25 PROCEDURE — 71275 CT ANGIOGRAPHY CHEST: CPT

## 2019-01-25 PROCEDURE — 85610 PROTHROMBIN TIME: CPT | Performed by: PHYSICIAN ASSISTANT

## 2019-01-25 PROCEDURE — 93005 ELECTROCARDIOGRAM TRACING: CPT

## 2019-01-25 PROCEDURE — 83880 ASSAY OF NATRIURETIC PEPTIDE: CPT | Performed by: NURSE PRACTITIONER

## 2019-01-25 PROCEDURE — 80053 COMPREHEN METABOLIC PANEL: CPT | Performed by: PHYSICIAN ASSISTANT

## 2019-01-25 PROCEDURE — 85025 COMPLETE CBC W/AUTO DIFF WBC: CPT | Performed by: PHYSICIAN ASSISTANT

## 2019-01-25 PROCEDURE — 85730 THROMBOPLASTIN TIME PARTIAL: CPT | Performed by: PHYSICIAN ASSISTANT

## 2019-01-25 RX ORDER — NITROGLYCERIN 80 MG/1
0.4 PATCH TRANSDERMAL DAILY
Status: DISCONTINUED | OUTPATIENT
Start: 2019-01-25 | End: 2019-01-28

## 2019-01-25 RX ORDER — DOCUSATE SODIUM 100 MG/1
100 CAPSULE, LIQUID FILLED ORAL 2 TIMES DAILY
Status: DISCONTINUED | OUTPATIENT
Start: 2019-01-25 | End: 2019-01-28 | Stop reason: HOSPADM

## 2019-01-25 RX ORDER — ASPIRIN 325 MG
325 TABLET ORAL ONCE
Status: COMPLETED | OUTPATIENT
Start: 2019-01-25 | End: 2019-01-25

## 2019-01-25 RX ORDER — ACETAMINOPHEN 325 MG/1
650 TABLET ORAL EVERY 4 HOURS PRN
Status: DISCONTINUED | OUTPATIENT
Start: 2019-01-25 | End: 2019-01-28 | Stop reason: HOSPADM

## 2019-01-25 RX ORDER — ASPIRIN 81 MG/1
81 TABLET ORAL DAILY
Status: DISCONTINUED | OUTPATIENT
Start: 2019-01-26 | End: 2019-01-28 | Stop reason: HOSPADM

## 2019-01-25 RX ORDER — ONDANSETRON 2 MG/ML
4 INJECTION INTRAMUSCULAR; INTRAVENOUS EVERY 6 HOURS PRN
Status: DISCONTINUED | OUTPATIENT
Start: 2019-01-25 | End: 2019-01-28 | Stop reason: HOSPADM

## 2019-01-25 RX ORDER — NICOTINE 21 MG/24HR
1 PATCH, TRANSDERMAL 24 HOURS TRANSDERMAL DAILY
Status: DISCONTINUED | OUTPATIENT
Start: 2019-01-26 | End: 2019-01-28 | Stop reason: HOSPADM

## 2019-01-25 RX ORDER — ATORVASTATIN CALCIUM 40 MG/1
40 TABLET, FILM COATED ORAL EVERY EVENING
Status: DISCONTINUED | OUTPATIENT
Start: 2019-01-25 | End: 2019-01-28 | Stop reason: HOSPADM

## 2019-01-25 RX ORDER — LISINOPRIL 10 MG/1
10 TABLET ORAL DAILY
Status: DISCONTINUED | OUTPATIENT
Start: 2019-01-26 | End: 2019-01-28 | Stop reason: HOSPADM

## 2019-01-25 RX ORDER — HEPARIN SODIUM 5000 [USP'U]/ML
5000 INJECTION, SOLUTION INTRAVENOUS; SUBCUTANEOUS EVERY 8 HOURS SCHEDULED
Status: DISCONTINUED | OUTPATIENT
Start: 2019-01-25 | End: 2019-01-28 | Stop reason: HOSPADM

## 2019-01-25 RX ORDER — HYDROCHLOROTHIAZIDE 12.5 MG/1
12.5 TABLET ORAL DAILY
Status: DISCONTINUED | OUTPATIENT
Start: 2019-01-26 | End: 2019-01-28 | Stop reason: HOSPADM

## 2019-01-25 RX ORDER — ALBUTEROL SULFATE 90 UG/1
2 AEROSOL, METERED RESPIRATORY (INHALATION) EVERY 6 HOURS PRN
Status: DISCONTINUED | OUTPATIENT
Start: 2019-01-25 | End: 2019-01-28 | Stop reason: HOSPADM

## 2019-01-25 RX ADMIN — DARUNAVIR 800 MG: 800 TABLET, FILM COATED ORAL at 23:40

## 2019-01-25 RX ADMIN — ATORVASTATIN CALCIUM 40 MG: 40 TABLET, FILM COATED ORAL at 23:13

## 2019-01-25 RX ADMIN — DOLUTEGRAVIR SODIUM 50 MG: 50 TABLET, FILM COATED ORAL at 23:41

## 2019-01-25 RX ADMIN — NITROGLYCERIN 0.4 MG: 0.4 PATCH TRANSDERMAL at 23:13

## 2019-01-25 RX ADMIN — ACETAMINOPHEN 650 MG: 325 TABLET, FILM COATED ORAL at 23:13

## 2019-01-25 RX ADMIN — ASPIRIN 325 MG: 325 TABLET ORAL at 17:51

## 2019-01-25 RX ADMIN — EMTRICITABINE AND TENOFOVIR ALAFENAMIDE 1 TABLET: 200; 25 TABLET ORAL at 23:39

## 2019-01-25 RX ADMIN — DOCUSATE SODIUM 100 MG: 100 CAPSULE, LIQUID FILLED ORAL at 23:13

## 2019-01-25 RX ADMIN — HEPARIN SODIUM 5000 UNITS: 5000 INJECTION, SOLUTION INTRAVENOUS; SUBCUTANEOUS at 23:17

## 2019-01-25 RX ADMIN — IOHEXOL 85 ML: 350 INJECTION, SOLUTION INTRAVENOUS at 18:42

## 2019-01-25 RX ADMIN — COBICISTAT 150 MG: 150 TABLET, FILM COATED ORAL at 23:40

## 2019-01-25 NOTE — LETTER
[address] 3RD FLOOR MED SURG UNIT  [address] [address]  No information on file  January 28, 2019     Patient: Arya Armijo   YOB: 1960   Date of Visit: 1/25/2019       To Whom it May Concern:    Carrie Way is under my professional care  He was seen in the hospital from 1/25/2019   to 01/28/19  He {Return to school/sport/work:45620}  If you have any questions or concerns, please don't hesitate to call           Sincerely,          Luis Carlos Jarquin

## 2019-01-25 NOTE — LETTER
[address] 3RD FLOOR MED SURG UNIT  [address] [address]  No information on file  January 28, 2019     Patient: Jacquelyn Jefferson   YOB: 1960   Date of Visit: 1/25/2019       To Whom it May Concern:    Marco Ocampo is under my professional care  He was seen in the hospital from 1/25/2019   to 01/28/19  He may return to work on Thursday January 31, 2019  If you have any questions or concerns, please don't hesitate to call           Sincerely,          Richard Monzon

## 2019-01-25 NOTE — ED PROVIDER NOTES
History  Chief Complaint   Patient presents with    Chest Pain     patient is c/o right sided chest pain and right arm numbness that has been intermittent x 9 weeks  Patient is a 54-year-old male with a history of HIV and hep C under control with anti-retroviral therapy as well as a history of hypertension who presents to the emergency department with right-sided chest pain on off for the last 7 8 weeks  Patient states that the pain comes on fairly abruptly it is sharp in nature located substernally with some radiation to his neck and into his right arm this episodes last anywhere from 5 minutes to 15 minutes  He states that during the episode of the pain he does have some shortness of breath  Patient states he has no known history of coronary artery disease no previous catheterizations or stents  He otherwise denies fevers chills diaphoresis nausea vomiting diarrhea hemoptysis cough  Prior to Admission Medications   Prescriptions Last Dose Informant Patient Reported? Taking?    DESCOVY 200-25 MG tablet 1/24/2019 at 1700  No Yes   Sig: TAKE 1 TABLET BY MOUTH DAILY   PREZCOBIX 800-150 MG TABS 1/24/2019 at 1700  No Yes   Sig: TAKE 1 TABLET BY MOUTH DAILY   TIVICAY 50 MG TABS 1/24/2019 at 1700  No Yes   Sig: TAKE 1 TABLET BY MOUTH TWICE A DAY   albuterol (VENTOLIN HFA) 90 mcg/act inhaler Not Taking at Unknown time  No No   Sig: Inhale 2 puffs every 6 (six) hours as needed for wheezing   Patient not taking: Reported on 1/25/2019    amoxicillin (AMOXIL) 500 mg capsule Not Taking at Unknown time  Yes No   Sig: TAKE ONE CAPSULE BY MOUTH EVERY 8 HOURS UNTIL FINISHED   aspirin (ASPIRIN LOW DOSE) 81 mg EC tablet 1/24/2019 at 1700  No Yes   Sig: Take 1 tablet (81 mg total) by mouth daily   ibuprofen (MOTRIN) 600 mg tablet 1/24/2019 at 1700  Yes Yes   Sig: Take 600 mg by mouth every 6 (six) hours as needed   imiquimod (ALDARA) 5 % cream Not Taking at Unknown time  No No   Sig: Apply 1 packet topically 3 (three) times a week   Patient not taking: Reported on 2019    lisinopril-hydrochlorothiazide (PRINZIDE,ZESTORETIC) 10-12 5 MG per tablet 2019 at 1700  No Yes   Sig: TAKE 1 TABLET BY MOUTH DAILY   nicotine polacrilex (NICORETTE) 4 mg gum   No No   Sig: CHEW 1 PIECE OF GUM AS DIRECTED AS NEEDED FOR SMOKING CESSATION   predniSONE 10 mg tablet Not Taking at Unknown time  No No   Si tabs by mouth q daily X 1 day, 5 tabs by mouth X 1 day, 4 tabs by mouth X 1 day, 3 tabs by mouth X1 day, 2 tabs by mouth daily X 1 day 1 tab by mouth X 1 day   Patient not taking: Reported on 2019       Facility-Administered Medications: None       Past Medical History:   Diagnosis Date    HIV (human immunodeficiency virus infection) ([facility] )     Hypertension     Opioid dependence ([facility] )        Past Surgical History:   Procedure Laterality Date    LUMBAR LAMINECTOMY      STOMACH SURGERY         Family History   Problem Relation Age of Onset    Alzheimer's disease Mother     Heart attack Father     Prostate cancer Brother      I have reviewed and agree with the history as documented  Social History   Substance Use Topics    Smoking status: Current Every Day Smoker     Packs/day: 7 00     Types: Cigars    Smokeless tobacco: Never Used    Alcohol use No        Review of Systems   Constitutional: Negative for chills, diaphoresis, fatigue and fever  HENT: Negative for congestion, ear pain, rhinorrhea, sneezing and sore throat  Respiratory: Negative for cough, shortness of breath, wheezing and stridor  Cardiovascular: Positive for chest pain  Negative for palpitations and leg swelling  Gastrointestinal: Negative for abdominal distention, abdominal pain, blood in stool, constipation, diarrhea, nausea and vomiting  Genitourinary: Negative for difficulty urinating, dysuria, frequency, hematuria and urgency  Musculoskeletal: Negative for gait problem, myalgias and neck pain  Skin: Negative for rash  Neurological: Negative for dizziness, syncope, weakness, light-headedness and headaches  All other systems reviewed and are negative  Physical Exam  Physical Exam   Constitutional: He is oriented to person, place, and time  He appears well-developed and well-nourished  HENT:   Head: Normocephalic and atraumatic  Cardiovascular: Normal rate, regular rhythm, normal heart sounds and intact distal pulses  Exam reveals no gallop and no friction rub  No murmur heard  Pulmonary/Chest: Effort normal and breath sounds normal  No respiratory distress  He has no wheezes  He has no rales  He exhibits no tenderness  Musculoskeletal: Normal range of motion  He exhibits no edema, tenderness or deformity  Neurological: He is alert and oriented to person, place, and time  Skin: Skin is warm and dry  Capillary refill takes less than 2 seconds  No rash noted  No erythema  No pallor         Vital Signs  ED Triage Vitals [01/25/19 1725]   Temperature Pulse Respirations Blood Pressure SpO2   99 7 °F (37 6 °C) (!) 117 18 (!) 161/103 96 %      Temp Source Heart Rate Source Patient Position - Orthostatic VS BP Location FiO2 (%)   Oral Monitor Sitting Left arm --      Pain Score       No Pain           Vitals:    01/25/19 1953 01/25/19 2042 01/25/19 2140 01/25/19 2305   BP: 122/68 133/81 143/80 124/73   Pulse: 96 92 94 92   Patient Position - Orthostatic VS: Sitting Sitting Sitting Sitting       Visual Acuity      ED Medications  Medications   aspirin (ECOTRIN LOW STRENGTH) EC tablet 81 mg (not administered)   albuterol (PROVENTIL HFA,VENTOLIN HFA) inhaler 2 puff (not administered)   lisinopril (ZESTRIL) tablet 10 mg (not administered)   hydrochlorothiazide (HYDRODIURIL) tablet 12 5 mg (not administered)   nicotine (NICODERM CQ) 21 mg/24 hr TD 24 hr patch 1 patch (not administered)   docusate sodium (COLACE) capsule 100 mg (100 mg Oral Given 1/25/19 4632)   ondansetron (ZOFRAN) injection 4 mg (not administered) atorvastatin (LIPITOR) tablet 40 mg (40 mg Oral Given 1/25/19 2313)   heparin (porcine) subcutaneous injection 5,000 Units (5,000 Units Subcutaneous Given 1/25/19 2317)   acetaminophen (TYLENOL) tablet 650 mg (650 mg Oral Given 1/25/19 2313)   dolutegravir (TIVICAY) tablet 50 mg (50 mg Oral Given 1/25/19 2341)   emtricitabine-tenofovir AF (DESCOVY) 200-25 MG 1 tablet (1 tablet Oral Given 1/25/19 2339)   nitroglycerin (NITRODUR) 0 4 mg/hr TD 24 hr patch (0 4 mg Transdermal Medication Applied 1/25/19 2313)   darunavir (PREZISTA) tablet 800 mg (800 mg Oral Given 1/25/19 2340)   cobicistat (TYBOST) 150 MG tablet 150 mg (150 mg Oral Given 1/25/19 2340)   aspirin tablet 325 mg (325 mg Oral Given 1/25/19 1751)   iohexol (OMNIPAQUE) 350 MG/ML injection (MULTI-DOSE) 85 mL (85 mL Intravenous Given 1/25/19 1842)       Diagnostic Studies  Results Reviewed     Procedure Component Value Units Date/Time    NT-BNP PRO [[id number]]  (Abnormal) Collected:  01/25/19 1757    Lab Status:  Final result Specimen:  Blood from Arm, Right Updated:  01/25/19 2228     NT-proBNP 468 (H) pg/mL     Troponin I [[id number]]  (Abnormal) Collected:  01/25/19 2045    Lab Status:  Final result Specimen:  Blood from Arm, Left Updated:  01/25/19 2120     Troponin I 0 15 (H) ng/mL     Comprehensive metabolic panel [[id number]]  (Abnormal) Collected:  01/25/19 1757    Lab Status:  Final result Specimen:  Blood from Arm, Right Updated:  01/25/19 1832     Sodium 139 mmol/L      Potassium 4 0 mmol/L      Chloride 103 mmol/L      CO2 26 mmol/L      ANION GAP 10 mmol/L      BUN 18 mg/dL      Creatinine 0 94 mg/dL      Glucose 102 mg/dL      Calcium 8 8 mg/dL      AST 16 U/L      ALT 11 (L) U/L      Alkaline Phosphatase 96 U/L      Total Protein 7 6 g/dL      Albumin 3 9 g/dL      Total Bilirubin 0 40 mg/dL      eGFR 89 ml/min/1 73sq m     Narrative:         National Kidney Disease Education Program recommendations are as follows:  GFR calculation is accurate only with a steady state creatinine  Chronic Kidney disease less than 60 ml/min/1 73 sq  meters  Kidney failure less than 15 ml/min/1 73 sq  meters  Troponin I [[id number]]  (Abnormal) Collected:  01/25/19 1757    Lab Status:  Final result Specimen:  Blood from Arm, Right Updated:  01/25/19 1829     Troponin I 0 14 (H) ng/mL     D-Dimer [[id number]]  (Abnormal) Collected:  01/25/19 1758    Lab Status:  Final result Specimen:  Blood from Arm, Right Updated:  01/25/19 1823     D-Dimer, Quant 627 (H) ng/ml (FEU)     Protime-INR [[id number]]  (Normal) Collected:  01/25/19 1758    Lab Status:  Final result Specimen:  Blood from Arm, Right Updated:  01/25/19 1820     Protime 13 7 seconds      INR 1 06    APTT [[id number]]  (Normal) Collected:  01/25/19 1758    Lab Status:  Final result Specimen:  Blood from Arm, Right Updated:  01/25/19 1820     PTT 32 seconds     CBC and differential [[id number]]  (Abnormal) Collected:  01/25/19 1757    Lab Status:  Final result Specimen:  Blood from Arm, Right Updated:  01/25/19 1805     WBC 9 93 Thousand/uL      RBC 5 29 Million/uL      Hemoglobin 15 6 g/dL      Hematocrit 47 2 %      MCV 89 fL      MCH 29 5 pg      MCHC 33 1 g/dL      RDW 14 3 %      MPV 10 1 fL      Platelets 438 Thousands/uL      nRBC 0 /100 WBCs      Neutrophils Relative 77 (H) %      Immat GRANS % 1 %      Lymphocytes Relative 10 (L) %      Monocytes Relative 11 %      Eosinophils Relative 1 %      Basophils Relative 0 %      Neutrophils Absolute 7 64 (H) Thousands/µL      Immature Grans Absolute 0 05 Thousand/uL      Lymphocytes Absolute 1 01 Thousands/µL      Monocytes Absolute 1 10 Thousand/µL      Eosinophils Absolute 0 09 Thousand/µL      Basophils Absolute 0 04 Thousands/µL                  CTA ED chest PE study   Final Result by Bo Skiff, MD (01/25 1942)      No evidence of pulmonary embolism  Nonspecific bilateral hilar lymphadenopathy  2 mm nodule left lower lobe series 3 image 95   Based on current Fleischner Society 2017 Guidelines on incidental pulmonary nodule, because the patient is considered high risk for lung cancer, 12 month follow-up non-contrast chest CT is recommended  Workstation performed: [id number]         XR chest 1 view portable   Final Result by Rachid Riggs MD (01/25 1941)      No acute cardiopulmonary disease  Workstation performed: [id number]                    Procedures  Procedures       Phone Contacts  ED Phone Contact    ED Course         HEART Risk Score      Most Recent Value   History  1 Filed at: 01/25/2019 1833   ECG  0 Filed at: 01/25/2019 1833   Age  1 Filed at: 01/25/2019 1833   Risk Factors  1 Filed at: 01/25/2019 1833   Troponin  1 Filed at: 01/25/2019 1833   Heart Score Risk Calculator   History  1 Filed at: 01/25/2019 1833   ECG  0 Filed at: 01/25/2019 1833   Age  1 Filed at: 01/25/2019 1833   Risk Factors  1 Filed at: 01/25/2019 1833   Troponin  1 Filed at: 01/25/2019 1833   HEART Score  4 Filed at: 01/25/2019 1833   HEART Score  4 Filed at: 01/25/2019 1833                            MDM  Number of Diagnoses or Management Options  Diagnosis management comments: Pt w/ sharp right-sided chest pain intermittently last anywhere from 5-15 minutes for the last 6 or 7 weeks patient has a history of hypertension and is HIV and hep C positive although was very compliant with undetectable viral loads  Last episode of pain was around 11 o'clock this morning he has been pain-free here in the emergency department his initial troponin was 0 14  Upon presentation history of vital signs did reveal a tachycardia at 112 beats per minute D-dimer was elevated greater than 600 CTA was ordered and is pending  Spoke with Dr Benavides and this time just advise aspirin which patient has already received an to be admitted to the floor trend his troponins if they are increasing start heparin drip and patient will need to be catheterization    Patient otherwise vitals have been within normal limits his heart rate has trended down into low 90s and he has been hemodynamically stable  CritCare Time    Disposition  Final diagnoses:   NSTEMI (non-ST elevated myocardial infarction) ([facility] [address] )     Time reflects when diagnosis was documented in both MDM as applicable and the Disposition within this note     Time User Action Codes Description Comment    1/25/2019  8:36 PM Maddison Oreilly Add [I21 4] NSTEMI (non-ST elevated myocardial infarction) ([facility] [address] )     1/25/2019  9:21 PM Kee Claudia Add [E78 5] Dyslipidemia     1/25/2019  9:21 PM Yoly Heal [E78 5] Dyslipidemia     1/25/2019  9:21 PM Shiva Cava [E78 5] Dyslipidemia       ED Disposition     ED Disposition Condition Comment    Admit  Case was discussed with Dr Jack Klinefelter and the patient's admission status was agreed to be Admission Status: inpatient status to the service of Dr Jack Klinefelter           Follow-up Information    None         Current Discharge Medication List      CONTINUE these medications which have NOT CHANGED    Details   aspirin (ASPIRIN LOW DOSE) 81 mg EC tablet Take 1 tablet (81 mg total) by mouth daily  Qty: 30 tablet, Refills: 5    Associated Diagnoses: Essential hypertension      DESCOVY 200-25 MG tablet TAKE 1 TABLET BY MOUTH DAILY  Qty: 30 tablet, Refills: 5    Associated Diagnoses: HIV (human immunodeficiency virus infection) (HCC)      ibuprofen (MOTRIN) 600 mg tablet Take 600 mg by mouth every 6 (six) hours as needed  Refills: 0      lisinopril-hydrochlorothiazide (PRINZIDE,ZESTORETIC) 10-12 5 MG per tablet TAKE 1 TABLET BY MOUTH DAILY  Qty: 30 tablet, Refills: 5    Associated Diagnoses: Essential hypertension      PREZCOBIX 800-150 MG TABS TAKE 1 TABLET BY MOUTH DAILY  Qty: 30 tablet, Refills: 5    Associated Diagnoses: HIV disease (HCC)      TIVICAY 50 MG TABS TAKE 1 TABLET BY MOUTH TWICE A DAY  Qty: 60 tablet, Refills: 5    Associated Diagnoses: HIV (human immunodeficiency virus infection) (HCC) albuterol (VENTOLIN HFA) 90 mcg/act inhaler Inhale 2 puffs every 6 (six) hours as needed for wheezing  Qty: 18 g, Refills: 0    Associated Diagnoses: Cough      amoxicillin (AMOXIL) 500 mg capsule TAKE ONE CAPSULE BY MOUTH EVERY 8 HOURS UNTIL FINISHED  Refills: 0      imiquimod (ALDARA) 5 % cream Apply 1 packet topically 3 (three) times a week  Qty: 12 each, Refills: 0    Associated Diagnoses: Condyloma      nicotine polacrilex (NICORETTE) 4 mg gum CHEW 1 PIECE OF GUM AS DIRECTED AS NEEDED FOR SMOKING CESSATION  Qty: 90 each, Refills: 3    Comments: PT REQ  Associated Diagnoses: Nicotine dependence, uncomplicated, unspecified nicotine product type      predniSONE 10 mg tablet 6 tabs by mouth q daily X 1 day, 5 tabs by mouth X 1 day, 4 tabs by mouth X 1 day, 3 tabs by mouth X1 day, 2 tabs by mouth daily X 1 day 1 tab by mouth X 1 day  Qty: 21 tablet, Refills: 0    Associated Diagnoses: Cough           No discharge procedures on file      ED Provider  Electronically Signed by           Toma James PA-C  01/25/19 6113

## 2019-01-25 NOTE — LETTER
[address] 3RD FLOOR MED SURG UNIT  [address] [address]  No information on file  January 28, 2019     Patient: Chantal Ryan   YOB: 1960   Date of Visit: 1/25/2019       To Whom it May Concern:    Porter Levi is under my professional care  He was seen in the hospital from 1/25/2019   to 01/28/19  He may return to work on Thursday January 31, 2019  If you have any questions or concerns, please don't hesitate to call           Sincerely,          Chilango Zaldivar

## 2019-01-26 LAB
ALBUMIN SERPL BCP-MCNC: 3.5 G/DL (ref 3.5–5)
ALP SERPL-CCNC: 81 U/L (ref 46–116)
ALT SERPL W P-5'-P-CCNC: 14 U/L (ref 12–78)
ANION GAP SERPL CALCULATED.3IONS-SCNC: 10 MMOL/L (ref 4–13)
AST SERPL W P-5'-P-CCNC: 21 U/L (ref 5–45)
BILIRUB SERPL-MCNC: 0.6 MG/DL (ref 0.2–1)
BUN SERPL-MCNC: 19 MG/DL (ref 5–25)
CALCIUM SERPL-MCNC: 8.6 MG/DL (ref 8.3–10.1)
CHLORIDE SERPL-SCNC: 104 MMOL/L (ref 100–108)
CHOLEST SERPL-MCNC: 166 MG/DL (ref 50–200)
CO2 SERPL-SCNC: 26 MMOL/L (ref 21–32)
CREAT SERPL-MCNC: 1.14 MG/DL (ref 0.6–1.3)
EST. AVERAGE GLUCOSE BLD GHB EST-MCNC: 117 MG/DL
GFR SERPL CREATININE-BSD FRML MDRD: 70 ML/MIN/1.73SQ M
GLUCOSE SERPL-MCNC: 98 MG/DL (ref 65–140)
HBA1C MFR BLD: 5.7 % (ref 4.2–6.3)
HDLC SERPL-MCNC: 31 MG/DL (ref 40–60)
LDLC SERPL CALC-MCNC: 104 MG/DL (ref 0–100)
MAGNESIUM SERPL-MCNC: 2.1 MG/DL (ref 1.6–2.6)
PHOSPHATE SERPL-MCNC: 4.1 MG/DL (ref 2.7–4.5)
POTASSIUM SERPL-SCNC: 4 MMOL/L (ref 3.5–5.3)
PROT SERPL-MCNC: 7.3 G/DL (ref 6.4–8.2)
SODIUM SERPL-SCNC: 140 MMOL/L (ref 136–145)
TRIGL SERPL-MCNC: 154 MG/DL
TROPONIN I SERPL-MCNC: 0.16 NG/ML

## 2019-01-26 PROCEDURE — 84484 ASSAY OF TROPONIN QUANT: CPT | Performed by: NURSE PRACTITIONER

## 2019-01-26 PROCEDURE — 83735 ASSAY OF MAGNESIUM: CPT | Performed by: NURSE PRACTITIONER

## 2019-01-26 PROCEDURE — 83036 HEMOGLOBIN GLYCOSYLATED A1C: CPT | Performed by: NURSE PRACTITIONER

## 2019-01-26 PROCEDURE — 93005 ELECTROCARDIOGRAM TRACING: CPT

## 2019-01-26 PROCEDURE — 99232 SBSQ HOSP IP/OBS MODERATE 35: CPT | Performed by: NURSE PRACTITIONER

## 2019-01-26 PROCEDURE — 80061 LIPID PANEL: CPT | Performed by: NURSE PRACTITIONER

## 2019-01-26 PROCEDURE — 84100 ASSAY OF PHOSPHORUS: CPT | Performed by: NURSE PRACTITIONER

## 2019-01-26 PROCEDURE — 80053 COMPREHEN METABOLIC PANEL: CPT | Performed by: NURSE PRACTITIONER

## 2019-01-26 PROCEDURE — 99254 IP/OBS CNSLTJ NEW/EST MOD 60: CPT | Performed by: INTERNAL MEDICINE

## 2019-01-26 RX ADMIN — DARUNAVIR 800 MG: 800 TABLET, FILM COATED ORAL at 08:50

## 2019-01-26 RX ADMIN — DOLUTEGRAVIR SODIUM 50 MG: 50 TABLET, FILM COATED ORAL at 19:09

## 2019-01-26 RX ADMIN — HEPARIN SODIUM 5000 UNITS: 5000 INJECTION, SOLUTION INTRAVENOUS; SUBCUTANEOUS at 21:49

## 2019-01-26 RX ADMIN — ACETAMINOPHEN 650 MG: 325 TABLET, FILM COATED ORAL at 10:40

## 2019-01-26 RX ADMIN — HYDROCHLOROTHIAZIDE 12.5 MG: 12.5 TABLET ORAL at 08:49

## 2019-01-26 RX ADMIN — LISINOPRIL 10 MG: 10 TABLET ORAL at 08:49

## 2019-01-26 RX ADMIN — DOLUTEGRAVIR SODIUM 50 MG: 50 TABLET, FILM COATED ORAL at 08:50

## 2019-01-26 RX ADMIN — DOCUSATE SODIUM 100 MG: 100 CAPSULE, LIQUID FILLED ORAL at 19:08

## 2019-01-26 RX ADMIN — EMTRICITABINE AND TENOFOVIR ALAFENAMIDE 1 TABLET: 200; 25 TABLET ORAL at 08:51

## 2019-01-26 RX ADMIN — DOCUSATE SODIUM 100 MG: 100 CAPSULE, LIQUID FILLED ORAL at 08:49

## 2019-01-26 RX ADMIN — ASPIRIN 81 MG: 81 TABLET, COATED ORAL at 08:49

## 2019-01-26 RX ADMIN — ATORVASTATIN CALCIUM 40 MG: 40 TABLET, FILM COATED ORAL at 19:08

## 2019-01-26 RX ADMIN — NITROGLYCERIN 0.4 MG: 0.4 PATCH TRANSDERMAL at 08:49

## 2019-01-26 RX ADMIN — NICOTINE 1 PATCH: 21 PATCH TRANSDERMAL at 08:47

## 2019-01-26 RX ADMIN — HEPARIN SODIUM 5000 UNITS: 5000 INJECTION, SOLUTION INTRAVENOUS; SUBCUTANEOUS at 06:22

## 2019-01-26 NOTE — PROGRESS NOTES
Progress Note - Victorino Roberts Chapel 1960, 62 y o  male MRN: [id number]    Unit/Bed#: -01 Encounter: [id number]    Primary Care Provider: ALLIE Neil   Date and time admitted to hospital: 1/25/2019  5:23 PM        * Chest pain   Assessment & Plan    · Patient reports that he has been experiencing chest pain for the last 9 weeks  The patient's chest pain is right-sided radiating across to the left side of his chest and down his right arm with back spasms  Denies any shortness of breath or difficulty breathing however does report chest tightness during these episodes  · Initial troponin is elevated 0 14/0 15, continue trending his remained flat at 0 16 and 0 16 respectively  · Monitor on telemetry  · Cardiology discussion chest pain appears atypical however patient has significant risk factors for CAD/ischemia recommending patient to remain in-patient get an echocardiogram and nuclear medicine stress test  · D-dimer was elevated, CT of chest was negative for PE however he does have a 2 mm incidental finding of lung nodule  · Echocardiogram pending   · Lipid panel total cholesterol 166 triglycerides 154 HDL 31   · Hemoglobin A1c pending     Chronic hepatitis ([facility] )   Assessment & Plan    · Patient reports that he was successfully treated outpatient  · 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 )   Assessment & Plan    · Will continue patient's home HIV medications  · Patient sees Dr Miranda outpatient for management of HIV and was last seen in October  · improved adherence with ART with an undetectable viral load  His CD4 count is in the 200s  · continue the Tivicay, Descovy, Prezcobix  Essential hypertension   Assessment & Plan    · Patient with some mild urgency on admission  · Overall clinically improved  · Continue home regimen  · Continue to monitor     Nicotine dependence   Assessment & Plan    · Patient reports that he smokes approximately 5 cigars daily,   · Per previous discussion patient's wife reports that he smokes approximately 10 cigars daily  · Counseled patient extensively on the importance of smoking cessation  · Continue nicotine patch         VTE Pharmacologic Prophylaxis:   Pharmacologic: Heparin  Mechanical VTE Prophylaxis in Place: Yes    Patient Centered Rounds: I have performed bedside rounds with nursing staff today  Discussions with Specialists or Other Care Team Provider:  Cardiology    Education and Discussions with Family / Patient:  Patient wife at bedside    Time Spent for Care: 30 minutes  More than 50% of total time spent on counseling and coordination of care as described above  Current Length of Stay: 1 day(s)    Current Patient Status: Inpatient   Certification Statement: The patient will continue to require additional inpatient hospital stay due to Ongoing workup in setting of non STEMI rule out ACS    Discharge Plan:  Not medically cleared    Code Status: Level 1 - Full Code      Subjective:   Patient reports no further episodes of chest pain in further discussion patient has noted a 5-7 lb weight gain over the past 2 weeks patient reports the pain has been progressive over the last 9 weeks with episodes ranging 15/10 in pain and not relieved by movement not increased with breathing and had allow the symptoms to subside  Patient further reports he had a brother die approximately 1 year ago of heart attack  Patient currently is asymptomatic in a snow complaints of headache dizziness chest pain shortness of breath      Objective:     Vitals:   Temp (24hrs), Av 9 °F (37 2 °C), Min:98 3 °F (36 8 °C), Max:99 7 °F (37 6 °C)    Temp:  [98 3 °F (36 8 °C)-99 7 °F (37 6 °C)] 99 1 °F (37 3 °C)  HR:  [] 84  Resp:  [16-20] 17  BP: (107-161)/() 107/54  SpO2:  [94 %-98 %] 94 %  Body mass index is 28 76 kg/m²  Input and Output Summary (last 24 hours):     No intake or output data in the 24 hours ending 01/26/19 0942    Physical Exam:     Physical Exam   Constitutional: He is oriented to person, place, and time  He appears well-developed and well-nourished  HENT:   Head: Normocephalic and atraumatic  Eyes: Conjunctivae and EOM are normal    Neck: Normal range of motion  Cardiovascular: Normal rate, regular rhythm and normal heart sounds  Pulmonary/Chest: Effort normal and breath sounds normal    Abdominal: Soft  Bowel sounds are normal    Musculoskeletal: Normal range of motion  He exhibits no edema  Neurological: He is alert and oriented to person, place, and time  Skin: Skin is warm and dry  Psychiatric: He has a normal mood and affect  His behavior is normal          Additional Data:     Labs:      Results from last 7 days  Lab Units 01/25/19  2321 01/25/19  1757   WBC Thousand/uL  --  9 93   HEMOGLOBIN g/dL  --  15 6   HEMATOCRIT %  --  47 2   PLATELETS Thousands/uL 170 185   NEUTROS PCT %  --  77*   LYMPHS PCT %  --  10*   MONOS PCT %  --  11   EOS PCT %  --  1       Results from last 7 days  Lab Units 01/26/19  0533   SODIUM mmol/L 140   POTASSIUM mmol/L 4 0   CHLORIDE mmol/L 104   CO2 mmol/L 26   BUN mg/dL 19   CREATININE mg/dL 1 14   ANION GAP mmol/L 10   CALCIUM mg/dL 8 6   ALBUMIN g/dL 3 5   TOTAL BILIRUBIN mg/dL 0 60   ALK PHOS U/L 81   ALT U/L 14   AST U/L 21   GLUCOSE RANDOM mg/dL 98       Results from last 7 days  Lab Units 01/25/19  1758   INR  1 06                       * I Have Reviewed All Lab Data Listed Above  * Additional Pertinent Lab Tests Reviewed:  All Labs For Current Hospital Admission Reviewed    Imaging:    Imaging Reports Reviewed Today Include:  CTA as mentioned above      Recent Cultures (last 7 days):           Last 24 Hours Medication List:     Current Facility-Administered Medications:  acetaminophen 650 mg Oral Q4H PRN ALLIE Cha   albuterol 2 puff Inhalation Q6H PRN ALLIE Cha   aspirin 81 mg Oral Daily ALLIE Cha   atorvastatin 40 mg Oral QPM ALLIE Cha   cobicistat 150 mg Oral Daily Jona Loja MD   darunavir 800 mg Oral Daily Jona Loja MD   docusate sodium 100 mg Oral BID ALLIE Cha   dolutegravir 50 mg Oral BID Jona Loja MD   emtricitabine-tenofovir AF 1 tablet Oral Daily Jona Loja MD   heparin (porcine) 5,000 Units Subcutaneous [facility] ALLIE Gentile   hydrochlorothiazide 12 5 mg Oral Daily ALLIE Cha   lisinopril 10 mg Oral Daily ALLIE Cha   nicotine 1 patch Transdermal Daily ALLIE Cha   nitroglycerin 0 4 mg Transdermal Daily Jona Loja MD   ondansetron 4 mg Intravenous Q6H PRN ALLIE Cha        Today, Patient Was Seen By: Jerlyn Schilder, CRNP    ** Please Note: Dictation voice to text software may have been used in the creation of this document   **

## 2019-01-26 NOTE — ASSESSMENT & PLAN NOTE
· Patient reports that he smokes approximately 5 cigars daily,   · Per previous discussion patient's wife reports that he smokes approximately 10 cigars daily  · Counseled patient extensively on the importance of smoking cessation  · Continue nicotine patch

## 2019-01-26 NOTE — ASSESSMENT & PLAN NOTE
· Will continue home medications  · Continue to monitor  · Initial elevation on admission however has significantly improved

## 2019-01-26 NOTE — ASSESSMENT & PLAN NOTE
· Will continue patient's home HIV medications  · Patient sees Dr Miranda outpatient for management of HIV with last seen in October  · improved adherence with ART with an undetectable viral load  His CD4 count is in the 200s   Will continue the Tivicay, Descovy, Prezcobix

## 2019-01-26 NOTE — ASSESSMENT & PLAN NOTE
· Will continue patient's home HIV medications  · Patient sees Dr Miranda outpatient for management of HIV and was last seen in October  · improved adherence with ART with an undetectable viral load  His CD4 count is in the 200s  · continue the Tivicay, Descovy, Prezcobix

## 2019-01-26 NOTE — ASSESSMENT & PLAN NOTE
· Patient with some mild urgency on admission  · Overall clinically improved  · Continue home regimen  · Continue to monitor

## 2019-01-26 NOTE — CONSULTS
Consultation - Cardiology Team One  Kapil Fernandez 62 y o  male MRN: [id number]  Unit/Bed#: -01 Encounter: [id number]    Consults    Physician Requesting Consult: Jose Guadalupe Carrington MD  Reason for Consult / Principal Problem: NSTEMI    HPI: Cardiologist Dr Myranda Lyman is a 62y o  year old male who has a history of HIV, hep C and hypertension presents with increasing constant sharp centralized chest pain with radiation to to the neck and right arm for about 9 weeks  Is associated with some shortness of breath  Nothing makes it better or worse including exertion  States episodes last from 5-15 minutes at a time  Has no previous history of cardiac disease  Smokes about 5 cigars daily, denies alcohol or illicit drug use      REVIEW OF SYSTEMS:  Constitutional:  Denies fever or chills   Eyes:  Denies change in visual acuity   HENT:  Denies nasal congestion or sore throat   Respiratory:  + shortness of breath   Cardiovascular:  +chest pain    GI:  Denies abdominal pain, nausea, vomiting, bloody stools or diarrhea   :  Denies dysuria, frequency, difficulty in micturition and nocturia  Musculoskeletal:  Denies back pain or joint pain   Neurologic:  Denies headache, focal weakness or sensory changes   Endocrine:  Denies polyuria or polydipsia   Lymphatic:  Denies swollen glands   Psychiatric:  Denies depression or anxiety     Historical Information   Past Medical History:   Diagnosis Date    HIV [facility]human immunodeficiency virus infection) ([facility] )     Hypertension     Opioid dependence ([facility] )      Past Surgical History:   Procedure Laterality Date    LUMBAR LAMINECTOMY      STOMACH SURGERY       History   Alcohol Use No     History   Drug Use No     History   Smoking Status    Current Every Day Smoker    Packs/day: 7 00    Types: Cigars   Smokeless Tobacco    Never Used       Family History:   Family History   Problem Relation Age of Onset    Alzheimer's disease Mother     Heart attack Father    Jenkeli Melba Prostate cancer Brother        MEDS & ALLERGIES:  all current active meds have been reviewed and current meds: Current Facility-Administered Medications   Medication Dose Route Frequency    acetaminophen (TYLENOL) tablet 650 mg  650 mg Oral Q4H PRN    albuterol (PROVENTIL HFA,VENTOLIN HFA) inhaler 2 puff  2 puff Inhalation Q6H PRN    aspirin (ECOTRIN LOW STRENGTH) EC tablet 81 mg  81 mg Oral Daily    atorvastatin (LIPITOR) tablet 40 mg  40 mg Oral QPM    cobicistat (TYBOST) 150 MG tablet 150 mg  150 mg Oral Daily    darunavir (PREZISTA) tablet 800 mg  800 mg Oral Daily    docusate sodium (COLACE) capsule 100 mg  100 mg Oral BID    dolutegravir (TIVICAY) tablet 50 mg  50 mg Oral BID    emtricitabine-tenofovir AF (DESCOVY) 200-25 MG 1 tablet  1 tablet Oral Daily    heparin (porcine) subcutaneous injection 5,000 Units  5,000 Units Subcutaneous Q8H [facility]    hydrochlorothiazide (HYDRODIURIL) tablet 12 5 mg  12 5 mg Oral Daily    lisinopril (ZESTRIL) tablet 10 mg  10 mg Oral Daily    nicotine (NICODERM CQ) 21 mg/24 hr TD 24 hr patch 1 patch  1 patch Transdermal Daily    nitroglycerin (NITRODUR) 0 4 mg/hr TD 24 hr patch  0 4 mg Transdermal Daily    ondansetron (ZOFRAN) injection 4 mg  4 mg Intravenous Q6H PRN        No Known Allergies    OBJECTIVE:  Vitals:   Vitals:    01/26/19 1129   BP: 101/61   Pulse: 83   Resp: 17   Temp: 98 4 °F (36 9 °C)   SpO2: 95%     Body mass index is 28 76 kg/m²      Systolic (03RZS), FELICIA:041 , Min:101 , RGM:928     Diastolic (22KXN), GDO:04, Min:54, Max:103    No intake or output data in the 24 hours ending 01/26/19 1326  Weight (last 2 days)     Date/Time   Weight    01/25/19 2140  85 8 (189 15)    01/25/19 1725  86 4 (190 48)            Invasive Devices     Peripheral Intravenous Line            Peripheral IV 01/25/19 Right Antecubital less than 1 day                PHYSICAL EXAMS:  General:  Patient is not in acute distress, laying in the bed comfortably, awake, alert responding to commands  Head: Normocephalic, Atraumatic  HEENT: White sclera, pink conjunctiva,  PERRLA,pharynx benign  Neck:  Supple, no neck vein distention, carotids+2/+2 no bruits, thyromegaly, adenopathy  Respiratory: clear to P/A  Cardiovascular:  PMI normal, S1-S2 normal, No  Murmurs, thrills, gallops, rubs   Regular rhythm  GI:  +abdominal scar  Abdomen soft nontender   No hepatosplenomegaly, adenopathy, ascites,or rebound tenderness  Extremities: No edema, normal pulses, no calf tenderness, no joint deformities, no venous disease   Integument:  No skin rashes or ulceration  Lymphatic:  No cervical or inguinal lymphadenopathy  Neurologic:  Patient is awake alert, responding to command, well-oriented to time and place and person moving all extremities    LABORATORY RESULTS:    Results from last 7 days  Lab Units 01/26/19 0533 01/25/19 2321 01/25/19  2045   TROPONIN I ng/mL 0 16* 0 16* 0 15*     CBC with diff:   Results from last 7 days  Lab Units 01/25/19 2321 01/25/19  1757   WBC Thousand/uL  --  9 93   HEMOGLOBIN g/dL  --  15 6   HEMATOCRIT %  --  47 2   MCV fL  --  89   PLATELETS Thousands/uL 170 185   MCH pg  --  29 5   MCHC g/dL  --  33 1   RDW %  --  14 3   MPV fL 10 0 10 1   NRBC AUTO /100 WBCs  --  0       CMP:  Results from last 7 days  Lab Units 01/26/19  0533 01/25/19  1757   POTASSIUM mmol/L 4 0 4 0   CHLORIDE mmol/L 104 103   CO2 mmol/L 26 26   BUN mg/dL 19 18   CREATININE mg/dL 1 14 0 94   CALCIUM mg/dL 8 6 8 8   AST U/L 21 16   ALT U/L 14 11*   ALK PHOS U/L 81 96   EGFR ml/min/1 73sq m 70 89       BMP:  Results from last 7 days  Lab Units 01/26/19  0533 01/25/19  1757   POTASSIUM mmol/L 4 0 4 0   CHLORIDE mmol/L 104 103   CO2 mmol/L 26 26   BUN mg/dL 19 18   CREATININE mg/dL 1 14 0 94   CALCIUM mg/dL 8 6 8 8         Results from last 7 days  Lab Units 01/25/19  1757   NT-PRO BNP pg/mL 468*        Results from last 7 days  Lab Units 01/26/19  0533   MAGNESIUM mg/dL 2 1       Results from last 7 days  Lab Units 01/26/19  0533   HEMOGLOBIN A1C % 5 7           Results from last 7 days  Lab Units 01/25/19  1758   INR  1 06       Lipid Profile:   No results found for: CHOL  Lab Results   Component Value Date    HDL 31 (L) 01/26/2019    HDL 35 (L) 07/15/2017     Lab Results   Component Value Date    LDLCALC 104 (H) 01/26/2019    LDLCALC 105 (H) 07/15/2017     Lab Results   Component Value Date    TRIG 154 (H) 01/26/2019    TRIG 97 07/15/2017       EKG reviewed personally:  Normal sinus rhythm, cannot rule out anterior infarct, age undetermined    Telemetry reviewed personally:   Normal sinus rhythm    Assessment/Plan:  1- NSTEMI type 1 versus type 2 to be determined  Troponins flat 0 15/0 16/0 16; D/C troponins  EKG cannot rule out anterior infarct  Echocardiogram ordered and pending to assess LVEF and wall motion abnormalities  Will order nuclear stress test to assess for reversible ischemia  Further recommendation will based upon test results    2- Hypertension  Stable, continue medications    3- Hyperlipidemia  Stable, continue statin    Code Status: Level 1 - Full Code    Counseling / Coordination of Care  Total floor / unit time spent today 35 minutes  Greater than 50% of total time was spent with the patient and / or family counseling and / or coordination of care  A description of the counseling / coordination of care: Review of history, current assessment, development of a plan      Lilibeth Gonzalez PA-C  1/26/2019,1:26 PM

## 2019-01-26 NOTE — ASSESSMENT & PLAN NOTE
· Patient reports that he was successfully treated outpatient  · 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  · Right upper quadrant ultrasound for screening was negative and the alpha fetoprotein negative on the last check  Continue Q 6 month hepatocellular carcinoma screening

## 2019-01-26 NOTE — ASSESSMENT & PLAN NOTE
· Patient reports that he has been experiencing chest pain for the last 9 weeks  The patient's chest pain is right-sided radiating across to the left side of his chest and down his right arm with back spasms  Denies any shortness of breath or difficulty breathing however does report chest tightness during these episodes      · Initial troponin is elevated 0 14/0 15, continue trending his remained flat at 0 16 and 0 16 respectively  · Monitor on telemetry  · Cardiology discussion chest pain appears atypical however patient has significant risk factors for CAD/ischemia recommending patient to remain in-patient get an echocardiogram and nuclear medicine stress test  · D-dimer was elevated, CT of chest was negative for PE however he does have a 2 mm incidental finding of lung nodule  · Echocardiogram pending   · Lipid panel total cholesterol 166 triglycerides 154 HDL 31   · Hemoglobin A1c pending

## 2019-01-26 NOTE — H&P
[address] Internal [facility]  H&P- Berwyn Cogan 1960, 62 y o  male MRN: [id number]    Unit/Bed#: -01 Encounter: [id number]    Primary Care Provider: ALLIE Liao   Date and time admitted to hospital: 1/25/2019  5:23 PM      * Chest pain   Assessment & Plan    · Patient reports that he has been experiencing chest pain for the last 9 weeks  The patient's chest pain is right-sided radiating across to the left side of his chest and down his right arm with back spasms  Denies any shortness of breath or difficulty breathing however does report chest tightness during these episodes  · Initial troponin is elevated 0 14/0 15, will continue to trend   · Monitor on telemetry  · Cardiology consulted  · D-dimer was elevated, CT of chest was negative for PE however he does have a 2 mm incidental finding of lung nodule  · Echocardiogram was ordered  · Lipid panel and hemoglobin A1c in the morning     Essential hypertension   Assessment & Plan    · Will continue home medications  · Continue to monitor  · Initial elevation on admission however has significantly improved     Dyslipidemia   Assessment & Plan    · Will start patient on atorvastatin  · Lipid panel in a m  Nicotine dependence   Assessment & Plan    · Patient reports that he smokes approximately 5 cigars daily, patient's wife reports that he smokes approximately 10 cigars daily  · Counseled patient extensively on the importance of smoking cessation  · Will initiate nicotine patch     HIV disease ([facility] [address] )   Assessment & Plan    · Will continue patient's home HIV medications  · Patient sees Dr Miranda outpatient for management of HIV with last seen in October  · improved adherence with ART with an undetectable viral load  His CD4 count is in the 200s   Will continue the Tivicay, Descovy, Prezcobix       Chronic hepatitis [facility])   Assessment & Plan    · Patient reports that he he was successfully treated outpatient  · 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  Right upper quadrant ultrasound for screening was negative and the alpha fetoprotein negative on the last check  Continue Q 6 month hepatocellular carcinoma screening  VTE Prophylaxis: Heparin  / sequential compression device   Code Status:  Full code  POLST: POLST form is not discussed and not completed at this time  Discussion with family:  Family at bedside    Anticipated Length of Stay:  Patient will be admitted on an Inpatient basis with an anticipated length of stay of  > 2 midnights  Justification for Hospital Stay:  Patient will require inpatient hospitalization for extensive workup of patient's chest pain with elevated troponins and possible interventions  Patient is not stable for discharge    Total Time for Visit, including Counseling / Coordination of Care: 45 minutes  Greater than 50% of this total time spent on direct patient counseling and coordination of care  Chief Complaint:   Chest pain    History of Present Illness:    Chantale Christy is a 62 y o  male who presents with chest pain  Patient reports that his chest pain initially began approximately 9 weeks ago  Reports that his become increasingly worse over the last 9 weeks and today decided to seek care secondary to family members raising concerns  Patient reports that his right-sided chest pain that radiates across his chest to the left and down his right arm describes it as a 10/10 deep sharp pain that lingers  He does also report back spasms  Denies any neck or jaw pain  Denies any nausea vomiting fevers chills diarrhea  Denies any respiratory symptoms denies any urinary symptoms  Is an active smoker  Was treated successfully for hepatitis C and is being treated for HIV  He has a physical job working in a [employer]  He does not report any exacerbating factors or alleviating factors  Reports that the pain just comes on more frequently during the day    In his become increasingly difficult for him to sleep over night as he has been experiencing the pain at night as well  Denies any alcohol or illicit drug use  Denies any significant emotional stressor dietary changes  Review of Systems:    Review of Systems    Past Medical and Surgical History:     Past Medical History:   Diagnosis Date    [facility]     Hypertension     Opioid dependence ([facility] )        Past Surgical History:   Procedure Laterality Date    LUMBAR LAMINECTOMY      STOMACH SURGERY         Meds/Allergies:    Prior to Admission medications    Medication Sig Start Date End Date Taking?  Authorizing Provider   albuterol (VENTOLIN HFA) 90 mcg/act inhaler Inhale 2 puffs every 6 (six) hours as needed for wheezing 11/27/18   ALLIE Villa   amoxicillin (AMOXIL) 500 mg capsule TAKE ONE CAPSULE BY MOUTH EVERY 8 HOURS UNTIL FINISHED 3/19/18   Historical Provider, MD   aspirin (ASPIRIN LOW DOSE) 81 mg EC tablet Take 1 tablet (81 mg total) by mouth daily 10/3/18   ALLIE Deluna   DESCOVY 200-25 MG tablet TAKE 1 TABLET BY MOUTH DAILY 10/10/18   ALLIE Villa   ibuprofen (MOTRIN) 600 mg tablet Take 600 mg by mouth every 6 (six) hours as needed 3/19/18   Historical Provider, MD   imiquimod (ALDARA) 5 % cream Apply 1 packet topically 3 (three) times a week 10/3/18   ALLIE Villa   lisinopril-hydrochlorothiazide (PRINZIDE,ZESTORETIC) 10-12 5 MG per tablet TAKE 1 TABLET BY MOUTH DAILY 10/10/18   ALLIE Villa   nicotine polacrilex (NICORETTE) 4 mg gum CHEW 1 PIECE OF GUM AS DIRECTED AS NEEDED FOR SMOKING CESSATION 3/22/18   ALLIE Villa   predniSONE 10 mg tablet 6 tabs by mouth q daily X 1 day, 5 tabs by mouth X 1 day, 4 tabs by mouth X 1 day, 3 tabs by mouth X1 day, 2 tabs by mouth daily X 1 day 1 tab by mouth X 1 day 11/27/18   ALLIE Deluna   PREZCOBIX 800-150 MG TABS TAKE 1 TABLET BY MOUTH DAILY 11/9/18   ALLIE Deluna TIVICAY 50 MG TABS TAKE 1 TABLET BY MOUTH TWICE A DAY 10/10/18   ALLIE Villa     I have reviewed home medications with patient personally  Allergies: No Known Allergies    Social History:     Marital Status: /Civil Union   Occupation:  [employer]  Patient Pre-hospital Living Situation:  Private residence  Patient Pre-hospital Level of Mobility:  Independent  Patient Pre-hospital Diet Restrictions:  None  Substance Use History:   History   Alcohol Use No     History   Smoking Status    Current Every Day Smoker    Packs/day: 7 00    Types: Cigars   Smokeless Tobacco    Never Used     History   Drug Use No       Family History:    Family History   Problem Relation Age of Onset    Alzheimer's disease Mother     Heart attack Father     Prostate cancer Brother        Physical Exam:     Vitals:   Blood Pressure: 143/80 (01/25/19 2140)  Pulse: 94 (01/25/19 2140)  Temperature: 98 9 °F (37 2 °C) (01/25/19 2140)  Temp Source: Oral (01/25/19 2140)  Respirations: 16 (01/25/19 2140)  Height: 5' 8" (172 7 cm) (01/25/19 2140)  Weight - Scale: 85 8 kg (189 lb 2 5 oz) (01/25/19 2140)  SpO2: 95 % (01/25/19 2140)    Physical Exam      Additional Data:     Lab Results: I have personally reviewed pertinent reports  Results from last 7 days  Lab Units 01/25/19  1757   WBC Thousand/uL 9 93   HEMOGLOBIN g/dL 15 6   HEMATOCRIT % 47 2   PLATELETS Thousands/uL 185   NEUTROS PCT % 77*   LYMPHS PCT % 10*   MONOS PCT % 11   EOS PCT % 1       Results from last 7 days  Lab Units 01/25/19  1757   SODIUM mmol/L 139   POTASSIUM mmol/L 4 0   CHLORIDE mmol/L 103   CO2 mmol/L 26   BUN mg/dL 18   CREATININE mg/dL 0 94   ANION GAP mmol/L 10   CALCIUM mg/dL 8 8   ALBUMIN g/dL 3 9   TOTAL BILIRUBIN mg/dL 0 40   ALK PHOS U/L 96   ALT U/L 11*   AST U/L 16   GLUCOSE RANDOM mg/dL 102       Results from last 7 days  Lab Units 01/25/19  1758   INR  1 06                   Imaging: I have personally reviewed pertinent reports  CTA ED chest PE study   Final Result by Andrew Garrett MD (01/25 1942)      No evidence of pulmonary embolism  Nonspecific bilateral hilar lymphadenopathy  2 mm nodule left lower lobe series 3 image 95  Based on current Fleischner Society 2017 Guidelines on incidental pulmonary nodule, because the patient is considered high risk for lung cancer, 12 month follow-up non-contrast chest CT is recommended  Workstation performed: [id number]         XR chest 1 view portable   Final Result by Andrew Garrett MD (01/25 1941)      No acute cardiopulmonary disease  Workstation performed: [id number]             EKG, Pathology, and Other Studies Reviewed on Admission:   · EKG:  Not available    Allscripts / Epic Records Reviewed: Yes     ** Please Note: This note has been constructed using a voice recognition system   **

## 2019-01-26 NOTE — ASSESSMENT & PLAN NOTE
· Patient reports that he has been experiencing chest pain for the last 9 weeks  The patient's chest pain is right-sided radiating across to the left side of his chest and down his right arm with back spasms  Denies any shortness of breath or difficulty breathing however does report chest tightness during these episodes      · Initial troponin is elevated 0 14/0 15, will continue to trend   · Monitor on telemetry  · Cardiology consulted  · D-dimer was elevated, CT of chest was negative for PE however he does have a 2 mm incidental finding of lung nodule  · Echocardiogram was ordered  · Lipid panel and hemoglobin A1c in the morning

## 2019-01-26 NOTE — ASSESSMENT & PLAN NOTE
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

## 2019-01-26 NOTE — ASSESSMENT & PLAN NOTE
· Patient reports that he smokes approximately 5 cigars daily, patient's wife reports that he smokes approximately 10 cigars daily  · Counseled patient extensively on the importance of smoking cessation  · Will initiate nicotine patch

## 2019-01-26 NOTE — UTILIZATION REVIEW
Initial Clinical Review    Admission: Date/Time/Statement: 1/25/19 @ 2005     Orders Placed This Encounter   Procedures    Inpatient Admission (expected length of stay for this patient is greater than two midnights)     Standing Status:   Standing     Number of Occurrences:   1     Order Specific Question:   Admitting Physician     Answer:   Whitley Sunica [[id number]]     Order Specific Question:   Level of Care     Answer:   Med Surg [[id number]]     Order Specific Question:   Estimated length of stay     Answer:   More than 2 Midnights     Order Specific Question:   Certification     Answer:   I certify that inpatient services are medically necessary for this patient for a duration of greater than two midnights  See H&P and MD Progress Notes for additional information about the patient's course of treatment  ED: Date/Time/Mode of Arrival:   ED Arrival Information     Expected Arrival Acuity Means of Arrival Escorted By Service Admission Type    - 1/25/2019 17:18 Emergent Walk-In Family Member General Medicine Emergency    Arrival Complaint    CHEST PAIN          Chief Complaint:   Chief Complaint   Patient presents with    Chest Pain     patient is c/o right sided chest pain and right arm numbness that has been intermittent x 9 weeks  History of Illness:  62 y o  male who presents with chest pain  Patient reports that his chest pain initially began approximately 9 weeks ago  Reports that his become increasingly worse over the last 9 weeks and today decided to seek care secondary to family members raising concerns  Patient reports that his right-sided chest pain that radiates across his chest to the left and down his right arm describes it as a 10/10 deep sharp pain that lingers  He does also report back spasms  Is an active smoker  Was treated successfully for hepatitis C and is being treated for HIV  He has a physical job working in a [employer]    He does not report any exacerbating factors or alleviating factors  Reports that the pain just comes on more frequently during the day  In his become increasingly difficult for him to sleep over night as he has been experiencing the pain at night as well  Denies any alcohol or illicit drug use  Denies any significant emotional stressor dietary changes  ED Vital Signs:   ED Triage Vitals [01/25/19 1725]   Temperature Pulse Respirations Blood Pressure SpO2   99 7 °F (37 6 °C) (!) 117 18 (!) 161/103 96 %      Temp Source Heart Rate Source Patient Position - Orthostatic VS BP Location FiO2 (%)   Oral Monitor Sitting Left arm --      Pain Score       No Pain        Wt Readings from Last 1 Encounters:   01/25/19 85 8 kg (189 lb 2 5 oz)       Vital Signs (abnormal): heart score 4 (history, age, risk factor, troponin)  01/26/19 0813  99 1 °F (37 3 °C)  84  17  107/54  --  94 %  None (Room        Abnormal Labs/Diagnostic Test Results: Alt 11  D dimer 627    cta chest - No evidence of pulmonary embolism  Nonspecific bilateral hilar lymphadenopathy  2 mm nodule left lower lobe    ekg- Normal sinus rhythm  Cannot rule out Anterior infarct , age undetermined  Abnormal ECG  No previous ECGs available    1749 troponin 0 14    2045  Troponin 0 15    2321 troponin 0 16    0533 troponin 0 16       1/26-  Triglycerides 154  KDL 31  LDL  104  ED Treatment:   Medication Administration from 01/25/2019 1718 to 01/25/2019 2140       Date/Time Order Dose Route Action Comments     01/25/2019 1751 aspirin tablet 325 mg 325 mg Oral Given      01/25/2019 1842 iohexol (OMNIPAQUE) 350 MG/ML injection (MULTI-DOSE) 85 mL 85 mL Intravenous Given           Past Medical/Surgical History:    Active Ambulatory Problems     Diagnosis Date Noted    [facility] 12/15/2015    Other cirrhosis of liver [facility] ) 11/14/2017    Dyslipidemia 10/22/2015    Essential hypertension 03/20/2017    Hepatitis B core antibody positive 10/23/2015    HIV disease ([facility] ) 05/21/2015    Nicotine dependence 03/28/2017    Polycythemia 11/14/2017    Bronchitis 11/27/2018     Past Medical History:   Diagnosis Date    HIV (human immunodeficiency virus infection) ([place] [address] )     Hypertension     Opioid dependence ([place] [address] )        Admitting Diagnosis: Chest pain [R07 9]  NSTEMI (non-ST elevated myocardial infarction) ([place] [address] ) [I21 4]    Age/Sex: 62 y o  male    Assessment/Plan:   Chest pain   Assessment & Plan     · Patient reports that he has been experiencing chest pain for the last 9 weeks  The patient's chest pain is right-sided radiating across to the left side of his chest and down his right arm with back spasms  Denies any shortness of breath or difficulty breathing however does report chest tightness during these episodes  · Initial troponin is elevated 0 14/0 15, will continue to trend   · Monitor on telemetry  · Cardiology consulted  · D-dimer was elevated, CT of chest was negative for PE however he does have a 2 mm incidental finding of lung nodule  · Echocardiogram was ordered  · Lipid panel and hemoglobin A1c in the morning      Essential hypertension   Assessment & Plan     · Will continue home medications  · Continue to monitor  · Initial elevation on admission however has significantly improved      Dyslipidemia   Assessment & Plan     · Will start patient on atorvastatin  · Lipid panel in a m       Nicotine dependence   Assessment & Plan     · Patient reports that he smokes approximately 5 cigars daily, patient's wife reports that he smokes approximately 10 cigars daily  · Counseled patient extensively on the importance of smoking cessation  · Will initiate nicotine patch      HIV disease ([place] [address] )   Assessment & Plan     · Will continue patient's home HIV medications  · Patient sees Dr Miranda outpatient for management of HIV with last seen in October  ? 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  Right upper quadrant ultrasound for screening was negative and the alpha fetoprotein negative on the last check    Continue Q 6 month hepatocellular carcinoma screening             Admission Orders: 1/25/2019  2005 INPATIENT   Scheduled Meds:   Current Facility-Administered Medications:  acetaminophen 650 mg Oral Q4H PRN   albuterol 2 puff Inhalation Q6H PRN   aspirin 81 mg Oral Daily   atorvastatin 40 mg Oral QPM   cobicistat 150 mg Oral Daily   darunavir 800 mg Oral Daily   docusate sodium 100 mg Oral BID   dolutegravir 50 mg Oral BID   emtricitabine-tenofovir AF 1 tablet Oral Daily   heparin (porcine) 5,000 Units Subcutaneous Q8H [facility]   hydrochlorothiazide 12 5 mg Oral Daily   lisinopril 10 mg Oral Daily   nicotine 1 patch Transdermal Daily   nitroglycerin 0 4 mg Transdermal Daily   ondansetron 4 mg Intravenous Q6H PRN     Continuous Infusions:    PRN Meds:   Acetaminophen - used x 1  OTHER ORDERS:  Telemetry  scds  Consult cardiology  echo      Network Utilization Review Department  Phone: [phone]; Fax [phone]  [email or web address]  ATTENTION: Please call with any questions or concerns to [phone]  and carefully listen to the prompts so that you are directed to the right person  Send all requests for admission clinical reviews, approved or denied determinations and any other requests to fax [phone]   All voicemails are confidential

## 2019-01-27 ENCOUNTER — APPOINTMENT (INPATIENT)
Dept: NON INVASIVE DIAGNOSTICS | Facility: HOSPITAL | Age: 59
DRG: 174 | End: 2019-01-27
Payer: COMMERCIAL

## 2019-01-27 ENCOUNTER — APPOINTMENT (INPATIENT)
Dept: INTERVENTIONAL RADIOLOGY/VASCULAR | Facility: HOSPITAL | Age: 59
DRG: 174 | End: 2019-01-27
Payer: COMMERCIAL

## 2019-01-27 LAB
ATRIAL RATE: 78 BPM
ATRIAL RATE: 79 BPM
ATRIAL RATE: 80 BPM
ATRIAL RATE: 81 BPM
MAGNESIUM SERPL-MCNC: 2.3 MG/DL (ref 1.6–2.6)
P AXIS: 61 DEGREES
P AXIS: 62 DEGREES
P AXIS: 64 DEGREES
P AXIS: 68 DEGREES
PR INTERVAL: 146 MS
PR INTERVAL: 148 MS
PR INTERVAL: 150 MS
PR INTERVAL: 156 MS
QRS AXIS: -40 DEGREES
QRS AXIS: -44 DEGREES
QRS AXIS: 30 DEGREES
QRS AXIS: 36 DEGREES
QRSD INTERVAL: 88 MS
QRSD INTERVAL: 90 MS
QRSD INTERVAL: 94 MS
QRSD INTERVAL: 96 MS
QT INTERVAL: 372 MS
QT INTERVAL: 400 MS
QT INTERVAL: 404 MS
QT INTERVAL: 414 MS
QTC INTERVAL: 432 MS
QTC INTERVAL: 460 MS
QTC INTERVAL: 461 MS
QTC INTERVAL: 474 MS
T WAVE AXIS: 16 DEGREES
T WAVE AXIS: 57 DEGREES
T WAVE AXIS: 59 DEGREES
T WAVE AXIS: 9 DEGREES
VENTRICULAR RATE: 78 BPM
VENTRICULAR RATE: 79 BPM
VENTRICULAR RATE: 80 BPM
VENTRICULAR RATE: 81 BPM

## 2019-01-27 PROCEDURE — 92928 PRQ TCAT PLMT NTRAC ST 1 LES: CPT | Performed by: INTERNAL MEDICINE

## 2019-01-27 PROCEDURE — C1769 GUIDE WIRE: HCPCS | Performed by: PHYSICIAN ASSISTANT

## 2019-01-27 PROCEDURE — 99152 MOD SED SAME PHYS/QHP 5/>YRS: CPT | Performed by: INTERNAL MEDICINE

## 2019-01-27 PROCEDURE — C1874 STENT, COATED/COV W/DEL SYS: HCPCS

## 2019-01-27 PROCEDURE — C1887 CATHETER, GUIDING: HCPCS | Performed by: PHYSICIAN ASSISTANT

## 2019-01-27 PROCEDURE — B2111ZZ FLUOROSCOPY OF MULTIPLE CORONARY ARTERIES USING LOW OSMOLAR CONTRAST: ICD-10-PCS | Performed by: INTERNAL MEDICINE

## 2019-01-27 PROCEDURE — 99152 MOD SED SAME PHYS/QHP 5/>YRS: CPT | Performed by: PHYSICIAN ASSISTANT

## 2019-01-27 PROCEDURE — C1725 CATH, TRANSLUMIN NON-LASER: HCPCS | Performed by: PHYSICIAN ASSISTANT

## 2019-01-27 PROCEDURE — 93458 L HRT ARTERY/VENTRICLE ANGIO: CPT | Performed by: INTERNAL MEDICINE

## 2019-01-27 PROCEDURE — 83735 ASSAY OF MAGNESIUM: CPT | Performed by: INTERNAL MEDICINE

## 2019-01-27 PROCEDURE — 93458 L HRT ARTERY/VENTRICLE ANGIO: CPT | Performed by: PHYSICIAN ASSISTANT

## 2019-01-27 PROCEDURE — 027034Z DILATION OF CORONARY ARTERY, ONE ARTERY WITH DRUG-ELUTING INTRALUMINAL DEVICE, PERCUTANEOUS APPROACH: ICD-10-PCS | Performed by: INTERNAL MEDICINE

## 2019-01-27 PROCEDURE — 93010 ELECTROCARDIOGRAM REPORT: CPT | Performed by: INTERNAL MEDICINE

## 2019-01-27 PROCEDURE — C9600 PERC DRUG-EL COR STENT SING: HCPCS | Performed by: PHYSICIAN ASSISTANT

## 2019-01-27 PROCEDURE — 99232 SBSQ HOSP IP/OBS MODERATE 35: CPT | Performed by: INTERNAL MEDICINE

## 2019-01-27 PROCEDURE — 93306 TTE W/DOPPLER COMPLETE: CPT | Performed by: INTERNAL MEDICINE

## 2019-01-27 PROCEDURE — 99153 MOD SED SAME PHYS/QHP EA: CPT | Performed by: PHYSICIAN ASSISTANT

## 2019-01-27 PROCEDURE — 99232 SBSQ HOSP IP/OBS MODERATE 35: CPT | Performed by: NURSE PRACTITIONER

## 2019-01-27 PROCEDURE — 4A023N7 MEASUREMENT OF CARDIAC SAMPLING AND PRESSURE, LEFT HEART, PERCUTANEOUS APPROACH: ICD-10-PCS | Performed by: INTERNAL MEDICINE

## 2019-01-27 PROCEDURE — 93306 TTE W/DOPPLER COMPLETE: CPT

## 2019-01-27 PROCEDURE — 93005 ELECTROCARDIOGRAM TRACING: CPT

## 2019-01-27 PROCEDURE — C1894 INTRO/SHEATH, NON-LASER: HCPCS | Performed by: PHYSICIAN ASSISTANT

## 2019-01-27 RX ORDER — FENTANYL CITRATE 50 UG/ML
INJECTION, SOLUTION INTRAMUSCULAR; INTRAVENOUS CODE/TRAUMA/SEDATION MEDICATION
Status: COMPLETED | OUTPATIENT
Start: 2019-01-27 | End: 2019-01-27

## 2019-01-27 RX ORDER — METOPROLOL SUCCINATE 25 MG/1
25 TABLET, EXTENDED RELEASE ORAL DAILY
Status: DISCONTINUED | OUTPATIENT
Start: 2019-01-27 | End: 2019-01-28 | Stop reason: HOSPADM

## 2019-01-27 RX ORDER — VERAPAMIL HCL 2.5 MG/ML
AMPUL (ML) INTRAVENOUS CODE/TRAUMA/SEDATION MEDICATION
Status: COMPLETED | OUTPATIENT
Start: 2019-01-27 | End: 2019-01-27

## 2019-01-27 RX ORDER — NITROGLYCERIN 20 MG/100ML
INJECTION INTRAVENOUS CODE/TRAUMA/SEDATION MEDICATION
Status: COMPLETED | OUTPATIENT
Start: 2019-01-27 | End: 2019-01-27

## 2019-01-27 RX ORDER — LIDOCAINE HYDROCHLORIDE 10 MG/ML
INJECTION, SOLUTION INFILTRATION; PERINEURAL CODE/TRAUMA/SEDATION MEDICATION
Status: COMPLETED | OUTPATIENT
Start: 2019-01-27 | End: 2019-01-27

## 2019-01-27 RX ORDER — SODIUM CHLORIDE 9 MG/ML
75 INJECTION, SOLUTION INTRAVENOUS CONTINUOUS
Status: DISCONTINUED | OUTPATIENT
Start: 2019-01-27 | End: 2019-01-27

## 2019-01-27 RX ORDER — SODIUM CHLORIDE 9 MG/ML
75 INJECTION, SOLUTION INTRAVENOUS CONTINUOUS
Status: CANCELLED | OUTPATIENT
Start: 2019-01-27 | End: 2019-01-27

## 2019-01-27 RX ADMIN — HYDROCHLOROTHIAZIDE 12.5 MG: 12.5 TABLET ORAL at 10:34

## 2019-01-27 RX ADMIN — FENTANYL CITRATE 50 MCG: 50 INJECTION, SOLUTION INTRAMUSCULAR; INTRAVENOUS at 12:52

## 2019-01-27 RX ADMIN — LIDOCAINE HYDROCHLORIDE 2 ML: 10 INJECTION, SOLUTION INFILTRATION; PERINEURAL at 12:31

## 2019-01-27 RX ADMIN — DOLUTEGRAVIR SODIUM 50 MG: 50 TABLET, FILM COATED ORAL at 10:33

## 2019-01-27 RX ADMIN — METOPROLOL SUCCINATE 25 MG: 25 TABLET, EXTENDED RELEASE ORAL at 14:53

## 2019-01-27 RX ADMIN — NITROGLYCERIN 200 MCG: 20 INJECTION INTRAVENOUS at 12:35

## 2019-01-27 RX ADMIN — TICAGRELOR 180 MG: 90 TABLET ORAL at 12:49

## 2019-01-27 RX ADMIN — NICOTINE 1 PATCH: 21 PATCH TRANSDERMAL at 10:31

## 2019-01-27 RX ADMIN — ASPIRIN 81 MG: 81 TABLET, COATED ORAL at 10:32

## 2019-01-27 RX ADMIN — IOHEXOL 65 ML: 350 INJECTION, SOLUTION INTRAVENOUS at 12:55

## 2019-01-27 RX ADMIN — DOCUSATE SODIUM 100 MG: 100 CAPSULE, LIQUID FILLED ORAL at 10:32

## 2019-01-27 RX ADMIN — ATORVASTATIN CALCIUM 40 MG: 40 TABLET, FILM COATED ORAL at 17:12

## 2019-01-27 RX ADMIN — VERAPAMIL HYDROCHLORIDE 2.5 MG: 2.5 INJECTION, SOLUTION INTRAVENOUS at 12:35

## 2019-01-27 RX ADMIN — SODIUM CHLORIDE 75 ML/HR: 0.9 INJECTION, SOLUTION INTRAVENOUS at 14:48

## 2019-01-27 RX ADMIN — LISINOPRIL 10 MG: 10 TABLET ORAL at 10:32

## 2019-01-27 RX ADMIN — HEPARIN SODIUM 5000 UNITS: 5000 INJECTION, SOLUTION INTRAVENOUS; SUBCUTANEOUS at 06:22

## 2019-01-27 RX ADMIN — FENTANYL CITRATE 50 MCG: 50 INJECTION, SOLUTION INTRAMUSCULAR; INTRAVENOUS at 12:35

## 2019-01-27 RX ADMIN — NITROGLYCERIN 0.4 MG: 0.4 PATCH TRANSDERMAL at 10:32

## 2019-01-27 RX ADMIN — EMTRICITABINE AND TENOFOVIR ALAFENAMIDE 1 TABLET: 200; 25 TABLET ORAL at 10:34

## 2019-01-27 RX ADMIN — DOCUSATE SODIUM 100 MG: 100 CAPSULE, LIQUID FILLED ORAL at 17:12

## 2019-01-27 RX ADMIN — DOLUTEGRAVIR SODIUM 50 MG: 50 TABLET, FILM COATED ORAL at 17:12

## 2019-01-27 RX ADMIN — DARUNAVIR 800 MG: 800 TABLET, FILM COATED ORAL at 10:33

## 2019-01-27 RX ADMIN — HEPARIN SODIUM 5000 UNITS: 5000 INJECTION, SOLUTION INTRAVENOUS; SUBCUTANEOUS at 21:08

## 2019-01-27 NOTE — PROGRESS NOTES
General Cardiology   Progress Note   Lillie Tavares 62 y o  male MRN: [id number]  Unit/Bed#: -01 Encounter: [id number]        Subjective:   Patient had multiple episodes of chest tightness since yesterday  Chest tightness is different from atypical chest pain he had in the past   Each episode associated with polymorphic VT on the monitor, the longest episode of about 10 beats  Patient denies having any syncope  Objective:   Vitals:  Vitals:    01/27/19 0741   BP: 141/74   Pulse: 72   Resp: 17   Temp: 98 1 °F (36 7 °C)   SpO2: 94%       Body mass index is 28 76 kg/m²  Systolic (19YLY), NNM:813 , Min:104 , NTV:347     Diastolic (87YZN), NUM:98, Min:66, Max:74    No intake or output data in the 24 hours ending 01/27/19 1134  Weight (last 2 days)     Date/Time   Weight    01/25/19 2140  85 8 (189 15)    01/25/19 1725  86 4 (190 48)              Telemetry Review:  Nonsustained polymorphic VT    PHYSICAL EXAMS:  General:  Patient is not in acute distress, laying in the bed comfortably, awake, alert responding to commands  Head: Normocephalic, Atraumatic  HEENT: White sclera, pink conjunctiva,  PERRLA,pharynx benign  Neck:  Supple, no neck vein distention, carotids+2/+2 no bruits, thyromegaly, adenopathy  Respiratory: clear to P/A  Cardiovascular:  PMI normal, S1-S2 normal, No  Murmurs, thrills, gallops, rubs   Regular rhythm  GI:  Abdomen soft nontender   No hepatosplenomegaly, adenopathy, ascites,or rebound tenderness  Extremities: No edema, normal pulses, no calf tenderness, no joint deformities, no venous disease   Integument:  No skin rashes or ulceration  Lymphatic:  No cervical or inguinal lymphadenopathy  Neurologic:  Patient is awake alert, responding to command, well-oriented to time and place and person moving all extremities      LABORATORY RESULTS:    Results from last 7 days  Lab Units 01/26/19  0533 01/25/19  2321 01/25/19  2045   TROPONIN I ng/mL 0 16* 0 16* 0 15*     CBC with diff:   Results from last 7 days  Lab Units 01/25/19  2321 01/25/19  1757   WBC Thousand/uL  --  9 93   HEMOGLOBIN g/dL  --  15 6   HEMATOCRIT %  --  47 2   MCV fL  --  89   PLATELETS Thousands/uL 170 185   MCH pg  --  29 5   MCHC g/dL  --  33 1   RDW %  --  14 3   MPV fL 10 0 10 1   NRBC AUTO /100 WBCs  --  0       CMP:  Results from last 7 days  Lab Units 01/26/19  0533 01/25/19  1757   POTASSIUM mmol/L 4 0 4 0   CHLORIDE mmol/L 104 103   CO2 mmol/L 26 26   BUN mg/dL 19 18   CREATININE mg/dL 1 14 0 94   CALCIUM mg/dL 8 6 8 8   AST U/L 21 16   ALT U/L 14 11*   ALK PHOS U/L 81 96   EGFR ml/min/1 73sq m 70 89       BMP:  Results from last 7 days  Lab Units 01/26/19  0533 01/25/19  1757   POTASSIUM mmol/L 4 0 4 0   CHLORIDE mmol/L 104 103   CO2 mmol/L 26 26   BUN mg/dL 19 18   CREATININE mg/dL 1 14 0 94   CALCIUM mg/dL 8 6 8 8         Results from last 7 days  Lab Units 01/25/19  1757   NT-PRO BNP pg/mL 468*        Results from last 7 days  Lab Units 01/26/19  0533   MAGNESIUM mg/dL 2 1       Results from last 7 days  Lab Units 01/26/19  0533   HEMOGLOBIN A1C % 5 7           Results from last 7 days  Lab Units 01/25/19  1758   INR  1 06       Lipid Profile:   No results found for: CHOL  Lab Results   Component Value Date    HDL 31 (L) 01/26/2019    HDL 35 (L) 07/15/2017     Lab Results   Component Value Date    LDLCALC 104 (H) 01/26/2019    LDLCALC 105 (H) 07/15/2017     Lab Results   Component Value Date    TRIG 154 (H) 01/26/2019    TRIG 97 07/15/2017       Cardiac testing:   No results found for this or any previous visit  No results found for this or any previous visit  No results found for this or any previous visit  No procedure found  No results found for this or any previous visit      Meds/Allergies   all current active meds have been reviewed  Prescriptions Prior to Admission   Medication    aspirin (ASPIRIN LOW DOSE) 81 mg EC tablet    DESCOVY 200-25 MG tablet    ibuprofen (MOTRIN) 600 mg tablet    lisinopril-hydrochlorothiazide (PRINZIDE,ZESTORETIC) 10-12 5 MG per tablet    PREZCOBIX 800-150 MG TABS    TIVICAY 50 MG TABS    albuterol (VENTOLIN HFA) 90 mcg/act inhaler    amoxicillin (AMOXIL) 500 mg capsule    imiquimod (ALDARA) 5 % cream    nicotine polacrilex (NICORETTE) 4 mg gum    predniSONE 10 mg tablet            Assessment/Plan:  1  Recurrent chest pain/NSTEMI/polymorphic VT:  Recurrent chest pain associated with polymorphic VT with positive troponin is very concerning for acute coronary syndrome  Will get cardiac catheterization as soon as possible to rule out significant CAD as a cause of recurrent polymorphic VT  Will initiate beta-blocker  Will check magnesium level  Continue aspirin statins for now  Risks, benefits and alternatives of cardiac catheterization were explained to the patient and informed consent was obtained  2    Hyperlipidemia  Switch to statins    3  Tobacco use:   patient was counseled about smoking cessation    Counseling / Coordination of Care  Total floor / unit time spent today 30 minutes  Greater than 50% of total time was spent with the patient and / or family counseling and / or coordination of care  ** Please Note: Dragon 360 Dictation voice to text software may have been used in the creation of this document   **

## 2019-01-27 NOTE — PROGRESS NOTES
Progress Note - Jesse Fields 1960, 62 y o  male MRN: [id number]    Unit/Bed#:  Encounter: [id number]    Primary Care Provider: ALLIE Jenkins   Date and time admitted to hospital: 1/25/2019  5:23 PM        * Chest pain   Assessment & Plan    · Patient reports that he has been experiencing chest pain for the last 9 weeks  The patient's chest pain is right-sided radiating across to the left side of his chest and down his right arm with back spasms  Denies any shortness of breath or difficulty breathing however does report chest tightness during these episodes  · Initial troponin is elevated 0 14/0 15, continue trending his remained flat at 0 16 and 0 16   · Patient had 2 events of polymorphic VT with reported chest pain cardiology percent patient for urgent catheterization patient was found to have an 80% lesion of the mid circumflex requiring a drug eluded stent x1  · Continue telemetry monitoring  · Patient will require anti-platelet therapy of aspirin and Brilinta  · Appreciate cardiology's ongoing recommendations  · Non STEMI type 1     Chronic hepatitis ([facility] )   Assessment & Plan    · Patient reports that he was successfully treated outpatient  · 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  · Right upper quadrant ultrasound for screening was negative and the alpha fetoprotein negative on the last check  Continue Q 6 month hepatocellular carcinoma screening  Dyslipidemia   Assessment & Plan    · Patient started on atorvastatin 40 mg  · Continue  · Lipid panel as mentioned above     HIV disease ([facility] )   Assessment & Plan    · Will continue patient's home HIV medications  · Patient sees Dr Miranda outpatient for management of HIV and was last seen in October  · improved adherence with ART with an undetectable viral load  His CD4 count is in the 200s  · continue the Tivicay, Descovy, Prezcobix       Essential hypertension   Assessment & Plan    · Patient with some mild urgency on admission  · Overall clinically improved  · Continue home regimen  · Continue to monitor     Nicotine dependence   Assessment & Plan    · Patient reports that he smokes approximately 5 cigars daily,   · Per previous discussion patient's wife reports that he smokes approximately 10 cigars daily  · Counseled patient extensively on the importance of smoking cessation  · Continue nicotine patch         VTE Pharmacologic Prophylaxis:   Pharmacologic: Heparin  Mechanical VTE Prophylaxis in Place: Yes    Patient Centered Rounds: I have performed bedside rounds with nursing staff today  Discussions with Specialists or Other Care Team Provider:  Cardiology    Education and Discussions with Family / Patient:  Patient and daughter at bedside    Time Spent for Care: 30 minutes  More than 50% of total time spent on counseling and coordination of care as described above  Current Length of Stay: 2 day(s)    Current Patient Status: Inpatient   Certification Statement: The patient will continue to require additional inpatient hospital stay due to Ongoing monitoring for chest pain status post cardiac catheterization with stent    Discharge Plan:  Pending progress not medically cleared    Code Status: Level 1 - Full Code      Subjective:   Patient denies headache dizziness chest pain shortness of breath  Patient is very appreciative his status post catheterization for the quick response the staff and getting the care that he needed  Objective:     Vitals:   Temp (24hrs), Av 4 °F (36 9 °C), Min:98 1 °F (36 7 °C), Max:98 5 °F (36 9 °C)    Temp:  [98 1 °F (36 7 °C)-98 5 °F (36 9 °C)] 98 5 °F (36 9 °C)  HR:  [72-89] 78  Resp:  [16-30] 22  BP: (104-148)/(64-79) 115/70  SpO2:  [93 %-97 %] 96 %  Body mass index is 28 76 kg/m²  Input and Output Summary (last 24 hours):        Intake/Output Summary (Last 24 hours) at 19 1505  Last data filed at 19 1301   Gross per 24 hour   Intake 0 ml   Output              250 ml   Net             -250 ml       Physical Exam:     Physical Exam   Constitutional: He is oriented to person, place, and time  He appears well-developed and well-nourished  HENT:   Head: Normocephalic and atraumatic  Mild conjunctivitis left eye patient denies irritation recommended warm compresses ongoing monitoring   Eyes: Conjunctivae and EOM are normal  Left eye exhibits discharge  Neck: Normal range of motion  Cardiovascular: Normal rate, regular rhythm and normal heart sounds  Pulmonary/Chest: Effort normal and breath sounds normal    Abdominal: Soft  Bowel sounds are normal    Musculoskeletal: Normal range of motion  Neurological: He is alert and oriented to person, place, and time  Skin: Skin is warm and dry  Psychiatric: He has a normal mood and affect  His behavior is normal          Additional Data:     Labs:      Results from last 7 days  Lab Units 01/25/19  2321 01/25/19  1757   WBC Thousand/uL  --  9 93   HEMOGLOBIN g/dL  --  15 6   HEMATOCRIT %  --  47 2   PLATELETS Thousands/uL 170 185   NEUTROS PCT %  --  77*   LYMPHS PCT %  --  10*   MONOS PCT %  --  11   EOS PCT %  --  1       Results from last 7 days  Lab Units 01/26/19  0533   SODIUM mmol/L 140   POTASSIUM mmol/L 4 0   CHLORIDE mmol/L 104   CO2 mmol/L 26   BUN mg/dL 19   CREATININE mg/dL 1 14   ANION GAP mmol/L 10   CALCIUM mg/dL 8 6   ALBUMIN g/dL 3 5   TOTAL BILIRUBIN mg/dL 0 60   ALK PHOS U/L 81   ALT U/L 14   AST U/L 21   GLUCOSE RANDOM mg/dL 98       Results from last 7 days  Lab Units 01/25/19  1758   INR  1 06           Results from last 7 days  Lab Units 01/26/19  0533   HEMOGLOBIN A1C % 5 7               * I Have Reviewed All Lab Data Listed Above  * Additional Pertinent Lab Tests Reviewed:  All Labs Within Last 24 Hours Reviewed        Recent Cultures (last 7 days):           Last 24 Hours Medication List:     Current Facility-Administered Medications:  acetaminophen 650 mg Oral Q4H PRN Jenniffer Roach, ALLIE    albuterol 2 puff Inhalation Q6H PRN Jenniffer Marley, CRNP    aspirin 81 mg Oral Daily Jenniffer Roach, ALLIE    atorvastatin 40 mg Oral QPM Jenniffer Roach, ALLIE    cobicistat 150 mg Oral Daily Dominga Valadez MD    darunavir 800 mg Oral Daily Dominga Valadez MD    docusate sodium 100 mg Oral BID Jenniffer Roach, ALLIE    dolutegravir 50 mg Oral BID Dominga Valadez MD    emtricitabine-tenofovir AF 1 tablet Oral Daily Dominga Valadez MD    heparin (porcine) 5,000 Units Subcutaneous [facility] Jenniffer Roach, ALLIE    hydrochlorothiazide 12 5 mg Oral Daily Jenniffer Roach, ALLIE    lisinopril 10 mg Oral Daily ALLIE Lim    metoprolol succinate 25 mg Oral Daily Dian Hoang MD    nicotine 1 patch Transdermal Daily ALLIE Lim    nitroglycerin 0 4 mg Transdermal Daily Dominga Valadez MD    ondansetron 4 mg Intravenous Q6H PRN ALLIE Lim    sodium chloride 75 mL/hr Intravenous Continuous Michael Chester PA-C Last Rate: 75 mL/hr (01/27/19 1448)   ticagrelor 90 mg Oral BID Michael Chester PA-C         Today, Patient Was Seen By: ALLIE Goldstein    ** Please Note: Dictation voice to text software may have been used in the creation of this document   **

## 2019-01-27 NOTE — ASSESSMENT & PLAN NOTE
· Patient reports that he has been experiencing chest pain for the last 9 weeks  The patient's chest pain is right-sided radiating across to the left side of his chest and down his right arm with back spasms  Denies any shortness of breath or difficulty breathing however does report chest tightness during these episodes      · Initial troponin is elevated 0 14/0 15, continue trending his remained flat at 0 16 and 0 16   · Patient had 2 events of polymorphic VT with reported chest pain cardiology percent patient for urgent catheterization patient was found to have an 80% lesion of the mid circumflex requiring a drug eluded stent x1  · Continue telemetry monitoring  · Patient will require anti-platelet therapy of aspirin and Brilinta  · Appreciate cardiology's ongoing recommendations

## 2019-01-27 NOTE — PROGRESS NOTES
Patient transported to  in bed on monitor, report given to [facility], care assumed  Assessed R radial puncture site with RN, site is clean, dry and intact with no signs or symptoms of bleeding or hematoma  Cap refill is brisk  Patient denies any chest pain at this time  VSS

## 2019-01-28 ENCOUNTER — TELEPHONE (OUTPATIENT)
Dept: SURGERY | Facility: CLINIC | Age: 59
End: 2019-01-28

## 2019-01-28 VITALS
HEART RATE: 75 BPM | OXYGEN SATURATION: 97 % | RESPIRATION RATE: 18 BRPM | HEIGHT: 68 IN | SYSTOLIC BLOOD PRESSURE: 143 MMHG | WEIGHT: 189.15 LBS | DIASTOLIC BLOOD PRESSURE: 84 MMHG | TEMPERATURE: 98.1 F | BODY MASS INDEX: 28.67 KG/M2

## 2019-01-28 LAB
ATRIAL RATE: 72 BPM
P AXIS: 78 DEGREES
PR INTERVAL: 148 MS
QRS AXIS: 47 DEGREES
QRSD INTERVAL: 92 MS
QT INTERVAL: 400 MS
QTC INTERVAL: 438 MS
T WAVE AXIS: 46 DEGREES
VENTRICULAR RATE: 72 BPM

## 2019-01-28 PROCEDURE — 93010 ELECTROCARDIOGRAM REPORT: CPT | Performed by: INTERNAL MEDICINE

## 2019-01-28 PROCEDURE — 93005 ELECTROCARDIOGRAM TRACING: CPT

## 2019-01-28 PROCEDURE — 99232 SBSQ HOSP IP/OBS MODERATE 35: CPT | Performed by: INTERNAL MEDICINE

## 2019-01-28 PROCEDURE — 99239 HOSP IP/OBS DSCHRG MGMT >30: CPT | Performed by: NURSE PRACTITIONER

## 2019-01-28 RX ORDER — NICOTINE 21 MG/24HR
1 PATCH, TRANSDERMAL 24 HOURS TRANSDERMAL EVERY 24 HOURS
Qty: 7 PATCH | Refills: 0 | Status: SHIPPED | OUTPATIENT
Start: 2019-01-28 | End: 2019-01-29 | Stop reason: SDUPTHER

## 2019-01-28 RX ORDER — METOPROLOL SUCCINATE 25 MG/1
25 TABLET, EXTENDED RELEASE ORAL DAILY
Qty: 30 TABLET | Refills: 0 | Status: SHIPPED | OUTPATIENT
Start: 2019-01-29 | End: 2019-02-01 | Stop reason: SDUPTHER

## 2019-01-28 RX ORDER — CLOPIDOGREL BISULFATE 75 MG/1
75 TABLET ORAL DAILY
Qty: 30 TABLET | Refills: 0 | Status: SHIPPED | OUTPATIENT
Start: 2019-01-30 | End: 2019-02-01 | Stop reason: SDUPTHER

## 2019-01-28 RX ORDER — ATORVASTATIN CALCIUM 40 MG/1
40 TABLET, FILM COATED ORAL EVERY EVENING
Qty: 30 TABLET | Refills: 0 | Status: SHIPPED | OUTPATIENT
Start: 2019-01-28 | End: 2019-02-01 | Stop reason: HOSPADM

## 2019-01-28 RX ORDER — CLOPIDOGREL 300 MG/1
300 TABLET, FILM COATED ORAL ONCE
Qty: 1 TABLET | Refills: 0 | Status: SHIPPED | OUTPATIENT
Start: 2019-01-29 | End: 2019-02-01 | Stop reason: HOSPADM

## 2019-01-28 RX ADMIN — DOCUSATE SODIUM 100 MG: 100 CAPSULE, LIQUID FILLED ORAL at 08:44

## 2019-01-28 RX ADMIN — NICOTINE 1 PATCH: 21 PATCH TRANSDERMAL at 08:49

## 2019-01-28 RX ADMIN — TICAGRELOR 90 MG: 90 TABLET ORAL at 08:45

## 2019-01-28 RX ADMIN — HEPARIN SODIUM 5000 UNITS: 5000 INJECTION, SOLUTION INTRAVENOUS; SUBCUTANEOUS at 05:57

## 2019-01-28 RX ADMIN — LISINOPRIL 10 MG: 10 TABLET ORAL at 08:43

## 2019-01-28 RX ADMIN — ASPIRIN 81 MG: 81 TABLET, COATED ORAL at 08:43

## 2019-01-28 RX ADMIN — DOLUTEGRAVIR SODIUM 50 MG: 50 TABLET, FILM COATED ORAL at 08:51

## 2019-01-28 RX ADMIN — HYDROCHLOROTHIAZIDE 12.5 MG: 12.5 TABLET ORAL at 08:44

## 2019-01-28 RX ADMIN — EMTRICITABINE AND TENOFOVIR ALAFENAMIDE 1 TABLET: 200; 25 TABLET ORAL at 08:51

## 2019-01-28 RX ADMIN — METOPROLOL SUCCINATE 25 MG: 25 TABLET, EXTENDED RELEASE ORAL at 08:50

## 2019-01-28 RX ADMIN — DARUNAVIR 800 MG: 800 TABLET, FILM COATED ORAL at 08:53

## 2019-01-28 RX ADMIN — COBICISTAT 150 MG: 150 TABLET, FILM COATED ORAL at 08:53

## 2019-01-28 NOTE — ASSESSMENT & PLAN NOTE
· Patient reports that he smokes approximately 5 cigars daily,   · Per previous discussion patient's wife reports that he smokes approximately 10 cigars daily  · Counseled patient extensively on the importance of smoking cessation  · Continue nicotine patch  · Patient requested nicotine patch upon discharge and wants to quit smoking will give patient a week's supply and he will follow up with his PCP on trending down the patch

## 2019-01-28 NOTE — DISCHARGE SUMMARY
Discharge- Yehuda Esqueda 1960, 62 y o  male MRN: [id number]    Unit/Bed#: -01 Encounter: [id number]    Primary Care Provider: ALLIE Solomon   Date and time admitted to hospital: 1/25/2019  5:23 PM        * Chest pain   Assessment & Plan    · Patient reports that he has been experiencing chest pain for the last 9 weeks  · Initial troponin is elevated 0 14/0 15, continue trending his remained flat at 0 16 and 0 16   · Patient had 2 events of polymorphic VT with reported chest pain cardiology percent patient for urgent catheterization yesterday patient was found to have an 80% lesion of the mid circumflex requiring a drug eluded stent x1 patient is postop day 1 without chest pain   · Continue telemetry monitoring while in-patient  · Patient will require dual anti-platelet therapy of aspirin and Brilinta  · Cardiology following     Chronic hepatitis [facility])   Assessment & Plan    · Patient reports that he was successfully treated outpatient  · 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  · Right upper quadrant ultrasound for screening was negative and the alpha fetoprotein negative on the last check  Continue Q 6 month hepatocellular carcinoma screening  Dyslipidemia   Assessment & Plan    · Patient started on atorvastatin 40 mg  · Continue  · Lipid panel as mentioned above     HIV disease ([facility] )   Assessment & Plan    · Will continue patient's home HIV medications  · Patient sees Dr Miranda outpatient for management of HIV and was last seen in October  · improved adherence with ART with an undetectable viral load  His CD4 count is in the 200s  · continue the Tivicay, Descovy, Prezcobix       Essential hypertension   Assessment & Plan    · Patient with some mild urgency on admission  · Overall clinically improved  · Continue home regimen  · Continue to monitor     Nicotine dependence   Assessment & Plan    · Patient reports that he smokes approximately 5 cigars daily,   · Per previous discussion patient's wife reports that he smokes approximately 10 cigars daily  · Counseled patient extensively on the importance of smoking cessation  · Continue nicotine patch  · Patient requested nicotine patch upon discharge and wants to quit smoking will give patient a week's supply and he will follow up with his PCP on trending down the patch           Discharging Physician / Practitioner: Priya Petersen  PCP: Priya Almaguer  Admission Date:   Admission Orders     Ordered        19  Inpatient Admission (expected length of stay for this patient is greater than two midnights)  Once             Discharge Date: 19    Resolved Problems  Date Reviewed: 2019    None          Consultations During Hospital Stay:  · Cardiology    Procedures Performed:   · Cardiac catheterization stenting x1 to 80% lesion to the mid circumflex    Significant Findings / Test Results:   · Chest pain ACS pt with polymorphic VT   · Non STEMI type 1    Incidental Findings:   · Viral conjunctivitis left eye patient recommended for warm compresses and visine at home if no improvement patient will discuss further with his PCP upon follow up     Test Results Pending at Discharge (will require follow up): · None     Outpatient Tests Requested:  · Per Cardiology    Complications:  None    Reason for Admission:  Chest pain workup    Hospital Course:     Elma Leach is a 62 y o  male patient who originally presented to the hospital on 2019 due to intermittent atypical chest pain x9 weeks which patient describes as started on his right side is chest down his right arm and across his left chest   Patient has a significant history of MI in the family his brother  approximately 1 year ago    Patient reports intermittently not feeling well overnight weeks with intermittent episodes of this chest pain when he had significant pain prior to admission that would not relieve no matter what he did did not increase with breathing patient came to the ED for further evaluation  Patient was found to have initially elevated troponins at 0 14/0 15/0 16/0  16  Patient was admitted cardiac evaluation was ordered along with telemetry monitoring  Initially cardiology had recommended echocardiogram and stress however over the 1st 24 hours patient experience 2 episodes of polymorphic VT with chest pain cardiology recommended urgent catheterization where patient was found to have an 80% lesion of the mid circumflex requiring a drug eluded stent x1  Patient was returned to the floor was monitored on telemetry for additional 24 hours remained asymptomatic and was cleared by Cardiology for discharge home  Please see above list of diagnoses and related plan for additional information  Condition at Discharge: good     Discharge Day Visit / Exam:     Subjective:  Patient denies headache dizziness chest pain shortness of breath  Patient reports overall feeling well patient reporting mild intermittent tingling over the left chest wall however this is overall clear  Patient says he feels significantly better and did not realize how ill he was feeling over the past 9 weeks the impact this was having on his overall quality of life  Patient has no other generalized questions other than when to return to work which he discussed with Cardiology and no was given upon discharge  Vitals: Blood Pressure: 129/72 (01/28/19 0744)  Pulse: 75 (01/28/19 0744)  Temperature: 98 7 °F (37 1 °C) (01/28/19 0744)  Temp Source: Oral (01/28/19 0744)  Respirations: 19 (01/28/19 0744)  Height: 5' 8" (172 7 cm) (01/25/19 2140)  Weight - Scale: 85 8 kg (189 lb 2 5 oz) (01/25/19 2140)  SpO2: 96 % (01/28/19 0744)  Exam:   Physical Exam   Constitutional: He is oriented to person, place, and time  He appears well-developed and well-nourished  HENT:   Head: Normocephalic and atraumatic     Eyes: EOM are normal    Mild tearing mild erythema no exudate seen at this time  Patient denies itching   Neck: Normal range of motion  Cardiovascular: Normal rate, regular rhythm and normal heart sounds  Pulmonary/Chest: Effort normal and breath sounds normal    Abdominal: Soft  Bowel sounds are normal    Musculoskeletal: Normal range of motion  Neurological: He is alert and oriented to person, place, and time  Skin: Skin is warm and dry  Psychiatric: He has a normal mood and affect  His behavior is normal            Discharge instructions/Information to patient and family:   See after visit summary for information provided to patient and family  Provisions for Follow-Up Care:  See after visit summary for information related to follow-up care and any pertinent home health orders  Disposition:     Home    For Discharges to [facility] SNF:   · Not Applicable to this Patient - Not Applicable to this Patient    Planned Readmission:  None     Discharge Statement:  I spent 36 minutes discharging the patient  This time was spent on the day of discharge  I had direct contact with the patient on the day of discharge  Greater than 50% of the total time was spent examining patient, answering all patient questions, arranging and discussing plan of care with patient as well as directly providing post-discharge instructions  Additional time then spent on discharge activities  Discharge Medications:  See after visit summary for reconciled discharge medications provided to patient and family        ** Please Note: This note has been constructed using a voice recognition system **

## 2019-01-28 NOTE — TELEPHONE ENCOUNTER
Spoke with Stephon Catsellon by phone  Cardiology will transition off [address] and start Plavix  There is an increased risk of bleeding  Pt  Was made aware and will need to be monitored closely

## 2019-01-28 NOTE — ASSESSMENT & PLAN NOTE
· Patient reports that he has been experiencing chest pain for the last 9 weeks       · Initial troponin is elevated 0 14/0 15, continue trending his remained flat at 0 16 and 0 16   · Patient had 2 events of polymorphic VT with reported chest pain cardiology percent patient for urgent catheterization yesterday patient was found to have an 80% lesion of the mid circumflex requiring a drug eluded stent x1 patient is postop day 1 without chest pain   · Continue telemetry monitoring while in-patient  · Patient will require dual anti-platelet therapy of aspirin and Brilinta  · Cardiology following

## 2019-01-28 NOTE — PROGRESS NOTES
General Cardiology   Progress Note   Floresita Arteaga 62 y o  male MRN: [id number]  Unit/Bed#: -01 Encounter: [id number]        Subjective:   Feeling better without chest pain or discomfort  Seen ambulating around the halls without limitation  Denies shortness of breath, palpitations, orthopnea, PND, headache, blurred vision, syncope, pain or swelling in the extremities  REVIEW OF SYSTEMS:  Constitutional:  Denies fever or chills   Eyes:  Denies change in visual acuity   HENT:  Denies nasal congestion or sore throat   Respiratory:  Denies cough or shortness of breath   Cardiovascular:  Denies chest pain or edema   GI:  Denies abdominal pain, nausea, vomiting, bloody stools or diarrhea   :  Denies dysuria, frequency, difficulty in micturition and nocturia  Musculoskeletal:  Denies back pain or joint pain   Neurologic:  Denies headache, focal weakness or sensory changes   Endocrine:  Denies polyuria or polydipsia   Lymphatic:  Denies swollen glands   Psychiatric:  Denies depression or anxiety     Objective:   Vitals:  Vitals:    01/28/19 1158   BP: 143/84   Pulse: 75   Resp: 18   Temp: 98 1 °F (36 7 °C)   SpO2: 97%       Body mass index is 28 76 kg/m²  Systolic (25XPI), FIK:491 , Min:92 , LXL:463     Diastolic (95QER), QIE:34, Min:56, Max:84      Intake/Output Summary (Last 24 hours) at 01/28/19 1407  Last data filed at 01/27/19 2108   Gross per 24 hour   Intake          1113 75 ml   Output              775 ml   Net           338 75 ml     Weight (last 2 days)     None          Telemetry Review: Normal sinus rhythm  PHYSICAL EXAMS:  General:  Patient is not in acute distress, laying in the bed comfortably, awake, alert responding to commands  Head: Normocephalic, Atraumatic     HEENT: White sclera, pink conjunctiva,  PERRLA,pharynx benign  Neck:  Supple, no neck vein distention, carotids+2/+2 no bruits, thyromegaly, adenopathy  Respiratory: clear to P/A  Cardiovascular:  PMI normal, S1-S2 normal, No Murmurs, thrills, gallops, rubs   Regular rhythm  GI:  +Abdominal scarring  Abdomen soft nontender   No hepatosplenomegaly, adenopathy, ascites,or rebound tenderness  Extremities: No edema, normal pulses, no calf tenderness, no joint deformities, no venous disease   Integument:  No skin rashes or ulceration  Lymphatic:  No cervical or inguinal lymphadenopathy  Neurologic:  Patient is awake alert, responding to command, well-oriented to time and place and person moving all extremities      LABORATORY RESULTS:    Results from last 7 days  Lab Units 01/26/19  0533 01/25/19 2321 01/25/19  2045   TROPONIN I ng/mL 0 16* 0 16* 0 15*     CBC with diff:   Results from last 7 days  Lab Units 01/25/19  2321 01/25/19  1757   WBC Thousand/uL  --  9 93   HEMOGLOBIN g/dL  --  15 6   HEMATOCRIT %  --  47 2   MCV fL  --  89   PLATELETS Thousands/uL 170 185   MCH pg  --  29 5   MCHC g/dL  --  33 1   RDW %  --  14 3   MPV fL 10 0 10 1   NRBC AUTO /100 WBCs  --  0       CMP:  Results from last 7 days  Lab Units 01/26/19  0533 01/25/19  1757   POTASSIUM mmol/L 4 0 4 0   CHLORIDE mmol/L 104 103   CO2 mmol/L 26 26   BUN mg/dL 19 18   CREATININE mg/dL 1 14 0 94   CALCIUM mg/dL 8 6 8 8   AST U/L 21 16   ALT U/L 14 11*   ALK PHOS U/L 81 96   EGFR ml/min/1 73sq m 70 89       BMP:  Results from last 7 days  Lab Units 01/26/19  0533 01/25/19  1757   POTASSIUM mmol/L 4 0 4 0   CHLORIDE mmol/L 104 103   CO2 mmol/L 26 26   BUN mg/dL 19 18   CREATININE mg/dL 1 14 0 94   CALCIUM mg/dL 8 6 8 8           Results from last 7 days  Lab Units 01/25/19  1757   NT-PRO BNP pg/mL 468*        Results from last 7 days  Lab Units 01/27/19  1209 01/26/19  0533   MAGNESIUM mg/dL 2 3 2 1       Results from last 7 days  Lab Units 01/26/19  0533   HEMOGLOBIN A1C % 5 7           Results from last 7 days  Lab Units 01/25/19  1758   INR  1 06       Lipid Profile:   No results found for: CHOL  Lab Results   Component Value Date    HDL 31 (L) 01/26/2019    HDL 35 (L) 07/15/2017     Lab Results   Component Value Date    LDLCALC 104 (H) 2019    LDLCALC 105 (H) 07/15/2017     Lab Results   Component Value Date    TRIG 154 (H) 2019    TRIG 97 07/15/2017       Cardiac testing:   Results for orders placed during the hospital encounter of 19   Echo complete with contrast if indicated    Narrative [address]  [address] [place], [place] [address]  [phone]    Transthoracic Echocardiogram  2D, M-mode, Doppler, and Color Doppler    Study date:  2019    Patient: Reinaldo Gaitan  MR number: [id number]  Account number: [de-identified]  : 1960  Age: 62 years  Gender: Male  Status: Inpatient  Location: Bedside  Height: 68 in  Weight: 189 lb  BP: 141/ 74 mmHg    Indications: CAD, Assess left ventricular function  Diagnoses: I25 10 - Atherosclerotic heart disease of native coronary artery without angina pectoris    Sonographer:   [address] [place]  Referring Physician:  ALLIE Milan  Group:  [facility]  Interpreting Physician:  Eri Caldwell MD    SUMMARY    LEFT VENTRICLE:  Systolic function was at the lower limits of normal  Ejection fraction was estimated to be 50 %  This study was inadequate for the evaluation of regional wall motion  Doppler parameters were consistent with abnormal left ventricular relaxation (grade 1 diastolic dysfunction)  MITRAL VALVE:  There was mild regurgitation  AORTIC VALVE:  Transaortic velocity was increased due to valvular stenosis  There was very mild stenosis  There was mild regurgitation  TRICUSPID VALVE:  There was mild regurgitation  HISTORY: Symptoms: chest pain  PRIOR HISTORY: HIV, Hep B, Opioid dependence, Risk factors: hypertension, hypercholesterolemia, and a history of current cigarette use (within the last month)  PROCEDURE: The procedure was performed at the bedside  This was a routine study  The transthoracic approach was used   The study included complete 2D imaging, M-mode, complete spectral Doppler, and color Doppler  The heart rate was 80 bpm,  at the start of the study  Images were obtained from the parasternal, apical, subcostal, and suprasternal notch acoustic windows  Image quality was adequate  LEFT VENTRICLE: Size was normal  Systolic function was at the lower limits of normal  Ejection fraction was estimated to be 50 %  This study was inadequate for the evaluation of regional wall motion  Wall thickness was normal  DOPPLER:  Doppler parameters were consistent with abnormal left ventricular relaxation (grade 1 diastolic dysfunction)  RIGHT VENTRICLE: The size was normal  Systolic function was normal  Wall thickness was normal     LEFT ATRIUM: Size was normal     RIGHT ATRIUM: Size was normal     MITRAL VALVE: Valve structure was normal  There was normal leaflet separation  DOPPLER: The transmitral velocity was within the normal range  There was no evidence for stenosis  There was mild regurgitation  AORTIC VALVE: The valve was trileaflet  Leaflets exhibited mild calcification and normal cuspal separation  DOPPLER: Transaortic velocity was increased due to valvular stenosis  There was very mild stenosis  There was mild regurgitation  TRICUSPID VALVE: The valve structure was normal  There was normal leaflet separation  DOPPLER: The transtricuspid velocity was within the normal range  There was no evidence for stenosis  There was mild regurgitation  PULMONIC VALVE: Not well visualized  PERICARDIUM: There was no pericardial effusion  The pericardium was normal in appearance  AORTA: The root exhibited normal size  SYSTEMIC VEINS: IVC: The inferior vena cava was normal in size and course   Respirophasic changes were normal     SYSTEM MEASUREMENT TABLES    2D  %FS: 27 6 %  Ao Diam: 3 4 cm  EDV(Teich): 127 8 ml  EF(Teich): 53 3 %  ESV(Teich): 59 7 ml  IVSd: 0 8 cm  LA Area: 13 1 cm2  LA Diam: 3 1 cm  LVEDV MOD A4C: 147 2 ml  LVEF MOD A4C: 49 3 %  LVESV MOD A4C: 74 6 ml  LVIDd: 5 2 cm  LVIDs: 3 7 cm  LVLd A4C: 9 cm  LVLs A4C: 7 9 cm  LVOT Diam: 2 4 cm  LVPWd: 1 cm  RA Area: 15 2 cm2  RVIDd: 3 2 cm  SV MOD A4C: 72 6 ml  SV(Teich): 68 1 ml    CW  AV Env  Ti: 270 2 ms  AV VTI: 42 6 cm  AV Vmax: 2 3 m/s  AV Vmean: 1 6 m/s  AV maxP 8 mmHg  AV meanP 6 mmHg    MM  TAPSE: 1 9 cm    PW  ERIC (VTI): 1 4 cm2  ERIC Vmax: 1 3 cm2  E': 0 1 m/s  E/E': 6 9  LVOT Env  Ti: 275 9 ms  LVOT VTI: 13 7 cm  LVOT Vmax: 0 7 m/s  LVOT Vmean: 0 5 m/s  LVOT maxP mmHg  LVOT meanP 1 mmHg  LVSV Dopp: 59 4 ml  MV A [place]: 0 7 m/s  MV Dec [place]: 3 m/s2  MV DecT: 206 2 ms  MV E [place]: 0 6 m/s  MV E/A Ratio: 0 9  MV PHT: 59 8 ms  MVA By PHT: 3 7 cm2    [employer] Accredited Echocardiography Laboratory    Prepared and electronically signed by    Dalton Llanes MD  Signed 2019 17:12:54       Meds/Allergies   current meds:   Current Facility-Administered Medications   Medication Dose Route Frequency    acetaminophen (TYLENOL) tablet 650 mg  650 mg Oral Q4H PRN    albuterol (PROVENTIL HFA,VENTOLIN HFA) inhaler 2 puff  2 puff Inhalation Q6H PRN    aspirin (ECOTRIN LOW STRENGTH) EC tablet 81 mg  81 mg Oral Daily    atorvastatin (LIPITOR) tablet 40 mg  40 mg Oral QPM    cobicistat (TYBOST) 150 MG tablet 150 mg  150 mg Oral Daily    darunavir (PREZISTA) tablet 800 mg  800 mg Oral Daily    docusate sodium (COLACE) capsule 100 mg  100 mg Oral BID    dolutegravir (TIVICAY) tablet 50 mg  50 mg Oral BID    emtricitabine-tenofovir AF (DESCOVY) 200-25 MG 1 tablet  1 tablet Oral Daily    heparin (porcine) subcutaneous injection 5,000 Units  5,000 Units Subcutaneous Q8H [address]    hydrochlorothiazide (HYDRODIURIL) tablet 12 5 mg  12 5 mg Oral Daily    lisinopril (ZESTRIL) tablet 10 mg  10 mg Oral Daily    metoprolol succinate (TOPROL-XL) 24 hr tablet 25 mg  25 mg Oral Daily    nicotine (NICODERM CQ) 21 mg/24 hr TD 24 hr patch 1 patch  1 patch Transdermal Daily    ondansetron (ZOFRAN) injection 4 mg  4 mg Intravenous Q6H PRN     Prescriptions Prior to Admission   Medication    aspirin (ASPIRIN LOW DOSE) 81 mg EC tablet    DESCOVY 200-25 MG tablet    ibuprofen (MOTRIN) 600 mg tablet    lisinopril-hydrochlorothiazide (PRINZIDE,ZESTORETIC) 10-12 5 MG per tablet    PREZCOBIX 800-150 MG TABS    TIVICAY 50 MG TABS    albuterol (VENTOLIN HFA) 90 mcg/act inhaler    amoxicillin (AMOXIL) 500 mg capsule    imiquimod (ALDARA) 5 % cream    nicotine polacrilex (NICORETTE) 4 mg gum    predniSONE 10 mg tablet        Assessment/Plan:  1- Coronary artery disease s/p [facility]ENT to [facility]  Patient received cardiac catheterization which showed 80% stenosis to proximal circumflex which was stented, also had 50% stenosis of 1st diagonal   Echo shows EF of 17%, grade 1 diastolic dysfunction, mild MR, mild AR, very mild AS, mild TR  Patient originally received Brilinta status post cardiac catheterization however this has interactions with current HIV regimen  Will switch to Plavix  D/C brillinta  Load with 300 mg Plavix 24 hrs after last Brilinta dose, then 75 mg q d x 1 year  Continue aspirin, statin, toprol    2- Hypertension  Stable, continue hydrochlorothiazide, lisinopril    3- Hyperlipidemia  Stable, continue statin    Counseling / Coordination of Care  Total floor / unit time spent today 20 minutes  Greater than 50% of total time was spent with the patient and / or family counseling and / or coordination of care  ** Please Note: Dragon 360 Dictation voice to text software may have been used in the creation of this document   **

## 2019-01-29 ENCOUNTER — TRANSITIONAL CARE MANAGEMENT (OUTPATIENT)
Dept: SURGERY | Facility: CLINIC | Age: 59
End: 2019-01-29

## 2019-01-29 DIAGNOSIS — Z72.0 TOBACCO ABUSE: ICD-10-CM

## 2019-01-29 RX ORDER — NICOTINE 21 MG/24HR
1 PATCH, TRANSDERMAL 24 HOURS TRANSDERMAL EVERY 24 HOURS
Qty: 30 PATCH | Refills: 0 | Status: SHIPPED | OUTPATIENT
Start: 2019-01-29 | End: 2019-02-13 | Stop reason: SDUPTHER

## 2019-01-29 NOTE — UTILIZATION REVIEW
Notification of Discharge  This is a Notification of Discharge from our facility [address]  Please be advised that this patient has been discharge from our facility  Below you will find the admission and discharge date and time including the patients disposition  PRESENTATION DATE: 1/25/2019  5:23 PM  IP ADMISSION DATE: 1/25/19 2005  DISCHARGE DATE: 1/28/2019  3:06 PM  DISPOSITION: [address] Utilization Review Department  Phone: [phone]; Fax [phone]  [email or web address]  [email or web address]  ATTENTION: Please call with any questions or concerns to [phone]  and carefully listen to the prompts so that you are directed to the right person  Send all requests for admission clinical reviews, approved or denied determinations and any other requests to fax [phone]   All voicemails are confidential

## 2019-02-01 ENCOUNTER — OFFICE VISIT (OUTPATIENT)
Dept: SURGERY | Facility: CLINIC | Age: 59
End: 2019-02-01
Payer: COMMERCIAL

## 2019-02-01 VITALS
HEART RATE: 74 BPM | BODY MASS INDEX: 28.52 KG/M2 | TEMPERATURE: 98.1 F | SYSTOLIC BLOOD PRESSURE: 120 MMHG | HEIGHT: 68 IN | WEIGHT: 188.2 LBS | DIASTOLIC BLOOD PRESSURE: 78 MMHG | OXYGEN SATURATION: 98 %

## 2019-02-01 DIAGNOSIS — I25.2 HISTORY OF NON-ST ELEVATION MYOCARDIAL INFARCTION (NSTEMI): ICD-10-CM

## 2019-02-01 DIAGNOSIS — E78.5 DYSLIPIDEMIA: Primary | ICD-10-CM

## 2019-02-01 DIAGNOSIS — F17.298 OTHER TOBACCO PRODUCT NICOTINE DEPENDENCE WITH OTHER NICOTINE-INDUCED DISORDER: ICD-10-CM

## 2019-02-01 DIAGNOSIS — I21.4 NSTEMI (NON-ST ELEVATED MYOCARDIAL INFARCTION) (HCC): ICD-10-CM

## 2019-02-01 DIAGNOSIS — B20 HIV DISEASE (HCC): ICD-10-CM

## 2019-02-01 DIAGNOSIS — B20 HIV (HUMAN IMMUNODEFICIENCY VIRUS INFECTION) (HCC): ICD-10-CM

## 2019-02-01 DIAGNOSIS — I10 ESSENTIAL HYPERTENSION: ICD-10-CM

## 2019-02-01 PROBLEM — J40 BRONCHITIS: Status: RESOLVED | Noted: 2018-11-27 | Resolved: 2019-02-01

## 2019-02-01 PROBLEM — R07.9 CHEST PAIN: Status: RESOLVED | Noted: 2019-01-25 | Resolved: 2019-02-01

## 2019-02-01 PROCEDURE — 99496 TRANSJ CARE MGMT HIGH F2F 7D: CPT | Performed by: NURSE PRACTITIONER

## 2019-02-01 RX ORDER — ASPIRIN 81 MG/1
81 TABLET ORAL DAILY
Qty: 30 TABLET | Refills: 5 | Status: SHIPPED | OUTPATIENT
Start: 2019-02-01 | End: 2019-02-13 | Stop reason: SDUPTHER

## 2019-02-01 RX ORDER — METOPROLOL SUCCINATE 25 MG/1
25 TABLET, EXTENDED RELEASE ORAL DAILY
Qty: 30 TABLET | Refills: 0 | Status: SHIPPED | OUTPATIENT
Start: 2019-02-01 | End: 2019-02-13 | Stop reason: SDUPTHER

## 2019-02-01 RX ORDER — LISINOPRIL AND HYDROCHLOROTHIAZIDE 12.5; 1 MG/1; MG/1
1 TABLET ORAL DAILY
Qty: 30 TABLET | Refills: 5 | Status: SHIPPED | OUTPATIENT
Start: 2019-02-01 | End: 2019-02-13 | Stop reason: HOSPADM

## 2019-02-01 RX ORDER — CLOPIDOGREL BISULFATE 75 MG/1
75 TABLET ORAL DAILY
Qty: 30 TABLET | Refills: 0 | Status: SHIPPED | OUTPATIENT
Start: 2019-02-01 | End: 2019-02-13 | Stop reason: SDUPTHER

## 2019-02-01 RX ORDER — ROSUVASTATIN CALCIUM 20 MG/1
20 TABLET, COATED ORAL DAILY
Qty: 30 TABLET | Refills: 2 | Status: SHIPPED | OUTPATIENT
Start: 2019-02-01 | End: 2019-02-13 | Stop reason: SDUPTHER

## 2019-02-01 RX ORDER — ROSUVASTATIN CALCIUM 20 MG/1
20 TABLET, COATED ORAL DAILY
Qty: 30 TABLET | Refills: 2 | Status: SHIPPED | OUTPATIENT
Start: 2019-02-01 | End: 2019-02-01 | Stop reason: SDUPTHER

## 2019-02-01 NOTE — ASSESSMENT & PLAN NOTE
LIPIDS: Total cholesterol 166 triglycerides 154HDL 31     Recheck lipids in 6 months  Started on atorvastatin while inpatient  Medication interacts with  ART  Will d/c and start pitavastatin 2mg  Prescription sent to [facility]  Eat a low fat/low cholesterol diet  Follow up with dietician for additional education

## 2019-02-01 NOTE — PATIENT INSTRUCTIONS
Low-Sodium Diet   AMBULATORY CARE:   A low-sodium diet  limits foods that are high in sodium (salt)  You will need to follow a low-sodium diet if you have high blood pressure, kidney disease, or heart failure  You may also need to follow this diet if you have a condition that is causing your body to retain (hold) extra fluid  You may need to limit the amount of sodium you eat to 1,500 mg  Ask your healthcare provider how much sodium you can have each day  How to use food labels to choose foods that are low in sodium:  Read food labels to find the amount of sodium they contain  The amount of sodium is listed in milligrams (mg)  The % Daily Value (DV) column tells you how much of your daily needs are met by 1 serving of the food for each nutrient listed  Choose foods that have less than 5% of the DV of sodium  These foods are considered low in sodium  Foods that have 20% or more of the DV of sodium are considered high in sodium  Some food labels may also list any of the following terms that tell you about the sodium content in the food:  · Sodium-free:  Less than 5 mg in each serving    · Very low sodium:  35 mg of sodium or less in each serving    · Low sodium:  140 mg of sodium or less in each serving    · Reduced sodium: At least 25% less sodium in each serving than the regular type    · Light in sodium:  50% less sodium in each serving    · Unsalted or no added salt:  No extra salt is added during processing (the food may still contain sodium)  Foods to avoid:  Salty foods are high in sodium   You should avoid the following:  · Processed foods:      ¨ Mixes for cornbread, biscuits, cake, and pudding     ¨ Instant foods, such as potatoes, cereals, noodles, and rice     ¨ Packaged foods, such as bread stuffing, rice and pasta mixes, snack dip mixes, and macaroni and cheese     ¨ Canned foods, such as canned vegetables, soups, broths, sauces, and vegetable or tomato juice    ¨ Snack foods, such as salted chips, popcorn, pretzels, pork rinds, salted crackers, and salted nuts    ¨ Frozen foods, such as dinners, entrees, vegetables with sauces, and breaded meats    ¨ Sauerkraut, pickled vegetables, and other foods prepared in brine    · Meats and cheeses:      ¨ Smoked or cured meat, such as corned beef, newman, ham, hot dogs, and sausage    ¨ Canned meats or spreads, such as potted meats, sardines, anchovies, and imitation seafood    ¨ Deli or lunch meats, such as bologna, ham, turkey, and roast beef    ¨ Processed cheese, such as American cheese and cheese spreads    · Condiments, sauces, and seasonings:      ¨ Salt (¼ teaspoon of salt contains 575 mg of sodium)    ¨ Seasonings made with salt, such as garlic salt, celery salt, onion salt, and seasoned salt    ¨ Regular soy sauce, barbecue sauce, teriyaki sauce, steak sauce, Worcestershire sauce, and most flavored vinegars    ¨ Canned gravy and mixes     ¨ Regular condiments, such as mustard, ketchup, and salad dressings    ¨ Pickles and olives    ¨ Meat tenderizers and monosodium glutamate (MSG)  Foods to include:  Read the food label to find the exact amount of sodium in each serving  · Bread and cereal:  Try to choose breads with less than 80 mg of sodium per serving  ¨ Bread, roll, shari, tortilla, or unsalted crackers  ¨ Ready-to-eat cereals with less than 5% DV of sodium (examples include shredded wheat and puffed rice)    ¨ Pasta    · Vegetables and fruits:      ¨ Unsalted fresh, frozen, or canned vegetables    ¨ Fresh, frozen, or canned fruits    ¨ Fruit juice    · Dairy:  One serving has about 150 mg of sodium  ¨ Milk, all types    ¨ Yogurt    ¨ Hard cheese, such as cheddar, Swiss, [employer], or mozzarella    · Meat and other protein foods:  Some raw meats may have added sodium       ¨ Plain meats, fish, and poultry     ¨ Eggs    · Other foods:      ¨ Homemade pudding    ¨ Unsalted nuts, popcorn, or pretzels    ¨ Unsalted butter or margarine  Ways to decrease sodium:   · Add spices and herbs to foods instead of salt during cooking  Use salt-free seasonings to add flavor to foods  Examples include onion powder, garlic powder, basil, stockton powder, paprika, and parsley  Try lemon or lime juice or vinegar to give foods a tart flavor  Use hot peppers, pepper, or cayenne pepper to add a spicy flavor to foods  · Do not keep a salt shaker at your kitchen table  This may help keep you from adding salt to food at the table  It may take time to get used to enjoying the natural flavor of food instead of adding salt  Talk to your healthcare provider before you use salt substitutes  Some salt substitutes have a high amount of potassium and need to be avoided if you have kidney disease  · Choose low-sodium foods at restaurants  Meals from restaurants are often high in sodium  Some restaurants have nutrition information on the menu that tells you the amount of sodium in their foods  If possible, ask for your food to be prepared with less, or no salt  · Shop for unsalted or low-sodium foods and snacks at the grocery store  Examples include unsalted or low-sodium broths, soups, and canned vegetables  Choose fresh or frozen vegetables instead  Choose unsalted nuts or seeds or fresh fruits or vegetables as snacks  Read food labels and choose salt-free, very low-sodium, or low-sodium foods  © 2017 [address] Information is for End User's use only and may not be sold, redistributed or otherwise used for commercial purposes  All illustrations and images included in CareNotes® are the copyrighted property of [employer]  or [employer]  The above information is an  only  It is not intended as medical advice for individual conditions or treatments  Talk to your doctor, nurse or pharmacist before following any medical regimen to see if it is safe and effective for you

## 2019-02-01 NOTE — ASSESSMENT & PLAN NOTE
Doing well with smoking cessation  Using the nicotine patch with good result  Reinforced the importance of smoking cessation to promote heart health

## 2019-02-01 NOTE — PROGRESS NOTES
Assessment/Plan:    Essential hypertension  Blood pressure: Stable  BP Readings from Last 3 Encounters:   19 120/78   19 143/84   18 138/90       Continue lisinopril-HCTZ and metoprolol  Educated on the following lifestyle modifications to lower BP and decrease cardiovascular risk factors  limit alcohol intake, reduce salt in diet, maintain a healthy weight, engage in 30 minutes of cardiovascular exercise daily, and not smoke  Dyslipidemia  LIPIDS: Total cholesterol 166 triglycerides 154HDL 31     Recheck lipids in 6 months  Started on atorvastatin while inpatient  Medication interacts with  ART  Will d/c and start pitavastatin 2mg  Prescription sent to [facility]   49  Eat a low fat/low cholesterol diet  Follow up with dietician for additional education  [facility]  No results found for: [id number]  CD4 ABS   Date/Time Value Ref Range Status   2018 07:31  (L) 359 - 1519 /uL Final     HIV-1 RNA by PCR, Qn   Date/Time Value Ref Range Status   2018 07:31 AM <20 copies/mL Final     Comment:     HIV-1 RNA not detected  The reportable range for this assay is 20 to 10,000,000  copies HIV-1 RNA/mL  HIV-1 RNA Viral Load Log   Date/Time Value Ref Range Status   2018 07:31 AM COMMENT [id number]/mL Final     Comment:     Unable to calculate result since non-numeric result obtained for  component test          ART: tivicay, Descovy, Prezcobix  Claims 100% adherence  Reminded of upcoming appointment scheduled 3/5/19  Denies side effects  Stressed the importance of adherence  Continue follow up with ID clinic  Reviewed most recent labs, including Cd4 and viral load  Discussed the risks and benefits of treatment options, instructions for management, importance of treatment adherence, and reduction of risk factor  Educated on possible  medication side effects       Counseled on routes of HIV transmission, including the risk of  infection  Emphasized that viral suppression is the best method to prevent HIV transmission  At this time pt denies the need for HIV testing of anyone in their life  Total encounter time was 45 minutes  Greater then 20 minutes were spent on counseling and patient education  Pt voices understanding and agreement with treatment plan  Nicotine dependence  Doing well with smoking cessation  Using the nicotine patch with good result  Reinforced the importance of smoking cessation to promote heart health  History of non-ST elevation myocardial infarction (NSTEMI)  S/P craterization with intervention  Continue plavix ,ASA, and metoprolol  Educated on the need for antiplatlet therapy  Also discussed the action of metoprolol in assisting with pumping function of the heart after MI  Instructed to follow up with cardiology  Brissa Strauss will schedule an appointment with the [facility] office on Monday  Educated on the importance of continued smoking cessation, daily exercise and importance of eating a heart healthy diet  Diagnoses and all orders for this visit:    Essential hypertension  -     metoprolol succinate (TOPROL-XL) 25 mg 24 hr tablet; Take 1 tablet (25 mg total) by mouth daily  -     lisinopril-hydrochlorothiazide (PRINZIDE,ZESTORETIC) 10-12 5 MG per tablet; Take 1 tablet by mouth daily  -     aspirin (ASPIRIN LOW DOSE) 81 mg EC tablet; Take 1 tablet (81 mg total) by mouth daily    Dyslipidemia  -     pitavastatin (LIVALO) 2 mg; Take 1 tablet (2 mg total) by mouth daily with dinner    HIV disease ([facility] )  -     Darunavir-Cobicistat (PREZCOBIX) 800-150 MG TABS; Take 1 tablet by mouth daily    Other tobacco product nicotine dependence with other nicotine-induced disorder    HIV (human immunodeficiency virus infection) (HCC)  -     dolutegravir (TIVICAY) 50 MG TABS; Take 1 tablet (50 mg total) by mouth 2 (two) times a day  -     emtricitabine-tenofovir AF (DESCOVY) 200-25 MG tablet;  Take 1 tablet by mouth daily    NSTEMI (non-ST elevated myocardial infarction) (HCC)  -     clopidogrel (PLAVIX) 75 mg tablet; Take 1 tablet (75 mg total) by mouth daily    History of non-ST elevation myocardial infarction (NSTEMI)          Subjective:      Patient ID: Jacquelyn Jefferson is a 62 y o  male  Elba Harrell is here today for [facility] visit after inpatient admission to [facility] from January 25, 2019 to January 08/20/2019  PMHx significant for HIV, HCV SVR s/p treatment, cirrhosis,HTN,hyperlipidemia and nicotine dependence  Elba Harrell presented to the hospital after experiencing chest pain x9 weeks  During admission two events of ventricular tachycardia were observed and patient was scheduled for emergent catheterization in which a  80% lesion of the mid circumflex was found, requiring intervention of drug eluting stents x1  Elba Harrell was placed on dual antiplatelet therapy with Plavix and aspirin  Atorvastatin 40 mg wand metoprolol 25mg were also started  Smoking cessation and dietary recommendations for heart healthy lifestyle were reviewed prior to discharge  Elba Harrell has picked up his new medication from a local pharmacy and has been taking them as directed  Mario quit smoking and was using nicotine patch to aide in cessation  Elba Harrell has not smoked since prior to hospitalization  Elba Harrell states he feels great but does have numerous questions regarding dietary advice, lifestyle changes, and new medication  The following portions of the patient's history were reviewed and updated as appropriate: allergies, current medications, past family history, past medical history, past social history, past surgical history and problem list     Review of Systems   Constitutional: Negative for activity change, appetite change, chills, diaphoresis, fatigue, fever and unexpected weight change  HENT: Negative for congestion, dental problem, ear pain, hearing loss, mouth sores, rhinorrhea and sore throat      Eyes: Negative for pain, redness and visual disturbance  Respiratory: Negative for shortness of breath and wheezing  Cardiovascular: Negative for chest pain and leg swelling  Gastrointestinal: Negative for abdominal pain, constipation, diarrhea, nausea and vomiting  Endocrine: Negative for polydipsia, polyphagia and polyuria  Genitourinary: Negative for difficulty urinating and dysuria  Musculoskeletal: Negative for back pain, joint swelling and myalgias  Skin: Negative for rash  Neurological: Negative for syncope and headaches  Psychiatric/Behavioral: Negative for behavioral problems and suicidal ideas  Objective:      /78   Pulse 74   Temp 98 1 °F (36 7 °C)   Ht 5' 8" (1 727 m)   Wt 85 4 kg (188 lb 3 2 oz)   SpO2 98%   BMI 28 62 kg/m²       Lab Results   Component Value Date     03/18/2017    K 4 0 01/26/2019     01/26/2019    CO2 26 01/26/2019    BUN 19 01/26/2019    CREATININE 1 14 01/26/2019    GLUF 112 (H) 09/29/2018    CALCIUM 8 6 01/26/2019    AST 21 01/26/2019    ALT 14 01/26/2019    ALKPHOS 81 01/26/2019    PROT 7 4 03/18/2017    BILITOT 1 1 03/18/2017    EGFR 70 01/26/2019     Lab Results   Component Value Date    WBC 9 93 01/25/2019    HGB 15 6 01/25/2019    HCT 47 2 01/25/2019    MCV 89 01/25/2019     01/25/2019     No results found for: HEPCAB  Lab Results   Component Value Date    HEPBCAB REACTIVE (A) 10/08/2015    HEPBCAB REACTIVE (A) 10/08/2015    HEPBCAB REACTIVE (A) 10/08/2015    HEPBCAB REACTIVE (A) 10/08/2015     Lab Results   Component Value Date    RPR Non-Reactive 09/29/2018            Physical Exam   Constitutional: He is oriented to person, place, and time  He appears well-developed and well-nourished  No distress  HENT:   Head: Normocephalic  Right Ear: External ear normal    Left Ear: External ear normal    Nose: Nose normal    Mouth/Throat: Oropharynx is clear and moist  No oropharyngeal exudate     Eyes: Pupils are equal, round, and reactive to light  Conjunctivae are normal  Right eye exhibits no discharge  Left eye exhibits no discharge  Neck: Normal range of motion  No thyromegaly present  Cardiovascular: Normal rate, regular rhythm, normal heart sounds and intact distal pulses  No murmur heard  Pulmonary/Chest: Effort normal and breath sounds normal  He has no wheezes  Abdominal: Soft  Bowel sounds are normal  He exhibits no mass  There is no tenderness  Musculoskeletal: Normal range of motion  He exhibits no edema or tenderness  Lymphadenopathy:     He has no cervical adenopathy  Neurological: He is alert and oriented to person, place, and time  Skin: Skin is warm and dry  No rash noted  Psychiatric: He has a normal mood and affect   His behavior is normal

## 2019-02-01 NOTE — ASSESSMENT & PLAN NOTE
S/P craterization with intervention  Continue plavix ,ASA, and metoprolol  Educated on the need for antiplatlet therapy  Also discussed the action of metoprolol in assisting with pumping function of the heart after MI  Instructed to follow up with cardiology  Gely Blum will schedule an appointment with the [facility] office on Monday  Educated on the importance of continued smoking cessation, daily exercise and importance of eating a heart healthy diet

## 2019-02-01 NOTE — ASSESSMENT & PLAN NOTE
No results found for: [id number]  CD4 ABS   Date/Time Value Ref Range Status   2018 07:31  (L) 359 - 1519 /uL Final     HIV-1 RNA by PCR, Qn   Date/Time Value Ref Range Status   2018 07:31 AM <20 copies/mL Final     Comment:     HIV-1 RNA not detected  The reportable range for this assay is 20 to 10,000,000  copies HIV-1 RNA/mL  HIV-1 RNA Viral Load Log   Date/Time Value Ref Range Status   2018 07:31 AM COMMENT [id number]/mL Final     Comment:     Unable to calculate result since non-numeric result obtained for  component test          ART: tivicay, Descovy, Prezcobix  Claims 100% adherence  Reminded of upcoming appointment scheduled 3/5/19  Denies side effects  Stressed the importance of adherence  Continue follow up with ID clinic  Reviewed most recent labs, including Cd4 and viral load  Discussed the risks and benefits of treatment options, instructions for management, importance of treatment adherence, and reduction of risk factor  Educated on possible  medication side effects  Counseled on routes of HIV transmission, including the risk of  infection  Emphasized that viral suppression is the best method to prevent HIV transmission  At this time pt denies the need for HIV testing of anyone in their life  Total encounter time was 45 minutes  Greater then 20 minutes were spent on counseling and patient education  Pt voices understanding and agreement with treatment plan

## 2019-02-01 NOTE — PROGRESS NOTES
Assessment/Plan:      There are no diagnoses linked to this encounter  Discussion: Met with pt for his behavioral health consultation incld completing his annual PHQ-9 and smoking cessation  Pt reported feeling thankful after a recent health scare and associated hospital admission/procedures  Pt reported that he has since quit smoking (1 week now) and is determined that this would be his final bout with smoking  BHS congratulated pt on his lifestyle change  BHS used motivational interviewing techniques to highlight pt's strength and previous quitting success, explore his current motivation for quitting and instill hope for another successful quit attempt  BHS explored pt's concerns about being successful this time, what led to the end of the previous quit attempt and how to avoid those pitfalls this time  Pt thanked S and ended session by stating that he would not let BHS down  BHS wished pt the best and invited him to call if he needs further assistance or support  PHQ-9 score was a 9  The three areas pt scored high on were explained by his recent medical problem  Since ED stay, pt reports issues have resolved themselves  Subjective:     Patient ID: Jey Melgoza is a 62 y o  male      H[facility]: [facility] [address] [facility] up     Objective:    Orientation    Person: Yes   Place: Yes   Time: Yes     Appearance     Well Developed: Yes   Healthy: Yes   Normal Body Odor: Yes   Smells of Feces: No  Well Nourished: Yes   Grooming Unkempt: No  Poor Eye Contact: No  Hirsute: No    Mood and Affect    Appropriate: Yes   Euthymic: Yes       Harm to Self or Others: denied    Substance Abuse: none reported or observed

## 2019-02-01 NOTE — ASSESSMENT & PLAN NOTE
Blood pressure: Stable  BP Readings from Last 3 Encounters:   02/01/19 120/78   01/28/19 143/84   11/27/18 138/90       Continue lisinopril-HCTZ and metoprolol  Educated on the following lifestyle modifications to lower BP and decrease cardiovascular risk factors  limit alcohol intake, reduce salt in diet, maintain a healthy weight, engage in 30 minutes of cardiovascular exercise daily, and not smoke

## 2019-02-13 ENCOUNTER — TELEPHONE (OUTPATIENT)
Dept: SURGERY | Facility: CLINIC | Age: 59
End: 2019-02-13

## 2019-02-13 DIAGNOSIS — I10 ESSENTIAL HYPERTENSION: ICD-10-CM

## 2019-02-13 DIAGNOSIS — B20 HIV (HUMAN IMMUNODEFICIENCY VIRUS INFECTION) (HCC): ICD-10-CM

## 2019-02-13 DIAGNOSIS — Z72.0 TOBACCO ABUSE: ICD-10-CM

## 2019-02-13 DIAGNOSIS — E78.5 DYSLIPIDEMIA: ICD-10-CM

## 2019-02-13 DIAGNOSIS — I21.4 NSTEMI (NON-ST ELEVATED MYOCARDIAL INFARCTION) (HCC): ICD-10-CM

## 2019-02-13 DIAGNOSIS — B20 HIV DISEASE (HCC): ICD-10-CM

## 2019-02-13 RX ORDER — LOSARTAN POTASSIUM AND HYDROCHLOROTHIAZIDE 12.5; 5 MG/1; MG/1
1 TABLET ORAL DAILY
Qty: 30 TABLET | Refills: 3 | Status: SHIPPED | OUTPATIENT
Start: 2019-02-13 | End: 2019-06-04 | Stop reason: SDUPTHER

## 2019-02-13 RX ORDER — METOPROLOL SUCCINATE 25 MG/1
25 TABLET, EXTENDED RELEASE ORAL DAILY
Qty: 30 TABLET | Refills: 0 | Status: SHIPPED | OUTPATIENT
Start: 2019-02-13 | End: 2019-04-10 | Stop reason: SDUPTHER

## 2019-02-13 RX ORDER — NICOTINE 21 MG/24HR
1 PATCH, TRANSDERMAL 24 HOURS TRANSDERMAL EVERY 24 HOURS
Qty: 30 PATCH | Refills: 0 | Status: SHIPPED | OUTPATIENT
Start: 2019-02-13 | End: 2019-03-21 | Stop reason: SDUPTHER

## 2019-02-13 RX ORDER — ROSUVASTATIN CALCIUM 20 MG/1
20 TABLET, COATED ORAL DAILY
Qty: 30 TABLET | Refills: 2 | Status: SHIPPED | OUTPATIENT
Start: 2019-02-13 | End: 2019-03-20 | Stop reason: HOSPADM

## 2019-02-13 RX ORDER — ASPIRIN 81 MG/1
81 TABLET ORAL DAILY
Qty: 30 TABLET | Refills: 5 | Status: SHIPPED | OUTPATIENT
Start: 2019-02-13 | End: 2019-11-18 | Stop reason: SDUPTHER

## 2019-02-13 RX ORDER — CLOPIDOGREL BISULFATE 75 MG/1
75 TABLET ORAL DAILY
Qty: 30 TABLET | Refills: 0 | Status: SHIPPED | OUTPATIENT
Start: 2019-02-13 | End: 2019-04-10 | Stop reason: SDUPTHER

## 2019-02-13 NOTE — PROGRESS NOTES
Spoke to Pat Carlisle and he states he is coughing all night after taking lisinopril  Will change lisinopril to losartan to see if side effect lessens  Medication sent to [facility] pharmacy at pt   Request

## 2019-02-18 ENCOUNTER — OFFICE VISIT (OUTPATIENT)
Dept: CARDIOLOGY CLINIC | Facility: CLINIC | Age: 59
End: 2019-02-18
Payer: COMMERCIAL

## 2019-02-18 VITALS
HEART RATE: 77 BPM | HEIGHT: 68 IN | WEIGHT: 188 LBS | DIASTOLIC BLOOD PRESSURE: 80 MMHG | OXYGEN SATURATION: 97 % | BODY MASS INDEX: 28.49 KG/M2 | SYSTOLIC BLOOD PRESSURE: 120 MMHG

## 2019-02-18 DIAGNOSIS — E78.5 DYSLIPIDEMIA: ICD-10-CM

## 2019-02-18 DIAGNOSIS — Z95.5 PRESENCE OF DRUG COATED STENT IN LEFT CIRCUMFLEX CORONARY ARTERY: ICD-10-CM

## 2019-02-18 DIAGNOSIS — I25.2 HISTORY OF NON-ST ELEVATION MYOCARDIAL INFARCTION (NSTEMI): Primary | ICD-10-CM

## 2019-02-18 DIAGNOSIS — I10 ESSENTIAL HYPERTENSION: ICD-10-CM

## 2019-02-18 DIAGNOSIS — I25.10 CORONARY ARTERY DISEASE INVOLVING NATIVE CORONARY ARTERY OF NATIVE HEART WITHOUT ANGINA PECTORIS: ICD-10-CM

## 2019-02-18 DIAGNOSIS — I47.2 POLYMORPHIC VENTRICULAR TACHYCARDIA (HCC): ICD-10-CM

## 2019-02-18 PROBLEM — I47.29 POLYMORPHIC VENTRICULAR TACHYCARDIA: Status: ACTIVE | Noted: 2019-02-18

## 2019-02-18 PROCEDURE — 99214 OFFICE O/P EST MOD 30 MIN: CPT | Performed by: INTERNAL MEDICINE

## 2019-02-18 NOTE — PROGRESS NOTES
Cardiology Consultation     Lillie Tavares  [id number]  1960  [facility] [address] [facility] [address]    C/c:  Hospital follow-up for chest pain, NSTEMI and polymorphic VT    HPI:  49-year-old male with past medical history of HIV, hep C, hypertension was recently admitted the hospital with chest pains and elevated troponin  While he was in the hospital he had episodes of polymorphic VT  Patient had urgent cardiac catheterization which showed significant lesion in the proximal circumflex which was intervened with drug-eluting stent with no complications  Patient did well after the procedure and was discharged home  Patient says the angina has resolved since discharge  He has gone back to work which requires physical effort and denies having any angina with exertion  He denies having any shortness of breath, palpitations, dizziness or syncope  Patient is taking aspirin Plavix and denies having any major bleeding episodes      Patient Active Problem List   Diagnosis    Chronic hepatitis ([place] [address] )    Other cirrhosis of liver (HCC)    Dyslipidemia    Essential hypertension    Hepatitis B core antibody positive    HIV disease ([place] [address] )    Nicotine dependence    Polycythemia    History of non-ST elevation myocardial infarction (NSTEMI)     Past Medical History:   Diagnosis Date    HIV (human immunodeficiency virus infection) ([place] [address] )     Hypertension     Opioid dependence ([place] [address] )      Social History     Socioeconomic History    Marital status: /Civil Union     Spouse name: Not on file    Number of children: Not on file    Years of education: Not on file    Highest education level: Not on file   Occupational History    Not on file   Social Needs    Financial resource strain: Not on file    Food insecurity:     Worry: Not on file     Inability: Not on file    Transportation needs: Medical: Not on file     Non-medical: Not on file   Tobacco Use    Smoking status: Former Smoker     Packs/day: 7 00     Types: Cigars     Last attempt to quit: 2019     Years since quittin 0    Smokeless tobacco: Never Used   Substance and Sexual Activity    Alcohol use: No    Drug use: No    Sexual activity: Yes     Partners: Female   Lifestyle    Physical activity:     Days per week: Not on file     Minutes per session: Not on file    Stress: Not on file   Relationships    Social connections:     Talks on phone: Not on file     Gets together: Not on file     Attends [employer] service: Not on file     Active member of club or organization: Not on file     Attends meetings of clubs or organizations: Not on file     Relationship status: Not on file    Intimate partner violence:     Fear of current or ex partner: Not on file     Emotionally abused: Not on file     Physically abused: Not on file     Forced sexual activity: Not on file   Other Topics Concern    Not on file   Social History Narrative    Not on file      Family History   Problem Relation Age of Onset    Alzheimer's disease Mother     Heart attack Father     Prostate cancer Brother      Past Surgical History:   Procedure Laterality Date    LUMBAR LAMINECTOMY      STOMACH SURGERY         Current Outpatient Medications:     aspirin (ASPIRIN LOW DOSE) 81 mg EC tablet, Take 1 tablet (81 mg total) by mouth daily, Disp: 30 tablet, Rfl: 5    clopidogrel (PLAVIX) 75 mg tablet, Take 1 tablet (75 mg total) by mouth daily, Disp: 30 tablet, Rfl: 0    Darunavir-Cobicistat (PREZCOBIX) 800-150 MG TABS, Take 1 tablet by mouth daily, Disp: 30 tablet, Rfl: 5    dolutegravir (TIVICAY) 50 MG TABS, Take 1 tablet (50 mg total) by mouth 2 (two) times a day, Disp: 60 tablet, Rfl: 5    emtricitabine-tenofovir AF (DESCOVY) 200-25 MG tablet, Take 1 tablet by mouth daily, Disp: 30 tablet, Rfl: 5    losartan-hydrochlorothiazide (HYZAAR) 50-12 5 mg per tablet, Take 1 tablet by mouth daily, Disp: 30 tablet, Rfl: 3    metoprolol succinate (TOPROL-XL) 25 mg 24 hr tablet, Take 1 tablet (25 mg total) by mouth daily, Disp: 30 tablet, Rfl: 0    nicotine (NICODERM CQ) 21 mg/24 hr TD 24 hr patch, Place 1 patch on the skin every 24 hours, Disp: 30 patch, Rfl: 0    rosuvastatin (CRESTOR) 20 MG tablet, Take 1 tablet (20 mg total) by mouth daily for 30 days, Disp: 30 tablet, Rfl: 2    albuterol (VENTOLIN HFA) 90 mcg/act inhaler, Inhale 2 puffs every 6 (six) hours as needed for wheezing (Patient not taking: Reported on 1/25/2019 ), Disp: 18 g, Rfl: 0  No Known Allergies  Vitals:    02/18/19 1523   BP: 120/80   BP Location: Left arm   Patient Position: Sitting   Cuff Size: Adult   Pulse: 77   SpO2: 97%   Weight: 85 3 kg (188 lb)   Height: 5' 8" (1 727 m)       Labs:  Admission on 01/25/2019, Discharged on 01/28/2019   Component Date Value    Sodium 01/25/2019 139     Potassium 01/25/2019 4 0     Chloride 01/25/2019 103     CO2 01/25/2019 26     ANION GAP 01/25/2019 10     BUN 01/25/2019 18     Creatinine 01/25/2019 0 94     Glucose 01/25/2019 102     Calcium 01/25/2019 8 8     AST 01/25/2019 16     ALT 01/25/2019 11*    Alkaline Phosphatase 01/25/2019 96     Total Protein 01/25/2019 7 6     Albumin 01/25/2019 3 9     Total Bilirubin 01/25/2019 0 40     eGFR 01/25/2019 89     WBC 01/25/2019 9 93     RBC 01/25/2019 5 29     Hemoglobin 01/25/2019 15 6     Hematocrit 01/25/2019 47 2     MCV 01/25/2019 89     MCH 01/25/2019 29 5     MCHC 01/25/2019 33 1     RDW 01/25/2019 14 3     MPV 01/25/2019 10 1     Platelets 22/08/3639 185     nRBC 01/25/2019 0     Neutrophils Relative 01/25/2019 77*    Immat GRANS % 01/25/2019 1     Lymphocytes Relative 01/25/2019 10*    Monocytes Relative 01/25/2019 11     Eosinophils Relative 01/25/2019 1     Basophils Relative 01/25/2019 0     Neutrophils Absolute 01/25/2019 7 64*    Immature Grans Absolute 01/25/2019 0 05     Lymphocytes Absolute 01/25/2019 1 01     Monocytes Absolute 01/25/2019 1 10     Eosinophils Absolute 01/25/2019 0 09     Basophils Absolute 01/25/2019 0 04     Protime 01/25/2019 13 7     INR 01/25/2019 1 06     PTT 01/25/2019 32     Troponin I 01/25/2019 0 14*    D-Dimer, Quant 01/25/2019 627*    Troponin I 01/25/2019 0 15*    Troponin I 01/25/2019 0 16*    NT-proBNP 01/25/2019 468*    Platelets 47/57/5437 170     MPV 01/25/2019 10 0     Ventricular Rate 01/25/2019 93     Atrial Rate 01/25/2019 93     AR Interval 01/25/2019 146     QRSD Interval 01/25/2019 92     QT Interval 01/25/2019 374     QTC Interval 01/25/2019 465     P Axis 01/25/2019 67     QRS Axis 01/25/2019 17     T Wave Axis 01/25/2019 44     Troponin I 01/26/2019 0 16*    Sodium 01/26/2019 140     Potassium 01/26/2019 4 0     Chloride 01/26/2019 104     CO2 01/26/2019 26     ANION GAP 01/26/2019 10     BUN 01/26/2019 19     Creatinine 01/26/2019 1 14     Glucose 01/26/2019 98     Calcium 01/26/2019 8 6     AST 01/26/2019 21     ALT 01/26/2019 14     Alkaline Phosphatase 01/26/2019 81     Total Protein 01/26/2019 7 3     Albumin 01/26/2019 3 5     Total Bilirubin 01/26/2019 0 60     eGFR 01/26/2019 70     Magnesium 01/26/2019 2 1     Phosphorus 01/26/2019 4 1     Cholesterol 01/26/2019 166     Triglycerides 01/26/2019 154*    HDL, Direct 01/26/2019 31*    LDL Calculated 01/26/2019 104*    Hemoglobin A1C 01/26/2019 5 7     EAG 01/26/2019 117     Ventricular Rate 01/26/2019 81     Atrial Rate 01/26/2019 81     AR Interval 01/26/2019 148     QRSD Interval 01/26/2019 88     QT Interval 01/26/2019 372     QTC Interval 01/26/2019 432     P Axis 01/26/2019 64     QRS Axis 01/26/2019 30     T Wave [place] 01/26/2019 16     Ventricular Rate 01/26/2019 80     Atrial Rate 01/26/2019 80     AR Interval 01/26/2019 146     QRSD Interval 01/26/2019 90     QT Interval 01/26/2019 400     QTC Interval 01/26/2019 461     P Axis 01/26/2019 62     QRS Axis 01/26/2019 36     T Wave Axis 01/26/2019 9     Magnesium 01/27/2019 2 3     Ventricular Rate 01/27/2019 79     Atrial Rate 01/27/2019 79     ID Interval 01/27/2019 150     QRSD Interval 01/27/2019 96     QT Interval 01/27/2019 414     QTC Interval 01/27/2019 474     P Axis 01/27/2019 61     QRS Axis 01/27/2019 -40     T Wave Axis 01/27/2019 59     Ventricular Rate 01/27/2019 78     Atrial Rate 01/27/2019 78     ID Interval 01/27/2019 156     QRSD Interval 01/27/2019 94     QT Interval 01/27/2019 404     QTC Interval 01/27/2019 460     P Axis 01/27/2019 68     QRS Axis 01/27/2019 -40     T Wave Axis 01/27/2019 57     Ventricular Rate 01/28/2019 72     Atrial Rate 01/28/2019 72     ID Interval 01/28/2019 148     QRSD Interval 01/28/2019 92     QT Interval 01/28/2019 400     QTC Interval 01/28/2019 438     P Axis 01/28/2019 78     QRS Axis 01/28/2019 47     T Wave Axis 01/28/2019 46      Imaging: Xr Chest 1 View Portable    Result Date: 1/25/2019  Narrative: CHEST INDICATION:   chest pain  COMPARISON:  None EXAM PERFORMED/VIEWS:  XR CHEST PORTABLE FINDINGS: Cardiomediastinal silhouette appears unremarkable  The lungs are clear  No pneumothorax or pleural effusion  Osseous structures appear within normal limits for patient age  Impression: No acute cardiopulmonary disease  Workstation performed: [id number]     Cta Ed Chest Pe Study    Result Date: 1/25/2019  Narrative: CTA - CHEST WITH IV CONTRAST - PULMONARY ANGIOGRAM INDICATION:   Chest pain, acute, PE suspected, low pretest prob  COMPARISON: None  TECHNIQUE: CTA examination of the chest was performed using angiographic technique according to a protocol specifically tailored to evaluate for pulmonary embolism  Axial, sagittal, and coronal 2D reformatted images were created from the source data and  submitted for interpretation    In addition, coronal 3D MIP postprocessing was performed on the acquisition scanner  Radiation dose length product (DLP) for this visit:  529 mGy-cm   This examination, like all CT scans performed in the [facility], was performed utilizing techniques to minimize radiation dose exposure, including the use of iterative reconstruction and automated exposure control  IV Contrast:  85 mL of iohexol (OMNIPAQUE)  FINDINGS: PULMONARY ARTERIAL TREE:  No pulmonary embolus is seen  LUNGS: 2 mm nodule left lower lobe series 3 image 95  Based on current Fleischner Society 2017 Guidelines on incidental pulmonary nodule, because the patient is considered high risk for lung cancer, 12 month follow-up non-contrast chest CT is recommended  PLEURA:  Unremarkable  HEART/GREAT VESSELS:  Unremarkable for patient's age  MEDIASTINUM AND MARK:  Nonspecific bilateral hilar lymphadenopathy  CHEST WALL AND LOWER NECK:   Unremarkable  VISUALIZED STRUCTURES IN THE UPPER ABDOMEN:  Nonspecific prominent gastric hepatic lymph nodes  OSSEOUS STRUCTURES:  No acute fracture or destructive osseous lesion  Impression: No evidence of pulmonary embolism  Nonspecific bilateral hilar lymphadenopathy  2 mm nodule left lower lobe series 3 image 95  Based on current Fleischner Society 2017 Guidelines on incidental pulmonary nodule, because the patient is considered high risk for lung cancer, 12 month follow-up non-contrast chest CT is recommended  Workstation performed: [id number]       Review of Systems:  Review of Systems   Constitutional: Negative for diaphoresis and fatigue  HENT: Negative for congestion and facial swelling  Eyes: Negative for photophobia and visual disturbance  Respiratory: Negative for chest tightness and shortness of breath  Cardiovascular: Negative for chest pain, palpitations and leg swelling  Gastrointestinal: Negative for abdominal pain and blood in stool  Endocrine: Negative for cold intolerance and heat intolerance     Musculoskeletal: Negative for arthralgias and myalgias  Skin: Negative for pallor and rash  Neurological: Negative for dizziness and syncope  Physical Exam:  Physical Exam   Constitutional: He is oriented to person, place, and time  He appears well-developed and well-nourished  HENT:   Head: Normocephalic and atraumatic  Eyes: Pupils are equal, round, and reactive to light  Conjunctivae and EOM are normal    Neck: Normal range of motion  Neck supple  No JVD present  No thyromegaly present  Cardiovascular: Normal rate, regular rhythm, normal heart sounds and intact distal pulses  Exam reveals no gallop and no friction rub  No murmur heard  Pulmonary/Chest: Effort normal and breath sounds normal  No respiratory distress  He has no wheezes  He has no rales  Abdominal: Soft  Bowel sounds are normal  He exhibits no distension  There is no tenderness  Musculoskeletal: Normal range of motion  He exhibits no edema  Neurological: He is alert and oriented to person, place, and time  He has normal reflexes  Skin: Skin is warm and dry  Psychiatric: He has a normal mood and affect  Discussion/Summary:  1  CHEST PAIN/NSTEMI/polymorphic VT/CORONARY DISEASE STATUS POST CIRCUMFLEX PCI with drug-eluting stent:  Angina has resolved  Continue aspirin, Plavix, statins, Toprol and losartan  Will get carotid ultrasound      2    Hyperlipidemia  Switch to statins     3   Tobacco use:   Patient stopped smoking    Follow-up in 6 months

## 2019-02-25 ENCOUNTER — TELEPHONE (OUTPATIENT)
Dept: SURGERY | Facility: CLINIC | Age: 59
End: 2019-02-25

## 2019-02-26 ENCOUNTER — APPOINTMENT (OUTPATIENT)
Dept: LAB | Facility: HOSPITAL | Age: 59
End: 2019-02-26
Attending: INTERNAL MEDICINE
Payer: COMMERCIAL

## 2019-02-26 PROCEDURE — 36415 COLL VENOUS BLD VENIPUNCTURE: CPT | Performed by: NURSE PRACTITIONER

## 2019-02-26 PROCEDURE — 86480 TB TEST CELL IMMUN MEASURE: CPT | Performed by: NURSE PRACTITIONER

## 2019-02-27 LAB
GAMMA INTERFERON BACKGROUND BLD IA-ACNC: 0.04 IU/ML
M TB IFN-G BLD-IMP: NEGATIVE
M TB IFN-G CD4+ BCKGRND COR BLD-ACNC: 0 IU/ML
M TB IFN-G CD4+ BCKGRND COR BLD-ACNC: 0.02 IU/ML
MITOGEN IGNF BCKGRD COR BLD-ACNC: >10 IU/ML

## 2019-03-05 ENCOUNTER — OFFICE VISIT (OUTPATIENT)
Dept: SURGERY | Facility: CLINIC | Age: 59
End: 2019-03-05
Payer: COMMERCIAL

## 2019-03-05 VITALS
OXYGEN SATURATION: 96 % | TEMPERATURE: 98 F | SYSTOLIC BLOOD PRESSURE: 140 MMHG | HEIGHT: 68 IN | HEART RATE: 84 BPM | BODY MASS INDEX: 29.52 KG/M2 | DIASTOLIC BLOOD PRESSURE: 90 MMHG | WEIGHT: 194.8 LBS

## 2019-03-05 DIAGNOSIS — F17.298 OTHER TOBACCO PRODUCT NICOTINE DEPENDENCE WITH OTHER NICOTINE-INDUCED DISORDER: ICD-10-CM

## 2019-03-05 DIAGNOSIS — I10 ESSENTIAL HYPERTENSION: ICD-10-CM

## 2019-03-05 DIAGNOSIS — K74.69 OTHER CIRRHOSIS OF LIVER (HCC): ICD-10-CM

## 2019-03-05 DIAGNOSIS — I25.2 HISTORY OF NON-ST ELEVATION MYOCARDIAL INFARCTION (NSTEMI): ICD-10-CM

## 2019-03-05 DIAGNOSIS — B20 HIV DISEASE (HCC): Primary | ICD-10-CM

## 2019-03-05 DIAGNOSIS — D75.1 POLYCYTHEMIA: ICD-10-CM

## 2019-03-05 PROCEDURE — 99215 OFFICE O/P EST HI 40 MIN: CPT | Performed by: INTERNAL MEDICINE

## 2019-03-05 NOTE — PROGRESS NOTES
Progress Note - Infectious Disease   Kapil Fernandez 62 y o  male MRN: [id number]  Unit/Bed#:  Encounter: [id number]      Impression/Plan:  1   HIV-improved adherence with ART with an undetectable viral load  His CD4 count is in the 400s   Will continue the Tivicay, Descovy, Prezcobix  Recheck labs in 2 months and follow up in 3 months   Stressed adherence  If patient still undetectable as of October 2017, can decrease followup to every 6 months     2  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  Right upper quadrant ultrasound for screening was negative and the alpha fetoprotein negative on the last check  Continue Q 6 month hepatocellular carcinoma screening      3   Nicotine dependence-patient has finally quit smoking after having an acute MI  Continue to encourage complete tobacco cessation     4   Polycythemia-likely secondary to the nicotine dependence  Seen by Hematology Oncology who agree with my assessment  Follow up the H&H closely now the patient is no longer smoking     5   Hypertension-asymptomatic and much improved   Discussed in detail with the primary    6  Acute myocardial infarction-status post PCI recently  Follow up with Cardiology  Discussed in detail with the primary      Patient was provided medication, adherence and prevention education    Subjective:  Routine follow-up for HIV  Patient claims 100% adherence with Prezcobix, Tijohanna Descovy   Patient recently had an acute MI and underwent PCI  Patient denies any notable side effects  Overall the feeling well  The patient denies any fever chills or sweats, denies any nausea vomiting or diarrhea, denies any cough or shortness of breath  ROS: A complete 12 point ROS is negative other than that noted in the HPI    Followup portions patient history reviewed and updated as:   Allergies, current medications, past medical history, past social history, past surgical history, and the problem list    Objective:  Vitals:  Vitals:    03/05/19 1635   BP: 140/90   Pulse: 84   Temp: 98 °F (36 7 °C)   SpO2: 96%   Weight: 88 4 kg (194 lb 12 8 oz)   Height: 5' 8" (1 727 m)       Physical Exam:   General Appearance:  Alert, interactive, appearing well,  nontoxic, no acute distress  Neck:   Supple without lymphadenopathy, no thyromegaly or masses   Throat: Oropharynx moist without lesions  Lungs:   Clear to auscultation bilaterally; no wheezes, rhonchi or rales; respirations unlabored   Heart:  RRR; no murmur, rub or gallop   Abdomen:   Soft, non-tender, non-distended, positive bowel sounds  Extremities: No clubbing, cyanosis or edema   Skin: No new rashes or lesions  No draining wounds noted  Labs, Imaging, & Other studies:   All pertinent labs and imaging studies were personally reviewed    Lab Results   Component Value Date     03/18/2017    K 4 0 02/26/2019     02/26/2019    CO2 28 02/26/2019    BUN 18 02/26/2019    CREATININE 0 90 02/26/2019    GLUF 115 (H) 02/26/2019    CALCIUM 9 3 02/26/2019    AST 21 02/26/2019    ALT 26 02/26/2019    ALKPHOS 79 02/26/2019    PROT 7 4 03/18/2017    BILITOT 1 1 03/18/2017    EGFR 94 02/26/2019     Lab Results   Component Value Date    WBC 6 64 02/26/2019    WBC 7 1 02/26/2019    HGB 17 1 (H) 02/26/2019    HGB 16 7 02/26/2019    HCT 51 0 (H) 02/26/2019    HCT 49 0 02/26/2019    MCV 88 02/26/2019    MCV 88 02/26/2019     02/26/2019     02/26/2019       Lab Results   Component Value Date    RPR Non-Reactive 09/29/2018     No results found for: [id number]  CD4 ABS   Date/Time Value Ref Range Status   02/26/2019 06:33  359 - 1519 /uL Final     HIV-1 RNA by PCR, Qn   Date/Time Value Ref Range Status   02/26/2019 06:33 AM <20 copies/mL Final     Comment:     HIV-1 RNA detected  The reportable range for this assay is 20 to 10,000,000  copies HIV-1 RNA/mL       HIV-1 RNA Viral Load Log   Date/Time Value Ref Range Status   02/26/2019 06:33 AM COMMENT [id number]/mL Final     Comment:     Unable to calculate result since non-numeric result obtained for  component test        Current Outpatient Medications:     albuterol (VENTOLIN HFA) 90 mcg/act inhaler, Inhale 2 puffs every 6 (six) hours as needed for wheezing, Disp: 18 g, Rfl: 0    aspirin (ASPIRIN LOW DOSE) 81 mg EC tablet, Take 1 tablet (81 mg total) by mouth daily, Disp: 30 tablet, Rfl: 5    clopidogrel (PLAVIX) 75 mg tablet, Take 1 tablet (75 mg total) by mouth daily, Disp: 30 tablet, Rfl: 0    Darunavir-Cobicistat (PREZCOBIX) 800-150 MG TABS, Take 1 tablet by mouth daily, Disp: 30 tablet, Rfl: 5    dolutegravir (TIVICAY) 50 MG TABS, Take 1 tablet (50 mg total) by mouth 2 (two) times a day, Disp: 60 tablet, Rfl: 5    emtricitabine-tenofovir AF (DESCOVY) 200-25 MG tablet, Take 1 tablet by mouth daily, Disp: 30 tablet, Rfl: 5    losartan-hydrochlorothiazide (HYZAAR) 50-12 5 mg per tablet, Take 1 tablet by mouth daily, Disp: 30 tablet, Rfl: 3    metoprolol succinate (TOPROL-XL) 25 mg 24 hr tablet, Take 1 tablet (25 mg total) by mouth daily, Disp: 30 tablet, Rfl: 0    nicotine (NICODERM CQ) 21 mg/24 hr TD 24 hr patch, Place 1 patch on the skin every 24 hours, Disp: 30 patch, Rfl: 0    rosuvastatin (CRESTOR) 20 MG tablet, Take 1 tablet (20 mg total) by mouth daily for 30 days, Disp: 30 tablet, Rfl: 2

## 2019-03-19 ENCOUNTER — APPOINTMENT (EMERGENCY)
Dept: RADIOLOGY | Facility: HOSPITAL | Age: 59
End: 2019-03-19
Payer: COMMERCIAL

## 2019-03-19 ENCOUNTER — HOSPITAL ENCOUNTER (OUTPATIENT)
Facility: HOSPITAL | Age: 59
Setting detail: OBSERVATION
Discharge: HOME | End: 2019-03-20
Attending: EMERGENCY MEDICINE | Admitting: INTERNAL MEDICINE
Payer: COMMERCIAL

## 2019-03-19 DIAGNOSIS — R07.9 CHEST PAIN: Primary | ICD-10-CM

## 2019-03-19 DIAGNOSIS — I25.10 CORONARY ARTERY DISEASE INVOLVING NATIVE CORONARY ARTERY OF NATIVE HEART WITHOUT ANGINA PECTORIS: ICD-10-CM

## 2019-03-19 LAB
ANION GAP SERPL CALCULATED.3IONS-SCNC: 10 MMOL/L (ref 4–13)
ATRIAL RATE: 76 BPM
BASOPHILS # BLD AUTO: 0.03 THOUSANDS/ΜL (ref 0–0.1)
BASOPHILS NFR BLD AUTO: 0 % (ref 0–1)
BUN SERPL-MCNC: 21 MG/DL (ref 5–25)
CALCIUM SERPL-MCNC: 9.1 MG/DL (ref 8.3–10.1)
CHLORIDE SERPL-SCNC: 103 MMOL/L (ref 100–108)
CO2 SERPL-SCNC: 26 MMOL/L (ref 21–32)
CREAT SERPL-MCNC: 0.96 MG/DL (ref 0.6–1.3)
EOSINOPHIL # BLD AUTO: 0.14 THOUSAND/ΜL (ref 0–0.61)
EOSINOPHIL NFR BLD AUTO: 2 % (ref 0–6)
ERYTHROCYTE [DISTWIDTH] IN BLOOD BY AUTOMATED COUNT: 13.9 % (ref 11.6–15.1)
GFR SERPL CREATININE-BSD FRML MDRD: 87 ML/MIN/1.73SQ M
GLUCOSE SERPL-MCNC: 86 MG/DL (ref 65–140)
HCT VFR BLD AUTO: 46.9 % (ref 36.5–49.3)
HGB BLD-MCNC: 15.9 G/DL (ref 12–17)
IMM GRANULOCYTES # BLD AUTO: 0.04 THOUSAND/UL (ref 0–0.2)
IMM GRANULOCYTES NFR BLD AUTO: 1 % (ref 0–2)
LYMPHOCYTES # BLD AUTO: 1.4 THOUSANDS/ΜL (ref 0.6–4.47)
LYMPHOCYTES NFR BLD AUTO: 20 % (ref 14–44)
MCH RBC QN AUTO: 30.2 PG (ref 26.8–34.3)
MCHC RBC AUTO-ENTMCNC: 33.9 G/DL (ref 31.4–37.4)
MCV RBC AUTO: 89 FL (ref 82–98)
MONOCYTES # BLD AUTO: 0.83 THOUSAND/ΜL (ref 0.17–1.22)
MONOCYTES NFR BLD AUTO: 12 % (ref 4–12)
NEUTROPHILS # BLD AUTO: 4.56 THOUSANDS/ΜL (ref 1.85–7.62)
NEUTS SEG NFR BLD AUTO: 65 % (ref 43–75)
NRBC BLD AUTO-RTO: 0 /100 WBCS
P AXIS: 77 DEGREES
PLATELET # BLD AUTO: 213 THOUSANDS/UL (ref 149–390)
PMV BLD AUTO: 9.7 FL (ref 8.9–12.7)
POTASSIUM SERPL-SCNC: 3.7 MMOL/L (ref 3.5–5.3)
PR INTERVAL: 158 MS
QRS AXIS: 61 DEGREES
QRSD INTERVAL: 100 MS
QT INTERVAL: 412 MS
QTC INTERVAL: 463 MS
RBC # BLD AUTO: 5.27 MILLION/UL (ref 3.88–5.62)
SODIUM SERPL-SCNC: 139 MMOL/L (ref 136–145)
T WAVE AXIS: 71 DEGREES
TROPONIN I SERPL-MCNC: <0.02 NG/ML
VENTRICULAR RATE: 76 BPM
WBC # BLD AUTO: 7 THOUSAND/UL (ref 4.31–10.16)

## 2019-03-19 PROCEDURE — 71046 X-RAY EXAM CHEST 2 VIEWS: CPT

## 2019-03-19 PROCEDURE — 99285 EMERGENCY DEPT VISIT HI MDM: CPT

## 2019-03-19 PROCEDURE — 99219 PR INITIAL OBSERVATION CARE/DAY 50 MINUTES: CPT | Performed by: INTERNAL MEDICINE

## 2019-03-19 PROCEDURE — 85025 COMPLETE CBC W/AUTO DIFF WBC: CPT | Performed by: EMERGENCY MEDICINE

## 2019-03-19 PROCEDURE — 93005 ELECTROCARDIOGRAM TRACING: CPT

## 2019-03-19 PROCEDURE — 80048 BASIC METABOLIC PNL TOTAL CA: CPT | Performed by: EMERGENCY MEDICINE

## 2019-03-19 PROCEDURE — 84484 ASSAY OF TROPONIN QUANT: CPT | Performed by: EMERGENCY MEDICINE

## 2019-03-19 PROCEDURE — 93010 ELECTROCARDIOGRAM REPORT: CPT | Performed by: INTERNAL MEDICINE

## 2019-03-19 PROCEDURE — 36415 COLL VENOUS BLD VENIPUNCTURE: CPT | Performed by: EMERGENCY MEDICINE

## 2019-03-19 RX ORDER — ATORVASTATIN CALCIUM 40 MG/1
40 TABLET, FILM COATED ORAL
Status: DISCONTINUED | OUTPATIENT
Start: 2019-03-19 | End: 2019-03-20 | Stop reason: HOSPADM

## 2019-03-19 RX ORDER — METOPROLOL SUCCINATE 25 MG/1
25 TABLET, EXTENDED RELEASE ORAL DAILY
Status: DISCONTINUED | OUTPATIENT
Start: 2019-03-20 | End: 2019-03-20 | Stop reason: HOSPADM

## 2019-03-19 RX ORDER — CLOPIDOGREL BISULFATE 75 MG/1
75 TABLET ORAL DAILY
Status: DISCONTINUED | OUTPATIENT
Start: 2019-03-20 | End: 2019-03-20 | Stop reason: HOSPADM

## 2019-03-19 RX ORDER — ASPIRIN 81 MG/1
81 TABLET ORAL DAILY
Status: DISCONTINUED | OUTPATIENT
Start: 2019-03-20 | End: 2019-03-20 | Stop reason: HOSPADM

## 2019-03-19 RX ORDER — NICOTINE 21 MG/24HR
1 PATCH, TRANSDERMAL 24 HOURS TRANSDERMAL EVERY 24 HOURS
Status: DISCONTINUED | OUTPATIENT
Start: 2019-03-19 | End: 2019-03-20 | Stop reason: HOSPADM

## 2019-03-19 RX ORDER — LORAZEPAM 1 MG/1
1 TABLET ORAL ONCE
Status: COMPLETED | OUTPATIENT
Start: 2019-03-19 | End: 2019-03-20

## 2019-03-19 RX ORDER — ALBUTEROL SULFATE 90 UG/1
2 AEROSOL, METERED RESPIRATORY (INHALATION) EVERY 6 HOURS PRN
Status: DISCONTINUED | OUTPATIENT
Start: 2019-03-19 | End: 2019-03-20 | Stop reason: HOSPADM

## 2019-03-20 ENCOUNTER — APPOINTMENT (OUTPATIENT)
Dept: NUCLEAR MEDICINE | Facility: HOSPITAL | Age: 59
End: 2019-03-20
Payer: COMMERCIAL

## 2019-03-20 ENCOUNTER — APPOINTMENT (OUTPATIENT)
Dept: NON INVASIVE DIAGNOSTICS | Facility: HOSPITAL | Age: 59
End: 2019-03-20
Payer: COMMERCIAL

## 2019-03-20 VITALS
OXYGEN SATURATION: 97 % | DIASTOLIC BLOOD PRESSURE: 86 MMHG | HEART RATE: 72 BPM | BODY MASS INDEX: 29.04 KG/M2 | TEMPERATURE: 97.9 F | HEIGHT: 68 IN | SYSTOLIC BLOOD PRESSURE: 143 MMHG | RESPIRATION RATE: 18 BRPM | WEIGHT: 191.58 LBS

## 2019-03-20 PROBLEM — R07.9 CHEST PAIN: Status: RESOLVED | Noted: 2019-01-25 | Resolved: 2019-03-20

## 2019-03-20 LAB
ANION GAP SERPL CALCULATED.3IONS-SCNC: 10 MMOL/L (ref 4–13)
ARRHY DURING EX: NORMAL
ATRIAL RATE: 65 BPM
BASOPHILS # BLD AUTO: 0.03 THOUSANDS/ΜL (ref 0–0.1)
BASOPHILS NFR BLD AUTO: 1 % (ref 0–1)
BUN SERPL-MCNC: 18 MG/DL (ref 5–25)
CALCIUM SERPL-MCNC: 8.8 MG/DL (ref 8.3–10.1)
CHEST PAIN STATEMENT: NORMAL
CHLORIDE SERPL-SCNC: 107 MMOL/L (ref 100–108)
CO2 SERPL-SCNC: 24 MMOL/L (ref 21–32)
CREAT SERPL-MCNC: 0.89 MG/DL (ref 0.6–1.3)
EOSINOPHIL # BLD AUTO: 0.11 THOUSAND/ΜL (ref 0–0.61)
EOSINOPHIL NFR BLD AUTO: 2 % (ref 0–6)
ERYTHROCYTE [DISTWIDTH] IN BLOOD BY AUTOMATED COUNT: 13.9 % (ref 11.6–15.1)
GFR SERPL CREATININE-BSD FRML MDRD: 94 ML/MIN/1.73SQ M
GLUCOSE SERPL-MCNC: 101 MG/DL (ref 65–140)
HCT VFR BLD AUTO: 48.3 % (ref 36.5–49.3)
HGB BLD-MCNC: 16.2 G/DL (ref 12–17)
IMM GRANULOCYTES # BLD AUTO: 0.02 THOUSAND/UL (ref 0–0.2)
IMM GRANULOCYTES NFR BLD AUTO: 0 % (ref 0–2)
LYMPHOCYTES # BLD AUTO: 0.95 THOUSANDS/ΜL (ref 0.6–4.47)
LYMPHOCYTES NFR BLD AUTO: 18 % (ref 14–44)
MAX DIASTOLIC BP: 86 MMHG
MAX HEART RATE: 109 BPM
MAX PREDICTED HEART RATE: 162 BPM
MAX. SYSTOLIC BP: 142 MMHG
MCH RBC QN AUTO: 29.9 PG (ref 26.8–34.3)
MCHC RBC AUTO-ENTMCNC: 33.5 G/DL (ref 31.4–37.4)
MCV RBC AUTO: 89 FL (ref 82–98)
MONOCYTES # BLD AUTO: 0.7 THOUSAND/ΜL (ref 0.17–1.22)
MONOCYTES NFR BLD AUTO: 13 % (ref 4–12)
NEUTROPHILS # BLD AUTO: 3.62 THOUSANDS/ΜL (ref 1.85–7.62)
NEUTS SEG NFR BLD AUTO: 66 % (ref 43–75)
NRBC BLD AUTO-RTO: 0 /100 WBCS
P AXIS: 68 DEGREES
PLATELET # BLD AUTO: 194 THOUSANDS/UL (ref 149–390)
PMV BLD AUTO: 9.6 FL (ref 8.9–12.7)
POTASSIUM SERPL-SCNC: 3.7 MMOL/L (ref 3.5–5.3)
PR INTERVAL: 172 MS
PROTOCOL NAME: NORMAL
QRS AXIS: 58 DEGREES
QRSD INTERVAL: 98 MS
QT INTERVAL: 434 MS
QTC INTERVAL: 451 MS
RBC # BLD AUTO: 5.42 MILLION/UL (ref 3.88–5.62)
REASON FOR TERMINATION: NORMAL
SODIUM SERPL-SCNC: 141 MMOL/L (ref 136–145)
T WAVE AXIS: 62 DEGREES
TARGET HR FORMULA: NORMAL
TEST INDICATION: NORMAL
TIME IN EXERCISE PHASE: NORMAL
TROPONIN I SERPL-MCNC: <0.02 NG/ML
TROPONIN I SERPL-MCNC: <0.02 NG/ML
VENTRICULAR RATE: 65 BPM
WBC # BLD AUTO: 5.43 THOUSAND/UL (ref 4.31–10.16)

## 2019-03-20 PROCEDURE — 93018 CV STRESS TEST I&R ONLY: CPT | Performed by: INTERNAL MEDICINE

## 2019-03-20 PROCEDURE — 85025 COMPLETE CBC W/AUTO DIFF WBC: CPT | Performed by: INTERNAL MEDICINE

## 2019-03-20 PROCEDURE — 99217 PR OBSERVATION CARE DISCHARGE MANAGEMENT: CPT | Performed by: FAMILY MEDICINE

## 2019-03-20 PROCEDURE — 93017 CV STRESS TEST TRACING ONLY: CPT

## 2019-03-20 PROCEDURE — 93016 CV STRESS TEST SUPVJ ONLY: CPT | Performed by: INTERNAL MEDICINE

## 2019-03-20 PROCEDURE — 99244 OFF/OP CNSLTJ NEW/EST MOD 40: CPT | Performed by: INTERNAL MEDICINE

## 2019-03-20 PROCEDURE — 80048 BASIC METABOLIC PNL TOTAL CA: CPT | Performed by: INTERNAL MEDICINE

## 2019-03-20 PROCEDURE — 36415 COLL VENOUS BLD VENIPUNCTURE: CPT | Performed by: INTERNAL MEDICINE

## 2019-03-20 PROCEDURE — 93010 ELECTROCARDIOGRAM REPORT: CPT | Performed by: INTERNAL MEDICINE

## 2019-03-20 PROCEDURE — A9502 TC99M TETROFOSMIN: HCPCS

## 2019-03-20 PROCEDURE — 78452 HT MUSCLE IMAGE SPECT MULT: CPT | Performed by: INTERNAL MEDICINE

## 2019-03-20 PROCEDURE — 78452 HT MUSCLE IMAGE SPECT MULT: CPT

## 2019-03-20 PROCEDURE — 84484 ASSAY OF TROPONIN QUANT: CPT | Performed by: FAMILY MEDICINE

## 2019-03-20 RX ORDER — PANTOPRAZOLE SODIUM 40 MG/1
40 TABLET, DELAYED RELEASE ORAL DAILY
Qty: 30 TABLET | Refills: 0 | Status: SHIPPED | OUTPATIENT
Start: 2019-03-20 | End: 2019-08-12 | Stop reason: SDUPTHER

## 2019-03-20 RX ORDER — ATORVASTATIN CALCIUM 40 MG/1
40 TABLET, FILM COATED ORAL
Qty: 30 TABLET | Refills: 0 | Status: SHIPPED | OUTPATIENT
Start: 2019-03-20 | End: 2019-07-01 | Stop reason: SDUPTHER

## 2019-03-20 RX ADMIN — LOSARTAN POTASSIUM: 50 TABLET, FILM COATED ORAL at 08:57

## 2019-03-20 RX ADMIN — ASPIRIN 81 MG: 81 TABLET, COATED ORAL at 08:54

## 2019-03-20 RX ADMIN — METOPROLOL SUCCINATE 25 MG: 25 TABLET, EXTENDED RELEASE ORAL at 08:55

## 2019-03-20 RX ADMIN — DOLUTEGRAVIR SODIUM 50 MG: 50 TABLET, FILM COATED ORAL at 08:57

## 2019-03-20 RX ADMIN — REGADENOSON 0.4 MG: 0.08 INJECTION, SOLUTION INTRAVENOUS at 10:33

## 2019-03-20 RX ADMIN — LORAZEPAM 1 MG: 1 TABLET ORAL at 00:18

## 2019-03-20 RX ADMIN — EMTRICITABINE AND TENOFOVIR ALAFENAMIDE 1 TABLET: 200; 25 TABLET ORAL at 08:57

## 2019-03-20 RX ADMIN — CLOPIDOGREL BISULFATE 75 MG: 75 TABLET ORAL at 08:54

## 2019-03-20 RX ADMIN — NICOTINE 1 PATCH: 21 PATCH TRANSDERMAL at 08:57

## 2019-03-20 RX ADMIN — ATORVASTATIN CALCIUM 40 MG: 40 TABLET, FILM COATED ORAL at 00:55

## 2019-03-21 ENCOUNTER — TRANSITIONAL CARE MANAGEMENT (OUTPATIENT)
Dept: SURGERY | Facility: CLINIC | Age: 59
End: 2019-03-21

## 2019-03-21 DIAGNOSIS — Z72.0 TOBACCO ABUSE: ICD-10-CM

## 2019-03-22 RX ORDER — NICOTINE 21 MG/24HR
1 PATCH, TRANSDERMAL 24 HOURS TRANSDERMAL EVERY 24 HOURS
Qty: 28 PATCH | Refills: 3 | Status: SHIPPED | OUTPATIENT
Start: 2019-03-22 | End: 2019-04-12 | Stop reason: HOSPADM

## 2019-04-10 DIAGNOSIS — I10 ESSENTIAL HYPERTENSION: ICD-10-CM

## 2019-04-10 DIAGNOSIS — I21.4 NSTEMI (NON-ST ELEVATED MYOCARDIAL INFARCTION) (HCC): ICD-10-CM

## 2019-04-12 ENCOUNTER — OFFICE VISIT (OUTPATIENT)
Dept: SURGERY | Facility: CLINIC | Age: 59
End: 2019-04-12
Payer: COMMERCIAL

## 2019-04-12 VITALS
DIASTOLIC BLOOD PRESSURE: 80 MMHG | HEIGHT: 68 IN | WEIGHT: 193 LBS | BODY MASS INDEX: 29.25 KG/M2 | OXYGEN SATURATION: 96 % | HEART RATE: 68 BPM | TEMPERATURE: 97.9 F | SYSTOLIC BLOOD PRESSURE: 110 MMHG

## 2019-04-12 DIAGNOSIS — R07.9 CHEST PAIN, UNSPECIFIED TYPE: ICD-10-CM

## 2019-04-12 DIAGNOSIS — I10 ESSENTIAL HYPERTENSION: ICD-10-CM

## 2019-04-12 DIAGNOSIS — F17.213 CIGARETTE NICOTINE DEPENDENCE WITH WITHDRAWAL: Primary | ICD-10-CM

## 2019-04-12 DIAGNOSIS — D22.9 ATYPICAL MOLE: Primary | ICD-10-CM

## 2019-04-12 PROBLEM — K21.9 GASTROESOPHAGEAL REFLUX DISEASE WITHOUT ESOPHAGITIS: Status: ACTIVE | Noted: 2019-04-12

## 2019-04-12 PROBLEM — R07.89 ATYPICAL CHEST PAIN: Status: ACTIVE | Noted: 2019-01-25

## 2019-04-12 PROCEDURE — 1111F DSCHRG MED/CURRENT MED MERGE: CPT | Performed by: NURSE PRACTITIONER

## 2019-04-12 PROCEDURE — 99496 TRANSJ CARE MGMT HIGH F2F 7D: CPT | Performed by: NURSE PRACTITIONER

## 2019-04-12 RX ORDER — CLOPIDOGREL BISULFATE 75 MG/1
TABLET ORAL
Qty: 30 TABLET | Refills: 0 | Status: SHIPPED | OUTPATIENT
Start: 2019-04-12 | End: 2019-04-30 | Stop reason: SDUPTHER

## 2019-04-12 RX ORDER — METOPROLOL SUCCINATE 25 MG/1
TABLET, EXTENDED RELEASE ORAL
Qty: 30 TABLET | Refills: 0 | Status: SHIPPED | OUTPATIENT
Start: 2019-04-12 | End: 2019-04-30 | Stop reason: SDUPTHER

## 2019-04-12 RX ORDER — NICOTINE 21 MG/24HR
1 PATCH, TRANSDERMAL 24 HOURS TRANSDERMAL EVERY 24 HOURS
Qty: 28 PATCH | Refills: 2 | Status: SHIPPED | OUTPATIENT
Start: 2019-04-12 | End: 2020-04-01 | Stop reason: SDUPTHER

## 2019-04-30 DIAGNOSIS — I21.4 NSTEMI (NON-ST ELEVATED MYOCARDIAL INFARCTION) (HCC): ICD-10-CM

## 2019-04-30 DIAGNOSIS — I10 ESSENTIAL HYPERTENSION: ICD-10-CM

## 2019-04-30 RX ORDER — CLOPIDOGREL BISULFATE 75 MG/1
TABLET ORAL
Qty: 30 TABLET | Refills: 0 | Status: SHIPPED | OUTPATIENT
Start: 2019-04-30 | End: 2019-08-12 | Stop reason: SDUPTHER

## 2019-04-30 RX ORDER — CLOPIDOGREL BISULFATE 75 MG/1
TABLET ORAL
Qty: 30 TABLET | Refills: 5 | Status: SHIPPED | OUTPATIENT
Start: 2019-04-30 | End: 2019-11-18 | Stop reason: SDUPTHER

## 2019-04-30 RX ORDER — METOPROLOL SUCCINATE 25 MG/1
TABLET, EXTENDED RELEASE ORAL
Qty: 30 TABLET | Refills: 0 | Status: SHIPPED | OUTPATIENT
Start: 2019-04-30 | End: 2019-06-04 | Stop reason: SDUPTHER

## 2019-05-06 NOTE — PROGRESS NOTES
Assessment    Annual nutrition assessment    Clinical Data/Client History    HIV: YES  AIDS: NO    Socio- Economic Status: Eats Out and Cooks    Living Situation: House    Food Prep/Access: Refrigerator, Stove and Microwave    Psycho Social Factors: N/A    Functional Status: normal    Oral Problems: Dentures  Partials upper and Partials lower currently undergoing dental work       Typical Food/Beverage Intake:    · Breakfast sausage sandwich  · Lunch deli ham and cheese sandwich/fruit  · Dinner meatballs/pork/pasta  · Snacks none reported  · Fluids coffee/water    Appetite: Good    Supplements: None    Food Intolerance: None    Current Body Weight: 185#    Nutrition Diagnosis    Problem: Predicted excessive fat and salt intake    Related to: Estimated intake inconsistent with measured nutritional needs and Lack of nutrition related education    As Evidenced By: Elevated lipid panel, HTN, recent tachycardia with initiation of antiplatelet therapy  Intervention Diet Prescription    Nutritional needs based on: 85kg    · 2040 kcal  · 68g protein  · 2040mL fluid  · 1 5g Na    Current intake: adequate    Snack/Supplement Recommendations: Increase whole fruits, vegetables, whole grains, water intake    Nutrition Recommendations: Low sodium, lean protein intake, reduce highly processed food    Goals: Improve overall nutritional intake r/t HTN/Hyperlipidemia/increase physical activity    Nutrition Education Intervention: Provided     Person Educated: Patient    Topics Discussed: Label reading for sodium and fat content/educational material provided at hospital     Teaching Method: Verbal and Written    Readiness to Change: Action    Visit Summary    Cee Sanchez was seen today for his annual nutrition assessment S/P myocardial infarction  CBW 85kg (BMI 28)  Prior education to patient r/t sodium and fat intake    Past review of nutritional intake indicates highly processed, high sodium foods ie; sausage, deli meats, pasta, etc  Limited fruit, vegetable, whole grain foods  PT is engaged at encounter related to discussing dietary intake in the setting of cardiovascular risk  Reviewed with handouts label reading for sodium and fat content  Reviewed dietary recommendations from hospital discharge  No current oral health problems, Sue Rowell is undergoing extensive dental work and will be getting partial dentures  Will continue interventions    Rylee Hernandez RD,LDN,CHC

## 2019-06-04 DIAGNOSIS — I10 ESSENTIAL HYPERTENSION: ICD-10-CM

## 2019-06-04 RX ORDER — LOSARTAN POTASSIUM AND HYDROCHLOROTHIAZIDE 12.5; 5 MG/1; MG/1
1 TABLET ORAL DAILY
Qty: 30 TABLET | Refills: 3 | Status: SHIPPED | OUTPATIENT
Start: 2019-06-04 | End: 2019-10-11 | Stop reason: SDUPTHER

## 2019-06-04 RX ORDER — METOPROLOL SUCCINATE 25 MG/1
TABLET, EXTENDED RELEASE ORAL
Qty: 30 TABLET | Refills: 0 | Status: SHIPPED | OUTPATIENT
Start: 2019-06-04 | End: 2019-07-01 | Stop reason: SDUPTHER

## 2019-06-06 ENCOUNTER — APPOINTMENT (OUTPATIENT)
Dept: LAB | Facility: HOSPITAL | Age: 59
End: 2019-06-06
Attending: INTERNAL MEDICINE
Payer: COMMERCIAL

## 2019-06-06 DIAGNOSIS — B20 HIV DISEASE (HCC): ICD-10-CM

## 2019-06-06 LAB
ALBUMIN SERPL BCP-MCNC: 3.9 G/DL (ref 3.5–5)
ALP SERPL-CCNC: 81 U/L (ref 46–116)
ALT SERPL W P-5'-P-CCNC: 31 U/L (ref 12–78)
ANION GAP SERPL CALCULATED.3IONS-SCNC: 11 MMOL/L (ref 4–13)
AST SERPL W P-5'-P-CCNC: 26 U/L (ref 5–45)
BASOPHILS # BLD AUTO: 0.03 THOUSANDS/ΜL (ref 0–0.1)
BASOPHILS NFR BLD AUTO: 0 % (ref 0–1)
BILIRUB SERPL-MCNC: 0.7 MG/DL (ref 0.2–1)
BUN SERPL-MCNC: 23 MG/DL (ref 5–25)
CALCIUM SERPL-MCNC: 9.4 MG/DL (ref 8.3–10.1)
CHLORIDE SERPL-SCNC: 101 MMOL/L (ref 100–108)
CO2 SERPL-SCNC: 26 MMOL/L (ref 21–32)
CREAT SERPL-MCNC: 1.11 MG/DL (ref 0.6–1.3)
EOSINOPHIL # BLD AUTO: 0.13 THOUSAND/ΜL (ref 0–0.61)
EOSINOPHIL NFR BLD AUTO: 2 % (ref 0–6)
ERYTHROCYTE [DISTWIDTH] IN BLOOD BY AUTOMATED COUNT: 13.9 % (ref 11.6–15.1)
GFR SERPL CREATININE-BSD FRML MDRD: 73 ML/MIN/1.73SQ M
GLUCOSE P FAST SERPL-MCNC: 104 MG/DL (ref 65–99)
HCT VFR BLD AUTO: 51.9 % (ref 36.5–49.3)
HGB BLD-MCNC: 17 G/DL (ref 12–17)
IMM GRANULOCYTES # BLD AUTO: 0.04 THOUSAND/UL (ref 0–0.2)
IMM GRANULOCYTES NFR BLD AUTO: 1 % (ref 0–2)
LYMPHOCYTES # BLD AUTO: 1.31 THOUSANDS/ΜL (ref 0.6–4.47)
LYMPHOCYTES NFR BLD AUTO: 19 % (ref 14–44)
MCH RBC QN AUTO: 29.3 PG (ref 26.8–34.3)
MCHC RBC AUTO-ENTMCNC: 32.8 G/DL (ref 31.4–37.4)
MCV RBC AUTO: 90 FL (ref 82–98)
MONOCYTES # BLD AUTO: 0.91 THOUSAND/ΜL (ref 0.17–1.22)
MONOCYTES NFR BLD AUTO: 13 % (ref 4–12)
NEUTROPHILS # BLD AUTO: 4.46 THOUSANDS/ΜL (ref 1.85–7.62)
NEUTS SEG NFR BLD AUTO: 65 % (ref 43–75)
NRBC BLD AUTO-RTO: 0 /100 WBCS
PLATELET # BLD AUTO: 243 THOUSANDS/UL (ref 149–390)
PMV BLD AUTO: 10 FL (ref 8.9–12.7)
POTASSIUM SERPL-SCNC: 3.8 MMOL/L (ref 3.5–5.3)
PROT SERPL-MCNC: 8.3 G/DL (ref 6.4–8.2)
RBC # BLD AUTO: 5.8 MILLION/UL (ref 3.88–5.62)
SODIUM SERPL-SCNC: 138 MMOL/L (ref 136–145)
WBC # BLD AUTO: 6.88 THOUSAND/UL (ref 4.31–10.16)

## 2019-06-06 PROCEDURE — 87536 HIV-1 QUANT&REVRSE TRNSCRPJ: CPT

## 2019-06-06 PROCEDURE — 36415 COLL VENOUS BLD VENIPUNCTURE: CPT

## 2019-06-06 PROCEDURE — 86480 TB TEST CELL IMMUN MEASURE: CPT

## 2019-06-06 PROCEDURE — 86360 T CELL ABSOLUTE COUNT/RATIO: CPT

## 2019-06-06 PROCEDURE — 85025 COMPLETE CBC W/AUTO DIFF WBC: CPT

## 2019-06-06 PROCEDURE — 80053 COMPREHEN METABOLIC PANEL: CPT

## 2019-06-07 LAB
GAMMA INTERFERON BACKGROUND BLD IA-ACNC: 0.02 IU/ML
M TB IFN-G BLD-IMP: NEGATIVE
M TB IFN-G CD4+ BCKGRND COR BLD-ACNC: 0 IU/ML
M TB IFN-G CD4+ BCKGRND COR BLD-ACNC: 0 IU/ML
MITOGEN IGNF BCKGRD COR BLD-ACNC: >10 IU/ML

## 2019-06-10 LAB
BASOPHILS # BLD AUTO: 0 X10E3/UL (ref 0–0.2)
BASOPHILS NFR BLD AUTO: 1 %
CD3+CD4+ CELLS # BLD: 241 /UL (ref 359–1519)
CD3+CD4+ CELLS NFR BLD: 18.5 % (ref 30.8–58.5)
CD3+CD4+ CELLS/CD3+CD8+ CLL BLD: 0.34 % (ref 0.92–3.72)
CD3+CD8+ CELLS # BLD: 715 /UL (ref 109–897)
CD3+CD8+ CELLS NFR BLD: 55 % (ref 12–35.5)
EOSINOPHIL # BLD AUTO: 0.1 X10E3/UL (ref 0–0.4)
EOSINOPHIL NFR BLD AUTO: 2 %
ERYTHROCYTE [DISTWIDTH] IN BLOOD BY AUTOMATED COUNT: 14.4 % (ref 12.3–15.4)
HCT VFR BLD AUTO: 55.5 % (ref 37.5–51)
HGB BLD-MCNC: 18.4 G/DL (ref 13–17.7)
HIV1 RNA # SERPL NAA+PROBE: 30 COPIES/ML
HIV1 RNA SERPL NAA+PROBE-LOG#: 1.48 LOG10COPY/ML
IMM GRANULOCYTES # BLD: 0 X10E3/UL (ref 0–0.1)
IMM GRANULOCYTES NFR BLD: 0 %
LYMPHOCYTES # BLD AUTO: 1.3 X10E3/UL (ref 0.7–3.1)
LYMPHOCYTES NFR BLD AUTO: 21 %
MCH RBC QN AUTO: 30.1 PG (ref 26.6–33)
MCHC RBC AUTO-ENTMCNC: 33.2 G/DL (ref 31.5–35.7)
MCV RBC AUTO: 91 FL (ref 79–97)
MONOCYTES # BLD AUTO: 0.6 X10E3/UL (ref 0.1–0.9)
MONOCYTES NFR BLD AUTO: 9 %
MORPHOLOGY BLD-IMP: ABNORMAL
NEUTROPHILS # BLD AUTO: 4.3 X10E3/UL (ref 1.4–7)
NEUTROPHILS NFR BLD AUTO: 67 %
PLATELET # BLD AUTO: 204 X10E3/UL (ref 150–450)
RBC # BLD AUTO: 6.11 X10E6/UL (ref 4.14–5.8)
WBC # BLD AUTO: 6.3 X10E3/UL (ref 3.4–10.8)

## 2019-06-11 ENCOUNTER — OFFICE VISIT (OUTPATIENT)
Dept: SURGERY | Facility: CLINIC | Age: 59
End: 2019-06-11
Payer: COMMERCIAL

## 2019-06-11 VITALS
SYSTOLIC BLOOD PRESSURE: 168 MMHG | TEMPERATURE: 98 F | HEART RATE: 75 BPM | OXYGEN SATURATION: 97 % | WEIGHT: 191.8 LBS | HEIGHT: 68 IN | BODY MASS INDEX: 29.07 KG/M2 | DIASTOLIC BLOOD PRESSURE: 98 MMHG

## 2019-06-11 DIAGNOSIS — B20 HIV DISEASE (HCC): Primary | ICD-10-CM

## 2019-06-11 DIAGNOSIS — I25.2 HISTORY OF NON-ST ELEVATION MYOCARDIAL INFARCTION (NSTEMI): ICD-10-CM

## 2019-06-11 DIAGNOSIS — K74.69 OTHER CIRRHOSIS OF LIVER (HCC): ICD-10-CM

## 2019-06-11 DIAGNOSIS — D75.1 POLYCYTHEMIA: ICD-10-CM

## 2019-06-11 DIAGNOSIS — I10 ESSENTIAL HYPERTENSION: ICD-10-CM

## 2019-06-11 DIAGNOSIS — J06.9 VIRAL UPPER RESPIRATORY TRACT INFECTION: ICD-10-CM

## 2019-06-11 PROCEDURE — 99215 OFFICE O/P EST HI 40 MIN: CPT | Performed by: INTERNAL MEDICINE

## 2019-06-12 ENCOUNTER — DOCUMENTATION (OUTPATIENT)
Dept: SURGERY | Facility: CLINIC | Age: 59
End: 2019-06-12

## 2019-06-12 VITALS — DIASTOLIC BLOOD PRESSURE: 82 MMHG | SYSTOLIC BLOOD PRESSURE: 156 MMHG

## 2019-07-01 DIAGNOSIS — I10 ESSENTIAL HYPERTENSION: ICD-10-CM

## 2019-07-01 DIAGNOSIS — I25.10 CORONARY ARTERY DISEASE INVOLVING NATIVE CORONARY ARTERY OF NATIVE HEART WITHOUT ANGINA PECTORIS: ICD-10-CM

## 2019-07-01 DIAGNOSIS — E78.5 DYSLIPIDEMIA: ICD-10-CM

## 2019-07-01 RX ORDER — ROSUVASTATIN CALCIUM 20 MG/1
TABLET, COATED ORAL
Qty: 30 TABLET | Refills: 2 | OUTPATIENT
Start: 2019-07-01

## 2019-07-01 RX ORDER — ATORVASTATIN CALCIUM 40 MG/1
40 TABLET, FILM COATED ORAL
Qty: 30 TABLET | Refills: 5 | Status: SHIPPED | OUTPATIENT
Start: 2019-07-01 | End: 2019-07-23 | Stop reason: HOSPADM

## 2019-07-01 RX ORDER — METOPROLOL SUCCINATE 25 MG/1
TABLET, EXTENDED RELEASE ORAL
Qty: 30 TABLET | Refills: 0 | OUTPATIENT
Start: 2019-07-01

## 2019-07-01 RX ORDER — METOPROLOL SUCCINATE 25 MG/1
25 TABLET, EXTENDED RELEASE ORAL DAILY
Qty: 30 TABLET | Refills: 5 | Status: SHIPPED | OUTPATIENT
Start: 2019-07-01 | End: 2019-11-18 | Stop reason: SDUPTHER

## 2019-07-09 ENCOUNTER — TELEPHONE (OUTPATIENT)
Dept: SURGERY | Facility: CLINIC | Age: 59
End: 2019-07-09

## 2019-07-22 ENCOUNTER — TELEPHONE (OUTPATIENT)
Dept: SURGERY | Facility: CLINIC | Age: 59
End: 2019-07-22

## 2019-07-23 ENCOUNTER — TELEPHONE (OUTPATIENT)
Dept: SURGERY | Facility: CLINIC | Age: 59
End: 2019-07-23

## 2019-07-23 DIAGNOSIS — E78.2 MIXED HYPERLIPIDEMIA: Primary | ICD-10-CM

## 2019-07-23 RX ORDER — ROSUVASTATIN CALCIUM 20 MG/1
20 TABLET, COATED ORAL DAILY
Qty: 30 TABLET | Refills: 3 | Status: SHIPPED | OUTPATIENT
Start: 2019-07-23 | End: 2019-11-12 | Stop reason: SDUPTHER

## 2019-07-29 ENCOUNTER — PATIENT OUTREACH (OUTPATIENT)
Dept: SURGERY | Facility: OTHER | Age: 59
End: 2019-07-29

## 2019-08-08 DIAGNOSIS — Z72.0 TOBACCO ABUSE: ICD-10-CM

## 2019-08-09 RX ORDER — NICOTINE 21 MG/24HR
1 PATCH, TRANSDERMAL 24 HOURS TRANSDERMAL EVERY 24 HOURS
Qty: 28 PATCH | Refills: 3 | OUTPATIENT
Start: 2019-08-09

## 2019-08-12 ENCOUNTER — PATIENT OUTREACH (OUTPATIENT)
Dept: SURGERY | Facility: OTHER | Age: 59
End: 2019-08-12

## 2019-08-12 ENCOUNTER — OFFICE VISIT (OUTPATIENT)
Dept: SURGERY | Facility: CLINIC | Age: 59
End: 2019-08-12
Payer: COMMERCIAL

## 2019-08-12 VITALS
HEIGHT: 68 IN | BODY MASS INDEX: 29.1 KG/M2 | OXYGEN SATURATION: 96 % | TEMPERATURE: 98.2 F | SYSTOLIC BLOOD PRESSURE: 120 MMHG | HEART RATE: 80 BPM | WEIGHT: 192 LBS | DIASTOLIC BLOOD PRESSURE: 80 MMHG

## 2019-08-12 DIAGNOSIS — I21.4 NSTEMI (NON-ST ELEVATED MYOCARDIAL INFARCTION) (HCC): ICD-10-CM

## 2019-08-12 DIAGNOSIS — R07.9 CHEST PAIN: ICD-10-CM

## 2019-08-12 DIAGNOSIS — E78.5 DYSLIPIDEMIA: ICD-10-CM

## 2019-08-12 DIAGNOSIS — I10 ESSENTIAL HYPERTENSION: ICD-10-CM

## 2019-08-12 DIAGNOSIS — K74.69 OTHER CIRRHOSIS OF LIVER (HCC): ICD-10-CM

## 2019-08-12 DIAGNOSIS — R05.9 COUGH: ICD-10-CM

## 2019-08-12 DIAGNOSIS — I25.2 HISTORY OF NON-ST ELEVATION MYOCARDIAL INFARCTION (NSTEMI): ICD-10-CM

## 2019-08-12 DIAGNOSIS — F17.298 OTHER TOBACCO PRODUCT NICOTINE DEPENDENCE WITH OTHER NICOTINE-INDUCED DISORDER: ICD-10-CM

## 2019-08-12 DIAGNOSIS — B20 HIV DISEASE (HCC): Primary | ICD-10-CM

## 2019-08-12 PROCEDURE — 99214 OFFICE O/P EST MOD 30 MIN: CPT | Performed by: NURSE PRACTITIONER

## 2019-08-12 PROCEDURE — 3074F SYST BP LT 130 MM HG: CPT | Performed by: NURSE PRACTITIONER

## 2019-08-12 RX ORDER — ALBUTEROL SULFATE 90 UG/1
2 AEROSOL, METERED RESPIRATORY (INHALATION) EVERY 6 HOURS PRN
Qty: 18 G | Refills: 0 | Status: SHIPPED | OUTPATIENT
Start: 2019-08-12 | End: 2019-09-24 | Stop reason: SDUPTHER

## 2019-08-12 RX ORDER — PANTOPRAZOLE SODIUM 40 MG/1
40 TABLET, DELAYED RELEASE ORAL DAILY
Qty: 30 TABLET | Refills: 0 | Status: SHIPPED | OUTPATIENT
Start: 2019-08-12 | End: 2019-09-24 | Stop reason: SDUPTHER

## 2019-08-12 NOTE — ASSESSMENT & PLAN NOTE
Compensated  Continue 6 month [facility] 75  surveillance  Due for RUQ US and AFP  Ordered today

## 2019-08-12 NOTE — PROGRESS NOTES
Assessment/Plan:    Other cirrhosis of liver ([facility] [address] )  Compensated  Continue [facility]  Due for RUQ US and AFP  Ordered today  Dyslipidemia  Continue rosuvastatin  Tolerating medication well and denies side effects  Eat a low fat/low cholesterol diet  Follow up with dietician for additional education  Essential hypertension  Blood pressure:   BP Readings from Last 3 Encounters:   08/12/19 120/80   06/11/19 156/82   06/11/19 168/98       Continue losartan-hctz  Educated on the following lifestyle modifications to lower BP and decrease cardiovascular risk factors  limit alcohol intake, reduce salt in diet, maintain a healthy weight, engage in 30 minutes of cardiovascular exercise daily, and not smoke  Nicotine dependence  Using nicotine patch with good results  Admits to smoking just 4 cigars in the last 6 months  Counseled for greater than 15 minutes on the importance of smoking cessation  Advised to quit  Educated on the increased risk of heart and lung disease associated with smoking  Referred to [address] for enrollment in smoking cessation program        History of non-ST elevation myocardial infarction (NSTEMI)  S/P craterization with intervention  Continue plavix ,ASA, and metoprolol  Educated on the need for antiplatlet therapy  Also discussed the action of metoprolol in assisting with pumping function of the heart after MI  Did not follow up with Cardiology  Kash Engel reports his last cardiologist has left the practice  Referred to cardiologist in [address]  Educated on the importance of continued smoking cessation, daily exercise and importance of eating a heart healthy diet  Gastroesophageal reflux disease without esophagitis  Restart Protonix  Educated on lifestyle modifications to improve GERD  Encouraged avoidance of acidic foods including citrus,onions, coffee, carbonated beverages, mint ,chocolate and fried foods    Encouraged abstinence from caffeine, tobacco, and alcohol  Educated to avoid laying down directly after eating a large meal  and consume smaller more frequent meals  Recommend a healthy body weight to decrease symptoms  HIV disease ([facility] [address] )  No results found for: QT8SFDB  CD4 ABS   Date/Time Value Ref Range Status   2019 07:07  (L) 359 - 1519 /uL Final     HIV-1 RNA by PCR, Qn   Date/Time Value Ref Range Status   2019 07:07 AM 30 copies/mL Final     Comment:     The reportable range for this assay is 20 to 10,000,000  copies HIV-1 RNA/mL  HIV-1 RNA Viral Load Log   Date/Time Value Ref Range Status   2019 07:07 AM 1 477 [id number]/mL Final         ART: Tivichermes Descovy, Prezcobix        Denies side effects  Stressed the importance of adherence  Continue follow up with ID clinic  Reviewed most recent labs, including Cd4 and viral load  Discussed the risks and benefits of treatment options, instructions for management, importance of treatment adherence, and reduction of risk factor  Educated on possible  medication side effects  Counseled on routes of HIV transmission, including the risk of  infection  Emphasized that viral suppression is the best method to prevent HIV transmission  At this time pt denies the need for HIV testing of anyone in their life  Total encounter time was 45 minutes  Greater then 20 minutes were spent on counseling and patient education  Pt voices understanding and agreement with treatment plan  Diagnoses and all orders for this visit:    NSTEMI (non-ST elevated myocardial infarction) [facility])  -     Ambulatory referral to Cardiology; Future    Chest pain  -     pantoprazole (PROTONIX) 40 mg tablet; Take 1 tablet (40 mg total) by mouth daily    Cough  -     albuterol (VENTOLIN HFA) 90 mcg/act inhaler;  Inhale 2 puffs every 6 (six) hours as needed for wheezing    Other cirrhosis of liver (HCC)    Dyslipidemia    Essential hypertension    Other tobacco product nicotine dependence with other nicotine-induced disorder    History of non-ST elevation myocardial infarction (NSTEMI)    HIV disease ([facility] [address] )          Subjective:      Patient ID: Oliver Salazar is a 61 y o  male  Rickie Delgadillo is here today for 3month PCP f/u of chronic conditions  PMHx significant for HIV, HCV SVR s/p treatment, cirrhosis,HTN,hyperlipidemia, and NSTEMI in January 2019 with cardiac intervention and stent placed  Rickie Delgadillo is c/o indigestion after eating  He was previously on Protonix, but stopped taking it several months ago  He would like a refill  The following portions of the patient's history were reviewed and updated as appropriate: allergies, current medications, past family history, past medical history, past social history, past surgical history and problem list     Review of Systems   Constitutional: Negative for activity change, appetite change, chills, diaphoresis, fatigue, fever and unexpected weight change  HENT: Negative for congestion, dental problem, ear pain, hearing loss, mouth sores, rhinorrhea and sore throat  Eyes: Negative for pain, redness and visual disturbance  Respiratory: Negative for shortness of breath and wheezing  Cardiovascular: Negative for chest pain and leg swelling  Gastrointestinal: Negative for abdominal pain, constipation, diarrhea, nausea and vomiting  Indigestion   Endocrine: Negative for polydipsia, polyphagia and polyuria  Genitourinary: Negative for difficulty urinating and dysuria  Musculoskeletal: Negative for back pain, joint swelling and myalgias  Skin: Negative for rash  Neurological: Negative for syncope and headaches  Psychiatric/Behavioral: Negative for behavioral problems and suicidal ideas           Objective:      /80   Pulse 80   Temp 98 2 °F (36 8 °C)   Ht 5' 8" (1 727 m)   Wt 87 1 kg (192 lb)   SpO2 96%   BMI 29 19 kg/m²       Lab Results   Component Value Date     03/18/2017    K 3 8 06/06/2019    CL 101 06/06/2019    CO2 26 06/06/2019    BUN 23 06/06/2019    CREATININE 1 11 06/06/2019    GLUF 104 (H) 06/06/2019    CALCIUM 9 4 06/06/2019    AST 26 06/06/2019    ALT 31 06/06/2019    ALKPHOS 81 06/06/2019    PROT 7 4 03/18/2017    BILITOT 1 1 03/18/2017    EGFR 73 06/06/2019     Lab Results   Component Value Date    WBC 6 88 06/06/2019    WBC 6 3 06/06/2019    HGB 17 0 06/06/2019    HGB 18 4 (H) 06/06/2019    HCT 51 9 (H) 06/06/2019    HCT 55 5 (H) 06/06/2019    MCV 90 06/06/2019    MCV 91 06/06/2019     06/06/2019     06/06/2019     No results found for: HEPCAB  Lab Results   Component Value Date    HEPBCAB REACTIVE (A) 10/08/2015    HEPBCAB REACTIVE (A) 10/08/2015    HEPBCAB REACTIVE (A) 10/08/2015    HEPBCAB REACTIVE (A) 10/08/2015     Lab Results   Component Value Date    RPR Non-Reactive 09/29/2018            Physical Exam   Constitutional: He is oriented to person, place, and time  He appears well-developed and well-nourished  No distress  HENT:   Head: Normocephalic  Right Ear: External ear normal    Left Ear: External ear normal    Nose: Nose normal    Mouth/Throat: Oropharynx is clear and moist  No oropharyngeal exudate  Eyes: Pupils are equal, round, and reactive to light  Conjunctivae are normal  Right eye exhibits no discharge  Left eye exhibits no discharge  Neck: Normal range of motion  No thyromegaly present  Cardiovascular: Normal rate, regular rhythm, normal heart sounds and intact distal pulses  No murmur heard  Pulmonary/Chest: Effort normal and breath sounds normal  He has no wheezes  Abdominal: Soft  Bowel sounds are normal  He exhibits no mass  There is no tenderness  Musculoskeletal: Normal range of motion  He exhibits no edema or tenderness  Lymphadenopathy:     He has no cervical adenopathy  Neurological: He is alert and oriented to person, place, and time  Skin: Skin is warm and dry  No rash noted  Psychiatric: He has a normal mood and affect   His behavior is normal

## 2019-08-12 NOTE — ASSESSMENT & PLAN NOTE
Blood pressure:   BP Readings from Last 3 Encounters:   08/12/19 120/80   06/11/19 156/82   06/11/19 168/98       Continue losartan-hctz  Educated on the following lifestyle modifications to lower BP and decrease cardiovascular risk factors  limit alcohol intake, reduce salt in diet, maintain a healthy weight, engage in 30 minutes of cardiovascular exercise daily, and not smoke

## 2019-08-12 NOTE — ASSESSMENT & PLAN NOTE
S/P craterization with intervention  Continue plavix ,ASA, and metoprolol  Educated on the need for antiplatlet therapy  Also discussed the action of metoprolol in assisting with pumping function of the heart after MI  Did not follow up with Cardiology  Fly Bueno reports his last cardiologist has left the practice  Referred to cardiologist in [place]  Educated on the importance of continued smoking cessation, daily exercise and importance of eating a heart healthy diet

## 2019-08-12 NOTE — ASSESSMENT & PLAN NOTE
Continue rosuvastatin  Tolerating medication well and denies side effects  Eat a low fat/low cholesterol diet  Follow up with dietician for additional education

## 2019-08-12 NOTE — ASSESSMENT & PLAN NOTE
Restart Protonix  Educated on lifestyle modifications to improve GERD  Encouraged avoidance of acidic foods including citrus,onions, coffee, carbonated beverages, mint ,chocolate and fried foods  Encouraged abstinence from caffeine, tobacco, and alcohol  Educated to avoid laying down directly after eating a large meal  and consume smaller more frequent meals  Recommend a healthy body weight to decrease symptoms

## 2019-08-12 NOTE — ASSESSMENT & PLAN NOTE
Using nicotine patch with good results  Admits to smoking just 4 cigars in the last 6 months  Counseled for greater than 15 minutes on the importance of smoking cessation  Advised to quit  Educated on the increased risk of heart and lung disease associated with smoking    Referred to [facility] for enrollment in smoking cessation program

## 2019-08-12 NOTE — PATIENT INSTRUCTIONS
Chest Pain, Ambulatory Care   GENERAL INFORMATION:   Chest pain  can be caused by a range of conditions, from not serious to life-threatening  It may be caused by a heart attack or a blood clot in your lungs  Sometimes chest pain or pressure is caused by poor blood flow to your heart (angina)  Infection, inflammation, or a fracture in the bones or cartilage in your chest can cause pain or discomfort  Chest pain can also be a symptom of a digestive problem, such as acid reflux or a stomach ulcer  Common symptoms include the following:   · Fever or sweating     · Nausea or vomiting     · Shortness of breath     · Discomfort or pressure that spreads from your chest to your back, jaw, or arm     · A racing or slow heartbeat     · Feeling weak, tired, or faint  Seek immediate care for the following symptoms:   · Any of the following signs of a heart attack:      ¨ Squeezing, pressure, or pain in your chest that lasts longer than 5 minutes or returns    ¨ Discomfort or pain in your back, neck, jaw, stomach, or arm     ¨ Trouble breathing     ¨ Nausea or vomiting    ¨ Lightheadedness or a sudden cold sweat, especially with trouble breathing         · Chest discomfort that gets worse, even with medicine    · Coughing or vomiting blood    · Black or bloody bowel movements     · Vomiting that does not stop, or pain when you swallow  Treatment for chest pain  may include medicine to treat your symptoms while he determines the cause of your chest pain  You may also need any of the following:  · Antiplatelets , such as aspirin, help prevent blood clots  Take your antiplatelet medicine exactly as directed  These medicines make it more likely for you to bleed or bruise  If you are told to take aspirin, do not take acetaminophen or ibuprofen instead  · Prescription pain medicine  may be given  Ask how to take this medicine safely  Do not smoke: If you smoke, it is never too late to quit   Smoking increases your risk for a heart attack and other heart and lung conditions  Ask your healthcare provider for information about how to stop smoking if you need help  Follow up with your healthcare provider as directed: You may need more tests  You may be referred to a specialist, such as a cardiologist or gastroenterologist  Write down your questions so you remember to ask them during your visits  CARE AGREEMENT:   You have the right to help plan your care  Learn about your health condition and how it may be treated  Discuss treatment options with your caregivers to decide what care you want to receive  You always have the right to refuse treatment  The above information is an  only  It is not intended as medical advice for individual conditions or treatments  Talk to your doctor, nurse or pharmacist before following any medical regimen to see if it is safe and effective for you  © 2014 [address] is for End User's use only and may not be sold, redistributed or otherwise used for commercial purposes  All illustrations and images included in CareNotes® are the copyrighted property of A D A M [employer]  or Saud Galarza  Gastroesophageal Reflux Disease   WHAT YOU NEED TO KNOW:   Gastroesophageal reflux occurs when acid and food in the stomach back up into the esophagus  Gastroesophageal reflux disease (GERD) is reflux that occurs more than twice a week for a few weeks  It usually causes heartburn and other symptoms  GERD can cause other health problems over time if it is not treated  DISCHARGE INSTRUCTIONS:   Return to the emergency department if:   · You feel full and cannot burp or vomit  · You have severe chest pain and sudden trouble breathing  · Your bowel movements are black, bloody, or tarry-looking  · Your vomit looks like coffee grounds or has blood in it  Contact your healthcare provider if:   · You vomit large amounts, or you vomit often      · You have trouble breathing after you vomit  · You have trouble swallowing, or pain with swallowing  · You are losing weight without trying  · Your symptoms get worse or do not improve with treatment  · You have questions or concerns about your condition or care  Medicines:   · Medicines  are used to decrease stomach acid  Medicine may also be used to help your lower esophageal sphincter and stomach contract (tighten) more  · Take your medicine as directed  Contact your healthcare provider if you think your medicine is not helping or if you have side effects  Tell him of her if you are allergic to any medicine  Keep a list of the medicines, vitamins, and herbs you take  Include the amounts, and when and why you take them  Bring the list or the pill bottles to follow-up visits  Carry your medicine list with you in case of an emergency  Manage GERD:   · Do not have foods or drinks that may increase heartburn  These include chocolate, peppermint, fried or fatty foods, drinks that contain caffeine, or carbonated drinks (soda)  Other foods include spicy foods, onions, tomatoes, and tomato-based foods  Do not have foods or drinks that can irritate your esophagus, such as citrus fruits, juices, and alcohol  · Do not eat large meals  When you eat a lot of food at one time, your stomach needs more acid to digest it  Eat 6 small meals each day instead of 3 large ones, and eat slowly  Do not eat meals 2 to 3 hours before bedtime  · Elevate the head of your bed  Place 6-inch blocks under the head of your bed frame  You may also use more than one pillow under your head and shoulders while you sleep  · Maintain a healthy weight  If you are overweight, weight loss may help relieve symptoms of GERD  · Do not smoke  Smoking weakens the lower esophageal sphincter and increases the risk of GERD  Ask your healthcare provider for information if you currently smoke and need help to quit   E-cigarettes or smokeless tobacco still contain nicotine  Talk to your healthcare provider before you use these products  · Do not wear clothing that is tight around your waist   Tight clothing can put pressure on your stomach and cause or worsen GERD symptoms  Follow up with your healthcare provider as directed:  Write down your questions so you remember to ask them during your visits  © 2017 [address] Information is for End User's use only and may not be sold, redistributed or otherwise used for commercial purposes  All illustrations and images included in CareNotes® are the copyrighted property of [employer] , [employer]  or Saud Galarza  The above information is an  only  It is not intended as medical advice for individual conditions or treatments  Talk to your doctor, nurse or pharmacist before following any medical regimen to see if it is safe and effective for you

## 2019-08-12 NOTE — ASSESSMENT & PLAN NOTE
No results found for: DT4GLOC  CD4 ABS   Date/Time Value Ref Range Status   2019 07:07  (L) 359 - 1519 /uL Final     HIV-1 RNA by PCR, Qn   Date/Time Value Ref Range Status   2019 07:07 AM 30 copies/mL Final     Comment:     The reportable range for this assay is 20 to 10,000,000  copies HIV-1 RNA/mL  HIV-1 RNA Viral Load Log   Date/Time Value Ref Range Status   2019 07:07 AM 1 477 [id number]/mL Final         ART: Tivichermes Descovy, Prezcobix        Denies side effects  Stressed the importance of adherence  Continue follow up with ID clinic  Reviewed most recent labs, including Cd4 and viral load  Discussed the risks and benefits of treatment options, instructions for management, importance of treatment adherence, and reduction of risk factor  Educated on possible  medication side effects  Counseled on routes of HIV transmission, including the risk of  infection  Emphasized that viral suppression is the best method to prevent HIV transmission  At this time pt denies the need for HIV testing of anyone in their life  Total encounter time was 45 minutes  Greater then 20 minutes were spent on counseling and patient education  Pt voices understanding and agreement with treatment plan

## 2019-08-19 ENCOUNTER — PATIENT OUTREACH (OUTPATIENT)
Dept: SURGERY | Facility: OTHER | Age: 59
End: 2019-08-19

## 2019-08-19 NOTE — PROGRESS NOTES
CT# [id number] MROR    Cm received ct's August MAWD premium for processing  Cm prepared the Check requisition scanned and emailed to Zcah Montalvo to be processed  Please see scanned items

## 2019-08-23 ENCOUNTER — TELEPHONE (OUTPATIENT)
Dept: SURGERY | Facility: CLINIC | Age: 59
End: 2019-08-23

## 2019-08-28 ENCOUNTER — PATIENT OUTREACH (OUTPATIENT)
Dept: SURGERY | Facility: OTHER | Age: 59
End: 2019-08-28

## 2019-08-30 ENCOUNTER — PATIENT OUTREACH (OUTPATIENT)
Dept: SURGERY | Facility: OTHER | Age: 59
End: 2019-08-30

## 2019-08-30 NOTE — PROGRESS NOTES
CT#[id number] MROR    Ct came in to the [facility] office to complete RCT, RST  Ct last saw Dr Juliana Pope on 6/11/19 and will see Dr Juliana Pope again on 9/10/19  Ct got labs done on 6/6/19 which can be looked at in the labs tab  Ct has also been seen by the [facility] at the [facility] and provided with several referrals to a Cardiologist and for imaging after ct's heart surgery  Ct states is in stable medical health and is adherent to HIV care  Ct last went to the dentist on December 2018 and had a root canal  Ct is scheduled and has an approved auth for further dental Tx but is unable to schedule any further dental services since he is on blood thinners until January 2020 and is not cleared for any dental Tx until after January  Ct had his eyes checked last year and has received the reminder to schedule his next visit  Ct will let cm know of his next vision appt  Ct is abstaining from risky behaviors, ct has been  to his wife for over 25 years, ct reports he uses condoms with his wife for sexual intercourse  Ct has no addictions or mental health concerns, he is independent in ADL  Ct has [employer] coverage, cm will work on [employer] authorization since the one approved expires on 9/6/19 and only covers premiums for July and August  Ct works full time, has a car, and a good support system, no dependents and no issues with  language comprehension, legal or nutrition  Re-certification and Reassessment is complete

## 2019-09-03 ENCOUNTER — APPOINTMENT (OUTPATIENT)
Dept: LAB | Facility: HOSPITAL | Age: 59
End: 2019-09-03
Attending: INTERNAL MEDICINE
Payer: COMMERCIAL

## 2019-09-03 DIAGNOSIS — K74.69 OTHER CIRRHOSIS OF LIVER (HCC): ICD-10-CM

## 2019-09-03 DIAGNOSIS — B20 HIV DISEASE (HCC): ICD-10-CM

## 2019-09-03 LAB
AFP-TM SERPL-MCNC: 4.9 NG/ML (ref 0.5–8)
ALBUMIN SERPL BCP-MCNC: 3.9 G/DL (ref 3.5–5)
ALP SERPL-CCNC: 81 U/L (ref 46–116)
ALT SERPL W P-5'-P-CCNC: 22 U/L (ref 12–78)
ANION GAP SERPL CALCULATED.3IONS-SCNC: 8 MMOL/L (ref 4–13)
AST SERPL W P-5'-P-CCNC: 25 U/L (ref 5–45)
BASOPHILS # BLD AUTO: 0.03 THOUSANDS/ΜL (ref 0–0.1)
BASOPHILS NFR BLD AUTO: 0 % (ref 0–1)
BILIRUB SERPL-MCNC: 0.8 MG/DL (ref 0.2–1)
BUN SERPL-MCNC: 20 MG/DL (ref 5–25)
CALCIUM SERPL-MCNC: 9.1 MG/DL (ref 8.3–10.1)
CHLORIDE SERPL-SCNC: 102 MMOL/L (ref 100–108)
CO2 SERPL-SCNC: 28 MMOL/L (ref 21–32)
CREAT SERPL-MCNC: 1.11 MG/DL (ref 0.6–1.3)
EOSINOPHIL # BLD AUTO: 0.16 THOUSAND/ΜL (ref 0–0.61)
EOSINOPHIL NFR BLD AUTO: 2 % (ref 0–6)
ERYTHROCYTE [DISTWIDTH] IN BLOOD BY AUTOMATED COUNT: 14.4 % (ref 11.6–15.1)
GFR SERPL CREATININE-BSD FRML MDRD: 72 ML/MIN/1.73SQ M
GLUCOSE P FAST SERPL-MCNC: 115 MG/DL (ref 65–99)
HCT VFR BLD AUTO: 54.1 % (ref 36.5–49.3)
HGB BLD-MCNC: 17.7 G/DL (ref 12–17)
IMM GRANULOCYTES # BLD AUTO: 0.04 THOUSAND/UL (ref 0–0.2)
IMM GRANULOCYTES NFR BLD AUTO: 1 % (ref 0–2)
LYMPHOCYTES # BLD AUTO: 1.22 THOUSANDS/ΜL (ref 0.6–4.47)
LYMPHOCYTES NFR BLD AUTO: 18 % (ref 14–44)
MCH RBC QN AUTO: 29.3 PG (ref 26.8–34.3)
MCHC RBC AUTO-ENTMCNC: 32.7 G/DL (ref 31.4–37.4)
MCV RBC AUTO: 89 FL (ref 82–98)
MONOCYTES # BLD AUTO: 0.81 THOUSAND/ΜL (ref 0.17–1.22)
MONOCYTES NFR BLD AUTO: 12 % (ref 4–12)
NEUTROPHILS # BLD AUTO: 4.46 THOUSANDS/ΜL (ref 1.85–7.62)
NEUTS SEG NFR BLD AUTO: 67 % (ref 43–75)
NRBC BLD AUTO-RTO: 0 /100 WBCS
PLATELET # BLD AUTO: 231 THOUSANDS/UL (ref 149–390)
PMV BLD AUTO: 9.7 FL (ref 8.9–12.7)
POTASSIUM SERPL-SCNC: 4.4 MMOL/L (ref 3.5–5.3)
PROT SERPL-MCNC: 8.3 G/DL (ref 6.4–8.2)
RBC # BLD AUTO: 6.05 MILLION/UL (ref 3.88–5.62)
SODIUM SERPL-SCNC: 138 MMOL/L (ref 136–145)
WBC # BLD AUTO: 6.72 THOUSAND/UL (ref 4.31–10.16)

## 2019-09-03 PROCEDURE — 87536 HIV-1 QUANT&REVRSE TRNSCRPJ: CPT

## 2019-09-03 PROCEDURE — 85025 COMPLETE CBC W/AUTO DIFF WBC: CPT

## 2019-09-03 PROCEDURE — 80053 COMPREHEN METABOLIC PANEL: CPT

## 2019-09-03 PROCEDURE — 86360 T CELL ABSOLUTE COUNT/RATIO: CPT

## 2019-09-03 PROCEDURE — 36415 COLL VENOUS BLD VENIPUNCTURE: CPT

## 2019-09-03 PROCEDURE — 82105 ALPHA-FETOPROTEIN SERUM: CPT

## 2019-09-04 LAB
BASOPHILS # BLD AUTO: 0 X10E3/UL (ref 0–0.2)
BASOPHILS NFR BLD AUTO: 0 %
CD3+CD4+ CELLS # BLD: 286 /UL (ref 359–1519)
CD3+CD4+ CELLS NFR BLD: 20.4 % (ref 30.8–58.5)
CD3+CD4+ CELLS/CD3+CD8+ CLL BLD: 0.38 % (ref 0.92–3.72)
CD3+CD8+ CELLS # BLD: 752 /UL (ref 109–897)
CD3+CD8+ CELLS NFR BLD: 53.7 % (ref 12–35.5)
EOSINOPHIL # BLD AUTO: 0.1 X10E3/UL (ref 0–0.4)
EOSINOPHIL NFR BLD AUTO: 2 %
ERYTHROCYTE [DISTWIDTH] IN BLOOD BY AUTOMATED COUNT: 14.8 % (ref 12.3–15.4)
HCT VFR BLD AUTO: 55.5 % (ref 37.5–51)
HGB BLD-MCNC: 18.8 G/DL (ref 13–17.7)
IMM GRANULOCYTES # BLD: 0 X10E3/UL (ref 0–0.1)
IMM GRANULOCYTES NFR BLD: 1 %
LYMPHOCYTES # BLD AUTO: 1.4 X10E3/UL (ref 0.7–3.1)
LYMPHOCYTES NFR BLD AUTO: 22 %
MCH RBC QN AUTO: 30.1 PG (ref 26.6–33)
MCHC RBC AUTO-ENTMCNC: 33.9 G/DL (ref 31.5–35.7)
MCV RBC AUTO: 89 FL (ref 79–97)
MONOCYTES # BLD AUTO: 0.6 X10E3/UL (ref 0.1–0.9)
MONOCYTES NFR BLD AUTO: 9 %
NEUTROPHILS # BLD AUTO: 4.2 X10E3/UL (ref 1.4–7)
NEUTROPHILS NFR BLD AUTO: 66 %
PLATELET # BLD AUTO: 194 X10E3/UL (ref 150–450)
RBC # BLD AUTO: 6.24 X10E6/UL (ref 4.14–5.8)
WBC # BLD AUTO: 6.4 X10E3/UL (ref 3.4–10.8)

## 2019-09-05 LAB
HIV1 RNA # SERPL NAA+PROBE: <20 COPIES/ML
HIV1 RNA SERPL NAA+PROBE-LOG#: NORMAL LOG10COPY/ML

## 2019-09-09 ENCOUNTER — PATIENT OUTREACH (OUTPATIENT)
Dept: SURGERY | Facility: OTHER | Age: 59
End: 2019-09-09

## 2019-09-09 NOTE — PROGRESS NOTES
CT# [id number] [facility] emailed cm requesting copy of his paystubs to take in to the [address] since this was submitted via mail and the ct was told that it was not received in a timely manner and that his MAWD status might be deactivated if they did not have the paperwork needed  Cm emailed ct's pay stubs submitted last month during ct's RCT, RST appointment  Ct emailed cm stating that everything was corrected at [address]

## 2019-09-10 ENCOUNTER — OFFICE VISIT (OUTPATIENT)
Dept: SURGERY | Facility: CLINIC | Age: 59
End: 2019-09-10
Payer: COMMERCIAL

## 2019-09-10 VITALS
DIASTOLIC BLOOD PRESSURE: 84 MMHG | BODY MASS INDEX: 29.64 KG/M2 | HEART RATE: 104 BPM | HEIGHT: 68 IN | SYSTOLIC BLOOD PRESSURE: 126 MMHG | OXYGEN SATURATION: 95 % | WEIGHT: 195.6 LBS | TEMPERATURE: 98.2 F

## 2019-09-10 DIAGNOSIS — D75.1 POLYCYTHEMIA: ICD-10-CM

## 2019-09-10 DIAGNOSIS — Z72.89 OTHER PROBLEMS RELATED TO LIFESTYLE: ICD-10-CM

## 2019-09-10 DIAGNOSIS — I10 ESSENTIAL HYPERTENSION: ICD-10-CM

## 2019-09-10 DIAGNOSIS — D72.89 OTHER SPECIFIED DISORDERS OF WHITE BLOOD CELLS: ICD-10-CM

## 2019-09-10 DIAGNOSIS — B20 HIV DISEASE (HCC): Primary | ICD-10-CM

## 2019-09-10 DIAGNOSIS — I25.10 CORONARY ARTERY DISEASE INVOLVING NATIVE CORONARY ARTERY OF NATIVE HEART WITHOUT ANGINA PECTORIS: ICD-10-CM

## 2019-09-10 DIAGNOSIS — Z11.3 ENCOUNTER FOR SCREENING FOR BACTERIAL SEXUALLY TRANSMITTED DISEASE: ICD-10-CM

## 2019-09-10 DIAGNOSIS — K74.69 OTHER CIRRHOSIS OF LIVER (HCC): ICD-10-CM

## 2019-09-10 DIAGNOSIS — Z20.2 CONTACT WITH AND (SUSPECTED) EXPOSURE TO INFECTIONS WITH A PREDOMINANTLY SEXUAL MODE OF TRANSMISSION: ICD-10-CM

## 2019-09-10 PROCEDURE — 3074F SYST BP LT 130 MM HG: CPT | Performed by: INTERNAL MEDICINE

## 2019-09-10 PROCEDURE — 3079F DIAST BP 80-89 MM HG: CPT | Performed by: INTERNAL MEDICINE

## 2019-09-10 PROCEDURE — 99215 OFFICE O/P EST HI 40 MIN: CPT | Performed by: INTERNAL MEDICINE

## 2019-09-10 NOTE — PROGRESS NOTES
Progress Note - Infectious Disease   Adin Shaw 61 y o  male MRN: [id number]  Unit/Bed#:  Encounter: [id number]      Impression/Plan:  1   HIV-improved adherence with ART with an undetectable viral load and CD4 count in the high 200s  Will continue the Tivicay, Descovy, Prezcobix  Recheck labs in 2 months and follow up in 3 months   Stressed adherence   Will hold on transitioning to q 6 months for now and until the next visit, and consider going to q 6 months if the CD4 count has rebounded     2  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  Right upper quadrant ultrasound for screening was negative and the alpha fetoprotein negative on the last check   Continue Q 6 month hepatocellular carcinoma screening      3   Polycythemia-likely secondary to the nicotine dependence   Seen by Hematology Oncology who agree with my assessment  Will continue to monitor the CBC with diff now that he is no longer smoking     4   Hypertension-asymptomatic and much improved   Discussed in detail with the primary     5  Coronary artery disease-complicated by acute MI status post PCI   Now asymptomatic  Follow up with Cardiology      6  Nicotine dependence-patient continues to smoke occasionally  He continues to be interesting quitting to scant completely kick habit  Stressed the importance of complete tobacco cessation     Discussed the above plan in detail with the primary      Patient was provided medication, adherence and prevention education    Subjective:  Routine follow-up for HIV  Patient claims 100% adherence with Tivicay/Descovy/Prezcobix  Patient denies any notable side effects  Overall the feeling well  The patient denies any fever chills or sweats, denies any nausea vomiting or diarrhea, denies any cough or shortness of breath  ROS: A complete 12 point ROS is negative other than that noted in the HPI    Followup portions patient history reviewed and updated as:   Allergies, current medications, past medical history, past social history, past surgical history, and the problem list    Objective:  Vitals:  Vitals:    09/10/19 1657   BP: 126/84   Pulse: 104   Temp: 98 2 °F (36 8 °C)   SpO2: 95%   Weight: 88 7 kg (195 lb 9 6 oz)   Height: 5' 8" (1 727 m)       Physical Exam:   General Appearance:  Alert, interactive, appearing well,  nontoxic, no acute distress  Neck:   Supple without lymphadenopathy, no thyromegaly or masses   Throat: Oropharynx moist without lesions  Lungs:   Clear to auscultation bilaterally; no wheezes, rhonchi or rales; respirations unlabored   Heart:  RRR; no murmur, rub or gallop   Abdomen:   Soft, non-tender, non-distended, positive bowel sounds  Extremities: No clubbing, cyanosis or edema   Skin: No new rashes or lesions  No draining wounds noted         Labs, Imaging, & Other studies:   All pertinent labs and imaging studies were personally reviewed    Lab Results   Component Value Date     03/18/2017    K 4 4 09/03/2019     09/03/2019    CO2 28 09/03/2019    BUN 20 09/03/2019    CREATININE 1 11 09/03/2019    GLUF 115 (H) 09/03/2019    CALCIUM 9 1 09/03/2019    AST 25 09/03/2019    ALT 22 09/03/2019    ALKPHOS 81 09/03/2019    PROT 7 4 03/18/2017    BILITOT 1 1 03/18/2017    EGFR 72 09/03/2019     Lab Results   Component Value Date    WBC 6 72 09/03/2019    WBC 6 4 09/03/2019    HGB 17 7 (H) 09/03/2019    HGB 18 8 (H) 09/03/2019    HCT 54 1 (H) 09/03/2019    HCT 55 5 (H) 09/03/2019    MCV 89 09/03/2019    MCV 89 09/03/2019     09/03/2019     09/03/2019     No results found for: HEPCAB  Lab Results   Component Value Date    HEPBCAB REACTIVE (A) 10/08/2015    HEPBCAB REACTIVE (A) 10/08/2015    HEPBCAB REACTIVE (A) 10/08/2015    HEPBCAB REACTIVE (A) 10/08/2015     Lab Results   Component Value Date    RPR Non-Reactive 09/29/2018     CD4 ABS   Date/Time Value Ref Range Status   09/03/2019 07:12  (L) 359 - 1519 /uL Final     HIV-1 RNA by PCR, Qn   Date/Time Value Ref Range Status   09/03/2019 07:12 AM <20 copies/mL Final     Comment:     HIV-1 RNA detected  The reportable range for this assay is 20 to 10,000,000  copies HIV-1 RNA/mL             Current Outpatient Medications:     albuterol (VENTOLIN HFA) 90 mcg/act inhaler, Inhale 2 puffs every 6 (six) hours as needed for wheezing, Disp: 18 g, Rfl: 0    aspirin (ASPIRIN LOW DOSE) 81 mg EC tablet, Take 1 tablet (81 mg total) by mouth daily, Disp: 30 tablet, Rfl: 5    clopidogrel (PLAVIX) 75 mg tablet, TAKE 1 TABLET BY MOUTH DAILY, Disp: 30 tablet, Rfl: 5    Darunavir-Cobicistat (PREZCOBIX) 800-150 MG TABS, Take 1 tablet by mouth daily, Disp: 30 tablet, Rfl: 5    dolutegravir (TIVICAY) 50 MG TABS, Take 1 tablet (50 mg total) by mouth 2 (two) times a day, Disp: 60 tablet, Rfl: 5    emtricitabine-tenofovir AF (DESCOVY) 200-25 MG tablet, Take 1 tablet by mouth daily, Disp: 30 tablet, Rfl: 5    losartan-hydrochlorothiazide (HYZAAR) 50-12 5 mg per tablet, TAKE 1 TABLET BY MOUTH DAILY, Disp: 30 tablet, Rfl: 3    metoprolol succinate (TOPROL-XL) 25 mg 24 hr tablet, Take 1 tablet (25 mg total) by mouth daily, Disp: 30 tablet, Rfl: 5    nicotine (NICODERM CQ) 14 mg/24hr TD 24 hr patch, Place 1 patch on the skin every 24 hours, Disp: 28 patch, Rfl: 2    pantoprazole (PROTONIX) 40 mg tablet, Take 1 tablet (40 mg total) by mouth daily, Disp: 30 tablet, Rfl: 0    rosuvastatin (CRESTOR) 20 MG tablet, Take 1 tablet (20 mg total) by mouth daily, Disp: 30 tablet, Rfl: 3

## 2019-09-23 ENCOUNTER — PATIENT OUTREACH (OUTPATIENT)
Dept: SURGERY | Facility: OTHER | Age: 59
End: 2019-09-23

## 2019-09-24 ENCOUNTER — PATIENT OUTREACH (OUTPATIENT)
Dept: SURGERY | Facility: OTHER | Age: 59
End: 2019-09-24

## 2019-09-24 ENCOUNTER — TELEPHONE (OUTPATIENT)
Dept: SURGERY | Facility: CLINIC | Age: 59
End: 2019-09-24

## 2019-09-24 DIAGNOSIS — R07.9 CHEST PAIN: ICD-10-CM

## 2019-09-24 DIAGNOSIS — R05.9 COUGH: ICD-10-CM

## 2019-09-24 RX ORDER — ALBUTEROL SULFATE 90 UG/1
2 AEROSOL, METERED RESPIRATORY (INHALATION) EVERY 6 HOURS PRN
Qty: 18 G | Refills: 0 | Status: SHIPPED | OUTPATIENT
Start: 2019-09-24 | End: 2019-11-18 | Stop reason: SDUPTHER

## 2019-09-24 RX ORDER — PANTOPRAZOLE SODIUM 40 MG/1
40 TABLET, DELAYED RELEASE ORAL DAILY
Qty: 30 TABLET | Refills: 0 | Status: SHIPPED | OUTPATIENT
Start: 2019-09-24 | End: 2019-11-05 | Stop reason: SDUPTHER

## 2019-09-24 NOTE — PROGRESS NOTES
CT# [id number]  [facility]    Cm received ct's [facility] Auth #[id number] 8/30/19-3/1/20 payments from Sept -Feb  $66 a month for a total of $396  Cm prepared ct's September [facility] check requisition, scanned and emailed to supervisor for processing  Please see scanned media

## 2019-10-11 DIAGNOSIS — B20 HIV (HUMAN IMMUNODEFICIENCY VIRUS INFECTION) (HCC): ICD-10-CM

## 2019-10-11 DIAGNOSIS — I10 ESSENTIAL HYPERTENSION: ICD-10-CM

## 2019-10-14 RX ORDER — LOSARTAN POTASSIUM AND HYDROCHLOROTHIAZIDE 12.5; 5 MG/1; MG/1
1 TABLET ORAL DAILY
Qty: 30 TABLET | Refills: 3 | Status: SHIPPED | OUTPATIENT
Start: 2019-10-14 | End: 2019-11-18 | Stop reason: SDUPTHER

## 2019-10-14 RX ORDER — DOLUTEGRAVIR SODIUM 50 MG/1
TABLET, FILM COATED ORAL
Qty: 60 TABLET | Refills: 5 | Status: SHIPPED | OUTPATIENT
Start: 2019-10-14 | End: 2019-11-18 | Stop reason: HOSPADM

## 2019-10-14 RX ORDER — EMTRICITABINE AND TENOFOVIR ALAFENAMIDE 200; 25 MG/1; MG/1
TABLET ORAL
Qty: 30 TABLET | Refills: 5 | Status: SHIPPED | OUTPATIENT
Start: 2019-10-14 | End: 2019-11-18 | Stop reason: HOSPADM

## 2019-10-23 ENCOUNTER — PATIENT OUTREACH (OUTPATIENT)
Dept: SURGERY | Facility: OTHER | Age: 59
End: 2019-10-23

## 2019-10-23 NOTE — PROGRESS NOTES
GZ#[id number] MROR    Cm received October [facility] statement from ct via email and prepared ct's check requisition to email to supervisor for processing

## 2019-10-23 NOTE — PROGRESS NOTES
CT# [id number] Poncho MELGAR after receiving an email from supervisor corrected the check requisition that had been submitted with incorrect amount due  Supervisor noticed that the amount had increase from $66 to $70 and asked cm to address the discrepancy  Cm contacted Debi Gonzalez at [facility] and notified the funding agency of the increase and asked if a new auth was necessary, Debi Gonzalez stated she would just print and attach the email to the ct's auth for future audits  Cm prepared a new check requisition and submitted to supervisor via email again, this time corrected  Please see scanned media

## 2019-11-02 ENCOUNTER — HOSPITAL ENCOUNTER (OUTPATIENT)
Dept: ULTRASOUND IMAGING | Facility: HOSPITAL | Age: 59
Discharge: HOME | End: 2019-11-02
Payer: COMMERCIAL

## 2019-11-02 DIAGNOSIS — K74.69 OTHER CIRRHOSIS OF LIVER (HCC): ICD-10-CM

## 2019-11-02 PROCEDURE — 76705 ECHO EXAM OF ABDOMEN: CPT

## 2019-11-05 ENCOUNTER — TELEPHONE (OUTPATIENT)
Dept: SURGERY | Facility: CLINIC | Age: 59
End: 2019-11-05

## 2019-11-05 DIAGNOSIS — R07.9 CHEST PAIN: ICD-10-CM

## 2019-11-05 RX ORDER — PANTOPRAZOLE SODIUM 40 MG/1
40 TABLET, DELAYED RELEASE ORAL DAILY
Qty: 30 TABLET | Refills: 3 | Status: SHIPPED | OUTPATIENT
Start: 2019-11-05 | End: 2019-11-18 | Stop reason: SDUPTHER

## 2019-11-12 DIAGNOSIS — E78.2 MIXED HYPERLIPIDEMIA: ICD-10-CM

## 2019-11-12 RX ORDER — ROSUVASTATIN CALCIUM 20 MG/1
TABLET, COATED ORAL
Qty: 30 TABLET | Refills: 3 | Status: SHIPPED | OUTPATIENT
Start: 2019-11-12 | End: 2019-11-18 | Stop reason: SDUPTHER

## 2019-11-18 ENCOUNTER — OFFICE VISIT (OUTPATIENT)
Dept: SURGERY | Facility: CLINIC | Age: 59
End: 2019-11-18
Payer: COMMERCIAL

## 2019-11-18 VITALS
HEART RATE: 80 BPM | HEIGHT: 68 IN | OXYGEN SATURATION: 96 % | DIASTOLIC BLOOD PRESSURE: 82 MMHG | TEMPERATURE: 98.2 F | BODY MASS INDEX: 30.01 KG/M2 | WEIGHT: 198 LBS | SYSTOLIC BLOOD PRESSURE: 134 MMHG

## 2019-11-18 DIAGNOSIS — F17.298 OTHER TOBACCO PRODUCT NICOTINE DEPENDENCE WITH OTHER NICOTINE-INDUCED DISORDER: ICD-10-CM

## 2019-11-18 DIAGNOSIS — K21.9 GASTROESOPHAGEAL REFLUX DISEASE WITHOUT ESOPHAGITIS: ICD-10-CM

## 2019-11-18 DIAGNOSIS — B20 HIV DISEASE (HCC): Primary | ICD-10-CM

## 2019-11-18 DIAGNOSIS — R07.9 CHEST PAIN: ICD-10-CM

## 2019-11-18 DIAGNOSIS — R07.89 ATYPICAL CHEST PAIN: ICD-10-CM

## 2019-11-18 DIAGNOSIS — B20 HIV (HUMAN IMMUNODEFICIENCY VIRUS INFECTION) (HCC): ICD-10-CM

## 2019-11-18 DIAGNOSIS — R05.9 COUGH: ICD-10-CM

## 2019-11-18 DIAGNOSIS — R07.9 CHEST PAIN, UNSPECIFIED TYPE: ICD-10-CM

## 2019-11-18 DIAGNOSIS — I21.4 NSTEMI (NON-ST ELEVATED MYOCARDIAL INFARCTION) (HCC): ICD-10-CM

## 2019-11-18 DIAGNOSIS — E78.2 MIXED HYPERLIPIDEMIA: ICD-10-CM

## 2019-11-18 DIAGNOSIS — I10 ESSENTIAL HYPERTENSION: ICD-10-CM

## 2019-11-18 DIAGNOSIS — K74.69 OTHER CIRRHOSIS OF LIVER (HCC): ICD-10-CM

## 2019-11-18 DIAGNOSIS — K12.0 APHTHOUS ULCER: ICD-10-CM

## 2019-11-18 PROCEDURE — 99214 OFFICE O/P EST MOD 30 MIN: CPT | Performed by: NURSE PRACTITIONER

## 2019-11-18 RX ORDER — ALBUTEROL SULFATE 90 UG/1
2 AEROSOL, METERED RESPIRATORY (INHALATION) EVERY 6 HOURS PRN
Qty: 18 G | Refills: 0 | Status: SHIPPED | OUTPATIENT
Start: 2019-11-18 | End: 2022-02-15 | Stop reason: SDUPTHER

## 2019-11-18 RX ORDER — PANTOPRAZOLE SODIUM 40 MG/1
40 TABLET, DELAYED RELEASE ORAL DAILY
Qty: 30 TABLET | Refills: 5 | Status: SHIPPED | OUTPATIENT
Start: 2019-11-18 | End: 2020-04-01 | Stop reason: SDUPTHER

## 2019-11-18 RX ORDER — ROSUVASTATIN CALCIUM 20 MG/1
20 TABLET, COATED ORAL DAILY
Qty: 30 TABLET | Refills: 5 | Status: SHIPPED | OUTPATIENT
Start: 2019-11-18 | End: 2020-04-01 | Stop reason: SDUPTHER

## 2019-11-18 RX ORDER — ASPIRIN 81 MG/1
81 TABLET ORAL DAILY
Qty: 30 TABLET | Refills: 5 | Status: SHIPPED | OUTPATIENT
Start: 2019-11-18 | End: 2020-04-01 | Stop reason: SDUPTHER

## 2019-11-18 RX ORDER — LOSARTAN POTASSIUM AND HYDROCHLOROTHIAZIDE 12.5; 5 MG/1; MG/1
1 TABLET ORAL DAILY
Qty: 30 TABLET | Refills: 5 | Status: SHIPPED | OUTPATIENT
Start: 2019-11-18 | End: 2020-04-01 | Stop reason: SDUPTHER

## 2019-11-18 RX ORDER — METOPROLOL SUCCINATE 25 MG/1
25 TABLET, EXTENDED RELEASE ORAL DAILY
Qty: 30 TABLET | Refills: 5 | Status: SHIPPED | OUTPATIENT
Start: 2019-11-18 | End: 2020-04-01 | Stop reason: SDUPTHER

## 2019-11-18 RX ORDER — NITROGLYCERIN 0.4 MG/1
0.4 TABLET SUBLINGUAL
Qty: 15 TABLET | Refills: 0 | Status: SHIPPED | OUTPATIENT
Start: 2019-11-18 | End: 2020-11-03 | Stop reason: SDUPTHER

## 2019-11-18 RX ORDER — CLOPIDOGREL BISULFATE 75 MG/1
75 TABLET ORAL DAILY
Qty: 30 TABLET | Refills: 5 | Status: SHIPPED | OUTPATIENT
Start: 2019-11-18 | End: 2020-04-01 | Stop reason: SDUPTHER

## 2019-11-18 NOTE — ASSESSMENT & PLAN NOTE
Compensated  Right upper quadrant ultrasound negative for mass  AFP 4 9  Continue 6 month [facility] [address]  surveillance  Repeat testing due in July 2020

## 2019-11-18 NOTE — ASSESSMENT & PLAN NOTE
Educated that sore in mouth is consistent with aphthous ulcer and not oral candidiasis  Reviewed images of both diseases and reinforced the importance of consultation with a medical professional prior to self diagnosis and treatment  Instructed to use topical OTC numbing gel  Advised to avoid acidic food and use a soft bristle tooth brush

## 2019-11-18 NOTE — ASSESSMENT & PLAN NOTE
Continue omeprazole  Educated on lifestyle modifications to improve GERD  Encouraged avoidance of acidic foods including citrus,onions, coffee, carbonated beverages, mint ,chocolate and fried foods  Encouraged abstinence from caffeine, tobacco, and alcohol  Educated to avoid laying down directly after eating a large meal  and consume smaller more frequent meals  Recommend a healthy body weight to decrease symptoms

## 2019-11-18 NOTE — ASSESSMENT & PLAN NOTE
Blood pressure: Stable  BP Readings from Last 3 Encounters:   11/18/19 134/82   09/10/19 126/84   08/12/19 120/80       Continue lisinopril-HCTZ  Educated on the following lifestyle modifications to lower BP and decrease cardiovascular risk factors  limit alcohol intake, reduce salt in diet, maintain a healthy weight, engage in 30 minutes of cardiovascular exercise daily, and not smoke

## 2019-11-18 NOTE — PATIENT INSTRUCTIONS
Chest Pain   WHAT YOU NEED TO KNOW:   Chest pain can be caused by a range of conditions, from not serious to life-threatening  Chest pain can be a symptom of a digestive problem, such as acid reflux or a stomach ulcer  An anxiety attack or a strong emotion, such as anger, can also cause chest pain  Infection, inflammation, or a fracture in the bones or cartilage in your chest can cause pain or discomfort  Sometimes chest pain or pressure is caused by poor blood flow to your heart (angina)  Chest pain may also be caused by life-threatening conditions such as a heart attack or blood clot in your lungs  DISCHARGE INSTRUCTIONS:   Call 911 if:   · You have any of the following signs of a heart attack:      ¨ Squeezing, pressure, or pain in your chest that lasts longer than 5 minutes or returns    ¨ Discomfort or pain in your back, neck, jaw, stomach, or arm     ¨ Trouble breathing    ¨ Nausea or vomiting    ¨ Lightheadedness or a sudden cold sweat, especially with chest pain or trouble breathing    Return to the emergency department if:   · You have chest discomfort that gets worse, even with medicine  · You cough or vomit blood  · Your bowel movements are black or bloody  · You cannot stop vomiting, or it hurts to swallow  Contact your healthcare provider if:   · You have questions or concerns about your condition or care  Medicines:   · Medicines  may be given to treat the cause of your chest pain  Examples include pain medicine, anxiety medicine, or medicines to increase blood flow to your heart  · Do not take certain medicines without asking your healthcare provider first   These include NSAIDs, herbal or vitamin supplements, or hormones (estrogen or progestin)  · Take your medicine as directed  Contact your healthcare provider if you think your medicine is not helping or if you have side effects  Tell him or her if you are allergic to any medicine   Keep a list of the medicines, vitamins, and herbs you take  Include the amounts, and when and why you take them  Bring the list or the pill bottles to follow-up visits  Carry your medicine list with you in case of an emergency  Follow up with your healthcare provider within 72 hours, or as directed: You may need to return for more tests to find the cause of your chest pain  You may be referred to a specialist, such as a cardiologist or gastroenterologist  Write down your questions so you remember to ask them during your visits  Healthy living tips: The following are general healthy guidelines  If your chest pain is caused by a heart problem, your healthcare provider will give you specific guidelines to follow  · Do not smoke  Nicotine and other chemicals in cigarettes and cigars can cause lung and heart damage  Ask your healthcare provider for information if you currently smoke and need help to quit  E-cigarettes or smokeless tobacco still contain nicotine  Talk to your healthcare provider before you use these products  · Eat a variety of healthy, low-fat foods  Healthy foods include fruits, vegetables, whole-grain breads, low-fat dairy products, beans, lean meats, and fish  Ask for more information about a heart healthy diet  · Ask about activity  Your healthcare provider will tell you which activities to limit or avoid  Ask when you can drive, return to work, and have sex  Ask about the best exercise plan for you  · Maintain a healthy weight  Ask your healthcare provider how much you should weigh  Ask him or her to help you create a weight loss plan if you are overweight  · Get the flu and pneumonia vaccines  All adults should get the influenza (flu) vaccine  Get it every year as soon as it becomes available  The pneumococcal vaccine is given to adults aged 72 years or older  The vaccine is given every 5 years to prevent pneumococcal disease, such as pneumonia    © 2017 [employer] Information is for End User's use only and may not be sold, redistributed or otherwise used for commercial purposes  All illustrations and images included in CareNotes® are the copyrighted property of A D A M [employer] Inc  or Saud Galarza  The above information is an  only  It is not intended as medical advice for individual conditions or treatments  Talk to your doctor, nurse or pharmacist before following any medical regimen to see if it is safe and effective for you  Heart Healthy Diet   AMBULATORY CARE:   A heart healthy diet  is an eating plan low in total fat, unhealthy fats, and sodium (salt)  A heart healthy diet helps decrease your risk for heart disease and stroke  Limit the amount of fat you eat to 25% to 35% of your total daily calories  Limit sodium to less than 2,300 mg each day  Healthy fats:  Healthy fats can help improve cholesterol levels  The risk for heart disease is decreased when cholesterol levels are normal  Choose healthy fats, such as the following:  · Unsaturated fat  is found in foods such as soybean, canola, olive, corn, and safflower oils  It is also found in soft tub margarine that is made with liquid vegetable oil  · Omega-3 fat  is found in certain fish, such as salmon, tuna, and trout, and in walnuts and flaxseed  Unhealthy fats:  Unhealthy fats can cause unhealthy cholesterol levels in your blood and increase your risk of heart disease  Limit unhealthy fats, such as the following:  · Cholesterol  is found in animal foods, such as eggs and lobster, and in dairy products made from whole milk  Limit cholesterol to less than 300 milligrams (mg) each day  You may need to limit cholesterol to 200 mg each day if you have heart disease  · Saturated fat  is found in meats, such as newman and hamburger  It is also found in chicken or turkey skin, whole milk, and butter  Limit saturated fat to less than 7% of your total daily calories   Limit saturated fat to less than 6% if you have heart disease or are at increased risk for it  · Trans fat  is found in packaged foods, such as potato chips and cookies  It is also in hard margarine, some fried foods, and shortening  Avoid trans fats as much as possible    Heart healthy foods and drinks to include:  Ask your dietitian or healthcare provider how many servings to have from each of the following food groups:  · Grains:      ¨ Whole-wheat breads, cereals, and pastas, and brown rice    ¨ Low-fat, low-sodium crackers and chips    · Vegetables:      ¨ Broccoli, green beans, green peas, and spinach    ¨ Collards, kale, and lima beans    ¨ Carrots, sweet potatoes, tomatoes, and peppers    ¨ Canned vegetables with no salt added    · Fruits:      ¨ Bananas, peaches, pears, and pineapple    ¨ Grapes, raisins, and dates    ¨ Oranges, tangerines, grapefruit, orange juice, and grapefruit juice    ¨ Apricots, mangoes, melons, and papaya    ¨ Raspberries and strawberries    ¨ Canned fruit with no added sugar    · Low-fat dairy products:      ¨ Nonfat (skim) milk, 1% milk, and low-fat almond, cashew, or soy milks fortified with calcium    ¨ Low-fat cheese, regular or frozen yogurt, and cottage cheese    · Meats and proteins , such as lean cuts of beef and pork (loin, leg, round), skinless chicken and turkey, legumes, soy products, egg whites, and nuts  Foods and drinks to limit or avoid:  Ask your dietitian or healthcare provider about these and other foods that are high in unhealthy fat, sodium, and sugar:  · Snack or packaged foods , such as frozen dinners, cookies, macaroni and cheese, and cereals with more than 300 mg of sodium per serving    · Canned or dry mixes  for cakes, soups, sauces, or gravies    · Vegetables with added sodium , such as instant potatoes, vegetables with added sauces, or regular canned vegetables    · Other foods high in sodium , such as ketchup, barbecue sauce, salad dressing, pickles, olives, soy sauce, and miso    · High-fat dairy foods such as whole or 2% milk, cream cheese, or sour cream, and cheeses     · High-fat protein foods  such as high-fat cuts of beef (T-bone steaks, ribs), chicken or turkey with skin, and organ meats, such as liver    · Cured or smoked meats , such as hot dogs, newman, and sausage    · Unhealthy fats and oils , such as butter, stick margarine, shortening, and cooking oils such as coconut or palm oil    · Food and drinks high in sugar , such as soft drinks (soda), sports drinks, sweetened tea, candy, cake, cookies, pies, and doughnuts  Other diet guidelines to follow:   · Eat more foods containing omega-3 fats  Eat fish high in omega-3 fats at least 2 times a week  · Limit alcohol  Too much alcohol can damage your heart and raise your blood pressure  Women should limit alcohol to 1 drink a day  Men should limit alcohol to 2 drinks a day  A drink of alcohol is 12 ounces of beer, 5 ounces of wine, or 1½ ounces of liquor  · Choose low-sodium foods  High-sodium foods can lead to high blood pressure  Add little or no salt to food you prepare  Use herbs and spices in place of salt  · Eat more fiber  to help lower cholesterol levels  Eat at least 5 servings of fruits and vegetables each day  Eat 3 ounces of whole-grain foods each day  Legumes (beans) are also a good source of fiber  Lifestyle guidelines:   · Do not smoke  Nicotine and other chemicals in cigarettes and cigars can cause lung and heart damage  Ask your healthcare provider for information if you currently smoke and need help to quit  E-cigarettes or smokeless tobacco still contain nicotine  Talk to your healthcare provider before you use these products  · Exercise regularly  to help you maintain a healthy weight and improve your blood pressure and cholesterol levels  Ask your healthcare provider about the best exercise plan for you  Do not start an exercise program without asking your healthcare provider     Follow up with your healthcare provider as directed:  Write down your questions so you remember to ask them during your visits  © 2017 [employer] Information is for End User's use only and may not be sold, redistributed or otherwise used for commercial purposes  All illustrations and images included in CareNotes® are the copyrighted property of [employer]  or [employer]ar[employer]  The above information is an  only  It is not intended as medical advice for individual conditions or treatments  Talk to your doctor, nurse or pharmacist before following any medical regimen to see if it is safe and effective for you

## 2019-11-18 NOTE — ASSESSMENT & PLAN NOTE
No results found for: [id number]  CD4 ABS   Date/Time Value Ref Range Status   2019 07:12  (L) 359 - 1519 /uL Final     HIV-1 RNA by PCR, Qn   Date/Time Value Ref Range Status   2019 07:12 AM <20 copies/mL Final     Comment:     HIV-1 RNA detected  The reportable range for this assay is 20 to 10,000,000  copies HIV-1 RNA/mL  HIV-1 RNA Viral Load Log   Date/Time Value Ref Range Status   2019 07:12 AM COMMENT [id number]/mL Final     Comment:     Unable to calculate result since non-numeric result obtained for  component test          ART: Tivicay, Descovy, and Prezcobix  Reports 100% adherence  Denies side effects  Stressed the importance of adherence  Continue follow up with ID clinic  Reviewed most recent labs, including Cd4 and viral load  Discussed the risks and benefits of treatment options, instructions for management, importance of treatment adherence, and reduction of risk factor  Educated on possible  medication side effects  Counseled on routes of HIV transmission, including the risk of  infection  Emphasized that viral suppression is the best method to prevent HIV transmission  At this time pt denies the need for HIV testing of anyone in their life  Total encounter time was 45 minutes  Greater then 20 minutes were spent on counseling and patient education  Pt voices understanding and agreement with treatment plan

## 2019-11-18 NOTE — ASSESSMENT & PLAN NOTE
Jeffrey Gunderson is struggling with smoking cessation  He is wearing the patch daily but removes it at night due to visit dreams  Educated to use nicotine gum for breakthrough cravings  Counseled for greater than 15 minutes on the importance of smoking cessation  Advised to quit  Educated on the increased risk of heart and lung disease associated with smoking    Referred to [facility] for enrollment in smoking cessation program

## 2019-11-18 NOTE — ASSESSMENT & PLAN NOTE
Reschedule with cardiology in January  24H Holter monitor ordered to r/u arrhythmia  Continue omeprazole  Avoid acidic foods  Ordered nitroglycerin, educated on use  Cautioned on side effects including possibility of headache  Provided with patient education hand out and instructed to seek emergency medical attention if pain last longer than 5 minutes, intensifys, he becomes short of breath, dizzy, diaphoretic or nauseous  Educated to reduce stress, get adequate water intake, avoid caffeine and nicotine

## 2019-11-18 NOTE — PROGRESS NOTES
Assessment/Plan:    Aphthous ulcer  Educated that sore in mouth is consistent with aphthous ulcer and not oral candidiasis  Reviewed images of both diseases and reinforced the importance of consultation with a medical professional prior to self diagnosis and treatment  Instructed to use topical OTC numbing gel  Advised to avoid acidic food and use a soft bristle tooth brush  Other cirrhosis of liver ([place] [id number] )  Compensated  Right upper quadrant ultrasound negative for mass  AFP 4 9  Continue 6 month [place] [address]  surveillance  Repeat testing due in 2020  HIV disease ([place] [address] )  No results found for: [id number]  CD4 ABS   Date/Time Value Ref Range Status   2019 07:12  (L) 359 - 1519 /uL Final     HIV-1 RNA by PCR, Qn   Date/Time Value Ref Range Status   2019 07:12 AM <20 copies/mL Final     Comment:     HIV-1 RNA detected  The reportable range for this assay is 20 to 10,000,000  copies HIV-1 RNA/mL  HIV-1 RNA Viral Load Log   Date/Time Value Ref Range Status   2019 07:12 AM COMMENT [id number]/mL Final     Comment:     Unable to calculate result since non-numeric result obtained for  component test          ART: Tivicay, Descovy, and Prezcobix  Reports 100% adherence  Denies side effects  Stressed the importance of adherence  Continue follow up with ID clinic  Reviewed most recent labs, including Cd4 and viral load  Discussed the risks and benefits of treatment options, instructions for management, importance of treatment adherence, and reduction of risk factor  Educated on possible  medication side effects  Counseled on routes of HIV transmission, including the risk of  infection  Emphasized that viral suppression is the best method to prevent HIV transmission  At this time pt denies the need for HIV testing of anyone in their life  Total encounter time was 45 minutes  Greater then 20 minutes were spent on counseling and patient education   Pt voices understanding and agreement with treatment plan  Essential hypertension  Blood pressure: Stable  BP Readings from Last 3 Encounters:   11/18/19 134/82   09/10/19 126/84   08/12/19 120/80       Continue lisinopril-HCTZ  Educated on the following lifestyle modifications to lower BP and decrease cardiovascular risk factors  limit alcohol intake, reduce salt in diet, maintain a healthy weight, engage in 30 minutes of cardiovascular exercise daily, and not smoke  Atypical chest pain  Reschedule with cardiology in January  24H Holter monitor ordered to r/u arrhythmia  Continue omeprazole  Avoid acidic foods  Ordered nitroglycerin, educated on use  Cautioned on side effects including possibility of headache  Provided with patient education hand out and instructed to seek emergency medical attention if pain last longer than 5 minutes, intensifys, he becomes short of breath, dizzy, diaphoretic or nauseous  Educated to reduce stress, get adequate water intake, avoid caffeine and nicotine  Nicotine dependence  Maria Luisa Santos is struggling with smoking cessation  He is wearing the patch daily but removes it at night due to visit dreams  Educated to use nicotine gum for breakthrough cravings  Counseled for greater than 15 minutes on the importance of smoking cessation  Advised to quit  Educated on the increased risk of heart and lung disease associated with smoking  Referred to [facility] for enrollment in smoking cessation program        Gastroesophageal reflux disease without esophagitis  Continue omeprazole  Educated on lifestyle modifications to improve GERD  Encouraged avoidance of acidic foods including citrus,onions, coffee, carbonated beverages, mint ,chocolate and fried foods  Encouraged abstinence from caffeine, tobacco, and alcohol  Educated to avoid laying down directly after eating a large meal  and consume smaller more frequent meals  Recommend a healthy body weight to decrease symptoms  Diagnoses and all orders for this visit:    Chest pain, unspecified type  -     Holter monitor - 24 hour; Future  -     nitroglycerin (NITROSTAT) 0 4 mg SL tablet; Place 1 tablet (0 4 mg total) under the tongue every 5 (five) minutes as needed for chest pain    Mixed hyperlipidemia  -     rosuvastatin (CRESTOR) 20 MG tablet; Take 1 tablet (20 mg total) by mouth daily    Chest pain  -     pantoprazole (PROTONIX) 40 mg tablet; Take 1 tablet (40 mg total) by mouth daily    Essential hypertension  -     metoprolol succinate (TOPROL-XL) 25 mg 24 hr tablet; Take 1 tablet (25 mg total) by mouth daily  -     losartan-hydrochlorothiazide (HYZAAR) 50-12 5 mg per tablet; Take 1 tablet by mouth daily  -     aspirin (ASPIRIN LOW DOSE) 81 mg EC tablet; Take 1 tablet (81 mg total) by mouth daily    HIV (human immunodeficiency virus infection) ([facility] [address] )  -     emtricitabine-tenofovir AF (DESCOVY) 200-25 MG tablet; Take 1 tablet by mouth daily  -     dolutegravir (TIVICAY) 50 MG TABS; Take 1 tablet (50 mg total) by mouth 2 (two) times a day    HIV disease ([facility] [address] )  -     Darunavir-Cobicistat (PREZCOBIX) 800-150 MG TABS; Take 1 tablet by mouth daily    NSTEMI (non-ST elevated myocardial infarction) (HCC)  -     clopidogrel (PLAVIX) 75 mg tablet; Take 1 tablet (75 mg total) by mouth daily    Cough  -     albuterol (VENTOLIN HFA) 90 mcg/act inhaler; Inhale 2 puffs every 6 (six) hours as needed for wheezing    Aphthous ulcer    Other cirrhosis of liver (HCC)    Atypical chest pain    Other tobacco product nicotine dependence with other nicotine-induced disorder    Gastroesophageal reflux disease without esophagitis          Subjective:      Patient ID: Justin Ashley is a 61 y o  male  Thompson Klinefelter is here today for 3month PCP f/u of chronic conditions  PMHx significant for HIV, HCV SVR s/p treatment, cirrhosis,HTN,hyperlipidemia, and NSTEMI in January 2019 with cardiac intervention and stent placed  Thompson Klinefelter is c/o of intermittent chest pain   He reports having episodes of chest pain that last 60-90 seconds  Symptoms occur once or twice a week  Mario describes pain as stabbing and occurring in the upper chest  Symptoms are resolved with rest  He denies nausea, vomitting, dizziness, diaphoresis or shortness of breath  Pain is local to chest and does radiate to back or arm  This has been ongoing for over a year  At last PCP appointment I asked Darien to go to cardiology for additional evaluation  He was unable to go due to work conflicts  He now has to wait until January to schedule a follow up appointment  He was seen in the ED for similar episodes 3/2019 and diagnosed with GERD  He was prescribed omeprazole, which does lessen his symptoms  Darien also thinks he has thrush  He c/o a white patch under his tongue  He noticed two or more transient ulcers on his buccal mucosa,They have since resolved  Darien 'googled" his symptoms and diagnosed himself with thrush  He has been using saltwater, hydrogen peroxide, and red wine vinegar to self treat his symptoms  Darien states these options have only worsened his symptoms  The following portions of the patient's history were reviewed and updated as appropriate: allergies, current medications, past family history, past medical history, past social history, past surgical history and problem list     Review of Systems   Constitutional: Negative for activity change, appetite change, chills, diaphoresis, fatigue, fever and unexpected weight change  HENT: Positive for mouth sores  Negative for congestion, dental problem, ear pain, hearing loss, rhinorrhea and sore throat  Eyes: Negative for pain, redness and visual disturbance  Respiratory: Negative for chest tightness, shortness of breath and wheezing  Cardiovascular: Positive for chest pain  Negative for leg swelling  Gastrointestinal: Negative for abdominal pain, constipation, diarrhea, nausea and vomiting     Endocrine: Negative for polydipsia, polyphagia and polyuria  Genitourinary: Negative for difficulty urinating and dysuria  Musculoskeletal: Negative for back pain, joint swelling and myalgias  Skin: Negative for rash  Neurological: Negative for syncope and headaches  Psychiatric/Behavioral: Negative for behavioral problems and suicidal ideas  Objective:      /82   Pulse 80   Temp 98 2 °F (36 8 °C)   Ht 5' 8" (1 727 m)   Wt 89 8 kg (198 lb)   SpO2 96%   BMI 30 11 kg/m²       Lab Results   Component Value Date     03/18/2017    K 4 4 09/03/2019     09/03/2019    CO2 28 09/03/2019    BUN 20 09/03/2019    CREATININE 1 11 09/03/2019    GLUF 115 (H) 09/03/2019    CALCIUM 9 1 09/03/2019    AST 25 09/03/2019    ALT 22 09/03/2019    ALKPHOS 81 09/03/2019    PROT 7 4 03/18/2017    BILITOT 1 1 03/18/2017    EGFR 72 09/03/2019     Lab Results   Component Value Date    WBC 6 72 09/03/2019    WBC 6 4 09/03/2019    HGB 17 7 (H) 09/03/2019    HGB 18 8 (H) 09/03/2019    HCT 54 1 (H) 09/03/2019    HCT 55 5 (H) 09/03/2019    MCV 89 09/03/2019    MCV 89 09/03/2019     09/03/2019     09/03/2019     No results found for: HEPCAB  Lab Results   Component Value Date    HEPBCAB REACTIVE (A) 10/08/2015    HEPBCAB REACTIVE (A) 10/08/2015    HEPBCAB REACTIVE (A) 10/08/2015    HEPBCAB REACTIVE (A) 10/08/2015     Lab Results   Component Value Date    RPR Non-Reactive 09/29/2018            Physical Exam   Constitutional: He is oriented to person, place, and time  He appears well-developed and well-nourished  No distress  HENT:   Head: Normocephalic  Right Ear: External ear normal    Left Ear: External ear normal    Nose: Nose normal    Mouth/Throat: Oropharynx is clear and moist  No oropharyngeal exudate  Eyes: Pupils are equal, round, and reactive to light  Conjunctivae are normal  Right eye exhibits no discharge  Left eye exhibits no discharge  Neck: Normal range of motion  No thyromegaly present  Cardiovascular: Normal rate, regular rhythm, normal heart sounds and intact distal pulses  No murmur heard  Pulmonary/Chest: Effort normal and breath sounds normal  He has no wheezes  Abdominal: Soft  Bowel sounds are normal  He exhibits no mass  There is no tenderness  Musculoskeletal: Normal range of motion  He exhibits no edema or tenderness  Lymphadenopathy:     He has no cervical adenopathy  Neurological: He is alert and oriented to person, place, and time  Skin: Skin is warm and dry  No rash noted  Psychiatric: He has a normal mood and affect  His behavior is normal        BMI Counseling: Body mass index is 30 11 kg/m²  The BMI is above normal  Nutrition recommendations include 3-5 servings of fruits/vegetables daily, consuming healthier snacks, reducing intake of saturated fat and trans fat and reducing intake of cholesterol

## 2019-11-20 ENCOUNTER — TELEPHONE (OUTPATIENT)
Dept: SURGERY | Facility: CLINIC | Age: 59
End: 2019-11-20

## 2019-11-25 ENCOUNTER — APPOINTMENT (OUTPATIENT)
Dept: LAB | Facility: HOSPITAL | Age: 59
End: 2019-11-25
Attending: INTERNAL MEDICINE
Payer: COMMERCIAL

## 2019-11-25 ENCOUNTER — PATIENT OUTREACH (OUTPATIENT)
Dept: SURGERY | Facility: OTHER | Age: 59
End: 2019-11-25

## 2019-11-25 ENCOUNTER — HOSPITAL ENCOUNTER (OUTPATIENT)
Dept: NON INVASIVE DIAGNOSTICS | Facility: HOSPITAL | Age: 59
Discharge: HOME | End: 2019-11-25
Payer: COMMERCIAL

## 2019-11-25 DIAGNOSIS — Z20.2 CONTACT WITH AND (SUSPECTED) EXPOSURE TO INFECTIONS WITH A PREDOMINANTLY SEXUAL MODE OF TRANSMISSION: ICD-10-CM

## 2019-11-25 DIAGNOSIS — B20 HIV DISEASE (HCC): ICD-10-CM

## 2019-11-25 DIAGNOSIS — Z11.3 ENCOUNTER FOR SCREENING FOR BACTERIAL SEXUALLY TRANSMITTED DISEASE: ICD-10-CM

## 2019-11-25 DIAGNOSIS — D72.89 OTHER SPECIFIED DISORDERS OF WHITE BLOOD CELLS: ICD-10-CM

## 2019-11-25 DIAGNOSIS — Z72.89 OTHER PROBLEMS RELATED TO LIFESTYLE: ICD-10-CM

## 2019-11-25 DIAGNOSIS — R07.9 CHEST PAIN, UNSPECIFIED TYPE: ICD-10-CM

## 2019-11-25 LAB
ALBUMIN SERPL BCP-MCNC: 4 G/DL (ref 3.5–5)
ALP SERPL-CCNC: 87 U/L (ref 46–116)
ALT SERPL W P-5'-P-CCNC: 26 U/L (ref 12–78)
ANION GAP SERPL CALCULATED.3IONS-SCNC: 12 MMOL/L (ref 4–13)
AST SERPL W P-5'-P-CCNC: 22 U/L (ref 5–45)
BASOPHILS # BLD AUTO: 0.03 THOUSANDS/ΜL (ref 0–0.1)
BASOPHILS NFR BLD AUTO: 1 % (ref 0–1)
BILIRUB SERPL-MCNC: 0.5 MG/DL (ref 0.2–1)
BILIRUB UR QL STRIP: NEGATIVE
BUN SERPL-MCNC: 22 MG/DL (ref 5–25)
C TRACH DNA SPEC QL NAA+PROBE: NEGATIVE
CALCIUM SERPL-MCNC: 9 MG/DL (ref 8.3–10.1)
CHLORIDE SERPL-SCNC: 103 MMOL/L (ref 100–108)
CLARITY UR: CLEAR
CO2 SERPL-SCNC: 24 MMOL/L (ref 21–32)
COLOR UR: YELLOW
CREAT SERPL-MCNC: 1.01 MG/DL (ref 0.6–1.3)
EOSINOPHIL # BLD AUTO: 0.14 THOUSAND/ΜL (ref 0–0.61)
EOSINOPHIL NFR BLD AUTO: 2 % (ref 0–6)
ERYTHROCYTE [DISTWIDTH] IN BLOOD BY AUTOMATED COUNT: 14.3 % (ref 11.6–15.1)
GFR SERPL CREATININE-BSD FRML MDRD: 81 ML/MIN/1.73SQ M
GLUCOSE P FAST SERPL-MCNC: 120 MG/DL (ref 65–99)
GLUCOSE UR STRIP-MCNC: NEGATIVE MG/DL
HCT VFR BLD AUTO: 54.8 % (ref 36.5–49.3)
HGB BLD-MCNC: 17.9 G/DL (ref 12–17)
HGB UR QL STRIP.AUTO: NEGATIVE
IMM GRANULOCYTES # BLD AUTO: 0.03 THOUSAND/UL (ref 0–0.2)
IMM GRANULOCYTES NFR BLD AUTO: 1 % (ref 0–2)
KETONES UR STRIP-MCNC: NEGATIVE MG/DL
LEUKOCYTE ESTERASE UR QL STRIP: NEGATIVE
LYMPHOCYTES # BLD AUTO: 1.18 THOUSANDS/ΜL (ref 0.6–4.47)
LYMPHOCYTES NFR BLD AUTO: 18 % (ref 14–44)
MCH RBC QN AUTO: 29.2 PG (ref 26.8–34.3)
MCHC RBC AUTO-ENTMCNC: 32.7 G/DL (ref 31.4–37.4)
MCV RBC AUTO: 89 FL (ref 82–98)
MONOCYTES # BLD AUTO: 0.81 THOUSAND/ΜL (ref 0.17–1.22)
MONOCYTES NFR BLD AUTO: 12 % (ref 4–12)
N GONORRHOEA DNA SPEC QL NAA+PROBE: NEGATIVE
NEUTROPHILS # BLD AUTO: 4.42 THOUSANDS/ΜL (ref 1.85–7.62)
NEUTS SEG NFR BLD AUTO: 66 % (ref 43–75)
NITRITE UR QL STRIP: NEGATIVE
NRBC BLD AUTO-RTO: 0 /100 WBCS
PH UR STRIP.AUTO: 5 [PH]
PLATELET # BLD AUTO: 219 THOUSANDS/UL (ref 149–390)
PMV BLD AUTO: 10 FL (ref 8.9–12.7)
POTASSIUM SERPL-SCNC: 3.6 MMOL/L (ref 3.5–5.3)
PROT SERPL-MCNC: 7.8 G/DL (ref 6.4–8.2)
PROT UR STRIP-MCNC: NEGATIVE MG/DL
RBC # BLD AUTO: 6.14 MILLION/UL (ref 3.88–5.62)
SODIUM SERPL-SCNC: 139 MMOL/L (ref 136–145)
SP GR UR STRIP.AUTO: 1.02 (ref 1–1.03)
UROBILINOGEN UR QL STRIP.AUTO: 0.2 E.U./DL
WBC # BLD AUTO: 6.61 THOUSAND/UL (ref 4.31–10.16)

## 2019-11-25 PROCEDURE — 85025 COMPLETE CBC W/AUTO DIFF WBC: CPT

## 2019-11-25 PROCEDURE — 81003 URINALYSIS AUTO W/O SCOPE: CPT

## 2019-11-25 PROCEDURE — 93225 XTRNL ECG REC<48 HRS REC: CPT

## 2019-11-25 PROCEDURE — 93226 XTRNL ECG REC<48 HR SCAN A/R: CPT

## 2019-11-25 PROCEDURE — 86592 SYPHILIS TEST NON-TREP QUAL: CPT

## 2019-11-25 PROCEDURE — 87491 CHLMYD TRACH DNA AMP PROBE: CPT

## 2019-11-25 PROCEDURE — 80053 COMPREHEN METABOLIC PANEL: CPT

## 2019-11-25 PROCEDURE — 87536 HIV-1 QUANT&REVRSE TRNSCRPJ: CPT

## 2019-11-25 PROCEDURE — 36415 COLL VENOUS BLD VENIPUNCTURE: CPT

## 2019-11-25 PROCEDURE — 87591 N.GONORRHOEAE DNA AMP PROB: CPT

## 2019-11-25 PROCEDURE — 86360 T CELL ABSOLUTE COUNT/RATIO: CPT

## 2019-11-25 NOTE — PROGRESS NOTES
#[id number] MROR    Cm received ct's November MAWD statement from ct via email  Ct prepared the check requisition, scanned and emailed to supervisor to be processed  Please see scanned media

## 2019-11-26 LAB
BASOPHILS # BLD AUTO: 0 X10E3/UL (ref 0–0.2)
BASOPHILS NFR BLD AUTO: 1 %
CD3+CD4+ CELLS # BLD: 239 /UL (ref 359–1519)
CD3+CD4+ CELLS NFR BLD: 18.4 % (ref 30.8–58.5)
CD3+CD4+ CELLS/CD3+CD8+ CLL BLD: 0.31 % (ref 0.92–3.72)
CD3+CD8+ CELLS # BLD: 764 /UL (ref 109–897)
CD3+CD8+ CELLS NFR BLD: 58.8 % (ref 12–35.5)
EOSINOPHIL # BLD AUTO: 0.1 X10E3/UL (ref 0–0.4)
EOSINOPHIL NFR BLD AUTO: 2 %
ERYTHROCYTE [DISTWIDTH] IN BLOOD BY AUTOMATED COUNT: 15 % (ref 12.3–15.4)
HCT VFR BLD AUTO: 53.1 % (ref 37.5–51)
HGB BLD-MCNC: 18.7 G/DL (ref 13–17.7)
IMM GRANULOCYTES # BLD: 0 X10E3/UL (ref 0–0.1)
IMM GRANULOCYTES NFR BLD: 1 %
LYMPHOCYTES # BLD AUTO: 1.3 X10E3/UL (ref 0.7–3.1)
LYMPHOCYTES NFR BLD AUTO: 21 %
MCH RBC QN AUTO: 30.2 PG (ref 26.6–33)
MCHC RBC AUTO-ENTMCNC: 35.2 G/DL (ref 31.5–35.7)
MCV RBC AUTO: 86 FL (ref 79–97)
MONOCYTES # BLD AUTO: 0.6 X10E3/UL (ref 0.1–0.9)
MONOCYTES NFR BLD AUTO: 10 %
NEUTROPHILS # BLD AUTO: 4.2 X10E3/UL (ref 1.4–7)
NEUTROPHILS NFR BLD AUTO: 65 %
PLATELET # BLD AUTO: 183 X10E3/UL (ref 150–450)
RBC # BLD AUTO: 6.19 X10E6/UL (ref 4.14–5.8)
RPR SER QL: NORMAL
WBC # BLD AUTO: 6.3 X10E3/UL (ref 3.4–10.8)

## 2019-11-27 LAB
HIV1 RNA # SERPL NAA+PROBE: 30 COPIES/ML
HIV1 RNA SERPL NAA+PROBE-LOG#: 1.48 LOG10COPY/ML

## 2019-11-27 PROCEDURE — 93227 XTRNL ECG REC<48 HR R&I: CPT

## 2019-12-03 ENCOUNTER — TELEPHONE (OUTPATIENT)
Dept: SURGERY | Facility: CLINIC | Age: 59
End: 2019-12-03

## 2019-12-03 NOTE — TELEPHONE ENCOUNTER
LVM for pt to call clinic to confirm if he was able to reach [facility] and offered assistance if necessary

## 2019-12-03 NOTE — TELEPHONE ENCOUNTER
Spoke to pt-he was able to reach [facility] and meds will be delivered tomorrow  Told pt not to hesitate to call our office if this ever happens in the future so we can assist   Pt stated understanding

## 2019-12-05 ENCOUNTER — TELEPHONE (OUTPATIENT)
Dept: SURGERY | Facility: CLINIC | Age: 59
End: 2019-12-05

## 2019-12-05 NOTE — TELEPHONE ENCOUNTER
Pt called in 12/4 and left us a voice message at 4:58pm  In the message he stated he still had not received his medication package from [facility] and he was going on 11 with no blood pressure meds or HIV meds  I reached out to both the patient and CCN  Cathryn stated the package was delayed due to weather conditions and the patient should receive it today  I informed pt and he stated he did not have any bad weather yesterday and was a bit upset  I asked pt to please give the package till the end of the day and to please contact us if he still does not receive it  Pt stated he gets home at around 4:30 pm and will not know until then  If he does not receive it he stated he would leave a voice message  I did inform pt we are closed tomorrow and we hope we can resolve this today

## 2019-12-10 ENCOUNTER — OFFICE VISIT (OUTPATIENT)
Dept: SURGERY | Facility: CLINIC | Age: 59
End: 2019-12-10
Payer: COMMERCIAL

## 2019-12-10 VITALS
HEIGHT: 68 IN | WEIGHT: 198 LBS | BODY MASS INDEX: 30.01 KG/M2 | DIASTOLIC BLOOD PRESSURE: 80 MMHG | TEMPERATURE: 98.6 F | HEART RATE: 86 BPM | OXYGEN SATURATION: 96 % | SYSTOLIC BLOOD PRESSURE: 130 MMHG

## 2019-12-10 DIAGNOSIS — I10 ESSENTIAL HYPERTENSION: ICD-10-CM

## 2019-12-10 DIAGNOSIS — B20 HIV DISEASE (HCC): Primary | ICD-10-CM

## 2019-12-10 DIAGNOSIS — Z13.6 ENCOUNTER FOR LIPID SCREENING FOR CARDIOVASCULAR DISEASE: ICD-10-CM

## 2019-12-10 DIAGNOSIS — I25.10 CORONARY ARTERY DISEASE INVOLVING NATIVE CORONARY ARTERY OF NATIVE HEART WITHOUT ANGINA PECTORIS: ICD-10-CM

## 2019-12-10 DIAGNOSIS — D75.1 POLYCYTHEMIA: ICD-10-CM

## 2019-12-10 DIAGNOSIS — Z13.1 SCREENING FOR DIABETES MELLITUS: ICD-10-CM

## 2019-12-10 DIAGNOSIS — Z79.899 OTHER LONG TERM (CURRENT) DRUG THERAPY: ICD-10-CM

## 2019-12-10 DIAGNOSIS — F17.298 OTHER TOBACCO PRODUCT NICOTINE DEPENDENCE WITH OTHER NICOTINE-INDUCED DISORDER: ICD-10-CM

## 2019-12-10 DIAGNOSIS — Z13.220 ENCOUNTER FOR LIPID SCREENING FOR CARDIOVASCULAR DISEASE: ICD-10-CM

## 2019-12-10 PROCEDURE — 3079F DIAST BP 80-89 MM HG: CPT | Performed by: INTERNAL MEDICINE

## 2019-12-10 PROCEDURE — 3075F SYST BP GE 130 - 139MM HG: CPT | Performed by: INTERNAL MEDICINE

## 2019-12-10 PROCEDURE — 99215 OFFICE O/P EST HI 40 MIN: CPT | Performed by: INTERNAL MEDICINE

## 2019-12-10 NOTE — PROGRESS NOTES
Progress Note - Infectious Disease   Jacqueline Kimbrough 61 y o  male MRN: [id number]  Unit/Bed#:  Encounter: [id number]      Impression/Plan:  1   HIV-improved adherence with ART with a near undetectable viral load and CD4 count in the high 200s  Will continue the Tivicay, Descovy, Prezcobix  Recheck labs in 2 months and follow up in 3 months   Stressed adherence   Will hold on transitioning to q 6 months for now and until the next visit, and consider going to q 6 months if the CD4 count has rebounded     2  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  Right upper quadrant ultrasound for screening was negative and the alpha fetoprotein negative on the last check   Continue Q 6 month hepatocellular carcinoma screening      3   Polycythemia-likely secondary to the nicotine dependence   Seen by Hematology Oncology who agree with my assessment   Will continue to monitor the CBC with diff now that he is no longer smoking     4   Hypertension-asymptomatic and much improved   Discussed in detail with the primary     5  Coronary artery disease-complicated by acute MI status post PCI   Now asymptomatic  Follow up with Cardiology      6  Nicotine dependence-patient continues to smoke occasionally but is decreased his tobacco use  Gabo Walker He continues to be interesting quitting to scant completely kick habit  Stressed the importance of complete tobacco cessation     Discussed the above plan in detail with the primary      Patient was provided medication, adherence and prevention education    Subjective:  Routine follow-up for HIV  Patient claims 100% adherence with Tivicay/Descovy/Prezcobix except apparently he ran out of medications and could not receive them over the Thanksgiving holiday  Therefore he missed approximately 8 straight days of treatment  Patient denies any notable side effects  Overall the feeling well    The patient denies any fever chills or sweats, denies any nausea vomiting or diarrhea, denies any cough or shortness of breath  ROS: A complete 12 point ROS is negative other than that noted in the HPI    Followup portions patient history reviewed and updated as: Allergies, current medications, past medical history, past social history, past surgical history, and the problem list    Objective:  Vitals:  Vitals:    12/10/19 1655   BP: 130/80   Pulse: 86   Temp: 98 6 °F (37 °C)   SpO2: 96%   Weight: 89 8 kg (198 lb)   Height: 5' 8" (1 727 m)       Physical Exam:   General Appearance:  Alert, interactive, appearing well,  nontoxic, no acute distress  Neck:   Supple without lymphadenopathy, no thyromegaly or masses   Throat: Oropharynx moist without lesions  Lungs:   Clear to auscultation bilaterally; no wheezes, rhonchi or rales; respirations unlabored   Heart:  RRR; no murmur, rub or gallop   Abdomen:   Soft, non-tender, non-distended, positive bowel sounds  Extremities: No clubbing, cyanosis or edema   Skin: No new rashes or lesions  No draining wounds noted         Labs, Imaging, & Other studies:   All pertinent labs and imaging studies were personally reviewed    Lab Results   Component Value Date     03/18/2017    K 3 6 11/25/2019     11/25/2019    CO2 24 11/25/2019    BUN 22 11/25/2019    CREATININE 1 01 11/25/2019    GLUF 120 (H) 11/25/2019    CALCIUM 9 0 11/25/2019    AST 22 11/25/2019    ALT 26 11/25/2019    ALKPHOS 87 11/25/2019    PROT 7 4 03/18/2017    BILITOT 1 1 03/18/2017    EGFR 81 11/25/2019     Lab Results   Component Value Date    WBC 6 3 11/25/2019    HGB 18 7 (H) 11/25/2019    HCT 53 1 (H) 11/25/2019    MCV 86 11/25/2019     11/25/2019     No results found for: HEPCAB  Lab Results   Component Value Date    HEPBCAB REACTIVE (A) 10/08/2015    HEPBCAB REACTIVE (A) 10/08/2015    HEPBCAB REACTIVE (A) 10/08/2015    HEPBCAB REACTIVE (A) 10/08/2015     Lab Results   Component Value Date    RPR Non-Reactive 11/25/2019     CD4 ABS   Date/Time Value Ref Range Status   11/25/2019 07:10  (L) 359 - 1519 /uL Final     HIV-1 RNA by PCR, Qn   Date/Time Value Ref Range Status   11/25/2019 07:10 AM 30 copies/mL Final     Comment:     The reportable range for this assay is 20 to 10,000,000  copies HIV-1 RNA/mL             Current Outpatient Medications:     albuterol (VENTOLIN HFA) 90 mcg/act inhaler, Inhale 2 puffs every 6 (six) hours as needed for wheezing, Disp: 18 g, Rfl: 0    aspirin (ASPIRIN LOW DOSE) 81 mg EC tablet, Take 1 tablet (81 mg total) by mouth daily, Disp: 30 tablet, Rfl: 5    clopidogrel (PLAVIX) 75 mg tablet, Take 1 tablet (75 mg total) by mouth daily, Disp: 30 tablet, Rfl: 5    Darunavir-Cobicistat (PREZCOBIX) 800-150 MG TABS, Take 1 tablet by mouth daily, Disp: 30 tablet, Rfl: 5    dolutegravir (TIVICAY) 50 MG TABS, Take 1 tablet (50 mg total) by mouth 2 (two) times a day, Disp: 60 tablet, Rfl: 5    emtricitabine-tenofovir AF (DESCOVY) 200-25 MG tablet, Take 1 tablet by mouth daily, Disp: 30 tablet, Rfl: 5    losartan-hydrochlorothiazide (HYZAAR) 50-12 5 mg per tablet, Take 1 tablet by mouth daily, Disp: 30 tablet, Rfl: 5    metoprolol succinate (TOPROL-XL) 25 mg 24 hr tablet, Take 1 tablet (25 mg total) by mouth daily, Disp: 30 tablet, Rfl: 5    nicotine (NICODERM CQ) 14 mg/24hr TD 24 hr patch, Place 1 patch on the skin every 24 hours, Disp: 28 patch, Rfl: 2    nitroglycerin (NITROSTAT) 0 4 mg SL tablet, Place 1 tablet (0 4 mg total) under the tongue every 5 (five) minutes as needed for chest pain, Disp: 15 tablet, Rfl: 0    pantoprazole (PROTONIX) 40 mg tablet, Take 1 tablet (40 mg total) by mouth daily, Disp: 30 tablet, Rfl: 5    rosuvastatin (CRESTOR) 20 MG tablet, Take 1 tablet (20 mg total) by mouth daily, Disp: 30 tablet, Rfl: 5

## 2019-12-11 NOTE — PROGRESS NOTES
Assessment/Plan:      Diagnoses and all orders for this visit:    HIV disease ([facility] [address] )  -     CBC and differential; Future  -     T-helper cells CD4/CD8 %; Future  -     HIV-1 RNA, quantitative, PCR; Future  -     Comprehensive metabolic panel; Future  -     Lipid Panel with Direct LDL reflex; Future  -     Hemoglobin A1C; Future    Essential hypertension  -     Hemoglobin A1C; Future    Other long term (current) drug therapy  -     Hemoglobin A1C; Future    Screening for diabetes mellitus  -     Hemoglobin A1C; Future    Encounter for lipid screening for cardiovascular disease  -     Lipid Panel with Direct LDL reflex; Future    Other tobacco product nicotine dependence with other nicotine-induced disorder    Polycythemia    Coronary artery disease involving native coronary artery of native heart without angina pectoris          [address] continued maintenance of well-being and continued work on complete tobacco cessation  Discussion: Today patient presents with no mental health concerns  [address] [place] [address] transitioned to smoking cessation discussion  Pt commented that he knew this discussion was coming  He discussed still struggling with the last cigar  Pt still uses the NRT patch during the day and is able to remain smoke free  On the ride to work, pt has replaced his smoking habit with siping on water  [address] [place] [address] praised pt for finding a viable alternative  Pt takes off the patch at night-time as it interferes with his sleep  Once the patch is off, pt ends up smoking about 0 5 cigar before bed  This last piece seems to be a struggle for pt  [address] [place] [address] empathized as pt shared and helped him explore potential behavior changes  Pt expressed commitment to continue working on complete smoking cessation motivated by the fact that ALLIE told him that continued smoking will have deadly effects on him which he does not want to experinece  Subjective:     Patient ID: Inez Moya is a 61 y o  male      HPI: The patient is being seen at the [facility] today for a routine behavioral health follow up     Objective:    Orientation    Person: Yes   Place: Yes   Time: Yes     Appearance     Well Developed: Yes   Healthy: Yes   Normal Body Odor: Yes   Grooming Unkempt: No  Poor Eye Contact: No    Mood and Affect    Appropriate: Yes   Euthymic: Yes     Harm to Self or Others: denied    Substance Abuse: none reported or observed

## 2019-12-17 ENCOUNTER — PATIENT OUTREACH (OUTPATIENT)
Dept: SURGERY | Facility: OTHER | Age: 59
End: 2019-12-17

## 2019-12-18 ENCOUNTER — PATIENT OUTREACH (OUTPATIENT)
Dept: SURGERY | Facility: OTHER | Age: 59
End: 2019-12-18

## 2019-12-18 NOTE — PROGRESS NOTES
CT# [id number] Poncho MELGAR received ct's December MAWD statement via email  Akash will work on ct's check req

## 2019-12-18 NOTE — PROGRESS NOTES
CT# [id number] Poncho MELGAR prepared and emailed to supervisor ct's Dec  MAWD Check requisition with Auth and statement  Please see scanned media

## 2020-01-06 ENCOUNTER — TELEPHONE (OUTPATIENT)
Dept: SURGERY | Facility: CLINIC | Age: 60
End: 2020-01-06

## 2020-01-09 ENCOUNTER — OFFICE VISIT (OUTPATIENT)
Dept: CARDIOLOGY CLINIC | Facility: CLINIC | Age: 60
End: 2020-01-09
Payer: COMMERCIAL

## 2020-01-09 VITALS
SYSTOLIC BLOOD PRESSURE: 134 MMHG | WEIGHT: 195 LBS | DIASTOLIC BLOOD PRESSURE: 86 MMHG | OXYGEN SATURATION: 98 % | RESPIRATION RATE: 18 BRPM | HEIGHT: 68 IN | BODY MASS INDEX: 29.55 KG/M2 | HEART RATE: 79 BPM

## 2020-01-09 DIAGNOSIS — R07.9 CHEST PAIN: ICD-10-CM

## 2020-01-09 DIAGNOSIS — I25.10 CORONARY ARTERY DISEASE INVOLVING NATIVE CORONARY ARTERY OF NATIVE HEART WITHOUT ANGINA PECTORIS: ICD-10-CM

## 2020-01-09 DIAGNOSIS — E78.5 DYSLIPIDEMIA: ICD-10-CM

## 2020-01-09 DIAGNOSIS — I10 ESSENTIAL HYPERTENSION: ICD-10-CM

## 2020-01-09 DIAGNOSIS — I21.4 NSTEMI (NON-ST ELEVATED MYOCARDIAL INFARCTION) (HCC): ICD-10-CM

## 2020-01-09 DIAGNOSIS — Z76.89 ENCOUNTER TO ESTABLISH CARE: Primary | ICD-10-CM

## 2020-01-09 DIAGNOSIS — I35.0 AORTIC STENOSIS: ICD-10-CM

## 2020-01-09 DIAGNOSIS — Z95.5 PRESENCE OF DRUG COATED STENT IN LEFT CIRCUMFLEX CORONARY ARTERY: ICD-10-CM

## 2020-01-09 PROCEDURE — 3075F SYST BP GE 130 - 139MM HG: CPT | Performed by: INTERNAL MEDICINE

## 2020-01-09 PROCEDURE — 3079F DIAST BP 80-89 MM HG: CPT | Performed by: INTERNAL MEDICINE

## 2020-01-09 PROCEDURE — 93000 ELECTROCARDIOGRAM COMPLETE: CPT | Performed by: INTERNAL MEDICINE

## 2020-01-09 PROCEDURE — 99215 OFFICE O/P EST HI 40 MIN: CPT | Performed by: INTERNAL MEDICINE

## 2020-01-09 NOTE — PROGRESS NOTES
Cardiology Office Note    Carlos Martinez 61 y o  male MRN: [id number]    01/09/20          Assessment/Plan:  1  Chest pain  · He reports severe episodes of chest discomfort  Pharmacologic MPI in 3/2019 was negative for ischemia  Cardiac catheterization was offered for further evaluation  He would like to consider it further and get back to us  2  CAD s/p TRENT to the mLCx in 1/2019   · Cont aspirin, plavix, metoprolol, and crestor    3  Mild aortic stenosis- cont surveillance imaging with TTE    4  Hypertension- cont toprol and losartan-hctz    5  Hyperlipidemia- cont crestor    6  Hx of Hep C- treated    7  Hx of HIV      1  Encounter to establish care  POCT ECG   2  NSTEMI (non-ST elevated myocardial infarction) [facility])  Ambulatory referral to Cardiology   3  Aortic stenosis  Echo complete with contrast if indicated   4  Chest pain     5  Coronary artery disease involving native coronary artery of native heart without angina pectoris     6  Presence of drug coated stent in left circumflex coronary artery     7  Dyslipidemia     8  Essential hypertension         HPI: Carlos Martinez is a 61y o  year old male with history of CAD s/p TRENT to the mid circumflex in January 2019, hypertension, and hyperlipidemia presents for routine follow-up  Past medical history:  · He presented to the [facility] ED in 01/2019 with chest pain and mild troponin elevation  He was noted to have a 10 beat run of polymorphic VT on telemetry  He underwent cardiac catheterization and subsequent PCI to the mid circumflex  · TTE 1/2019: EF: 50%, g1DD, mild MR,AS, AI, and TR  · Pharmacologic MPI 3/2019- negative for ischemia  · Holter 11/2019- Ventricular ectopy 0 3% of total beats- no significant arrhythmia noted  Today he notes intermittent episodes of substernal chest pain  They are occasionally on exertion but also occur at rest  He describes it as a squeezing sensation   He states that his symptoms are different than that experienced prior to his PCI however his previous symptoms were atypical  He had similar symptoms in march and was evaluated with a pharmacologic MPI that was negative for ischemia  He denies any other associated symptoms or concerns today       Family History: father with MI in his [de-identified]    Social history: smoke cigars; 5-6 per week      Patient Active Problem List   Diagnosis    Other cirrhosis of liver ([facility] )    Dyslipidemia    Essential hypertension    Hepatitis B core antibody positive    HIV disease ([place] [address] )    Nicotine dependence    Polycythemia    Aphthous ulcer    Atypical chest pain    History of non-ST elevation myocardial infarction (NSTEMI)    Polymorphic ventricular tachycardia (HCC)    Presence of drug coated stent in left circumflex coronary artery    Coronary artery disease involving native coronary artery of native heart without angina pectoris    Gastroesophageal reflux disease without esophagitis       No Known Allergies      Current Outpatient Medications:     albuterol (VENTOLIN HFA) 90 mcg/act inhaler, Inhale 2 puffs every 6 (six) hours as needed for wheezing, Disp: 18 g, Rfl: 0    aspirin (ASPIRIN LOW DOSE) 81 mg EC tablet, Take 1 tablet (81 mg total) by mouth daily, Disp: 30 tablet, Rfl: 5    clopidogrel (PLAVIX) 75 mg tablet, Take 1 tablet (75 mg total) by mouth daily, Disp: 30 tablet, Rfl: 5    Darunavir-Cobicistat (PREZCOBIX) 800-150 MG TABS, Take 1 tablet by mouth daily, Disp: 30 tablet, Rfl: 5    dolutegravir (TIVICAY) 50 MG TABS, Take 1 tablet (50 mg total) by mouth 2 (two) times a day, Disp: 60 tablet, Rfl: 5    emtricitabine-tenofovir AF (DESCOVY) 200-25 MG tablet, Take 1 tablet by mouth daily, Disp: 30 tablet, Rfl: 5    losartan-hydrochlorothiazide (HYZAAR) 50-12 5 mg per tablet, Take 1 tablet by mouth daily, Disp: 30 tablet, Rfl: 5    metoprolol succinate (TOPROL-XL) 25 mg 24 hr tablet, Take 1 tablet (25 mg total) by mouth daily, Disp: 30 tablet, Rfl: 5    nicotine (NICODERM CQ) 14 mg/24hr TD 24 hr patch, Place 1 patch on the skin every 24 hours, Disp: 28 patch, Rfl: 2    nitroglycerin (NITROSTAT) 0 4 mg SL tablet, Place 1 tablet (0 4 mg total) under the tongue every 5 (five) minutes as needed for chest pain, Disp: 15 tablet, Rfl: 0    pantoprazole (PROTONIX) 40 mg tablet, Take 1 tablet (40 mg total) by mouth daily, Disp: 30 tablet, Rfl: 5    rosuvastatin (CRESTOR) 20 MG tablet, Take 1 tablet (20 mg total) by mouth daily, Disp: 30 tablet, Rfl: 5    Past Medical History:   Diagnosis Date    HIV (human immunodeficiency virus infection) ([facility] )     Hypertension     Opioid dependence ([facility] )        Family History   Problem Relation Age of Onset    Alzheimer's disease Mother     Heart attack Father     Prostate cancer Brother        Past Surgical History:   Procedure Laterality Date    CARDIAC SURGERY      LUMBAR LAMINECTOMY      STOMACH SURGERY         Social History     Socioeconomic History    Marital status: /Civil Union     Spouse name: Not on file    Number of children: Not on file    Years of education: Not on file    Highest education level: Not on file   Occupational History    Not on file   Social Needs    Financial resource strain: Not on file    Food insecurity:     Worry: Not on file     Inability: Not on file    Transportation needs:     Medical: Not on file     Non-medical: Not on file   Tobacco Use    Smoking status: Light Tobacco Smoker     Packs/day: 0 00     Types: Cigars     Last attempt to quit: 2019     Years since quittin 9    Smokeless tobacco: Never Used    Tobacco comment: 1 cigar/day   Substance and Sexual Activity    Alcohol use: Yes     Comment: rarely    Drug use: No    Sexual activity: Yes     Partners: Female   Lifestyle    Physical activity:     Days per week: Not on file     Minutes per session: Not on file    Stress: Not on file   Relationships    Social connections:     Talks on phone: Not on file     Gets together: Not on file     Attends Restorationism service: Not on file     Active member of club or organization: Not on file     Attends meetings of clubs or organizations: Not on file     Relationship status: Not on file    Intimate partner violence:     Fear of current or ex partner: Not on file     Emotionally abused: Not on file     Physically abused: Not on file     Forced sexual activity: Not on file   Other Topics Concern    Not on file   Social History Narrative    Not on file       Review of Systems   Constitution: Negative for diaphoresis, weight gain and weight loss  HENT: Negative for congestion  Cardiovascular: Positive for chest pain  Negative for dyspnea on exertion, irregular heartbeat, leg swelling, near-syncope, orthopnea, palpitations, paroxysmal nocturnal dyspnea and syncope  Respiratory: Negative for shortness of breath, sleep disturbances due to breathing and snoring  Hematologic/Lymphatic: Does not bruise/bleed easily  Skin: Negative for rash  Musculoskeletal: Negative for myalgias  Gastrointestinal: Negative for nausea and vomiting  Neurological: Negative for excessive daytime sleepiness and light-headedness  Psychiatric/Behavioral: The patient is not nervous/anxious  Vitals: /86   Pulse 79   Resp 18   Ht 5' 8" (1 727 m)   Wt 88 5 kg (195 lb)   SpO2 98%   BMI 29 65 kg/m²       Physical Exam:     GEN: Alert and oriented x 3, in no acute distress  Well appearing and well nourished  HEENT: Sclera anicteric, conjunctivae pink, mucous membranes moist  Oropharynx clear  NECK: Supple, no carotid bruits, no significant JVD  Trachea midline, no thyromegaly  HEART: Regular rhythm, normal S1 and S2, 2/6 AD, occasional ectopic beats noted, clicks, gallops or rubs  PMI nondisplaced, no thrills  LUNGS: Clear to auscultation bilaterally; no wheezes, rales, or rhonchi  No increased work of breathing or signs of respiratory distress     ABDOMEN: Soft, nontender, nondistended, normoactive bowel sounds  EXTREMITIES: Skin warm and well perfused, no clubbing, cyanosis, or edema  NEURO: No focal findings  Normal speech  Mood and affect normal    SKIN: Normal without suspicious lesions on exposed skin        Lab Results:       Lab Results   Component Value Date    HGBA1C 5 7 01/26/2019    HGBA1C 5 5 09/29/2018    HGBA1C 5 4 03/11/2016    HGBA1C 5 4 03/11/2016    HGBA1C 5 4 03/11/2016    HGBA1C 5 4 03/11/2016     No results found for: CHOL  Lab Results   Component Value Date    HDL 35 (L) 02/26/2019    HDL 31 (L) 01/26/2019    HDL 35 (L) 07/15/2017     Lab Results   Component Value Date    LDLCALC 62 02/26/2019    LDLCALC 104 (H) 01/26/2019    LDLCALC 105 (H) 07/15/2017     Lab Results   Component Value Date    TRIG 136 02/26/2019    TRIG 154 (H) 01/26/2019    TRIG 97 07/15/2017     No results found for: CHOLHDL

## 2020-01-21 ENCOUNTER — PATIENT OUTREACH (OUTPATIENT)
Dept: SURGERY | Facility: OTHER | Age: 60
End: 2020-01-21

## 2020-01-21 NOTE — PROGRESS NOTES
IE#[id number] MROR    Akash received ct's January MAWD statement via email  Akash worked on ct's check req  And emailed to supervisor for further processing  Please see scanned media

## 2020-01-24 ENCOUNTER — HOSPITAL ENCOUNTER (OUTPATIENT)
Dept: NON INVASIVE DIAGNOSTICS | Facility: CLINIC | Age: 60
Discharge: HOME | End: 2020-01-24
Payer: COMMERCIAL

## 2020-01-24 ENCOUNTER — PATIENT OUTREACH (OUTPATIENT)
Dept: SURGERY | Facility: OTHER | Age: 60
End: 2020-01-24

## 2020-01-24 DIAGNOSIS — I35.0 AORTIC STENOSIS: ICD-10-CM

## 2020-01-24 PROCEDURE — 93306 TTE W/DOPPLER COMPLETE: CPT | Performed by: INTERNAL MEDICINE

## 2020-01-24 PROCEDURE — 93306 TTE W/DOPPLER COMPLETE: CPT

## 2020-01-24 NOTE — PROGRESS NOTES
David Rogers    Ct's wife came to the [facility] office to provide cm with ct's January MAWD statement since she believed that cm had not received it  Ct was not aware that her daughter had sent it via email  Cm agreed to let the ct know from now on when the MAWD is processed

## 2020-02-06 ENCOUNTER — PATIENT OUTREACH (OUTPATIENT)
Dept: SURGERY | Facility: OTHER | Age: 60
End: 2020-02-06

## 2020-02-06 NOTE — PROGRESS NOTES
CT# [id number] MROR    Akash prepared and mailed out to ct an RCT, RST reminder letter since the ct is due by next month for recertification  Please see scanned media

## 2020-02-06 NOTE — PROGRESS NOTES
Assessment/Plan:     [employer]     Today patient present with No chief complaint on file  Patient would likely benefit from***  Consider/focus/continue ***  Stage of change: {STAGE OF CHANGE (Optional):19982}  Plan/ Behavioral Recommendations: ***      There are no diagnoses linked to this encounter  Discussion:     {DIS (Optional):04356}    Subjective:     Patient ID: Omer Frey is a 61 y o  male      HPI    History of Present Illness:     {HPI BH (Optional):65287}    Review of Systems      Objective:     Physical Exam      [facility]    Orientation     Person: {YES/NO:20200}    Place: {YES/NO:20200}    Time: {YES/NO:20200}    Appearance    Well Developed: {YES/NO:56458} {WELL DEVELOPED (Optional):33118}    Uncomfortable: {YES/NO:92694}    Normal Body Odor: {YES/NO:44053}    Smells of Feces: {YES/NO:20200}    Smells of Urine: {YES/NO:17856}    Disheveled: {YES/NO:03674}    Well Nourished: {YES/NO:31447} {WELL NURISHED  (Optional):49242}    Grooming Unkempt: {YES/NO:20200}    Poor Eye Contact: {YES/NO:10005}    Hirsute: {YES/NO:76146}    Looks Tired: {YES/NO:28699}    Acutely Exhausted: {YES/NO:10459}{ACUTELY EXH (Optional):75401}    Mood and Affect:     Appropriate: {YES/NO:52841}    Euthymic{YES/NO:50728}    Irritable: {YES/NO:51901}    Angry: {YES/NO:86321}    Anxious: {YES/NO:91300}    Depressed:{YES/NO:34413}    Blunted:{YES/NO:89187}    Labile: {YES/NO:27153}    Restricted: {YES/NO:20200}    Harm to Self or Others: ***    Substance Abuse: ***

## 2020-02-07 ENCOUNTER — TELEPHONE (OUTPATIENT)
Dept: SURGERY | Facility: CLINIC | Age: 60
End: 2020-02-07

## 2020-02-24 ENCOUNTER — PATIENT OUTREACH (OUTPATIENT)
Dept: SURGERY | Facility: OTHER | Age: 60
End: 2020-02-24

## 2020-02-24 NOTE — PROGRESS NOTES
PV#[id number] MROR    Akash received ct's February MAWD statement via email  Akash will work on ct's MAWD check req  And scan in media once done

## 2020-02-25 ENCOUNTER — PATIENT OUTREACH (OUTPATIENT)
Dept: SURGERY | Facility: OTHER | Age: 60
End: 2020-02-25

## 2020-02-25 NOTE — PROGRESS NOTES
CT# [id number] Poncho MELGAR worked on ct's February MAWD check req  CM scanned and emailed the check req to supervisor for processing  Please see scanned media

## 2020-02-28 ENCOUNTER — TELEPHONE (OUTPATIENT)
Dept: SURGERY | Facility: CLINIC | Age: 60
End: 2020-02-28

## 2020-02-28 ENCOUNTER — DOCUMENTATION (OUTPATIENT)
Dept: SURGERY | Facility: OTHER | Age: 60
End: 2020-02-28

## 2020-03-03 ENCOUNTER — PATIENT OUTREACH (OUTPATIENT)
Dept: SURGERY | Facility: OTHER | Age: 60
End: 2020-03-03

## 2020-03-03 NOTE — PROGRESS NOTES
CT# [id number] [facility] emailed akash to schedule his RCT appointment for some day of this week since he was due on 3/1/2020 after 4pm, after work  Cm answered ct stating that Friday after 4pm would work best  Akash is still waiting on ct to confirm this appointment to place it on cm's calendar

## 2020-03-06 ENCOUNTER — PATIENT OUTREACH (OUTPATIENT)
Dept: SURGERY | Facility: OTHER | Age: 60
End: 2020-03-06

## 2020-03-06 NOTE — PROGRESS NOTES
CT# [id number] [facility] came to the [facility] office to complete RCT, RST  Ct states is in stable medical health  Ct reported [facility] ID Dr [facility] and getting labs done  Ct also went to see the optometrist and dentist last year however ct stated that the dentist at [facility] will not continue to treat the ct as planned since [employer] will not cover the Tx  Ct stated that he was looking for a new dentist since we do not have a contract with [email or web address] anymore and his dental tx has been affected by this  CM provided the ct with the dental offices which we have contracts with and the ct will schedule an appointment and let cm know  Ct continues to be adherent to HIV care and is on Prezista, Prescobix, Descovy and Tivicay  Ct is on blood thinners but states feeling great  Ct is abstaining from risky behaviors, ct is in a monogamous relationship with his wife of over 20 years  Ct lives in his home with his wife, has a car has MAWD coverage  Ct will request an auth to continue to assist the ct with his MAWD premiums  Ct works a full time job at [employer]  Ct has no addictions or mental health concerns ct has no domestic violence concerns, is independent in ADL and has a strong support system  Ct has no dependents and no issues with language comprehension, legal or nutrition  Reassessment and Recertification is complete  Please see scanned media

## 2020-03-19 ENCOUNTER — PATIENT OUTREACH (OUTPATIENT)
Dept: SURGERY | Facility: OTHER | Age: 60
End: 2020-03-19

## 2020-03-19 NOTE — PROGRESS NOTES
CT MROR    Akash received ct's March MAWD statement to be processed by akash Bustos will work on the check requisition and send to supervisor for further processing

## 2020-03-27 ENCOUNTER — PATIENT OUTREACH (OUTPATIENT)
Dept: SURGERY | Facility: OTHER | Age: 60
End: 2020-03-27

## 2020-03-27 NOTE — PROGRESS NOTES
CT# [id number] Poncho MELGAR attempted to work on ct's March MAWD premiums but noticed ct's MAWD auth had  on 3/1/2020 and called [employer] to inquire on how to proceed since the ct is recertified  CM sent ct's RCT forms, income and MAWD premiums  Please see scanned media

## 2020-03-30 ENCOUNTER — PATIENT OUTREACH (OUTPATIENT)
Dept: SURGERY | Facility: OTHER | Age: 60
End: 2020-03-30

## 2020-03-30 NOTE — PROGRESS NOTES
CT#[id number] MROR    Akash contacted ct to remind him of his virtual visit with Robert Karen today at 3:53pm  Ct did not answer the message left by akash Bustos also emailed Lynette Sanchez at [facility] [place] to ask about ct's Antwan Jones since she emailed akash with a new Auth # [id number] to submit to supervisor with check requisition

## 2020-04-01 ENCOUNTER — PATIENT OUTREACH (OUTPATIENT)
Dept: SURGERY | Facility: OTHER | Age: 60
End: 2020-04-01

## 2020-04-01 ENCOUNTER — TELEMEDICINE (OUTPATIENT)
Dept: SURGERY | Facility: CLINIC | Age: 60
End: 2020-04-01
Payer: COMMERCIAL

## 2020-04-01 DIAGNOSIS — I21.4 NSTEMI (NON-ST ELEVATED MYOCARDIAL INFARCTION) (HCC): ICD-10-CM

## 2020-04-01 DIAGNOSIS — B20 HIV (HUMAN IMMUNODEFICIENCY VIRUS INFECTION) (HCC): ICD-10-CM

## 2020-04-01 DIAGNOSIS — I10 ESSENTIAL HYPERTENSION: ICD-10-CM

## 2020-04-01 DIAGNOSIS — F17.213 CIGARETTE NICOTINE DEPENDENCE WITH WITHDRAWAL: ICD-10-CM

## 2020-04-01 DIAGNOSIS — R07.9 CHEST PAIN: ICD-10-CM

## 2020-04-01 DIAGNOSIS — B20 HIV DISEASE (HCC): Primary | ICD-10-CM

## 2020-04-01 DIAGNOSIS — E78.2 MIXED HYPERLIPIDEMIA: ICD-10-CM

## 2020-04-01 PROCEDURE — 99214 OFFICE O/P EST MOD 30 MIN: CPT | Performed by: NURSE PRACTITIONER

## 2020-04-01 RX ORDER — ASPIRIN 81 MG/1
81 TABLET ORAL DAILY
Qty: 30 TABLET | Refills: 5 | Status: SHIPPED | OUTPATIENT
Start: 2020-04-01 | End: 2020-10-20 | Stop reason: HOSPADM

## 2020-04-01 RX ORDER — NICOTINE 21 MG/24HR
1 PATCH, TRANSDERMAL 24 HOURS TRANSDERMAL EVERY 24 HOURS
Qty: 28 PATCH | Refills: 2 | Status: SHIPPED | OUTPATIENT
Start: 2020-04-01 | End: 2020-06-19

## 2020-04-01 RX ORDER — ROSUVASTATIN CALCIUM 20 MG/1
20 TABLET, COATED ORAL DAILY
Qty: 30 TABLET | Refills: 5 | Status: SHIPPED | OUTPATIENT
Start: 2020-04-01 | End: 2020-11-13

## 2020-04-01 RX ORDER — CLOPIDOGREL BISULFATE 75 MG/1
75 TABLET ORAL DAILY
Qty: 30 TABLET | Refills: 5 | Status: SHIPPED | OUTPATIENT
Start: 2020-04-01 | End: 2020-11-27

## 2020-04-01 RX ORDER — LOSARTAN POTASSIUM AND HYDROCHLOROTHIAZIDE 12.5; 5 MG/1; MG/1
1 TABLET ORAL DAILY
Qty: 30 TABLET | Refills: 5 | Status: SHIPPED | OUTPATIENT
Start: 2020-04-01 | End: 2020-10-20 | Stop reason: HOSPADM

## 2020-04-01 RX ORDER — METOPROLOL SUCCINATE 25 MG/1
25 TABLET, EXTENDED RELEASE ORAL DAILY
Qty: 30 TABLET | Refills: 5 | Status: SHIPPED | OUTPATIENT
Start: 2020-04-01 | End: 2020-12-28

## 2020-04-01 RX ORDER — PANTOPRAZOLE SODIUM 40 MG/1
40 TABLET, DELAYED RELEASE ORAL DAILY
Qty: 30 TABLET | Refills: 5 | Status: SHIPPED | OUTPATIENT
Start: 2020-04-01 | End: 2020-11-27

## 2020-04-06 ENCOUNTER — PATIENT OUTREACH (OUTPATIENT)
Dept: SURGERY | Facility: OTHER | Age: 60
End: 2020-04-06

## 2020-04-07 ENCOUNTER — TELEPHONE (OUTPATIENT)
Dept: SURGERY | Facility: CLINIC | Age: 60
End: 2020-04-07

## 2020-04-07 ENCOUNTER — TELEMEDICINE (OUTPATIENT)
Dept: SURGERY | Facility: CLINIC | Age: 60
End: 2020-04-07
Payer: COMMERCIAL

## 2020-04-07 DIAGNOSIS — B20 HIV DISEASE (HCC): Primary | ICD-10-CM

## 2020-04-07 DIAGNOSIS — J30.2 SEASONAL ALLERGIES: ICD-10-CM

## 2020-04-07 PROCEDURE — 99214 OFFICE O/P EST MOD 30 MIN: CPT | Performed by: NURSE PRACTITIONER

## 2020-04-07 RX ORDER — MOMETASONE FUROATE 50 UG/1
2 SPRAY, METERED NASAL DAILY
Qty: 17 G | Refills: 2 | Status: SHIPPED | OUTPATIENT
Start: 2020-04-07 | End: 2021-05-18 | Stop reason: HOSPADM

## 2020-04-07 RX ORDER — CETIRIZINE HYDROCHLORIDE 10 MG/1
10 TABLET ORAL DAILY
Qty: 30 TABLET | Refills: 2 | Status: SHIPPED | OUTPATIENT
Start: 2020-04-07 | End: 2020-12-21

## 2020-04-20 ENCOUNTER — PATIENT OUTREACH (OUTPATIENT)
Dept: SURGERY | Facility: OTHER | Age: 60
End: 2020-04-20

## 2020-04-22 ENCOUNTER — PATIENT OUTREACH (OUTPATIENT)
Dept: SURGERY | Facility: OTHER | Age: 60
End: 2020-04-22

## 2020-05-18 ENCOUNTER — PATIENT OUTREACH (OUTPATIENT)
Dept: SURGERY | Facility: OTHER | Age: 60
End: 2020-05-18

## 2020-05-21 ENCOUNTER — PATIENT OUTREACH (OUTPATIENT)
Dept: SURGERY | Facility: OTHER | Age: 60
End: 2020-05-21

## 2020-05-27 ENCOUNTER — PATIENT OUTREACH (OUTPATIENT)
Dept: SURGERY | Facility: OTHER | Age: 60
End: 2020-05-27

## 2020-05-29 ENCOUNTER — APPOINTMENT (OUTPATIENT)
Dept: LAB | Facility: HOSPITAL | Age: 60
End: 2020-05-29
Attending: INTERNAL MEDICINE
Payer: COMMERCIAL

## 2020-05-29 DIAGNOSIS — Z79.899 OTHER LONG TERM (CURRENT) DRUG THERAPY: ICD-10-CM

## 2020-05-29 DIAGNOSIS — I10 ESSENTIAL HYPERTENSION: ICD-10-CM

## 2020-05-29 DIAGNOSIS — B20 HIV DISEASE (HCC): ICD-10-CM

## 2020-05-29 DIAGNOSIS — Z13.1 SCREENING FOR DIABETES MELLITUS: ICD-10-CM

## 2020-05-29 DIAGNOSIS — Z13.220 ENCOUNTER FOR LIPID SCREENING FOR CARDIOVASCULAR DISEASE: ICD-10-CM

## 2020-05-29 DIAGNOSIS — Z13.6 ENCOUNTER FOR LIPID SCREENING FOR CARDIOVASCULAR DISEASE: ICD-10-CM

## 2020-05-29 LAB
ALBUMIN SERPL BCP-MCNC: 3.8 G/DL (ref 3.5–5)
ALP SERPL-CCNC: 85 U/L (ref 46–116)
ALT SERPL W P-5'-P-CCNC: 42 U/L (ref 12–78)
ANION GAP SERPL CALCULATED.3IONS-SCNC: 8 MMOL/L (ref 4–13)
AST SERPL W P-5'-P-CCNC: 30 U/L (ref 5–45)
BASOPHILS # BLD AUTO: 0.03 THOUSANDS/ΜL (ref 0–0.1)
BASOPHILS NFR BLD AUTO: 1 % (ref 0–1)
BILIRUB SERPL-MCNC: 0.5 MG/DL (ref 0.2–1)
BUN SERPL-MCNC: 17 MG/DL (ref 5–25)
CALCIUM SERPL-MCNC: 8.7 MG/DL (ref 8.3–10.1)
CHLORIDE SERPL-SCNC: 107 MMOL/L (ref 100–108)
CHOLEST SERPL-MCNC: 140 MG/DL (ref 50–200)
CO2 SERPL-SCNC: 27 MMOL/L (ref 21–32)
CREAT SERPL-MCNC: 0.94 MG/DL (ref 0.6–1.3)
EOSINOPHIL # BLD AUTO: 0.16 THOUSAND/ΜL (ref 0–0.61)
EOSINOPHIL NFR BLD AUTO: 3 % (ref 0–6)
ERYTHROCYTE [DISTWIDTH] IN BLOOD BY AUTOMATED COUNT: 14.4 % (ref 11.6–15.1)
EST. AVERAGE GLUCOSE BLD GHB EST-MCNC: 114 MG/DL
GFR SERPL CREATININE-BSD FRML MDRD: 88 ML/MIN/1.73SQ M
GLUCOSE P FAST SERPL-MCNC: 110 MG/DL (ref 65–99)
HBA1C MFR BLD: 5.6 %
HCT VFR BLD AUTO: 50.9 % (ref 36.5–49.3)
HDLC SERPL-MCNC: 34 MG/DL
HGB BLD-MCNC: 16.7 G/DL (ref 12–17)
IMM GRANULOCYTES # BLD AUTO: 0.03 THOUSAND/UL (ref 0–0.2)
IMM GRANULOCYTES NFR BLD AUTO: 1 % (ref 0–2)
LDLC SERPL CALC-MCNC: 75 MG/DL (ref 0–100)
LYMPHOCYTES # BLD AUTO: 1.13 THOUSANDS/ΜL (ref 0.6–4.47)
LYMPHOCYTES NFR BLD AUTO: 20 % (ref 14–44)
MCH RBC QN AUTO: 29.3 PG (ref 26.8–34.3)
MCHC RBC AUTO-ENTMCNC: 32.8 G/DL (ref 31.4–37.4)
MCV RBC AUTO: 89 FL (ref 82–98)
MONOCYTES # BLD AUTO: 0.87 THOUSAND/ΜL (ref 0.17–1.22)
MONOCYTES NFR BLD AUTO: 15 % (ref 4–12)
NEUTROPHILS # BLD AUTO: 3.58 THOUSANDS/ΜL (ref 1.85–7.62)
NEUTS SEG NFR BLD AUTO: 60 % (ref 43–75)
NRBC BLD AUTO-RTO: 0 /100 WBCS
PLATELET # BLD AUTO: 210 THOUSANDS/UL (ref 149–390)
PMV BLD AUTO: 9.8 FL (ref 8.9–12.7)
POTASSIUM SERPL-SCNC: 4.2 MMOL/L (ref 3.5–5.3)
PROT SERPL-MCNC: 7.8 G/DL (ref 6.4–8.2)
RBC # BLD AUTO: 5.7 MILLION/UL (ref 3.88–5.62)
SODIUM SERPL-SCNC: 142 MMOL/L (ref 136–145)
TRIGL SERPL-MCNC: 154 MG/DL
WBC # BLD AUTO: 5.8 THOUSAND/UL (ref 4.31–10.16)

## 2020-05-29 PROCEDURE — 87536 HIV-1 QUANT&REVRSE TRNSCRPJ: CPT

## 2020-05-29 PROCEDURE — 80061 LIPID PANEL: CPT

## 2020-05-29 PROCEDURE — 83036 HEMOGLOBIN GLYCOSYLATED A1C: CPT

## 2020-05-29 PROCEDURE — 80053 COMPREHEN METABOLIC PANEL: CPT

## 2020-05-29 PROCEDURE — 86360 T CELL ABSOLUTE COUNT/RATIO: CPT

## 2020-05-29 PROCEDURE — 85025 COMPLETE CBC W/AUTO DIFF WBC: CPT

## 2020-05-29 PROCEDURE — 36415 COLL VENOUS BLD VENIPUNCTURE: CPT

## 2020-05-30 LAB
BASOPHILS # BLD AUTO: 0 X10E3/UL (ref 0–0.2)
BASOPHILS NFR BLD AUTO: 1 %
CD3+CD4+ CELLS # BLD: 206 /UL (ref 359–1519)
CD3+CD4+ CELLS NFR BLD: 17.2 % (ref 30.8–58.5)
CD3+CD4+ CELLS/CD3+CD8+ CLL BLD: 0.29 % (ref 0.92–3.72)
CD3+CD8+ CELLS # BLD: 709 /UL (ref 109–897)
CD3+CD8+ CELLS NFR BLD: 59.1 % (ref 12–35.5)
EOSINOPHIL # BLD AUTO: 0.1 X10E3/UL (ref 0–0.4)
EOSINOPHIL NFR BLD AUTO: 2 %
ERYTHROCYTE [DISTWIDTH] IN BLOOD BY AUTOMATED COUNT: 16.7 % (ref 11.6–15.4)
HCT VFR BLD AUTO: 62.5 % (ref 37.5–51)
HGB BLD-MCNC: 21 G/DL (ref 13–17.7)
IMM GRANULOCYTES # BLD: 0 X10E3/UL (ref 0–0.1)
IMM GRANULOCYTES NFR BLD: 1 %
LYMPHOCYTES # BLD AUTO: 1.2 X10E3/UL (ref 0.7–3.1)
LYMPHOCYTES NFR BLD AUTO: 23 %
MCH RBC QN AUTO: 29.2 PG (ref 26.6–33)
MCHC RBC AUTO-ENTMCNC: 33.6 G/DL (ref 31.5–35.7)
MCV RBC AUTO: 87 FL (ref 79–97)
MONOCYTES # BLD AUTO: 0.6 X10E3/UL (ref 0.1–0.9)
MONOCYTES NFR BLD AUTO: 12 %
NEUTROPHILS # BLD AUTO: 3.2 X10E3/UL (ref 1.4–7)
NEUTROPHILS NFR BLD AUTO: 61 %
PLATELET # BLD AUTO: 135 X10E3/UL (ref 150–450)
RBC # BLD AUTO: 7.19 X10E6/UL (ref 4.14–5.8)
WBC # BLD AUTO: 5.2 X10E3/UL (ref 3.4–10.8)

## 2020-06-01 LAB
HIV1 RNA # SERPL NAA+PROBE: 40 COPIES/ML
HIV1 RNA SERPL NAA+PROBE-LOG#: 1.6 LOG10COPY/ML

## 2020-06-08 ENCOUNTER — TELEPHONE (OUTPATIENT)
Dept: SURGERY | Facility: CLINIC | Age: 60
End: 2020-06-08

## 2020-06-09 ENCOUNTER — PATIENT OUTREACH (OUTPATIENT)
Dept: SURGERY | Facility: OTHER | Age: 60
End: 2020-06-09

## 2020-06-09 ENCOUNTER — OFFICE VISIT (OUTPATIENT)
Dept: SURGERY | Facility: CLINIC | Age: 60
End: 2020-06-09
Payer: COMMERCIAL

## 2020-06-09 VITALS
OXYGEN SATURATION: 97 % | HEIGHT: 68 IN | DIASTOLIC BLOOD PRESSURE: 78 MMHG | WEIGHT: 198.6 LBS | HEART RATE: 96 BPM | SYSTOLIC BLOOD PRESSURE: 130 MMHG | TEMPERATURE: 98.1 F | BODY MASS INDEX: 30.1 KG/M2

## 2020-06-09 DIAGNOSIS — I25.10 CORONARY ARTERY DISEASE INVOLVING NATIVE CORONARY ARTERY OF NATIVE HEART WITHOUT ANGINA PECTORIS: ICD-10-CM

## 2020-06-09 DIAGNOSIS — B20 HIV DISEASE (HCC): Primary | ICD-10-CM

## 2020-06-09 DIAGNOSIS — K52.9 CHRONIC DIARRHEA: ICD-10-CM

## 2020-06-09 DIAGNOSIS — R19.7 DIARRHEA, UNSPECIFIED TYPE: Primary | ICD-10-CM

## 2020-06-09 DIAGNOSIS — F17.298 OTHER TOBACCO PRODUCT NICOTINE DEPENDENCE WITH OTHER NICOTINE-INDUCED DISORDER: ICD-10-CM

## 2020-06-09 DIAGNOSIS — I10 ESSENTIAL HYPERTENSION: ICD-10-CM

## 2020-06-09 DIAGNOSIS — K74.60 CIRRHOSIS OF LIVER WITHOUT ASCITES, UNSPECIFIED HEPATIC CIRRHOSIS TYPE (HCC): ICD-10-CM

## 2020-06-09 DIAGNOSIS — K74.69 OTHER CIRRHOSIS OF LIVER (HCC): ICD-10-CM

## 2020-06-09 PROCEDURE — 99215 OFFICE O/P EST HI 40 MIN: CPT | Performed by: INTERNAL MEDICINE

## 2020-06-17 ENCOUNTER — PATIENT OUTREACH (OUTPATIENT)
Dept: SURGERY | Facility: OTHER | Age: 60
End: 2020-06-17

## 2020-06-18 DIAGNOSIS — F17.213 CIGARETTE NICOTINE DEPENDENCE WITH WITHDRAWAL: ICD-10-CM

## 2020-06-22 ENCOUNTER — PATIENT OUTREACH (OUTPATIENT)
Dept: SURGERY | Facility: OTHER | Age: 60
End: 2020-06-22

## 2020-07-20 ENCOUNTER — PATIENT OUTREACH (OUTPATIENT)
Dept: SURGERY | Facility: OTHER | Age: 60
End: 2020-07-20

## 2020-07-20 ENCOUNTER — APPOINTMENT (OUTPATIENT)
Dept: LAB | Facility: HOSPITAL | Age: 60
End: 2020-07-20
Payer: COMMERCIAL

## 2020-07-20 DIAGNOSIS — R19.7 DIARRHEA, UNSPECIFIED TYPE: ICD-10-CM

## 2020-07-20 LAB — C DIFF TOX GENS STL QL NAA+PROBE: NEGATIVE

## 2020-07-20 PROCEDURE — 87177 OVA AND PARASITES SMEARS: CPT

## 2020-07-20 PROCEDURE — 87493 C DIFF AMPLIFIED PROBE: CPT

## 2020-07-20 PROCEDURE — 89055 LEUKOCYTE ASSESSMENT FECAL: CPT

## 2020-07-20 PROCEDURE — 87329 GIARDIA AG IA: CPT

## 2020-07-20 PROCEDURE — 87209 SMEAR COMPLEX STAIN: CPT

## 2020-07-20 NOTE — PROGRESS NOTES
HR#[id number] MROR    Ct sent his July [facility] premiums via email  Cm will prepare ct's check req and email to supervisor for further processing  Once cm receives signed check req  From supervisor cm will scan in the [facility] packet for July

## 2020-07-21 ENCOUNTER — TELEPHONE (OUTPATIENT)
Dept: SURGERY | Facility: CLINIC | Age: 60
End: 2020-07-21

## 2020-07-21 ENCOUNTER — PATIENT OUTREACH (OUTPATIENT)
Dept: SURGERY | Facility: OTHER | Age: 60
End: 2020-07-21

## 2020-07-21 NOTE — PROGRESS NOTES
CT# [id number] Poncho MELGAR received ct's signed check requisition from supervisor  Please see scanned media

## 2020-07-22 LAB — G LAMBLIA AG STL QL IA: NEGATIVE

## 2020-07-22 NOTE — TELEPHONE ENCOUNTER
Pt contacted lab and was told they still don't have the correct tests and will contact him when they do

## 2020-07-27 ENCOUNTER — PATIENT OUTREACH (OUTPATIENT)
Dept: SURGERY | Facility: OTHER | Age: 60
End: 2020-07-27

## 2020-07-27 ENCOUNTER — TELEPHONE (OUTPATIENT)
Dept: SURGERY | Facility: CLINIC | Age: 60
End: 2020-07-27

## 2020-07-27 NOTE — PROGRESS NOTES
CT# [id number] [facility]    As per the [facility] earlier this morning the ct has had diarrhea and is being followed up with

## 2020-07-28 ENCOUNTER — OFFICE VISIT (OUTPATIENT)
Dept: SURGERY | Facility: CLINIC | Age: 60
End: 2020-07-28
Payer: COMMERCIAL

## 2020-07-28 VITALS
OXYGEN SATURATION: 97 % | HEIGHT: 68 IN | WEIGHT: 200 LBS | BODY MASS INDEX: 30.31 KG/M2 | SYSTOLIC BLOOD PRESSURE: 128 MMHG | TEMPERATURE: 97.6 F | DIASTOLIC BLOOD PRESSURE: 78 MMHG | HEART RATE: 94 BPM

## 2020-07-28 DIAGNOSIS — K57.92 DIVERTICULITIS: ICD-10-CM

## 2020-07-28 DIAGNOSIS — K59.1 FUNCTIONAL DIARRHEA: ICD-10-CM

## 2020-07-28 DIAGNOSIS — U07.1 COVID-19: ICD-10-CM

## 2020-07-28 DIAGNOSIS — B20 HIV DISEASE (HCC): Primary | ICD-10-CM

## 2020-07-28 DIAGNOSIS — Z00.00 ANNUAL PHYSICAL EXAM: ICD-10-CM

## 2020-07-28 PROCEDURE — 99214 OFFICE O/P EST MOD 30 MIN: CPT | Performed by: NURSE PRACTITIONER

## 2020-07-28 RX ORDER — DICYCLOMINE HYDROCHLORIDE 10 MG/1
10 CAPSULE ORAL AS NEEDED
Qty: 60 CAPSULE | Refills: 0 | Status: SHIPPED | OUTPATIENT
Start: 2020-07-28 | End: 2021-05-18 | Stop reason: HOSPADM

## 2020-07-28 NOTE — PROGRESS NOTES
[address]  [facility]    NAME: Arya Armijo  AGE: 61 y o  SEX: male  : 1960     DATE: 2020     Assessment and Plan:     Problem List Items Addressed This Visit        Other    HIV disease ([address] )     No results found for: [id number]  CD4 ABS   Date/Time Value Ref Range Status   2020 07:21  (L) 359 - 1519 /uL Final     HIV-1 RNA by PCR, Qn   Date/Time Value Ref Range Status   2020 07:21 AM 40 copies/mL Final     Comment:     The reportable range for this assay is 20 to 10,000,000  copies HIV-1 RNA/mL  HIV-1 RNA Viral Load Log   Date/Time Value Ref Range Status   2020 07:21 AM 1 602 [id number]/mL Final     ART: Prezcobix and Tivicay  Reports 100% adherence  Denies side effects  Stressed the importance of adherence  Continue follow up with ID clinic  Reviewed most recent labs, including Cd4 and viral load  Discussed the risks and benefits of treatment options, instructions for management, importance of treatment adherence, and reduction of risk factor  Educated on possible  medication side effects  Counseled on routes of HIV transmission, including the risk of  infection  Emphasized that viral suppression is the best method to prevent HIV transmission  At this time pt denies the need for HIV testing of anyone in their life  Total encounter time was 45 minutes  Greater then 20 minutes were spent on counseling and patient education  Pt voices understanding and agreement with treatment plan  Other Visit Diagnoses     Functional diarrhea    -  Primary    Relevant Medications    dicyclomine (BENTYL) 10 mg capsule    COVID-19        Relevant Orders    SARS-CoV2 Antibody, Total (IgG, IgA, IgM) [facility]    Diverticulitis        Relevant Medications    psyllium (METAMUCIL) 58 6 % packet    Annual physical exam            Educated on the signs and symptoms of COVID-19   Instructed to call clinic if pt develops cough, fever, or shortness of breath  Educated to maintain CDC recommended social distancing practices, wash hands frequently, avoid touching face, and stay home if you become ill  Immunizations and preventive care screenings were discussed with patient today  Appropriate education was printed on patient's after visit summary  Counseling:  Alcohol/drug use: discussed moderation in alcohol intake, the recommendations for healthy alcohol use, and avoidance of illicit drug use  Dental Health: discussed importance of regular tooth brushing, flossing, and dental visits  Injury prevention: discussed safety/seat belts, safety helmets, smoke detectors, carbon dioxide detectors, and smoking near bedding or upholstery  · Exercise: the importance of regular exercise/physical activity was discussed  Recommend exercise 3-5 times per week for at least 30 minutes  No follow-ups on file  Chief Complaint:     Chief Complaint   Patient presents with    HIV Positive     3 mth f/u- no c/os      History of Present Illness:     Adult Annual Physical   Patient here for a comprehensive physical exam  The patient reports problems - IBS  Diet and Physical Activity  · Diet/Nutrition: well balanced diet  · Exercise: moderate cardiovascular exercise and 3-4 times a week on average  Depression Screening  PHQ-9 Depression Screening    PHQ-9:    Frequency of the following problems over the past two weeks:            General Health  · Sleep: gets 4-6 hours of sleep on average  · Hearing: normal - bilateral   · Vision: wears glasses and contacts  · Dental: no dental visits for >1 year   Health  · Symptoms include: none     Review of Systems:     Review of Systems   Constitutional: Negative for activity change, appetite change, chills, diaphoresis, fatigue, fever and unexpected weight change     HENT: Negative for congestion, dental problem, ear pain, hearing loss, mouth sores, rhinorrhea and sore throat  Eyes: Negative for pain, redness and visual disturbance  Respiratory: Negative for shortness of breath and wheezing  Cardiovascular: Negative for chest pain and leg swelling  Gastrointestinal: Negative for abdominal pain, constipation, diarrhea, nausea and vomiting  Endocrine: Negative for polydipsia, polyphagia and polyuria  Genitourinary: Negative for difficulty urinating and dysuria  Musculoskeletal: Negative for back pain, joint swelling and myalgias  Skin: Negative for rash  Neurological: Negative for syncope and headaches  Psychiatric/Behavioral: Negative for behavioral problems and suicidal ideas        Past Medical History:     Past Medical History:   Diagnosis Date    HIV [facility]human immunodeficiency virus infection) ([facility] )     Hypertension     Opioid dependence ([facility] )       Past Surgical History:     Past Surgical History:   Procedure Laterality Date    CARDIAC SURGERY      LUMBAR LAMINECTOMY      STOMACH SURGERY        Family History:     Family History   Problem Relation Age of Onset    Alzheimer's disease Mother     Heart attack Father     Prostate cancer Brother       Social History:        Social History     Socioeconomic History    Marital status: /Civil Union     Spouse name: Not on file    Number of children: Not on file    Years of education: Not on file    Highest education level: Not on file   Occupational History    Not on file   Social Needs    Financial resource strain: Not on file    Food insecurity:     Worry: Not on file     Inability: Not on file    Transportation needs:     Medical: Not on file     Non-medical: Not on file   Tobacco Use    Smoking status: Former Smoker     Packs/day: 0 00     Types: Cigars     Last attempt to quit: 3/1/2019     Years since quittin 4    Smokeless tobacco: Never Used    Tobacco comment: 1 cigar/day   Substance and Sexual Activity    Alcohol use: Yes     Frequency: 2-4 times a month Comment: rarely    Drug use: No    Sexual activity: Yes     Partners: Female   Lifestyle    Physical activity:     Days per week: Not on file     Minutes per session: Not on file    Stress: Not on file   Relationships    Social connections:     Talks on phone: Not on file     Gets together: Not on file     Attends [employer] service: Not on file     Active member of club or organization: Not on file     Attends meetings of clubs or organizations: Not on file     Relationship status: Not on file    Intimate partner violence:     Fear of current or ex partner: Not on file     Emotionally abused: Not on file     Physically abused: Not on file     Forced sexual activity: Not on file   Other Topics Concern    Not on file   Social History Narrative    Not on file      Current Medications:     Current Outpatient Medications   Medication Sig Dispense Refill    albuterol (VENTOLIN HFA) 90 mcg/act inhaler Inhale 2 puffs every 6 (six) hours as needed for wheezing 18 g 0    aspirin (ASPIRIN LOW DOSE) 81 mg EC tablet Take 1 tablet (81 mg total) by mouth daily 30 tablet 5    cetirizine (ZyrTEC) 10 mg tablet Take 1 tablet (10 mg total) by mouth daily 30 tablet 2    clopidogrel (PLAVIX) 75 mg tablet Take 1 tablet (75 mg total) by mouth daily 30 tablet 5    Darunavir-Cobicistat (Prezcobix) 800-150 MG TABS Take 1 tablet by mouth daily 30 tablet 5    dolutegravir (Tivicay) 50 MG TABS Take 1 tablet (50 mg total) by mouth 2 (two) times a day 60 tablet 5    emtricitabine-tenofovir AF (Descovy) 200-25 MG tablet Take 1 tablet by mouth daily 30 tablet 5    losartan-hydrochlorothiazide (HYZAAR) 50-12 5 mg per tablet Take 1 tablet by mouth daily 30 tablet 5    metoprolol succinate (TOPROL-XL) 25 mg 24 hr tablet Take 1 tablet (25 mg total) by mouth daily 30 tablet 5    mometasone (NASONEX) 50 mcg/act nasal spray 2 sprays into each nostril daily 17 g 2    nitroglycerin (NITROSTAT) 0 4 mg SL tablet Place 1 tablet (0 4 mg total) under the tongue every 5 (five) minutes as needed for chest pain 15 tablet 0    pantoprazole (PROTONIX) 40 mg tablet Take 1 tablet (40 mg total) by mouth daily 30 tablet 5    rosuvastatin (CRESTOR) 20 MG tablet Take 1 tablet (20 mg total) by mouth daily 30 tablet 5    dicyclomine (BENTYL) 10 mg capsule Take 1 capsule (10 mg total) by mouth as needed (for diarrehea) 60 capsule 0    NICOTINE STEP 2 14 MG/24HR TD 24 hr patch PLACE 1 PATCH ON THE SKIN EVERY 24 HOURS (Patient not taking: Reported on 7/28/2020) 28 patch 2    psyllium (METAMUCIL) 58 6 % packet Take 1 packet by mouth daily 60 packet 1     No current facility-administered medications for this visit  Allergies:     No Known Allergies   Physical Exam:     /78   Pulse 94   Temp 97 6 °F (36 4 °C)   Ht 5' 8" (1 727 m)   Wt 90 7 kg (200 lb)   SpO2 97%   BMI 30 41 kg/m²     Physical Exam   Constitutional: He is oriented to person, place, and time  He appears well-developed and well-nourished  No distress  HENT:   Head: Normocephalic  Right Ear: External ear normal    Left Ear: External ear normal    Nose: Nose normal    Mouth/Throat: Oropharynx is clear and moist  No oropharyngeal exudate  Eyes: Pupils are equal, round, and reactive to light  Conjunctivae are normal  Right eye exhibits no discharge  Left eye exhibits no discharge  Neck: Normal range of motion  No thyromegaly present  Cardiovascular: Normal rate, regular rhythm, normal heart sounds and intact distal pulses  No murmur heard  Pulmonary/Chest: Effort normal and breath sounds normal  He has no wheezes  Abdominal: Soft  Bowel sounds are normal  He exhibits no mass  There is no tenderness  Musculoskeletal: Normal range of motion  He exhibits no edema or tenderness  Lymphadenopathy:     He has no cervical adenopathy  Neurological: He is alert and oriented to person, place, and time  Skin: Skin is warm and dry  No rash noted     Psychiatric: He has a normal mood and affect  His behavior is normal        ALLIE Villa  [facility]  BMI Counseling: Body mass index is 30 41 kg/m²  The BMI is above normal  Nutrition recommendations include 3-5 servings of fruits/vegetables daily

## 2020-07-28 NOTE — ASSESSMENT & PLAN NOTE
No results found for: [id number]  CD4 ABS   Date/Time Value Ref Range Status   2020 07:21  (L) 359 - 1519 /uL Final     HIV-1 RNA by PCR, Qn   Date/Time Value Ref Range Status   2020 07:21 AM 40 copies/mL Final     Comment:     The reportable range for this assay is 20 to 10,000,000  copies HIV-1 RNA/mL  HIV-1 RNA Viral Load Log   Date/Time Value Ref Range Status   2020 07:21 AM 1 602 [id number]/mL Final     ART: Prezcobix and Tivicay  Reports 100% adherence  Denies side effects  Stressed the importance of adherence  Continue follow up with ID clinic  Reviewed most recent labs, including Cd4 and viral load  Discussed the risks and benefits of treatment options, instructions for management, importance of treatment adherence, and reduction of risk factor  Educated on possible  medication side effects  Counseled on routes of HIV transmission, including the risk of  infection  Emphasized that viral suppression is the best method to prevent HIV transmission  At this time pt denies the need for HIV testing of anyone in their life  Total encounter time was 45 minutes  Greater then 20 minutes were spent on counseling and patient education  Pt voices understanding and agreement with treatment plan

## 2020-07-28 NOTE — PATIENT INSTRUCTIONS
Irritable Bowel Syndrome   WHAT YOU NEED TO KNOW:   Irritable bowel syndrome (IBS) is a condition that prevents food from moving through your intestines normally  The food may move through too slowly or too quickly  This causes bloating, increased gas, constipation, or diarrhea  DISCHARGE INSTRUCTIONS:   Return to the emergency department if:   · You have severe abdominal pain  · Your bowel movements are dark or have blood in them  Contact your healthcare provider if:   · You have a fever  · You have pain in your rectum  · Your abdominal pain does not go away, even after treatment  · You have questions or concerns about your condition or care  Medicines:   · Diarrhea medicine  helps decrease the amount of diarrhea you have  Some of these medicines coat the intestine and make bowel movements less watery  Other medicines work by slowing down how fast the intestines move food through  · Laxatives  help treat constipation by moving food and liquids out of your stomach faster  · Stool softeners  soften your bowel movements to prevent straining  · Muscle relaxers  decrease abdominal pain and muscle spasms  · Take your medicine as directed  Contact your healthcare provider if you think your medicine is not helping or if you have side effects  Tell him of her if you are allergic to any medicine  Keep a list of the medicines, vitamins, and herbs you take  Include the amounts, and when and why you take them  Bring the list or the pill bottles to follow-up visits  Carry your medicine list with you in case of an emergency  Manage IBS:   · Eat a variety of healthy foods  Healthy foods include fruits, vegetables, whole-grain breads, low-fat dairy products, beans, lean meats, and fish  You may need to avoid certain foods to decrease your symptoms  · Drink liquids as directed  Ask how much liquid to drink each day and which liquids are best for you   For most people, good liquids to drink are water, juice, and milk  · Exercise regularly  Ask about the best exercise plan for you  Exercise can decrease your blood pressure and improve your health  · Manage stress  Stress may slow healing and cause illness  Learn new ways to relax, such as deep breathing  · Keep a record  of everything you eat and drink, and your symptoms, for 3 weeks  Bring this record with you to your follow-up visits  Follow up with your healthcare provider as directed:  Write down your questions so you remember to ask them during your visits  © 2017 [address]  Information is for End User's use only and may not be sold, redistributed or otherwise used for commercial purposes  All illustrations and images included in CareNotes® are the copyrighted property of A D A M [employer] Inc  or [employer]  The above information is an  only  It is not intended as medical advice for individual conditions or treatments  Talk to your doctor, nurse or pharmacist before following any medical regimen to see if it is safe and effective for you  Wellness Visit for Adults   AMBULATORY CARE:   A wellness visit  is when you see your healthcare provider to get screened for health problems  You can also get advice on how to stay healthy  Write down your questions so you remember to ask them  Ask your healthcare provider how often you should have a wellness visit  What happens at a wellness visit:  Your healthcare provider will ask about your health, and your family history of health problems  This includes high blood pressure, heart disease, and cancer  He or she will ask if you have symptoms that concern you, if you smoke, and about your mood  You may also be asked about your intake of medicines, supplements, food, and alcohol  Any of the following may be done:  · Your weight  will be checked  Your height may also be checked so your body mass index (BMI) can be calculated   Your BMI shows if you are at a healthy weight  · Your blood pressure  and heart rate will be checked  Your temperature may also be checked  · Blood and urine tests  may be done  Blood tests may be done to check your cholesterol levels  Abnormal cholesterol levels increase your risk for heart disease and stroke  You may also need a blood or urine test to check for diabetes if you are at increased risk  Urine tests may be done to look for signs of an infection or kidney disease  · A physical exam  includes checking your heartbeat and lungs with a stethoscope  Your healthcare provider may also check your skin to look for sun damage  · Screening tests  may be recommended  A screening test is done to check for diseases that may not cause symptoms  The screening tests you may need depend on your age, gender, family history, and lifestyle habits  For example, colorectal screening may be recommended if you are 48years old or older  Screening tests you need if you are a woman:   · A Pap smear  is used to screen for cervical cancer  Pap smears are usually done every 3 to 5 years depending on your age  You may need them more often if you have had abnormal Pap smear test results in the past  Ask your healthcare provider how often you should have a Pap smear  · A mammogram  is an x-ray of your breasts to screen for breast cancer  Experts recommend mammograms every 2 years starting at age 48 years  You may need a mammogram at age 52 years or younger if you have an increased risk for breast cancer  Talk to your healthcare provider about when you should start having mammograms and how often you need them  Vaccines you may need:   · Get an influenza vaccine  every year  The influenza vaccine protects you from the flu  Several types of viruses cause the flu  The viruses change over time, so new vaccines are made each year  · Get a tetanus-diphtheria (Td) booster vaccine  every 10 years   This vaccine protects you against tetanus and diphtheria  Tetanus is a severe infection that may cause painful muscle spasms and lockjaw  Diphtheria is a severe bacterial infection that causes a thick covering in the back of your mouth and throat  · Get a human papillomavirus (HPV) vaccine  if you are female and aged 23 to 32 or male 23 to 24 and never received it  This vaccine protects you from HPV infection  HPV is the most common infection spread by sexual contact  HPV may also cause vaginal, penile, and anal cancers  · Get a pneumococcal vaccine  if you are aged 72 years or older  The pneumococcal vaccine is an injection given to protect you from pneumococcal disease  Pneumococcal disease is an infection caused by pneumococcal bacteria  The infection may cause pneumonia, meningitis, or an ear infection  · Get a shingles vaccine  if you are aged 61 or older, even if you have had shingles before  The shingles vaccine is an injection to protect you from the varicella-zoster virus  This is the same virus that causes chickenpox  Shingles is a painful rash that develops in people who had chickenpox or have been exposed to the virus  How to eat healthy:  My Plate is a model for planning healthy meals  It shows the types and amounts of foods that should go on your plate  Fruits and vegetables make up about half of your plate, and grains and protein make up the other half  A serving of dairy is included on the side of your plate  The amount of calories and serving sizes you need depends on your age, gender, weight, and height  Examples of healthy foods are listed below:  · Eat a variety of vegetables  such as dark green, red, and orange vegetables  You can also include canned vegetables low in sodium (salt) and frozen vegetables without added butter or sauces  · Eat a variety of fresh fruits , canned fruit in 100% juice, frozen fruit, and dried fruit  · Include whole grains  At least half of the grains you eat should be whole grains   Examples include whole-wheat bread, wheat pasta, brown rice, and whole-grain cereals such as oatmeal     · Eat a variety of protein foods such as seafood (fish and shellfish), lean meat, and poultry without skin (turkey and chicken)  Examples of lean meats include pork leg, shoulder, or tenderloin, and beef round, sirloin, tenderloin, and extra lean ground beef  Other protein foods include eggs and egg substitutes, beans, peas, soy products, nuts, and seeds  · Choose low-fat dairy products such as skim or 1% milk or low-fat yogurt, cheese, and cottage cheese  · Limit unhealthy fats  such as butter, hard margarine, and shortening  Exercise:  Exercise at least 30 minutes per day on most days of the week  Some examples of exercise include walking, biking, dancing, and swimming  You can also fit in more physical activity by taking the stairs instead of the elevator or parking farther away from stores  Include muscle strengthening activities 2 days each week  Regular exercise provides many health benefits  It helps you manage your weight, and decreases your risk for type 2 diabetes, heart disease, stroke, and high blood pressure  Exercise can also help improve your mood  Ask your healthcare provider about the best exercise plan for you  General health and safety guidelines:   · Do not smoke  Nicotine and other chemicals in cigarettes and cigars can cause lung damage  Ask your healthcare provider for information if you currently smoke and need help to quit  E-cigarettes or smokeless tobacco still contain nicotine  Talk to your healthcare provider before you use these products  · Limit alcohol  A drink of alcohol is 12 ounces of beer, 5 ounces of wine, or 1½ ounces of liquor  · Lose weight, if needed  Being overweight increases your risk of certain health conditions  These include heart disease, high blood pressure, type 2 diabetes, and certain types of cancer  · Protect your skin    Do not sunbathe or use tanning beds  Use sunscreen with a SPF 15 or higher  Apply sunscreen at least 15 minutes before you go outside  Reapply sunscreen every 2 hours  Wear protective clothing, hats, and sunglasses when you are outside  · Drive safely  Always wear your seatbelt  Make sure everyone in your car wears a seatbelt  A seatbelt can save your life if you are in an accident  Do not use your cell phone when you are driving  This could distract you and cause an accident  Pull over if you need to make a call or send a text message  · Practice safe sex  Use latex condoms if are sexually active and have more than one partner  Your healthcare provider may recommend screening tests for sexually transmitted infections (STIs)  · Wear helmets, lifejackets, and protective gear  Always wear a helmet when you ride a bike or motorcycle, go skiing, or play sports that could cause a head injury  Wear protective equipment when you play sports  Wear a lifejacket when you are on a boat or doing water sports  © 2017 [facility] Information is for End User's use only and may not be sold, redistributed or otherwise used for commercial purposes  All illustrations and images included in CareNotes® are the copyrighted property of A D A M , Inc  or [employer]  The above information is an  only  It is not intended as medical advice for individual conditions or treatments  Talk to your doctor, nurse or pharmacist before following any medical regimen to see if it is safe and effective for you

## 2020-07-29 ENCOUNTER — PATIENT OUTREACH (OUTPATIENT)
Dept: SURGERY | Facility: OTHER | Age: 60
End: 2020-07-29

## 2020-07-29 LAB
O+P STL CONC: NORMAL
WBC SPEC QL GRAM STN: NORMAL

## 2020-07-29 NOTE — PROGRESS NOTES
CT# [id number] Poncho MELGAR called ct to ask for ct's verbal consent to complete and submit ct's SFS RWG application  Cm then completed scanned and emailed to Toma Loomis to further process  Cm is waiting for an approval letter from Mr Francisco Seaman  Please see scanned media

## 2020-07-29 NOTE — PROGRESS NOTES
The  follow-up for continued relationship of care and support with patient  Mr  Mk Moss advised he was doing well as expected  He shared he was worried about working but has worked through everyday during the pandemic  "I missed my grandchildren and we were scared to see them at first"  He continues to pray and ask God for protection for his family  He and the  celebrated his sense of peace and encouraged him to continue to find ways to bring peace into his life  Will continued to visit as needed  07/28/20 [facility] Affiliation   (Higher Power/God)   Spiritual Beliefs/Perceptions   Concept of God Accepting   God's Role in Disease Natural   Relationship with God Close   Support Systems Spouse/significant other;Children;Family members   Psychosocial   Psychosocial (WDL) WDL   Length of Time/Family Visitation 6-15 min   Stress Factors   Patient Stress Factors None identified   Coping Responses   Patient Coping Accepting   Patient Spiritual Assessment   Feelings of Well Being   (Expressed feeling grateful )   Plan of Care   Assessment Completed by: Unit visit; Other (Comment)  (PCP Visit  )

## 2020-07-30 ENCOUNTER — PATIENT OUTREACH (OUTPATIENT)
Dept: SURGERY | Facility: OTHER | Age: 60
End: 2020-07-30

## 2020-07-30 NOTE — PROGRESS NOTES
CT# [id number] Poncho MELGAR mailed ct the [facility] Patient Education for Antibody Serology testing letter (Q&A) and a separate letter explaining that the ct can call cm or clinic for either testing or further questions  Please see scanned media

## 2020-08-03 ENCOUNTER — PATIENT OUTREACH (OUTPATIENT)
Dept: SURGERY | Facility: OTHER | Age: 60
End: 2020-08-03

## 2020-08-03 NOTE — PROGRESS NOTES
CT# [address]    As per João Brown today ct is doing well   Cm will work on ct's MAWD check requisition as usual upon arrival

## 2020-08-06 DIAGNOSIS — I10 ESSENTIAL HYPERTENSION: Primary | ICD-10-CM

## 2020-08-07 RX ORDER — HYDROCHLOROTHIAZIDE 12.5 MG/1
TABLET ORAL
Qty: 30 TABLET | Refills: 5 | Status: SHIPPED | OUTPATIENT
Start: 2020-08-07 | End: 2020-10-20

## 2020-08-07 RX ORDER — LOSARTAN POTASSIUM 50 MG/1
TABLET ORAL
Qty: 30 TABLET | Refills: 5 | Status: SHIPPED | OUTPATIENT
Start: 2020-08-07 | End: 2020-10-20 | Stop reason: HOSPADM

## 2020-08-24 ENCOUNTER — PATIENT OUTREACH (OUTPATIENT)
Dept: SURGERY | Facility: OTHER | Age: 60
End: 2020-08-24

## 2020-08-24 NOTE — PROGRESS NOTES
EG#[id number] DENNIS Bustos received ct's August MAWD premium statement via email today  Akash will work on ct's MAWD check req  Soon  Akash has emailed the bill to Joan Monday to assist with submitting check req

## 2020-08-25 ENCOUNTER — PATIENT OUTREACH (OUTPATIENT)
Dept: SURGERY | Facility: OTHER | Age: 60
End: 2020-08-25

## 2020-08-25 NOTE — PROGRESS NOTES
Cm received ct's August 2020 MAWD premium from seb Ada A  Cm submitted check req, letter, MAWD August premium, and MAWD auth to supervisor for processing  See media

## 2020-08-26 ENCOUNTER — PATIENT OUTREACH (OUTPATIENT)
Dept: SURGERY | Facility: OTHER | Age: 60
End: 2020-08-26

## 2020-08-26 NOTE — PROGRESS NOTES
Supervisor submitted August 2020 MAWD package to accounts payable for processing  See media for signed check req

## 2020-08-27 ENCOUNTER — TELEPHONE (OUTPATIENT)
Dept: SURGERY | Facility: CLINIC | Age: 60
End: 2020-08-27

## 2020-08-27 ENCOUNTER — PATIENT OUTREACH (OUTPATIENT)
Dept: SURGERY | Facility: OTHER | Age: 60
End: 2020-08-27

## 2020-08-27 NOTE — PROGRESS NOTES
CT# [id number] Poncho MELGAR contacted ct to ask when would be a good time to schedule the ct's RCT since the ct is due for RCT  Cm asked ct to contact cm to schedule the RCT after September 4th  Ct stated we could meet on 9/14/20 since the ct will be on vacation until 9/12/20

## 2020-08-27 NOTE — TELEPHONE ENCOUNTER
Per ALLIE Nails, offered pt appt 8/31/20 @1 when provider is back in the office  Pt states he will go to Urgent Care because he will not be able to take the time off

## 2020-09-04 ENCOUNTER — PATIENT OUTREACH (OUTPATIENT)
Dept: SURGERY | Facility: OTHER | Age: 60
End: 2020-09-04

## 2020-09-14 ENCOUNTER — PATIENT OUTREACH (OUTPATIENT)
Dept: SURGERY | Facility: OTHER | Age: 60
End: 2020-09-14

## 2020-09-14 NOTE — PROGRESS NOTES
CT# [id number] came to the [facility] office to complete RCT, RST  Ct states is in stable medical health  Ct reported seeing his ID Dr Snow Choi at the [facility] and getting labs done in the months of June and July  Ct will do new labs by the end of this month and will see Dr Snow Choi by October and 10 [facility]  Ct has not seen the optometrist or the dentist since 2019, however ct and cm spoke about the new contract between [employer] and [employer] and ct and cm completed, signed and will submit the [employer] program application to Financial Counselors to be approved  Cm explained to the ct the need for this application and to please let the dental clinic to remind them about his heart condition  Ct stated that he would let cm know when he was ready to call  Ct continues to be adherent to HIV care and is on Prescobix, Descovy and Tivicay  Ct is on blood thinners but states feeling great  Ct is abstaining from risky behaviors, ct is in a monogamous relationship with his wife of over 20 years  Ct lives in his home with his wife, has a car has MAWD coverage  Ct will request an auth to continue to assist the ct with his MAWD premiums  Ct works a full time job at [employer]  Ct has no addictions or mental health concerns ct has no domestic violence concerns, is independent in ADL and has a strong support system  Ct has no dependents and no issues with language comprehension, legal or nutrition  Reassessment and Recertification is complete   Please see scanned media

## 2020-09-22 ENCOUNTER — PATIENT OUTREACH (OUTPATIENT)
Dept: SURGERY | Facility: OTHER | Age: 60
End: 2020-09-22

## 2020-09-22 NOTE — PROGRESS NOTES
CT# [id number] Poncho MELGAR received ct's MAWD premiums via email  Cm prepared and submitted ct's MAWD auth request to pay for MAWD bills  CM will scan in September MAWD packet once the Auth has been approved  Cm will work on MAWD check req once the approved [place] is received

## 2020-09-25 ENCOUNTER — PATIENT OUTREACH (OUTPATIENT)
Dept: SURGERY | Facility: OTHER | Age: 60
End: 2020-09-25

## 2020-09-25 NOTE — PROGRESS NOTES
CT# 5 MROR    Cm called ct to offer the [place] [facility] [employer] program since the ct lives with his grandchildren  Ct told cm that he had received a letter from the [employer]  8  program stating that he would receive a retro active pay due to Rhonda 19 and ct asked if he would have to provide [employer] with the check since [employer] pays for ct's MAWD premiums  Cm attempted to contact Kirk Ellis or Julian at [place] but was unsuccessful  Cm will call again and email to have a response for the ct

## 2020-09-29 ENCOUNTER — PATIENT OUTREACH (OUTPATIENT)
Dept: SURGERY | Facility: OTHER | Age: 60
End: 2020-09-29

## 2020-09-29 NOTE — PROGRESS NOTES
RG#[id number] MROR    Ct had told cm that he was told by MAWD that he might be receiving a check sort of a retro active pay since January 2020 to provide some ct's a COVID 19 relief from MAWD payments  Cm asked Dulce Vegas from [facility] if this was the case should the ct provide the check to [facility] or what should the ct do with it since "[employer] pays for the ct's MAWD premiums  Dulce Vegas stated that he should have them accredit it back to the ct's account to pay for future premiums  Cm asked Teagan Salazar and she stated she has not heard about this check  Cm also asked if they were reducing any MAWD payments due to COVID 19 and she stated that they go by the income and set it for 6 months, if there has been a reduction in hours or income that would affect the premium and might reduce the payment amount  Cm will ask the ct to provide cm with a letter or the check if he receives any of them

## 2020-09-30 ENCOUNTER — PATIENT OUTREACH (OUTPATIENT)
Dept: SURGERY | Facility: OTHER | Age: 60
End: 2020-09-30

## 2020-09-30 NOTE — PROGRESS NOTES
CT# [id number] Poncho MELGAR worked on ct's September MAWD check req  And submitted to supervisor for signatures and to be further processed with accounts payable  Once cm receives the signed check req cm will scan in the September MA packet

## 2020-10-01 ENCOUNTER — PATIENT OUTREACH (OUTPATIENT)
Dept: SURGERY | Facility: OTHER | Age: 60
End: 2020-10-01

## 2020-10-12 ENCOUNTER — LAB (OUTPATIENT)
Dept: LAB | Facility: HOSPITAL | Age: 60
End: 2020-10-12
Attending: INTERNAL MEDICINE
Payer: COMMERCIAL

## 2020-10-12 DIAGNOSIS — K74.60 CIRRHOSIS OF LIVER WITHOUT ASCITES, UNSPECIFIED HEPATIC CIRRHOSIS TYPE (HCC): ICD-10-CM

## 2020-10-12 DIAGNOSIS — U07.1 COVID-19: ICD-10-CM

## 2020-10-12 DIAGNOSIS — B20 HIV DISEASE (HCC): ICD-10-CM

## 2020-10-12 LAB
AFP-TM SERPL-MCNC: 5.6 NG/ML (ref 0.5–8)
ALBUMIN SERPL BCP-MCNC: 3.9 G/DL (ref 3.5–5)
ALP SERPL-CCNC: 91 U/L (ref 46–116)
ALT SERPL W P-5'-P-CCNC: 25 U/L (ref 12–78)
ANION GAP SERPL CALCULATED.3IONS-SCNC: 9 MMOL/L (ref 4–13)
AST SERPL W P-5'-P-CCNC: 16 U/L (ref 5–45)
BASOPHILS # BLD AUTO: 0.04 THOUSANDS/ΜL (ref 0–0.1)
BASOPHILS NFR BLD AUTO: 1 % (ref 0–1)
BILIRUB SERPL-MCNC: 0.7 MG/DL (ref 0.2–1)
BUN SERPL-MCNC: 18 MG/DL (ref 5–25)
CALCIUM SERPL-MCNC: 9.4 MG/DL (ref 8.3–10.1)
CHLORIDE SERPL-SCNC: 103 MMOL/L (ref 100–108)
CO2 SERPL-SCNC: 26 MMOL/L (ref 21–32)
CREAT SERPL-MCNC: 1.07 MG/DL (ref 0.6–1.3)
EOSINOPHIL # BLD AUTO: 0.17 THOUSAND/ΜL (ref 0–0.61)
EOSINOPHIL NFR BLD AUTO: 3 % (ref 0–6)
ERYTHROCYTE [DISTWIDTH] IN BLOOD BY AUTOMATED COUNT: 14.1 % (ref 11.6–15.1)
GFR SERPL CREATININE-BSD FRML MDRD: 75 ML/MIN/1.73SQ M
GLUCOSE P FAST SERPL-MCNC: 105 MG/DL (ref 65–99)
HCT VFR BLD AUTO: 56.3 % (ref 36.5–49.3)
HGB BLD-MCNC: 18.2 G/DL (ref 12–17)
IMM GRANULOCYTES # BLD AUTO: 0.04 THOUSAND/UL (ref 0–0.2)
IMM GRANULOCYTES NFR BLD AUTO: 1 % (ref 0–2)
LYMPHOCYTES # BLD AUTO: 1.18 THOUSANDS/ΜL (ref 0.6–4.47)
LYMPHOCYTES NFR BLD AUTO: 19 % (ref 14–44)
MCH RBC QN AUTO: 28.8 PG (ref 26.8–34.3)
MCHC RBC AUTO-ENTMCNC: 32.3 G/DL (ref 31.4–37.4)
MCV RBC AUTO: 89 FL (ref 82–98)
MONOCYTES # BLD AUTO: 0.83 THOUSAND/ΜL (ref 0.17–1.22)
MONOCYTES NFR BLD AUTO: 13 % (ref 4–12)
NEUTROPHILS # BLD AUTO: 4.04 THOUSANDS/ΜL (ref 1.85–7.62)
NEUTS SEG NFR BLD AUTO: 63 % (ref 43–75)
NRBC BLD AUTO-RTO: 0 /100 WBCS
PLATELET # BLD AUTO: 230 THOUSANDS/UL (ref 149–390)
PMV BLD AUTO: 9.4 FL (ref 8.9–12.7)
POTASSIUM SERPL-SCNC: 3.7 MMOL/L (ref 3.5–5.3)
PROT SERPL-MCNC: 8.8 G/DL (ref 6.4–8.2)
RBC # BLD AUTO: 6.33 MILLION/UL (ref 3.88–5.62)
SODIUM SERPL-SCNC: 138 MMOL/L (ref 136–145)
WBC # BLD AUTO: 6.3 THOUSAND/UL (ref 4.31–10.16)

## 2020-10-12 PROCEDURE — 86480 TB TEST CELL IMMUN MEASURE: CPT

## 2020-10-12 PROCEDURE — 86769 SARS-COV-2 COVID-19 ANTIBODY: CPT

## 2020-10-12 PROCEDURE — 82105 ALPHA-FETOPROTEIN SERUM: CPT

## 2020-10-12 PROCEDURE — 85025 COMPLETE CBC W/AUTO DIFF WBC: CPT

## 2020-10-12 PROCEDURE — 80053 COMPREHEN METABOLIC PANEL: CPT

## 2020-10-12 PROCEDURE — 87536 HIV-1 QUANT&REVRSE TRNSCRPJ: CPT

## 2020-10-12 PROCEDURE — 36415 COLL VENOUS BLD VENIPUNCTURE: CPT

## 2020-10-12 PROCEDURE — 86360 T CELL ABSOLUTE COUNT/RATIO: CPT

## 2020-10-13 LAB
BASOPHILS # BLD AUTO: 0 X10E3/UL (ref 0–0.2)
BASOPHILS NFR BLD AUTO: 1 %
CD3+CD4+ CELLS # BLD: 234 /UL (ref 359–1519)
CD3+CD4+ CELLS NFR BLD: 19.5 % (ref 30.8–58.5)
CD3+CD4+ CELLS/CD3+CD8+ CLL BLD: 0.36 % (ref 0.92–3.72)
CD3+CD8+ CELLS # BLD: 649 /UL (ref 109–897)
CD3+CD8+ CELLS NFR BLD: 54.1 % (ref 12–35.5)
EOSINOPHIL # BLD AUTO: 0.1 X10E3/UL (ref 0–0.4)
EOSINOPHIL NFR BLD AUTO: 2 %
ERYTHROCYTE [DISTWIDTH] IN BLOOD BY AUTOMATED COUNT: 13.6 % (ref 11.6–15.4)
HCT VFR BLD AUTO: 53.3 % (ref 37.5–51)
HGB BLD-MCNC: 17.9 G/DL (ref 13–17.7)
IMM GRANULOCYTES # BLD: 0 X10E3/UL (ref 0–0.1)
IMM GRANULOCYTES NFR BLD: 1 %
LYMPHOCYTES # BLD AUTO: 1.2 X10E3/UL (ref 0.7–3.1)
LYMPHOCYTES NFR BLD AUTO: 20 %
MCH RBC QN AUTO: 29.7 PG (ref 26.6–33)
MCHC RBC AUTO-ENTMCNC: 33.6 G/DL (ref 31.5–35.7)
MCV RBC AUTO: 88 FL (ref 79–97)
MONOCYTES # BLD AUTO: 0.7 X10E3/UL (ref 0.1–0.9)
MONOCYTES NFR BLD AUTO: 12 %
NEUTROPHILS # BLD AUTO: 3.9 X10E3/UL (ref 1.4–7)
NEUTROPHILS NFR BLD AUTO: 64 %
PLATELET # BLD AUTO: 254 X10E3/UL (ref 150–450)
RBC # BLD AUTO: 6.03 X10E6/UL (ref 4.14–5.8)
SARS-COV-2 IGG+IGM SERPL QL IA: NORMAL
WBC # BLD AUTO: 6 X10E3/UL (ref 3.4–10.8)

## 2020-10-14 LAB
GAMMA INTERFERON BACKGROUND BLD IA-ACNC: 0.03 IU/ML
HIV1 RNA # SERPL NAA+PROBE: 30 COPIES/ML
HIV1 RNA SERPL NAA+PROBE-LOG#: 1.48 LOG10COPY/ML
M TB IFN-G BLD-IMP: NEGATIVE
M TB IFN-G CD4+ BCKGRND COR BLD-ACNC: 0 IU/ML
M TB IFN-G CD4+ BCKGRND COR BLD-ACNC: 0 IU/ML
MITOGEN IGNF BCKGRD COR BLD-ACNC: >10 IU/ML

## 2020-10-19 ENCOUNTER — TELEPHONE (OUTPATIENT)
Dept: SURGERY | Facility: CLINIC | Age: 60
End: 2020-10-19

## 2020-10-19 ENCOUNTER — PATIENT OUTREACH (OUTPATIENT)
Dept: SURGERY | Facility: OTHER | Age: 60
End: 2020-10-19

## 2020-10-20 ENCOUNTER — OFFICE VISIT (OUTPATIENT)
Dept: SURGERY | Facility: CLINIC | Age: 60
End: 2020-10-20
Payer: COMMERCIAL

## 2020-10-20 VITALS
DIASTOLIC BLOOD PRESSURE: 100 MMHG | HEIGHT: 68 IN | SYSTOLIC BLOOD PRESSURE: 154 MMHG | BODY MASS INDEX: 30.8 KG/M2 | HEART RATE: 98 BPM | OXYGEN SATURATION: 97 % | TEMPERATURE: 98.8 F | WEIGHT: 203.2 LBS

## 2020-10-20 DIAGNOSIS — I25.2 HISTORY OF NON-ST ELEVATION MYOCARDIAL INFARCTION (NSTEMI): ICD-10-CM

## 2020-10-20 DIAGNOSIS — R76.8 HEPATITIS B CORE ANTIBODY POSITIVE: ICD-10-CM

## 2020-10-20 DIAGNOSIS — K74.69 OTHER CIRRHOSIS OF LIVER (HCC): ICD-10-CM

## 2020-10-20 DIAGNOSIS — R91.1 PULMONARY NODULE: ICD-10-CM

## 2020-10-20 DIAGNOSIS — B20 HIV DISEASE (HCC): ICD-10-CM

## 2020-10-20 DIAGNOSIS — Z23 NEED FOR INFLUENZA VACCINATION: ICD-10-CM

## 2020-10-20 DIAGNOSIS — I10 ESSENTIAL HYPERTENSION: ICD-10-CM

## 2020-10-20 DIAGNOSIS — D75.1 POLYCYTHEMIA: ICD-10-CM

## 2020-10-20 DIAGNOSIS — F17.298 OTHER TOBACCO PRODUCT NICOTINE DEPENDENCE WITH OTHER NICOTINE-INDUCED DISORDER: ICD-10-CM

## 2020-10-20 DIAGNOSIS — K21.9 GASTROESOPHAGEAL REFLUX DISEASE WITHOUT ESOPHAGITIS: Primary | ICD-10-CM

## 2020-10-20 DIAGNOSIS — I25.10 CORONARY ARTERY DISEASE INVOLVING NATIVE CORONARY ARTERY OF NATIVE HEART WITHOUT ANGINA PECTORIS: ICD-10-CM

## 2020-10-20 PROCEDURE — 99214 OFFICE O/P EST MOD 30 MIN: CPT | Performed by: NURSE PRACTITIONER

## 2020-10-20 PROCEDURE — 90682 RIV4 VACC RECOMBINANT DNA IM: CPT

## 2020-10-20 PROCEDURE — 90471 IMMUNIZATION ADMIN: CPT

## 2020-10-20 RX ORDER — LOSARTAN POTASSIUM AND HYDROCHLOROTHIAZIDE 25; 100 MG/1; MG/1
1 TABLET ORAL DAILY
Qty: 30 TABLET | Refills: 2 | Status: SHIPPED | OUTPATIENT
Start: 2020-10-20 | End: 2020-12-28

## 2020-10-20 RX ORDER — HYDROCHLOROTHIAZIDE 25 MG/1
25 TABLET ORAL DAILY
Qty: 30 TABLET | Refills: 2 | Status: SHIPPED | OUTPATIENT
Start: 2020-10-20 | End: 2020-10-20 | Stop reason: HOSPADM

## 2020-10-20 RX ORDER — ASPIRIN 81 MG/1
81 TABLET ORAL DAILY
Qty: 30 TABLET | Refills: 3 | Status: SHIPPED | OUTPATIENT
Start: 2020-10-20 | End: 2021-05-18 | Stop reason: SDUPTHER

## 2020-10-21 ENCOUNTER — PATIENT OUTREACH (OUTPATIENT)
Dept: SURGERY | Facility: OTHER | Age: 60
End: 2020-10-21

## 2020-10-30 DIAGNOSIS — B20 HIV DISEASE (HCC): ICD-10-CM

## 2020-11-02 ENCOUNTER — TELEPHONE (OUTPATIENT)
Dept: SURGERY | Facility: CLINIC | Age: 60
End: 2020-11-02

## 2020-11-02 RX ORDER — DARUNAVIR ETHANOLATE AND COBICISTAT 800; 150 MG/1; MG/1
TABLET, FILM COATED ORAL
Qty: 30 TABLET | Refills: 5 | Status: SHIPPED | OUTPATIENT
Start: 2020-11-02 | End: 2021-04-21

## 2020-11-03 ENCOUNTER — OFFICE VISIT (OUTPATIENT)
Dept: SURGERY | Facility: CLINIC | Age: 60
End: 2020-11-03
Payer: COMMERCIAL

## 2020-11-03 VITALS
WEIGHT: 202.4 LBS | HEART RATE: 97 BPM | SYSTOLIC BLOOD PRESSURE: 132 MMHG | DIASTOLIC BLOOD PRESSURE: 90 MMHG | BODY MASS INDEX: 30.68 KG/M2 | TEMPERATURE: 97.7 F | HEIGHT: 68 IN | OXYGEN SATURATION: 97 %

## 2020-11-03 DIAGNOSIS — F17.298 OTHER TOBACCO PRODUCT NICOTINE DEPENDENCE WITH OTHER NICOTINE-INDUCED DISORDER: ICD-10-CM

## 2020-11-03 DIAGNOSIS — Z20.2 CONTACT WITH AND (SUSPECTED) EXPOSURE TO INFECTIONS WITH A PREDOMINANTLY SEXUAL MODE OF TRANSMISSION: ICD-10-CM

## 2020-11-03 DIAGNOSIS — B20 HIV DISEASE (HCC): Primary | ICD-10-CM

## 2020-11-03 DIAGNOSIS — Z72.89 OTHER PROBLEMS RELATED TO LIFESTYLE: ICD-10-CM

## 2020-11-03 DIAGNOSIS — D72.89 OTHER SPECIFIED DISORDERS OF WHITE BLOOD CELLS: ICD-10-CM

## 2020-11-03 DIAGNOSIS — R07.9 CHEST PAIN, UNSPECIFIED TYPE: ICD-10-CM

## 2020-11-03 DIAGNOSIS — K52.9 CHRONIC DIARRHEA: ICD-10-CM

## 2020-11-03 DIAGNOSIS — K74.69 OTHER CIRRHOSIS OF LIVER (HCC): ICD-10-CM

## 2020-11-03 DIAGNOSIS — D75.1 POLYCYTHEMIA: ICD-10-CM

## 2020-11-03 DIAGNOSIS — I10 ESSENTIAL HYPERTENSION: ICD-10-CM

## 2020-11-03 DIAGNOSIS — R91.1 LUNG NODULE: ICD-10-CM

## 2020-11-03 DIAGNOSIS — Z11.3 SCREENING EXAMINATION FOR VENEREAL DISEASE: ICD-10-CM

## 2020-11-03 PROCEDURE — 99215 OFFICE O/P EST HI 40 MIN: CPT | Performed by: INTERNAL MEDICINE

## 2020-11-03 RX ORDER — NITROGLYCERIN 0.4 MG/1
0.4 TABLET SUBLINGUAL
Qty: 15 TABLET | Refills: 0 | Status: SHIPPED | OUTPATIENT
Start: 2020-11-03

## 2020-11-12 DIAGNOSIS — E78.2 MIXED HYPERLIPIDEMIA: ICD-10-CM

## 2020-11-13 RX ORDER — ROSUVASTATIN CALCIUM 20 MG/1
TABLET, COATED ORAL
Qty: 30 TABLET | Refills: 5 | Status: SHIPPED | OUTPATIENT
Start: 2020-11-13 | End: 2021-04-21

## 2020-11-16 ENCOUNTER — HOSPITAL ENCOUNTER (OUTPATIENT)
Dept: CT IMAGING | Facility: HOSPITAL | Age: 60
Discharge: HOME | End: 2020-11-16
Payer: COMMERCIAL

## 2020-11-16 ENCOUNTER — HOSPITAL ENCOUNTER (OUTPATIENT)
Dept: VASCULAR ULTRASOUND | Facility: HOSPITAL | Age: 60
Discharge: HOME | End: 2020-11-16
Attending: INTERNAL MEDICINE
Payer: COMMERCIAL

## 2020-11-16 ENCOUNTER — HOSPITAL ENCOUNTER (OUTPATIENT)
Dept: ULTRASOUND IMAGING | Facility: HOSPITAL | Age: 60
Discharge: HOME | End: 2020-11-16
Payer: COMMERCIAL

## 2020-11-16 DIAGNOSIS — R91.1 PULMONARY NODULE: ICD-10-CM

## 2020-11-16 DIAGNOSIS — I10 ESSENTIAL HYPERTENSION: ICD-10-CM

## 2020-11-16 DIAGNOSIS — R76.8 HEPATITIS B CORE ANTIBODY POSITIVE: ICD-10-CM

## 2020-11-16 DIAGNOSIS — E78.5 DYSLIPIDEMIA: ICD-10-CM

## 2020-11-16 DIAGNOSIS — I25.10 CORONARY ARTERY DISEASE INVOLVING NATIVE CORONARY ARTERY OF NATIVE HEART WITHOUT ANGINA PECTORIS: ICD-10-CM

## 2020-11-16 PROCEDURE — 93880 EXTRACRANIAL BILAT STUDY: CPT | Performed by: SURGERY

## 2020-11-16 PROCEDURE — 76705 ECHO EXAM OF ABDOMEN: CPT

## 2020-11-16 PROCEDURE — 71250 CT THORAX DX C-: CPT

## 2020-11-16 PROCEDURE — G1004 CDSM NDSC: HCPCS

## 2020-11-16 PROCEDURE — 93880 EXTRACRANIAL BILAT STUDY: CPT

## 2020-11-17 ENCOUNTER — OFFICE VISIT (OUTPATIENT)
Dept: SURGERY | Facility: CLINIC | Age: 60
End: 2020-11-17
Payer: COMMERCIAL

## 2020-11-17 ENCOUNTER — TELEPHONE (OUTPATIENT)
Dept: SURGERY | Facility: CLINIC | Age: 60
End: 2020-11-17

## 2020-11-17 VITALS
HEART RATE: 90 BPM | WEIGHT: 200.2 LBS | OXYGEN SATURATION: 96 % | HEIGHT: 68 IN | SYSTOLIC BLOOD PRESSURE: 124 MMHG | BODY MASS INDEX: 30.34 KG/M2 | TEMPERATURE: 97.6 F | DIASTOLIC BLOOD PRESSURE: 78 MMHG

## 2020-11-17 DIAGNOSIS — T65.292D TOXIC EFFECT OF TOBACCO, INTENTIONAL SELF-HARM, SUBSEQUENT ENCOUNTER: ICD-10-CM

## 2020-11-17 DIAGNOSIS — I10 ESSENTIAL HYPERTENSION: ICD-10-CM

## 2020-11-17 DIAGNOSIS — L02.91 ABSCESS: ICD-10-CM

## 2020-11-17 DIAGNOSIS — B20 HIV DISEASE (HCC): Primary | ICD-10-CM

## 2020-11-17 PROBLEM — T65.291A TOXIC EFFECT OF TOBACCO AND NICOTINE: Status: ACTIVE | Noted: 2017-03-28

## 2020-11-17 PROCEDURE — 99214 OFFICE O/P EST MOD 30 MIN: CPT | Performed by: NURSE PRACTITIONER

## 2020-11-17 RX ORDER — CEPHALEXIN 500 MG/1
500 CAPSULE ORAL EVERY 8 HOURS SCHEDULED
Qty: 15 CAPSULE | Refills: 0 | Status: SHIPPED | OUTPATIENT
Start: 2020-11-17 | End: 2020-11-22

## 2020-11-23 ENCOUNTER — PATIENT OUTREACH (OUTPATIENT)
Dept: SURGERY | Facility: OTHER | Age: 60
End: 2020-11-23

## 2020-11-27 DIAGNOSIS — I21.4 NSTEMI (NON-ST ELEVATED MYOCARDIAL INFARCTION) (HCC): ICD-10-CM

## 2020-11-27 DIAGNOSIS — R07.9 CHEST PAIN: ICD-10-CM

## 2020-11-27 RX ORDER — CLOPIDOGREL BISULFATE 75 MG/1
TABLET ORAL
Qty: 30 TABLET | Refills: 5 | Status: SHIPPED | OUTPATIENT
Start: 2020-11-27 | End: 2021-05-14

## 2020-11-27 RX ORDER — PANTOPRAZOLE SODIUM 40 MG/1
TABLET, DELAYED RELEASE ORAL
Qty: 30 TABLET | Refills: 5 | Status: SHIPPED | OUTPATIENT
Start: 2020-11-27 | End: 2020-12-01 | Stop reason: ALTCHOICE

## 2020-12-01 ENCOUNTER — OFFICE VISIT (OUTPATIENT)
Dept: GASTROENTEROLOGY | Facility: CLINIC | Age: 60
End: 2020-12-01
Payer: COMMERCIAL

## 2020-12-01 ENCOUNTER — PATIENT OUTREACH (OUTPATIENT)
Dept: SURGERY | Facility: OTHER | Age: 60
End: 2020-12-01

## 2020-12-01 VITALS
DIASTOLIC BLOOD PRESSURE: 80 MMHG | WEIGHT: 202 LBS | HEART RATE: 88 BPM | HEIGHT: 68 IN | SYSTOLIC BLOOD PRESSURE: 130 MMHG | BODY MASS INDEX: 30.62 KG/M2

## 2020-12-01 DIAGNOSIS — B19.20 HEPATITIS C VIRUS INFECTION WITHOUT HEPATIC COMA, UNSPECIFIED CHRONICITY: ICD-10-CM

## 2020-12-01 DIAGNOSIS — K21.9 GASTROESOPHAGEAL REFLUX DISEASE WITHOUT ESOPHAGITIS: Primary | ICD-10-CM

## 2020-12-01 DIAGNOSIS — K74.60 CIRRHOSIS OF LIVER WITHOUT ASCITES, UNSPECIFIED HEPATIC CIRRHOSIS TYPE (HCC): ICD-10-CM

## 2020-12-01 DIAGNOSIS — B20 HIV INFECTION, UNSPECIFIED SYMPTOM STATUS (HCC): ICD-10-CM

## 2020-12-01 PROCEDURE — 99214 OFFICE O/P EST MOD 30 MIN: CPT | Performed by: PHYSICIAN ASSISTANT

## 2020-12-01 RX ORDER — PANTOPRAZOLE SODIUM 40 MG/1
40 TABLET, DELAYED RELEASE ORAL 2 TIMES DAILY
Qty: 60 TABLET | Refills: 1 | Status: SHIPPED | OUTPATIENT
Start: 2020-12-01 | End: 2021-03-26

## 2020-12-03 ENCOUNTER — PATIENT OUTREACH (OUTPATIENT)
Dept: SURGERY | Facility: OTHER | Age: 60
End: 2020-12-03

## 2020-12-07 ENCOUNTER — PATIENT OUTREACH (OUTPATIENT)
Dept: SURGERY | Facility: OTHER | Age: 60
End: 2020-12-07

## 2020-12-08 DIAGNOSIS — T65.292D TOXIC EFFECT OF TOBACCO, INTENTIONAL SELF-HARM, SUBSEQUENT ENCOUNTER: ICD-10-CM

## 2020-12-08 DIAGNOSIS — T65.292D TOXIC EFFECT OF TOBACCO, INTENTIONAL SELF-HARM, SUBSEQUENT ENCOUNTER: Primary | ICD-10-CM

## 2020-12-08 DIAGNOSIS — F17.213 CIGARETTE NICOTINE DEPENDENCE WITH WITHDRAWAL: ICD-10-CM

## 2020-12-08 RX ORDER — NICOTINE 21 MG/24HR
1 PATCH, TRANSDERMAL 24 HOURS TRANSDERMAL EVERY 24 HOURS
Qty: 28 PATCH | Refills: 2 | Status: SHIPPED | OUTPATIENT
Start: 2020-12-08 | End: 2020-12-08 | Stop reason: SDUPTHER

## 2020-12-08 RX ORDER — NICOTINE 21 MG/24HR
1 PATCH, TRANSDERMAL 24 HOURS TRANSDERMAL EVERY 24 HOURS
Qty: 28 PATCH | Refills: 2 | Status: SHIPPED | OUTPATIENT
Start: 2020-12-08 | End: 2021-03-02

## 2020-12-08 RX ORDER — NICOTINE 21 MG/24HR
1 PATCH, TRANSDERMAL 24 HOURS TRANSDERMAL EVERY 24 HOURS
Qty: 28 PATCH | Refills: 2 | OUTPATIENT
Start: 2020-12-08

## 2020-12-20 DIAGNOSIS — J30.2 SEASONAL ALLERGIES: ICD-10-CM

## 2020-12-21 RX ORDER — CETIRIZINE HYDROCHLORIDE 10 MG/1
TABLET ORAL
Qty: 30 TABLET | Refills: 2 | Status: SHIPPED | OUTPATIENT
Start: 2020-12-21 | End: 2021-04-12

## 2020-12-24 ENCOUNTER — PATIENT OUTREACH (OUTPATIENT)
Dept: SURGERY | Facility: OTHER | Age: 60
End: 2020-12-24

## 2020-12-24 DIAGNOSIS — I10 ESSENTIAL HYPERTENSION: ICD-10-CM

## 2020-12-28 RX ORDER — LOSARTAN POTASSIUM AND HYDROCHLOROTHIAZIDE 25; 100 MG/1; MG/1
1 TABLET ORAL DAILY
Qty: 30 TABLET | Refills: 2 | Status: SHIPPED | OUTPATIENT
Start: 2020-12-28 | End: 2021-03-22

## 2020-12-28 RX ORDER — METOPROLOL SUCCINATE 25 MG/1
TABLET, EXTENDED RELEASE ORAL
Qty: 30 TABLET | Refills: 5 | Status: SHIPPED | OUTPATIENT
Start: 2020-12-28 | End: 2021-01-14 | Stop reason: ALTCHOICE

## 2020-12-31 ENCOUNTER — PATIENT OUTREACH (OUTPATIENT)
Dept: SURGERY | Facility: OTHER | Age: 60
End: 2020-12-31

## 2021-01-05 ENCOUNTER — PATIENT OUTREACH (OUTPATIENT)
Dept: SURGERY | Facility: OTHER | Age: 61
End: 2021-01-05

## 2021-01-05 NOTE — PROGRESS NOTES
CT# [id number] MROR    Akash received ct's December MAWD bill by e-mail  Akash prepared ct's MAWD check req  Cm scanned and emailed to supervisor for further processing  Cm received supervisor's signed check req  Please see scanned media

## 2021-01-12 ENCOUNTER — PATIENT OUTREACH (OUTPATIENT)
Dept: SURGERY | Facility: OTHER | Age: 61
End: 2021-01-12

## 2021-01-14 ENCOUNTER — ANESTHESIA (OUTPATIENT)
Dept: GASTROENTEROLOGY | Facility: HOSPITAL | Age: 61
End: 2021-01-14

## 2021-01-14 ENCOUNTER — ANESTHESIA EVENT (OUTPATIENT)
Dept: GASTROENTEROLOGY | Facility: HOSPITAL | Age: 61
End: 2021-01-14

## 2021-01-14 ENCOUNTER — HOSPITAL ENCOUNTER (OUTPATIENT)
Dept: GASTROENTEROLOGY | Facility: HOSPITAL | Age: 61
Setting detail: OUTPATIENT SURGERY
Discharge: HOME | End: 2021-01-14
Attending: INTERNAL MEDICINE | Admitting: INTERNAL MEDICINE
Payer: COMMERCIAL

## 2021-01-14 VITALS
HEART RATE: 77 BPM | TEMPERATURE: 97.8 F | BODY MASS INDEX: 32.18 KG/M2 | RESPIRATION RATE: 20 BRPM | DIASTOLIC BLOOD PRESSURE: 68 MMHG | SYSTOLIC BLOOD PRESSURE: 110 MMHG | WEIGHT: 205.03 LBS | OXYGEN SATURATION: 95 % | HEIGHT: 67 IN

## 2021-01-14 VITALS — HEART RATE: 79 BPM

## 2021-01-14 DIAGNOSIS — B20 HIV INFECTION, UNSPECIFIED SYMPTOM STATUS (HCC): ICD-10-CM

## 2021-01-14 DIAGNOSIS — K74.60 CIRRHOSIS OF LIVER WITHOUT ASCITES, UNSPECIFIED HEPATIC CIRRHOSIS TYPE (HCC): ICD-10-CM

## 2021-01-14 DIAGNOSIS — B19.20 HEPATITIS C VIRUS INFECTION WITHOUT HEPATIC COMA, UNSPECIFIED CHRONICITY: ICD-10-CM

## 2021-01-14 DIAGNOSIS — K21.9 GASTROESOPHAGEAL REFLUX DISEASE WITHOUT ESOPHAGITIS: ICD-10-CM

## 2021-01-14 PROCEDURE — 43235 EGD DIAGNOSTIC BRUSH WASH: CPT | Performed by: INTERNAL MEDICINE

## 2021-01-14 RX ORDER — SODIUM CHLORIDE, SODIUM LACTATE, POTASSIUM CHLORIDE, CALCIUM CHLORIDE 600; 310; 30; 20 MG/100ML; MG/100ML; MG/100ML; MG/100ML
125 INJECTION, SOLUTION INTRAVENOUS CONTINUOUS
Status: DISCONTINUED | OUTPATIENT
Start: 2021-01-14 | End: 2021-01-14

## 2021-01-14 RX ORDER — LIDOCAINE HYDROCHLORIDE 10 MG/ML
INJECTION, SOLUTION EPIDURAL; INFILTRATION; INTRACAUDAL; PERINEURAL AS NEEDED
Status: DISCONTINUED | OUTPATIENT
Start: 2021-01-14 | End: 2021-01-14

## 2021-01-14 RX ORDER — PROPOFOL 10 MG/ML
INJECTION, EMULSION INTRAVENOUS AS NEEDED
Status: DISCONTINUED | OUTPATIENT
Start: 2021-01-14 | End: 2021-01-14

## 2021-01-14 RX ADMIN — PROPOFOL 50 MG: 10 INJECTION, EMULSION INTRAVENOUS at 07:21

## 2021-01-14 RX ADMIN — PROPOFOL 100 MG: 10 INJECTION, EMULSION INTRAVENOUS at 07:18

## 2021-01-14 RX ADMIN — PROPOFOL 50 MG: 10 INJECTION, EMULSION INTRAVENOUS at 07:19

## 2021-01-14 RX ADMIN — SODIUM CHLORIDE, SODIUM LACTATE, POTASSIUM CHLORIDE, AND CALCIUM CHLORIDE: .6; .31; .03; .02 INJECTION, SOLUTION INTRAVENOUS at 07:13

## 2021-01-14 RX ADMIN — LIDOCAINE HYDROCHLORIDE 50 MG: 10 INJECTION, SOLUTION EPIDURAL; INFILTRATION; INTRACAUDAL; PERINEURAL at 07:18

## 2021-01-14 NOTE — ANESTHESIA PREPROCEDURE EVALUATION
Procedure:  EGD    Relevant Problems   CARDIO   (+) Atypical chest pain   (+) Coronary artery disease involving native coronary artery of native heart without angina pectoris   (+) Essential hypertension      GI/HEPATIC   (+) Gastroesophageal reflux disease without esophagitis   (+) Other cirrhosis of liver (HCC)      NEURO/PSYCH   (+) History of non-ST elevation myocardial infarction (NSTEMI)        Physical Exam    Airway    Mallampati score: III  TM Distance: >3 FB  Neck ROM: full     Dental       Cardiovascular  Rhythm: regular, Rate: normal,     Pulmonary  Breath sounds clear to auscultation,     Other Findings    Hx of HIV    Anesthesia Plan  ASA Score- 3     Anesthesia Type- IV sedation with anesthesia with ASA Monitors  Additional Monitors:   Airway Plan:           Plan Factors-Exercise tolerance (METS): >4 METS  Chart reviewed  EKG reviewed  Existing labs reviewed  Induction- intravenous  Postoperative Plan-     Informed Consent- Anesthetic plan and risks discussed with patient  I personally reviewed this patient with the CRNA  Discussed and agreed on the Anesthesia Plan with the CRNA  Mendez Zamora

## 2021-01-14 NOTE — DISCHARGE INSTRUCTIONS
Upper Endoscopy   WHAT YOU NEED TO KNOW:   An upper endoscopy is also called an upper gastrointestinal (GI) endoscopy, or an esophagogastroduodenoscopy (EGD)  You may feel bloated, gassy, or have some abdominal discomfort after your procedure  Your throat may be sore for 24 to 36 hours  You may burp or pass gas from air that is still inside your body  DISCHARGE INSTRUCTIONS:   Call 911 if:   · You have sudden chest pain or trouble breathing  Seek care immediately if:   · You feel dizzy or faint  · You have trouble swallowing  · You have severe throat pain  · Your bowel movements are very dark or black  · Your abdomen is hard and firm and you have severe pain  · You vomit blood  Contact your healthcare provider if:   · You feel full or bloated and cannot burp or pass gas  · You have not had a bowel movement for 3 days after your procedure  · You have neck pain  · You have a fever or chills  · You have nausea or are vomiting  · You have a rash or hives  · You have questions or concerns about your endoscopy  Relieve a sore throat:  Suck on throat lozenges or crushed ice  Gargle with a small amount of warm salt water  Mix 1 teaspoon of salt and 1 cup of warm water to make salt water  Relieve gas and discomfort from bloating:  Lie on your right side with a heating pad on your abdomen  Take short walks to help pass gas  Eat small meals until bloating is relieved  Rest after your procedure:  Do not drive or make important decisions until the day after your procedure  Return to your normal activity as directed  You can usually return to work the day after your procedure  Follow up with your healthcare provider as directed:  Write down your questions so you remember to ask them during your visits  © Copyright [address] Information is for End User's use only and may not be sold, redistributed or otherwise used for commercial purposes   All illustrations and images included in CareNotes® are the copyrighted property of A D A M , Inc  or Johana Story   The above information is an  only  It is not intended as medical advice for individual conditions or treatments  Talk to your doctor, nurse or pharmacist before following any medical regimen to see if it is safe and effective for you  Upper Endoscopy   WHAT YOU NEED TO KNOW:   An upper endoscopy is also called an upper gastrointestinal (GI) endoscopy, or an esophagogastroduodenoscopy (EGD)  You may feel bloated, gassy, or have some abdominal discomfort after your procedure  Your throat may be sore for 24 to 36 hours  You may burp or pass gas from air that is still inside your body  DISCHARGE INSTRUCTIONS:   Call 911 if:   · You have sudden chest pain or trouble breathing  Seek care immediately if:   · You feel dizzy or faint  · You have trouble swallowing  · You have severe throat pain  · Your bowel movements are very dark or black  · Your abdomen is hard and firm and you have severe pain  · You vomit blood  Contact your healthcare provider if:   · You feel full or bloated and cannot burp or pass gas  · You have not had a bowel movement for 3 days after your procedure  · You have neck pain  · You have a fever or chills  · You have nausea or are vomiting  · You have a rash or hives  · You have questions or concerns about your endoscopy  Relieve a sore throat:  Suck on throat lozenges or crushed ice  Gargle with a small amount of warm salt water  Mix 1 teaspoon of salt and 1 cup of warm water to make salt water  Relieve gas and discomfort from bloating:  Lie on your right side with a heating pad on your abdomen  Take short walks to help pass gas  Eat small meals until bloating is relieved  Rest after your procedure:  Do not drive or make important decisions until the day after your procedure  Return to your normal activity as directed   You can usually return to work the day after your procedure  Follow up with your healthcare provider as directed:  Write down your questions so you remember to ask them during your visits  © Copyright [address] Information is for End User's use only and may not be sold, redistributed or otherwise used for commercial purposes  All illustrations and images included in CareNotes® are the copyrighted property of A D A M , Inc  or [facility] Salazar Story   The above information is an  only  It is not intended as medical advice for individual conditions or treatments  Talk to your doctor, nurse or pharmacist before following any medical regimen to see if it is safe and effective for you

## 2021-01-14 NOTE — ANESTHESIA POSTPROCEDURE EVALUATION
Post-Op Assessment Note    CV Status:  Stable  Pain Score: 0    Pain management: adequate     Mental Status:  Awake   Hydration Status:  Stable   PONV Controlled:  None   Airway Patency:  Patent      Post Op Vitals Reviewed: Yes      Staff: CRNA         No complications documented      BP   105/64   Temp   97 8   Pulse  80   Resp  18   SpO2 96

## 2021-01-14 NOTE — H&P
History and Physical -  [facility]  Carla Mccullough 61 y o  male MRN: [id number]                  HPI: Carla Mccullough is a 61y o  year old male who presents for EGD for GERD and a history of cirrhosis  This is being done to rule out Quarles's esophagus as well as varices  REVIEW OF SYSTEMS: Per the HPI, and otherwise unremarkable  Historical Information   Past Medical History:   Diagnosis Date    HIV (human immunodeficiency virus infection) ([facility] )     Hypertension     Opioid dependence ([facility] )      Past Surgical History:   Procedure Laterality Date    CARDIAC SURGERY      LUMBAR LAMINECTOMY      STOMACH SURGERY       Social History   Social History     Substance and Sexual Activity   Alcohol Use Yes    Frequency: 2-4 times a month    Comment: rarely     Social History     Substance and Sexual Activity   Drug Use No     Social History     Tobacco Use   Smoking Status Current Every Day Smoker    Packs/day: 0 00    Types: Cigars   Smokeless Tobacco Never Used   Tobacco Comment    10-15 cigars/week     Family History   Problem Relation Age of Onset    Alzheimer's disease Mother     Heart attack Father     Prostate cancer Brother        Meds/Allergies     (Not in a hospital admission)      No Known Allergies    Objective     Blood pressure 160/78, pulse 77, temperature 97 8 °F (36 6 °C), temperature source Temporal, resp  rate (!) 23, height 5' 7" (1 702 m), weight 93 kg (205 lb 0 4 oz), SpO2 96 %        PHYSICAL EXAM    Gen: NAD  CV: RRR  CHEST: Clear  ABD: soft, NT/ND  EXT: no edema  Neuro: AAO      ASSESSMENT/PLAN:  This is a 61y o  year old male here for GERD, cirrhosis    PLAN:   Procedure:  EGD

## 2021-01-18 ENCOUNTER — TELEPHONE (OUTPATIENT)
Dept: SURGERY | Facility: CLINIC | Age: 61
End: 2021-01-18

## 2021-01-18 NOTE — PATIENT INSTRUCTIONS

## 2021-01-19 ENCOUNTER — TELEPHONE (OUTPATIENT)
Dept: SURGERY | Facility: CLINIC | Age: 61
End: 2021-01-19

## 2021-01-19 ENCOUNTER — OFFICE VISIT (OUTPATIENT)
Dept: SURGERY | Facility: CLINIC | Age: 61
End: 2021-01-19
Payer: COMMERCIAL

## 2021-01-19 VITALS
HEIGHT: 67 IN | TEMPERATURE: 98.2 F | OXYGEN SATURATION: 97 % | WEIGHT: 205.2 LBS | HEART RATE: 90 BPM | DIASTOLIC BLOOD PRESSURE: 78 MMHG | BODY MASS INDEX: 32.21 KG/M2 | SYSTOLIC BLOOD PRESSURE: 110 MMHG

## 2021-01-19 DIAGNOSIS — E78.5 DYSLIPIDEMIA: ICD-10-CM

## 2021-01-19 DIAGNOSIS — B20 HIV DISEASE (HCC): ICD-10-CM

## 2021-01-19 DIAGNOSIS — I25.10 CORONARY ARTERY DISEASE INVOLVING NATIVE CORONARY ARTERY OF NATIVE HEART WITHOUT ANGINA PECTORIS: ICD-10-CM

## 2021-01-19 DIAGNOSIS — T65.292D TOXIC EFFECT OF TOBACCO, INTENTIONAL SELF-HARM, SUBSEQUENT ENCOUNTER: ICD-10-CM

## 2021-01-19 DIAGNOSIS — I10 ESSENTIAL HYPERTENSION: ICD-10-CM

## 2021-01-19 DIAGNOSIS — K21.9 GASTROESOPHAGEAL REFLUX DISEASE WITHOUT ESOPHAGITIS: ICD-10-CM

## 2021-01-19 DIAGNOSIS — R07.89 ATYPICAL CHEST PAIN: Primary | ICD-10-CM

## 2021-01-19 DIAGNOSIS — R91.1 LUNG NODULE: ICD-10-CM

## 2021-01-19 PROCEDURE — 99214 OFFICE O/P EST MOD 30 MIN: CPT | Performed by: NURSE PRACTITIONER

## 2021-01-19 RX ORDER — METOPROLOL SUCCINATE 25 MG/1
25 TABLET, EXTENDED RELEASE ORAL DAILY
Qty: 30 TABLET | Refills: 2 | Status: SHIPPED | OUTPATIENT
Start: 2021-01-19 | End: 2021-04-21

## 2021-01-19 NOTE — ASSESSMENT & PLAN NOTE
Continue current statin  Tolerating medication well and denies muscle pain or weakness  LFTs are not elevated  Eat a low fat/low cholesterol diet  Repeat lipids with next labs

## 2021-01-19 NOTE — PROGRESS NOTES
Assessment/Plan:    Educated on the signs and symptoms of COVID-19  Instructed to call clinic if pt develops cough, fever, or shortness of breath  Educated to maintain CDC recommended social distancing practices, wash hands frequently, avoid touching face, and stay home if you become ill  Educated and COVID-19 vaccine  Discussed risks versus benefits  Answered all patient questions  Reviewed using My Chart to schedule appointment for vaccine  Atypical chest pain  24 hour holter and Echo completed  Instructed to f/u with cardiology  Did not take nitroglycerin as previously instructed to alleviate chest pain  Continues to complain of intermittent occasional chest pain that resolves in less than 3 minutes spontaneously  Pain is not associated with dietary intake, activity, rest, and or stress  Does notice that pain occurs more frequently when smoking  Advised to use nitroglycerin as previously instructed and emphasized the importance of cardiology follow-up  Lung nodule  Stable  Chest CT completed in November  Continue surveillance with annual CT scan  Toxic effect of tobacco and nicotine  Previously prescribed NRT  Encouraged to start using nicotine patches  Counseled for greater than 15 minutes on the importance of smoking cessation  Advised to quit  Educated on the increased risk of heart and lung disease associated with smoking  Referred to [facility] for enrollment in smoking cessation program          Essential hypertension  Blood pressure:  Stable  BP Readings from Last 3 Encounters:   01/19/21 110/78   01/14/21 110/68   12/01/20 130/80       Continue metoprolol, losartan, and HCTZ    Educated on the following lifestyle modifications to lower BP and decrease cardiovascular risk factors  limit alcohol intake, reduce salt in diet, maintain a healthy weight, engage in 30 minutes of cardiovascular exercise daily, and not smoke  Dyslipidemia    Continue current statin   Tolerating medication well and denies muscle pain or weakness  LFTs are not elevated  Eat a low fat/low cholesterol diet  Repeat lipids with next labs  Gastroesophageal reflux disease without esophagitis  Symptoms well controlled with Protonix  Educated on lifestyle modifications to improve GERD  Encouraged avoidance of acidic foods including citrus,onions, coffee, carbonated beverages, mint ,chocolate and fried foods  Encouraged abstinence from caffeine, tobacco, and alcohol  Educated to avoid laying down directly after eating a large meal  and consume smaller more frequent meals  Recommend a healthy body weight to decrease symptoms  Coronary artery disease involving native coronary artery of native heart without angina pectoris  Follow-up with Cardiology  Continue aspirin, Plavix, and Crestor  HIV disease ([facility] )  No results found for: VC9PAOR  CD4 ABS   Date/Time Value Ref Range Status   10/12/2020 10:38  (L) 359 - 1519 /uL Final     HIV-1 RNA by PCR, Qn   Date/Time Value Ref Range Status   10/12/2020 10:38 AM 30 copies/mL Final     Comment:     The reportable range for this assay is 20 to 10,000,000  copies HIV-1 RNA/mL  HIV-1 RNA Viral Load Log   Date/Time Value Ref Range Status   10/12/2020 10:38 AM 1 477 aog19gbue/mL Final         A RT:  Tivicay Descovy and Prezcobix    Reports 100% adherence    Denies side effects  Stressed the importance of adherence  Continue follow up with ID clinic  Reviewed most recent labs, including Cd4 and viral load  Discussed the risks and benefits of treatment options, instructions for management, importance of treatment adherence, and reduction of risk factor  Educated on possible  medication side effects  Counseled on routes of HIV transmission, including the risk of  infection  Emphasized that viral suppression is the best method to prevent HIV transmission  At this time pt denies the need for HIV testing of anyone in their life  Total encounter time was 45 minutes  Greater then 20 minutes were spent on counseling and patient education  Pt voices understanding and agreement with treatment plan  Diagnoses and all orders for this visit:    Atypical chest pain    Dyslipidemia    Essential hypertension  -     metoprolol succinate (TOPROL-XL) 25 mg 24 hr tablet; Take 1 tablet (25 mg total) by mouth daily    Gastroesophageal reflux disease without esophagitis    HIV disease ([facility] )    Toxic effect of tobacco, intentional self-harm, subsequent encounter    Lung nodule    Coronary artery disease involving native coronary artery of native heart without angina pectoris          Subjective:      Patient ID: Zina Lockett is a 61 y o  male  Dat Jacobs is here today for three-month PCP follow-up of chronic conditions  PMHx significant for HIV, HCV SVR s/p treatment, cirrhosis,HTN,hyperlipidemia, and NSTEMI in January 2019 with cardiac intervention and stent placed  Dat Jacobs is complaining of continued intermittent occasional midsternal chest pain without radiation that lasts for less than 3 minutes  Pain resolves spontaneously  Pain is not associated with activity, rest, stress, or dietary intake  Pain has not been alleviated with changes to diet for ingestion of and acids  Pain has been ongoing for greater than 9 months  Dat Jacobs has not followed up with Cardiology as previously instructed  24 hour Holter and Echo have been completed  Dat Jacobs will be scheduling cardiology follow-up later this month  Previously prescribed nitroglycerin for p r n  Use for acute angina, but has not used medication        The following portions of the patient's history were reviewed and updated as appropriate: allergies, current medications, past family history, past medical history, past social history, past surgical history and problem list     Review of Systems   Constitutional: Negative for activity change, appetite change, chills, diaphoresis, fatigue, fever and unexpected weight change  HENT: Negative for congestion, dental problem, ear pain, hearing loss, mouth sores, rhinorrhea and sore throat  Eyes: Negative for pain, redness and visual disturbance  Respiratory: Negative for shortness of breath and wheezing  Cardiovascular: Positive for chest pain (Occasional)  Negative for leg swelling  Gastrointestinal: Negative for abdominal pain, constipation, diarrhea, nausea and vomiting  Endocrine: Negative for polydipsia, polyphagia and polyuria  Genitourinary: Negative for difficulty urinating and dysuria  Musculoskeletal: Negative for back pain, joint swelling and myalgias  Skin: Negative for rash  Neurological: Negative for syncope and headaches  Psychiatric/Behavioral: Negative for behavioral problems and suicidal ideas           Objective:      /78   Pulse 90   Temp 98 2 °F (36 8 °C)   Ht 5' 7" (1 702 m)   Wt 93 1 kg (205 lb 3 2 oz)   SpO2 97%   BMI 32 14 kg/m²       Lab Results   Component Value Date     03/18/2017    K 3 7 10/12/2020     10/12/2020    CO2 26 10/12/2020    BUN 18 10/12/2020    CREATININE 1 07 10/12/2020    GLUF 105 (H) 10/12/2020    CALCIUM 9 4 10/12/2020    AST 16 10/12/2020    ALT 25 10/12/2020    ALKPHOS 91 10/12/2020    PROT 7 4 03/18/2017    BILITOT 1 1 03/18/2017    EGFR 75 10/12/2020     Lab Results   Component Value Date    WBC 6 30 10/12/2020    WBC 6 0 10/12/2020    HGB 18 2 (H) 10/12/2020    HGB 17 9 (H) 10/12/2020    HCT 56 3 (H) 10/12/2020    HCT 53 3 (H) 10/12/2020    MCV 89 10/12/2020    MCV 88 10/12/2020     10/12/2020     10/12/2020     No results found for: HEPCAB  Lab Results   Component Value Date    HEPBCAB REACTIVE (A) 10/08/2015    HEPBCAB REACTIVE (A) 10/08/2015    HEPBCAB REACTIVE (A) 10/08/2015    HEPBCAB REACTIVE (A) 10/08/2015     Lab Results   Component Value Date    RPR Non-Reactive 11/25/2019            Physical Exam  Constitutional:       General: He is not in acute distress  Appearance: He is well-developed  HENT:      Head: Normocephalic  Right Ear: External ear normal       Left Ear: External ear normal       Nose: Nose normal       Mouth/Throat:      Pharynx: No oropharyngeal exudate  Eyes:      General:         Right eye: No discharge  Left eye: No discharge  Conjunctiva/sclera: Conjunctivae normal       Pupils: Pupils are equal, round, and reactive to light  Neck:      Musculoskeletal: Normal range of motion  Thyroid: No thyromegaly  Cardiovascular:      Rate and Rhythm: Normal rate and regular rhythm  Heart sounds: Normal heart sounds  No murmur  Pulmonary:      Effort: Pulmonary effort is normal       Breath sounds: Normal breath sounds  No wheezing  Abdominal:      General: Bowel sounds are normal       Palpations: Abdomen is soft  There is no mass  Tenderness: There is no abdominal tenderness  Musculoskeletal: Normal range of motion  General: No tenderness  Lymphadenopathy:      Cervical: No cervical adenopathy  Skin:     General: Skin is warm and dry  Findings: No rash  Neurological:      Mental Status: He is alert and oriented to person, place, and time     Psychiatric:         Behavior: Behavior normal

## 2021-01-19 NOTE — TELEPHONE ENCOUNTER
----- Message from Emmanuelle Ramirez sent at 1/18/2021  2:47 PM EST -----  Regarding: labs  Please remind pt to have his labs done by the end of this week for his 2/2/21 ID appt

## 2021-01-19 NOTE — ASSESSMENT & PLAN NOTE
Symptoms well controlled with Protonix  Educated on lifestyle modifications to improve GERD  Encouraged avoidance of acidic foods including citrus,onions, coffee, carbonated beverages, mint ,chocolate and fried foods  Encouraged abstinence from caffeine, tobacco, and alcohol  Educated to avoid laying down directly after eating a large meal  and consume smaller more frequent meals  Recommend a healthy body weight to decrease symptoms

## 2021-01-19 NOTE — ASSESSMENT & PLAN NOTE
No results found for: IB0YBPP  CD4 ABS   Date/Time Value Ref Range Status   10/12/2020 10:38  (L) 359 - 1519 /uL Final     HIV-1 RNA by PCR, Qn   Date/Time Value Ref Range Status   10/12/2020 10:38 AM 30 copies/mL Final     Comment:     The reportable range for this assay is 20 to 10,000,000  copies HIV-1 RNA/mL  HIV-1 RNA Viral Load Log   Date/Time Value Ref Range Status   10/12/2020 10:38 AM 1 477 [id number]/mL Final         A RT:  Tivicay Descovy and Prezcobix    Reports 100% adherence    Denies side effects  Stressed the importance of adherence  Continue follow up with ID clinic  Reviewed most recent labs, including Cd4 and viral load  Discussed the risks and benefits of treatment options, instructions for management, importance of treatment adherence, and reduction of risk factor  Educated on possible  medication side effects  Counseled on routes of HIV transmission, including the risk of  infection  Emphasized that viral suppression is the best method to prevent HIV transmission  At this time pt denies the need for HIV testing of anyone in their life  Total encounter time was 45 minutes  Greater then 20 minutes were spent on counseling and patient education  Pt voices understanding and agreement with treatment plan

## 2021-01-19 NOTE — ASSESSMENT & PLAN NOTE
24 hour holter and Echo completed  Instructed to f/u with cardiology  Did not take nitroglycerin as previously instructed to alleviate chest pain  Continues to complain of intermittent occasional chest pain that resolves in less than 3 minutes spontaneously  Pain is not associated with dietary intake, activity, rest, and or stress  Does notice that pain occurs more frequently when smoking  Advised to use nitroglycerin as previously instructed and emphasized the importance of cardiology follow-up

## 2021-01-19 NOTE — ASSESSMENT & PLAN NOTE
Blood pressure:  Stable  BP Readings from Last 3 Encounters:   01/19/21 110/78   01/14/21 110/68   12/01/20 130/80       Continue metoprolol, losartan, and HCTZ    Educated on the following lifestyle modifications to lower BP and decrease cardiovascular risk factors  limit alcohol intake, reduce salt in diet, maintain a healthy weight, engage in 30 minutes of cardiovascular exercise daily, and not smoke

## 2021-01-19 NOTE — ASSESSMENT & PLAN NOTE
Previously prescribed NRT  Encouraged to start using nicotine patches  Counseled for greater than 15 minutes on the importance of smoking cessation  Advised to quit  Educated on the increased risk of heart and lung disease associated with smoking    Referred to [address] for enrollment in smoking cessation program

## 2021-01-22 ENCOUNTER — PATIENT OUTREACH (OUTPATIENT)
Dept: SURGERY | Facility: OTHER | Age: 61
End: 2021-01-22

## 2021-01-22 NOTE — PROGRESS NOTES
C#[id number] [facility]    Akash received ct's January MAWD bill via e-mail  Cm prepared the check req and e-mailed to supervisor to be processed  Cm is waiting on check req to be signed by supervisor to scan the January MAWD packet in to [address] [place] [address]

## 2021-01-27 ENCOUNTER — PATIENT OUTREACH (OUTPATIENT)
Dept: SURGERY | Facility: OTHER | Age: 61
End: 2021-01-27

## 2021-01-27 NOTE — PROGRESS NOTES
CT# [id number] [facility]    Cm received ct's signed check req for January MAWD premiums  Please see scanned media for January MAWD packet

## 2021-02-04 ENCOUNTER — PATIENT OUTREACH (OUTPATIENT)
Dept: SURGERY | Facility: OTHER | Age: 61
End: 2021-02-04

## 2021-02-04 NOTE — PROGRESS NOTES
CT#[id number] [facility]    Ct e-mailed cm his bank statements and asked if he could come to the [facility] office on 2/5/2021 to RCT, RST  CM notified ct that cm would be working remotely until 2/15/2021 but that if he wishes we could RCT, RST him over the phone tomorrow  Ct accepted and will complete RCT, RST tomorrow after 3pm over the phone

## 2021-02-05 ENCOUNTER — PATIENT OUTREACH (OUTPATIENT)
Dept: SURGERY | Facility: OTHER | Age: 61
End: 2021-02-05

## 2021-02-05 NOTE — PROGRESS NOTES
CT# [id number] [facility]     Ct and Cm agreed to complete RCT, RST over the phone due to COVID 19  No signatures were collected at this time  Ct states is in stable medical health  Ct reported seeing his ID, Dr Anam Junior at the [facility] and getting labs done last November 2020  Ct is scheduled to see Dr Anam Junior on 3/23/21 and ALLIE on 4/26/21  Ct has not seen the optometrist at [facility] or the dentist at [address] since 2020, however ct and cm spoke about the new contract between [employer] and [employer]  Cm had submitted ct's SFS program application and was approved  Ct must be cleared for dental tx by cardiologist  Tina Jones states he is still smoking cigars and finds it hard to quit  Cm and ct spoke about ways to cut down or quit eventually and about how this affects his immune system getting better  Ct understands the risks he takes by continuing to smoke but states the habit is so hard to break  Ct continues to be adherent to HIV care and is on Prescobix, Descovy and Tivicay (2) equals to 4 pills a day  Ct is on blood thinners but states feeling great  Ct is abstaining from risky behaviors, ct is in a monogamous relationship with his wife of over 20 years  Ct lives in his home with his wife, has a car has MAWD coverage  Ct stated he is in the journey to purchase his home  Ct will request an auth to continue to assist the ct with his MAWD premiums  Ct works a full time job at [email or web address]am has no addictions or mental health concerns ct has no domestic violence concerns, is independent in ADL and has a strong support system  Ct has no dependents and no issues with language comprehension, legal or nutrition   Reassessment and Recertification is complete

## 2021-02-08 ENCOUNTER — TELEPHONE (OUTPATIENT)
Dept: SURGERY | Facility: CLINIC | Age: 61
End: 2021-02-08

## 2021-02-11 ENCOUNTER — PATIENT OUTREACH (OUTPATIENT)
Dept: SURGERY | Facility: OTHER | Age: 61
End: 2021-02-11

## 2021-02-11 NOTE — PROGRESS NOTES
CT# [id number] [facility] reached out to seb to ask about the [employer] letter he had received  Cm explained that the application had been approved and that this was an approval letter  Cm will print out the letter and scan in to epic

## 2021-02-12 ENCOUNTER — PATIENT OUTREACH (OUTPATIENT)
Dept: SURGERY | Facility: OTHER | Age: 61
End: 2021-02-12

## 2021-02-13 NOTE — PROGRESS NOTES
CT# [id number] [facility] was notified that the letter received from DARIAN is an approval for the  SFS and will be scanned in to [facility] once it's printed

## 2021-02-18 ENCOUNTER — PATIENT OUTREACH (OUTPATIENT)
Dept: SURGERY | Facility: OTHER | Age: 61
End: 2021-02-18

## 2021-02-18 NOTE — PROGRESS NOTES
CT#[id number] [facility]    Cm reached out to ct to explain the  SFS approval letter since the ct seemed to be a bit confused

## 2021-02-22 ENCOUNTER — PATIENT OUTREACH (OUTPATIENT)
Dept: SURGERY | Facility: OTHER | Age: 61
End: 2021-02-22

## 2021-02-22 NOTE — PROGRESS NOTES
CT# [id number] [facility]    Akash received ct's February MAWD premium statement to be processed  Cm will work on ct's auth request for assistance from [employer] for ct's MAWD monthly payments

## 2021-02-23 ENCOUNTER — PATIENT OUTREACH (OUTPATIENT)
Dept: SURGERY | Facility: OTHER | Age: 61
End: 2021-02-23

## 2021-02-23 NOTE — PROGRESS NOTES
CT# [id number] [facility]    Cm prepared, scanned and e-mailed ct's February MAWD check req  To supervisor to be processed  Cm received signed check req  From supervisor  Cm scanned in February MA packet  Cm also submitted ct's RW SFS self attestation completed form to [employer] for recertification purposes  Cm has scanned in the form  Please see scanned media

## 2021-02-26 DIAGNOSIS — T65.292D TOXIC EFFECT OF TOBACCO, INTENTIONAL SELF-HARM, SUBSEQUENT ENCOUNTER: ICD-10-CM

## 2021-03-02 ENCOUNTER — OFFICE VISIT (OUTPATIENT)
Dept: CARDIOLOGY CLINIC | Facility: CLINIC | Age: 61
End: 2021-03-02
Payer: COMMERCIAL

## 2021-03-02 VITALS
HEIGHT: 67 IN | WEIGHT: 204 LBS | DIASTOLIC BLOOD PRESSURE: 88 MMHG | OXYGEN SATURATION: 95 % | RESPIRATION RATE: 18 BRPM | HEART RATE: 88 BPM | BODY MASS INDEX: 32.02 KG/M2 | SYSTOLIC BLOOD PRESSURE: 140 MMHG

## 2021-03-02 DIAGNOSIS — I20.8 STABLE ANGINA (HCC): Primary | ICD-10-CM

## 2021-03-02 PROCEDURE — 99214 OFFICE O/P EST MOD 30 MIN: CPT | Performed by: INTERNAL MEDICINE

## 2021-03-02 PROCEDURE — 3079F DIAST BP 80-89 MM HG: CPT | Performed by: INTERNAL MEDICINE

## 2021-03-02 PROCEDURE — 3077F SYST BP >= 140 MM HG: CPT | Performed by: INTERNAL MEDICINE

## 2021-03-02 RX ORDER — ISOSORBIDE MONONITRATE 30 MG/1
30 TABLET, EXTENDED RELEASE ORAL DAILY
Qty: 30 TABLET | Refills: 3 | Status: SHIPPED | OUTPATIENT
Start: 2021-03-02 | End: 2021-03-03 | Stop reason: SDUPTHER

## 2021-03-02 RX ORDER — NICOTINE 21 MG/24HR
1 PATCH, TRANSDERMAL 24 HOURS TRANSDERMAL EVERY 24 HOURS
Qty: 28 PATCH | Refills: 2 | Status: SHIPPED | OUTPATIENT
Start: 2021-03-02 | End: 2021-05-18 | Stop reason: SDUPTHER

## 2021-03-02 NOTE — PROGRESS NOTES
Cardiology Office Note    Vick Goodman 61 y o  male MRN: [id number]    03/02/21          Assessment:  1  Chest pain   2  CAD s/p TRENT to the mLCx  (1/2019)  3  Mild aortic stenosis   4  Hypertension  5  Hyperlipidemia  6  Hx of Hep C- treated  7  Hx of HIV    Plan:  · Will start Imdur to alleviate his symptoms  If no symptom resolution will consider evaluation with cardiac catheterization  · Cont aspirin, plavix, toprol-xl, and crestor  · Cont losartan-hctz   · Ambulatory blood pressure monitoring and maintaining a low sodium diet was advised  Follow up: 1 month  or sooner as needed    1  Stable angina (HCC)  isosorbide mononitrate (IMDUR) 30 mg 24 hr tablet       HPI: Vick Goodman is a 61y o  year old male with history of CAD s/p PCI, mild aortic stenosis, and hypertension presents for routine follow-up  Past medical history:  · He presented to the [facility] in 01/2019 with chest pain  He had a 10 beat run of polymorphic VT  He underwent cardiac catheterization and subsequent PCI to the mid circumflex  · TTE 1/2019: EF: 50%, g1DD, mild MR,AS, AI, and TR  · Pharmacologic MPI 3/2019- negative for ischemia  · Holter 11/2019- Ventricular ectopy 0 3% of total beats- no significant arrhythmia noted  On evaluation today he notes intermittent central chest pressure for the past year  The episodes last for 30 seconds to 1 minute and occur about 3 per day  His symptoms are occasionally relieved with SL Nitro and/or tums  He was evaluated with an EGD that was essentially normal  He has a very active job and lifts 50 lbs 4-5 x per day without limitation or symptom reproduction  He denies any other cardiac concerns at this time  He has been tolerating his medications without side effect       Family History: father with MI in his [de-identified]    Social history: smokes cigars occasionally       No Known Allergies      Current Outpatient Medications:     albuterol (VENTOLIN HFA) 90 mcg/act inhaler, Inhale 2 puffs every 6 (six) hours as needed for wheezing, Disp: 18 g, Rfl: 0    aspirin (ECOTRIN LOW STRENGTH) 81 mg EC tablet, Take 1 tablet (81 mg total) by mouth daily, Disp: 30 tablet, Rfl: 3    cetirizine (ZyrTEC) 10 mg tablet, TAKE ONE TABLET BY MOUTH EVERY DAY, Disp: 30 tablet, Rfl: 2    clopidogrel (PLAVIX) 75 mg tablet, TAKE 1 TABLET BY MOUTH DAILY, Disp: 30 tablet, Rfl: 5    dicyclomine (BENTYL) 10 mg capsule, Take 1 capsule (10 mg total) by mouth as needed (for diarrehea), Disp: 60 capsule, Rfl: 0    dolutegravir (Tivicay) 50 MG TABS, Take 1 tablet (50 mg total) by mouth 2 (two) times a day, Disp: 60 tablet, Rfl: 5    emtricitabine-tenofovir AF (Descovy) 200-25 MG tablet, Take 1 tablet by mouth daily, Disp: 30 tablet, Rfl: 5    losartan-hydrochlorothiazide (HYZAAR) 100-25 MG per tablet, TAKE 1 TABLET BY MOUTH DAILY, Disp: 30 tablet, Rfl: 2    metoprolol succinate (TOPROL-XL) 25 mg 24 hr tablet, Take 1 tablet (25 mg total) by mouth daily, Disp: 30 tablet, Rfl: 2    mometasone (NASONEX) 50 mcg/act nasal spray, 2 sprays into each nostril daily, Disp: 17 g, Rfl: 2    nicotine (NICODERM CQ) 21 mg/24 hr TD 24 hr patch, Place 1 patch on the skin every 24 hours (Patient taking differently: Place 1 patch on the skin every 24 hours ), Disp: 28 patch, Rfl: 2    nitroglycerin (NITROSTAT) 0 4 mg SL tablet, Place 1 tablet (0 4 mg total) under the tongue every 5 (five) minutes as needed for chest pain, Disp: 15 tablet, Rfl: 0    pantoprazole (PROTONIX) 40 mg tablet, Take 1 tablet (40 mg total) by mouth 2 (two) times a day, Disp: 60 tablet, Rfl: 1    Prezcobix 800-150 MG TABS, TAKE 1 TABLET BY MOUTH DAILY, Disp: 30 tablet, Rfl: 5    rosuvastatin (CRESTOR) 20 MG tablet, TAKE 1 TABLET BY MOUTH DAILY, Disp: 30 tablet, Rfl: 5    isosorbide mononitrate (IMDUR) 30 mg 24 hr tablet, Take 1 tablet (30 mg total) by mouth daily, Disp: 30 tablet, Rfl: 3    psyllium (METAMUCIL) 58 6 % packet, Take 1 packet by mouth daily (Patient not taking: Reported on 3/2/2021), Disp: 60 packet, Rfl: 1    Past Medical History:   Diagnosis Date    HIV (human immunodeficiency virus infection) ([facility] )     Hypertension     Opioid dependence ([facility] )        Family History   Problem Relation Age of Onset    Alzheimer's disease Mother     Heart attack Father     Prostate cancer Brother        Past Surgical History:   Procedure Laterality Date    CARDIAC SURGERY      LUMBAR LAMINECTOMY      STOMACH SURGERY         Social History     Socioeconomic History    Marital status: /Civil Union     Spouse name: Not on file    Number of children: Not on file    Years of education: Not on file    Highest education level: Not on file   Occupational History    Not on file   Social Needs    Financial resource strain: Not on file    Food insecurity     Worry: Not on file     Inability: Not on file    Transportation needs     Medical: Not on file     Non-medical: Not on file   Tobacco Use    Smoking status: Current Every Day Smoker     Packs/day: 0 00     Types: Cigars    Smokeless tobacco: Never Used    Tobacco comment: 5 cigars/day   Substance and Sexual Activity    Alcohol use: Not Currently     Frequency: 2-4 times a month     Comment: rarely    Drug use: No    Sexual activity: Yes     Partners: Female   Lifestyle    Physical activity     Days per week: Not on file     Minutes per session: Not on file    Stress: Not on file   Relationships    Social connections     Talks on phone: Not on file     Gets together: Not on file     Attends [employer] service: Not on file     Active member of club or organization: Not on file     Attends meetings of clubs or organizations: Not on file     Relationship status: Not on file    Intimate partner violence     Fear of current or ex partner: Not on file     Emotionally abused: Not on file     Physically abused: Not on file     Forced sexual activity: Not on file   Other Topics Concern    Not on file   Social History Narrative    Not on file       Review of Systems   Constitution: Negative for diaphoresis, weight gain and weight loss  HENT: Negative for congestion  Cardiovascular: Positive for chest pain  Negative for dyspnea on exertion, irregular heartbeat, leg swelling, near-syncope, orthopnea, palpitations, paroxysmal nocturnal dyspnea and syncope  Respiratory: Negative for shortness of breath, sleep disturbances due to breathing and snoring  Hematologic/Lymphatic: Does not bruise/bleed easily  Skin: Negative for rash  Musculoskeletal: Negative for myalgias  Gastrointestinal: Negative for nausea and vomiting  Neurological: Negative for excessive daytime sleepiness and light-headedness  Psychiatric/Behavioral: The patient is not nervous/anxious  Vitals: /88   Pulse 88   Resp 18   Ht 5' 7" (1 702 m)   Wt 92 5 kg (204 lb)   SpO2 95%   BMI 31 95 kg/m²       Physical Exam:     GEN: Alert and oriented x 3, in no acute distress  Well appearing and well nourished  HEENT: Sclera anicteric, conjunctivae pink, mucous membranes moist  Oropharynx clear  NECK: Supple, no carotid bruits, no significant JVD  Trachea midline, no thyromegaly  HEART: Regular rhythm, normal S1 and S2, no murmurs, clicks, gallops or rubs  PMI nondisplaced, no thrills  LUNGS: Clear to auscultation bilaterally; no wheezes, rales, or rhonchi  No increased work of breathing or signs of respiratory distress  ABDOMEN: Soft, nontender, nondistended, normoactive bowel sounds  EXTREMITIES: Skin warm and well perfused, no clubbing, cyanosis, or edema  NEURO: No focal findings  Normal speech  Mood and affect normal    SKIN: Normal without suspicious lesions on exposed skin

## 2021-03-03 DIAGNOSIS — R07.9 CHEST PAIN, UNSPECIFIED TYPE: ICD-10-CM

## 2021-03-03 DIAGNOSIS — I20.8 STABLE ANGINA (HCC): ICD-10-CM

## 2021-03-03 RX ORDER — ISOSORBIDE MONONITRATE 30 MG/1
30 TABLET, EXTENDED RELEASE ORAL DAILY
Qty: 90 TABLET | Refills: 3 | Status: SHIPPED | OUTPATIENT
Start: 2021-03-03 | End: 2021-05-18 | Stop reason: HOSPADM

## 2021-03-03 RX ORDER — NITROGLYCERIN 0.4 MG/1
0.4 TABLET SUBLINGUAL
Qty: 15 TABLET | Refills: 0 | Status: CANCELLED | OUTPATIENT
Start: 2021-03-03

## 2021-03-03 NOTE — TELEPHONE ENCOUNTER
Pt's wife called and stated that she needs pt's medication isosorbide mononitrate (IMDUR) 30 mg 24 hr tablet to be sent to [facility] pharmacy on file not the mail order

## 2021-03-04 ENCOUNTER — TELEPHONE (OUTPATIENT)
Dept: CARDIOLOGY CLINIC | Facility: CLINIC | Age: 61
End: 2021-03-04

## 2021-03-04 NOTE — TELEPHONE ENCOUNTER
Spoke with pharmacy   Pt prescriptions was sent first to mail order before it was cancelled mail order had already filled and mailed to patient  Pt is aware medication is coming by mail

## 2021-03-04 NOTE — TELEPHONE ENCOUNTER
isosorbide mononitrate (IMDUR) 30 mg 24 hr tablet     Pt's wife stated that [facility] in [facility] will not fill this prescription because it was not put in as a "changed prescription"  Pharmacy needs to be notified so prescription can be filled      [facility] [phone]

## 2021-03-10 DIAGNOSIS — Z23 ENCOUNTER FOR IMMUNIZATION: ICD-10-CM

## 2021-03-15 ENCOUNTER — APPOINTMENT (OUTPATIENT)
Dept: LAB | Facility: HOSPITAL | Age: 61
End: 2021-03-15
Attending: INTERNAL MEDICINE
Payer: COMMERCIAL

## 2021-03-15 DIAGNOSIS — Z11.3 SCREENING EXAMINATION FOR VENEREAL DISEASE: ICD-10-CM

## 2021-03-15 DIAGNOSIS — Z20.2 CONTACT WITH AND (SUSPECTED) EXPOSURE TO INFECTIONS WITH A PREDOMINANTLY SEXUAL MODE OF TRANSMISSION: ICD-10-CM

## 2021-03-15 DIAGNOSIS — B20 HIV DISEASE (HCC): ICD-10-CM

## 2021-03-15 DIAGNOSIS — D72.89 OTHER SPECIFIED DISORDERS OF WHITE BLOOD CELLS: ICD-10-CM

## 2021-03-15 DIAGNOSIS — Z72.89 OTHER PROBLEMS RELATED TO LIFESTYLE: ICD-10-CM

## 2021-03-15 LAB
ALBUMIN SERPL BCP-MCNC: 3.7 G/DL (ref 3.5–5)
ALP SERPL-CCNC: 89 U/L (ref 46–116)
ALT SERPL W P-5'-P-CCNC: 22 U/L (ref 12–78)
ANION GAP SERPL CALCULATED.3IONS-SCNC: 9 MMOL/L (ref 4–13)
AST SERPL W P-5'-P-CCNC: 18 U/L (ref 5–45)
BASOPHILS # BLD AUTO: 0.04 THOUSANDS/ΜL (ref 0–0.1)
BASOPHILS NFR BLD AUTO: 1 % (ref 0–1)
BILIRUB SERPL-MCNC: 0.68 MG/DL (ref 0.2–1)
BILIRUB UR QL STRIP: NEGATIVE
BUN SERPL-MCNC: 19 MG/DL (ref 5–25)
CALCIUM SERPL-MCNC: 9.3 MG/DL (ref 8.3–10.1)
CHLORIDE SERPL-SCNC: 102 MMOL/L (ref 100–108)
CLARITY UR: CLEAR
CO2 SERPL-SCNC: 28 MMOL/L (ref 21–32)
COLOR UR: YELLOW
CREAT SERPL-MCNC: 1.12 MG/DL (ref 0.6–1.3)
EOSINOPHIL # BLD AUTO: 0.16 THOUSAND/ΜL (ref 0–0.61)
EOSINOPHIL NFR BLD AUTO: 3 % (ref 0–6)
ERYTHROCYTE [DISTWIDTH] IN BLOOD BY AUTOMATED COUNT: 14.4 % (ref 11.6–15.1)
GFR SERPL CREATININE-BSD FRML MDRD: 71 ML/MIN/1.73SQ M
GLUCOSE P FAST SERPL-MCNC: 137 MG/DL (ref 65–99)
GLUCOSE UR STRIP-MCNC: NEGATIVE MG/DL
HCT VFR BLD AUTO: 54.4 % (ref 36.5–49.3)
HGB BLD-MCNC: 17.9 G/DL (ref 12–17)
HGB UR QL STRIP.AUTO: NEGATIVE
IMM GRANULOCYTES # BLD AUTO: 0.02 THOUSAND/UL (ref 0–0.2)
IMM GRANULOCYTES NFR BLD AUTO: 0 % (ref 0–2)
KETONES UR STRIP-MCNC: ABNORMAL MG/DL
LEUKOCYTE ESTERASE UR QL STRIP: NEGATIVE
LYMPHOCYTES # BLD AUTO: 0.91 THOUSANDS/ΜL (ref 0.6–4.47)
LYMPHOCYTES NFR BLD AUTO: 16 % (ref 14–44)
MCH RBC QN AUTO: 29.1 PG (ref 26.8–34.3)
MCHC RBC AUTO-ENTMCNC: 32.9 G/DL (ref 31.4–37.4)
MCV RBC AUTO: 88 FL (ref 82–98)
MONOCYTES # BLD AUTO: 0.72 THOUSAND/ΜL (ref 0.17–1.22)
MONOCYTES NFR BLD AUTO: 13 % (ref 4–12)
NEUTROPHILS # BLD AUTO: 3.7 THOUSANDS/ΜL (ref 1.85–7.62)
NEUTS SEG NFR BLD AUTO: 67 % (ref 43–75)
NITRITE UR QL STRIP: NEGATIVE
NRBC BLD AUTO-RTO: 0 /100 WBCS
PH UR STRIP.AUTO: 5.5 [PH]
PLATELET # BLD AUTO: 232 THOUSANDS/UL (ref 149–390)
PMV BLD AUTO: 9.6 FL (ref 8.9–12.7)
POTASSIUM SERPL-SCNC: 3.9 MMOL/L (ref 3.5–5.3)
PROT SERPL-MCNC: 8.3 G/DL (ref 6.4–8.2)
PROT UR STRIP-MCNC: NEGATIVE MG/DL
RBC # BLD AUTO: 6.16 MILLION/UL (ref 3.88–5.62)
RPR SER QL: NORMAL
SODIUM SERPL-SCNC: 139 MMOL/L (ref 136–145)
SP GR UR STRIP.AUTO: 1.02 (ref 1–1.03)
UROBILINOGEN UR QL STRIP.AUTO: 0.2 E.U./DL
WBC # BLD AUTO: 5.55 THOUSAND/UL (ref 4.31–10.16)

## 2021-03-15 PROCEDURE — 85025 COMPLETE CBC W/AUTO DIFF WBC: CPT

## 2021-03-15 PROCEDURE — 87491 CHLMYD TRACH DNA AMP PROBE: CPT

## 2021-03-15 PROCEDURE — 87591 N.GONORRHOEAE DNA AMP PROB: CPT

## 2021-03-15 PROCEDURE — 36415 COLL VENOUS BLD VENIPUNCTURE: CPT

## 2021-03-15 PROCEDURE — 80053 COMPREHEN METABOLIC PANEL: CPT

## 2021-03-15 PROCEDURE — 86592 SYPHILIS TEST NON-TREP QUAL: CPT

## 2021-03-15 PROCEDURE — 86360 T CELL ABSOLUTE COUNT/RATIO: CPT

## 2021-03-15 PROCEDURE — 81003 URINALYSIS AUTO W/O SCOPE: CPT

## 2021-03-15 PROCEDURE — 87536 HIV-1 QUANT&REVRSE TRNSCRPJ: CPT

## 2021-03-16 LAB
BASOPHILS # BLD AUTO: 0.1 X10E3/UL (ref 0–0.2)
BASOPHILS NFR BLD AUTO: 1 %
C TRACH DNA SPEC QL NAA+PROBE: NEGATIVE
CD3+CD4+ CELLS # BLD: 139 /UL (ref 359–1519)
CD3+CD4+ CELLS NFR BLD: 15.4 % (ref 30.8–58.5)
CD3+CD4+ CELLS/CD3+CD8+ CLL BLD: 0.26 % (ref 0.92–3.72)
CD3+CD8+ CELLS # BLD: 527 /UL (ref 109–897)
CD3+CD8+ CELLS NFR BLD: 58.6 % (ref 12–35.5)
EOSINOPHIL # BLD AUTO: 0.1 X10E3/UL (ref 0–0.4)
EOSINOPHIL NFR BLD AUTO: 2 %
ERYTHROCYTE [DISTWIDTH] IN BLOOD BY AUTOMATED COUNT: 14 % (ref 11.6–15.4)
HCT VFR BLD AUTO: 51.9 % (ref 37.5–51)
HGB BLD-MCNC: 18.1 G/DL (ref 13–17.7)
IMM GRANULOCYTES # BLD: 0 X10E3/UL (ref 0–0.1)
IMM GRANULOCYTES NFR BLD: 1 %
LYMPHOCYTES # BLD AUTO: 0.9 X10E3/UL (ref 0.7–3.1)
LYMPHOCYTES NFR BLD AUTO: 16 %
MCH RBC QN AUTO: 29.6 PG (ref 26.6–33)
MCHC RBC AUTO-ENTMCNC: 34.9 G/DL (ref 31.5–35.7)
MCV RBC AUTO: 85 FL (ref 79–97)
MONOCYTES # BLD AUTO: 0.7 X10E3/UL (ref 0.1–0.9)
MONOCYTES NFR BLD AUTO: 13 %
N GONORRHOEA DNA SPEC QL NAA+PROBE: NEGATIVE
NEUTROPHILS # BLD AUTO: 3.8 X10E3/UL (ref 1.4–7)
NEUTROPHILS NFR BLD AUTO: 67 %
PLATELET # BLD AUTO: 234 X10E3/UL (ref 150–450)
RBC # BLD AUTO: 6.11 X10E6/UL (ref 4.14–5.8)
WBC # BLD AUTO: 5.6 X10E3/UL (ref 3.4–10.8)

## 2021-03-17 LAB
HIV1 RNA # SERPL NAA+PROBE: <20 COPIES/ML
HIV1 RNA SERPL NAA+PROBE-LOG#: NORMAL LOG10COPY/ML

## 2021-03-22 ENCOUNTER — PATIENT OUTREACH (OUTPATIENT)
Dept: SURGERY | Facility: OTHER | Age: 61
End: 2021-03-22

## 2021-03-22 DIAGNOSIS — B20 HIV (HUMAN IMMUNODEFICIENCY VIRUS INFECTION) (HCC): ICD-10-CM

## 2021-03-22 DIAGNOSIS — I10 ESSENTIAL HYPERTENSION: ICD-10-CM

## 2021-03-22 RX ORDER — EMTRICITABINE AND TENOFOVIR ALAFENAMIDE 200; 25 MG/1; MG/1
TABLET ORAL
Qty: 30 TABLET | Refills: 5 | Status: SHIPPED | OUTPATIENT
Start: 2021-03-22 | End: 2021-05-18 | Stop reason: SDUPTHER

## 2021-03-22 RX ORDER — LOSARTAN POTASSIUM AND HYDROCHLOROTHIAZIDE 25; 100 MG/1; MG/1
1 TABLET ORAL DAILY
Qty: 30 TABLET | Refills: 2 | Status: SHIPPED | OUTPATIENT
Start: 2021-03-22 | End: 2021-05-18 | Stop reason: SDUPTHER

## 2021-03-22 RX ORDER — DOLUTEGRAVIR SODIUM 50 MG/1
TABLET, FILM COATED ORAL
Qty: 60 TABLET | Refills: 5 | Status: SHIPPED | OUTPATIENT
Start: 2021-03-22 | End: 2021-05-18 | Stop reason: SDUPTHER

## 2021-03-22 NOTE — PROGRESS NOTES
CT# [id number] [facility]    Cm received ct's MAWD premiums via e-mail  Cm confirmed with ct having received the premiums and will work on them as soon as cm returns to the office

## 2021-03-23 ENCOUNTER — OFFICE VISIT (OUTPATIENT)
Dept: SURGERY | Facility: CLINIC | Age: 61
End: 2021-03-23
Payer: COMMERCIAL

## 2021-03-23 ENCOUNTER — PATIENT OUTREACH (OUTPATIENT)
Dept: SURGERY | Facility: OTHER | Age: 61
End: 2021-03-23

## 2021-03-23 VITALS
HEART RATE: 91 BPM | BODY MASS INDEX: 32.21 KG/M2 | WEIGHT: 205.2 LBS | OXYGEN SATURATION: 95 % | SYSTOLIC BLOOD PRESSURE: 128 MMHG | DIASTOLIC BLOOD PRESSURE: 78 MMHG | HEIGHT: 67 IN | TEMPERATURE: 98.8 F

## 2021-03-23 DIAGNOSIS — Z13.220 ENCOUNTER FOR LIPID SCREENING FOR CARDIOVASCULAR DISEASE: ICD-10-CM

## 2021-03-23 DIAGNOSIS — K74.60 CIRRHOSIS OF LIVER WITHOUT ASCITES, UNSPECIFIED HEPATIC CIRRHOSIS TYPE (HCC): ICD-10-CM

## 2021-03-23 DIAGNOSIS — R91.1 LUNG NODULE: ICD-10-CM

## 2021-03-23 DIAGNOSIS — K74.69 OTHER CIRRHOSIS OF LIVER (HCC): ICD-10-CM

## 2021-03-23 DIAGNOSIS — B20 HIV DISEASE (HCC): Primary | ICD-10-CM

## 2021-03-23 DIAGNOSIS — Z79.899 OTHER LONG TERM (CURRENT) DRUG THERAPY: ICD-10-CM

## 2021-03-23 DIAGNOSIS — I10 ESSENTIAL HYPERTENSION: ICD-10-CM

## 2021-03-23 DIAGNOSIS — D75.1 POLYCYTHEMIA: ICD-10-CM

## 2021-03-23 DIAGNOSIS — T65.292D TOXIC EFFECT OF TOBACCO, INTENTIONAL SELF-HARM, SUBSEQUENT ENCOUNTER: ICD-10-CM

## 2021-03-23 DIAGNOSIS — Z13.6 ENCOUNTER FOR LIPID SCREENING FOR CARDIOVASCULAR DISEASE: ICD-10-CM

## 2021-03-23 DIAGNOSIS — Z13.1 SCREENING FOR DIABETES MELLITUS: ICD-10-CM

## 2021-03-23 PROCEDURE — 99215 OFFICE O/P EST HI 40 MIN: CPT | Performed by: INTERNAL MEDICINE

## 2021-03-23 PROCEDURE — 3008F BODY MASS INDEX DOCD: CPT | Performed by: INTERNAL MEDICINE

## 2021-03-23 PROCEDURE — 4004F PT TOBACCO SCREEN RCVD TLK: CPT | Performed by: INTERNAL MEDICINE

## 2021-03-23 NOTE — PROGRESS NOTES
CT MROR    As per the [facility] the ct's CD4 count has dropped from 200 to 139 and the ct needs to get some test done

## 2021-03-23 NOTE — PROGRESS NOTES
Progress Note - Infectious Disease   Ruth Sellers 61 y o  male MRN: [id number]  Unit/Bed#:  Encounter: [id number]      Impression/Plan:  1   HIV-improved adherence with ART with a near undetectable viral load  but his CD4 count has dropped into the 100s  Will continue the Tivicay, Descovy, Prezcobix  Recheck labs in 2 months and follow up in 3 months  Stressed adherence       2  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  Right upper quadrant ultrasound for screening was negative and the alpha fetoprotein negative on the last check   Continue Q 6 month hepatocellular carcinoma screening      3   Polycythemia-likely secondary to the nicotine dependence   Seen by Hematology Oncology who agree with my assessment   Will continue to monitor the CBC with diff now that he is no longer smoking     4   Hypertension-asymptomatic and stable and reasonably controlled   Continue monitor     5  Coronary artery disease-complicated by acute MI status post PCI   Now asymptomatic   Follow up with Cardiology      6  Nicotine dependence-patient continues to smoke occasionally but has decreased his tobacco use  He will continue to try to completely quit     7  Lung nodule-repeat CT scan stay  Patient was provided medication, adherence and prevention education    Subjective:  Routine follow-up for HIV  Patient claims 100% adherence with Prezcobix Tivicay and Descovy    Patient denies any notable side effects  Overall the feeling well  The patient denies any fever chills or sweats, denies any nausea vomiting or diarrhea, denies any cough or shortness of breath  ROS:  A complete review of systems is negative other than that noted above in the subjective    Followup portions patient history reviewed and updated as:   Allergies, current medications, past medical history, past social history, past surgical history, and the problem list    Objective:  Vitals:  Vitals:    03/23/21 1609   BP: 128/78 Pulse: 91   Temp: 98 8 °F (37 1 °C)   SpO2: 95%   Weight: 93 1 kg (205 lb 3 2 oz)   Height: 5' 7" (1 702 m)       Physical Exam:   General Appearance:  Alert, interactive, appearing well,  nontoxic, no acute distress  Neck:   Supple without lymphadenopathy, no thyromegaly or masses   Throat: Oropharynx moist without lesions  Lungs:   Clear to auscultation bilaterally; no wheezes, rhonchi or rales; respirations unlabored   Heart:  RRR; no murmur, rub or gallop   Abdomen:   Soft, non-tender, non-distended, positive bowel sounds  Extremities: No clubbing, cyanosis or edema   Skin: No new rashes or lesions  No draining wounds noted         Labs, Imaging, & Other studies:   All pertinent labs and imaging studies were personally reviewed    Lab Results   Component Value Date     03/18/2017    K 3 9 03/15/2021     03/15/2021    CO2 28 03/15/2021    BUN 19 03/15/2021    CREATININE 1 12 03/15/2021    GLUF 137 (H) 03/15/2021    CALCIUM 9 3 03/15/2021    AST 18 03/15/2021    ALT 22 03/15/2021    ALKPHOS 89 03/15/2021    PROT 7 4 03/18/2017    BILITOT 1 1 03/18/2017    EGFR 71 03/15/2021     Lab Results   Component Value Date    WBC 5 55 03/15/2021    WBC 5 6 03/15/2021    HGB 17 9 (H) 03/15/2021    HGB 18 1 (H) 03/15/2021    HCT 54 4 (H) 03/15/2021    HCT 51 9 (H) 03/15/2021    MCV 88 03/15/2021    MCV 85 03/15/2021     03/15/2021     03/15/2021     No results found for: HEPCAB  Lab Results   Component Value Date    HEPBCAB REACTIVE (A) 10/08/2015    HEPBCAB REACTIVE (A) 10/08/2015    HEPBCAB REACTIVE (A) 10/08/2015    HEPBCAB REACTIVE (A) 10/08/2015     Lab Results   Component Value Date    RPR Non-Reactive 03/15/2021     CD4 ABS   Date/Time Value Ref Range Status   03/15/2021 07:13  (L) 359 - 1519 /uL Final     HIV-1 RNA by PCR, Qn   Date/Time Value Ref Range Status   03/15/2021 07:13 AM <20 copies/mL Final     Comment:     HIV-1 RNA not detected  The reportable range for this assay is 20 to 10,000,000  copies HIV-1 RNA/mL             Current Outpatient Medications:     albuterol (VENTOLIN HFA) 90 mcg/act inhaler, Inhale 2 puffs every 6 (six) hours as needed for wheezing, Disp: 18 g, Rfl: 0    aspirin (ECOTRIN LOW STRENGTH) 81 mg EC tablet, Take 1 tablet (81 mg total) by mouth daily, Disp: 30 tablet, Rfl: 3    cetirizine (ZyrTEC) 10 mg tablet, TAKE ONE TABLET BY MOUTH EVERY DAY, Disp: 30 tablet, Rfl: 2    clopidogrel (PLAVIX) 75 mg tablet, TAKE 1 TABLET BY MOUTH DAILY, Disp: 30 tablet, Rfl: 5    Descovy 200-25 MG tablet, TAKE 1 TABLET BY MOUTH DAILY, Disp: 30 tablet, Rfl: 5    dicyclomine (BENTYL) 10 mg capsule, Take 1 capsule (10 mg total) by mouth as needed (for diarrehea), Disp: 60 capsule, Rfl: 0    isosorbide mononitrate (IMDUR) 30 mg 24 hr tablet, Take 1 tablet (30 mg total) by mouth daily, Disp: 90 tablet, Rfl: 3    losartan-hydrochlorothiazide (HYZAAR) 100-25 MG per tablet, TAKE 1 TABLET BY MOUTH DAILY, Disp: 30 tablet, Rfl: 2    metoprolol succinate (TOPROL-XL) 25 mg 24 hr tablet, Take 1 tablet (25 mg total) by mouth daily, Disp: 30 tablet, Rfl: 2    mometasone (NASONEX) 50 mcg/act nasal spray, 2 sprays into each nostril daily, Disp: 17 g, Rfl: 2    nicotine (NICODERM CQ) 21 mg/24 hr TD 24 hr patch, PLACE 1 PATCH ON THE SKIN EVERY 24 HOURS, Disp: 28 patch, Rfl: 2    nitroglycerin (NITROSTAT) 0 4 mg SL tablet, Place 1 tablet (0 4 mg total) under the tongue every 5 (five) minutes as needed for chest pain, Disp: 15 tablet, Rfl: 0    pantoprazole (PROTONIX) 40 mg tablet, Take 1 tablet (40 mg total) by mouth 2 (two) times a day, Disp: 60 tablet, Rfl: 1    Prezcobix 800-150 MG TABS, TAKE 1 TABLET BY MOUTH DAILY, Disp: 30 tablet, Rfl: 5    psyllium (METAMUCIL) 58 6 % packet, Take 1 packet by mouth daily, Disp: 60 packet, Rfl: 1    rosuvastatin (CRESTOR) 20 MG tablet, TAKE 1 TABLET BY MOUTH DAILY, Disp: 30 tablet, Rfl: 5    Tivicay 50 MG TABS, TAKE 1 TABLET BY MOUTH TWICE A DAY, Disp: 60 tablet, Rfl: 5

## 2021-03-24 ENCOUNTER — TELEPHONE (OUTPATIENT)
Dept: SURGERY | Facility: CLINIC | Age: 61
End: 2021-03-24

## 2021-03-24 NOTE — TELEPHONE ENCOUNTER
Assessment    Annual Nutrition     Clinical Data/Client History    HIV: Yes  AIDS: No    : Danielle Stovall    Socio- Economic Status: [employer]    Living Situation: House and lives with wife    Food Prep/Access: Refrigerator, Stove, Microwave and Running Water    Psycho Social Factors: Not Assessed    Functional Status: Ambulatory, Able to [employer] and Prepared Own Meals    Activity Level: On his feet most of the day as well as heavily lifting as a part of his job    Ambulation Difficulty with N/A    Oral Problems: N/A    Last Dental Exam: Last year    Procedures Performed: Follow-up on a previous procedure    Typical Food/Beverage Intake:    · Breakfast Buttered roll, Persian, and coffee (purchased outside of the house)-weekdays, newman and eggs weekends  · Lunch Deli sandwich and soup, side of fruit  · Dinner Meatloaf/chicken, starch-usually pasta, salad or vegetable    Does not like fish other than shrimp or tunafish salad heavy on the pacheco  · Snacks After dinner-"limitless", "nothing healthy"-cupcakes, cheese curls, chips  · Fluids Water-3-4 cups, coffee, sometimes orange juice    Appetite: Excellent    Supplements: Rolaids    Food Intolerance: Milk and icecream upsets stomach but can tolerate yogurt and cheese     Usual Body Weight: 178# as per pt recall    Current Body Weight: 205#     Nutrition Diagnosis    Problem: Altered nutrition related laboratory values    Related to: Diet high in processed and high fat foods    As Evidenced By: Patient Interview, Dietary Recall and Abnormal Lab Results -lipid panel 03/2021    Intervention Diet Prescription    Nutritional needs based on: Actual body wt    · 9424-5166 kcal  22-25 kcal/kg minue 250 kcal for 1/2# wt loss/wk  · 75g protein  · 8208-3978 mL fluid  1 mL/kcal  · 2300 mg Na  American Heart Association    Current intake: Exceeds estimated energy needs    Snack/Supplement Recommendations: Limit snacking at night as well as choices for night snacks, work a healthier snack into the time between lunch and dinner and/or breakfast and lunch    Nutrition Recommendations: Reduce processed snack foods, incorporate more fruits and vegetables, increase water intake    Goals: Keep at least 4 bottles of water in the car on weekdays    Nutrition Education Intervention: Provided     Person Educated: Patient    Topics Discussed: Healthy snacking, reducing fat intake, increasing water intake, MyPlate, portions    Teaching Method: Verbal    Readiness to Change: Preparation:  (Getting ready to change)     Visit Summary    Pt's main concern is the chest pain he has-a few times a day but can be daily or every few days  Has an appt coming up with cardiologist to further discuss  Recent endoscopy showed normal results  Reviewed results of lipid panel from 03/16/2021  Discussed how some dietary choices and habits may be contributing to the higher values such as snack choices, weekday breakfast choices  Pt does not feel he is ready to change breakfast habits at this time but is willing to consider changing some of the night snacks or reducing the amount eaten-he isn't sure which one yet  He feels the time he snacks at night is his downtime after a lot workday  Pt does a lot of heavy lifting during the day but also spends about 4 hours or more in the car driving to different locations  Suggested keeping healthy snacks in the car-whole grain crackers, apples, bags of grapes, mandarine oranges, low-sodium popcorn  Also reviewed the importance of water for health as well as managing appetite  Reviewed how increasing healthy snacks during the day and upping water intake may help to reduce his appetite later on in the evening-Pt receptive    Pt stopped taking the nitros prescribed by his provider for the chest pain because it gave him a severe headache the entire time he took it (3-4 days), will follow up with cardiologist   States rolaids relieve the pain but he is consuming large amounts of them to manage the symptoms  Pt plans to discuss with provider other tips he has heard to help reduce the chest pain if it is related to reflux-apple cider vinegar or baking soda mixed with water    Patience Jackson RD,LDN,CHC

## 2021-03-26 ENCOUNTER — PATIENT OUTREACH (OUTPATIENT)
Dept: SURGERY | Facility: OTHER | Age: 61
End: 2021-03-26

## 2021-03-26 DIAGNOSIS — K21.9 GASTROESOPHAGEAL REFLUX DISEASE WITHOUT ESOPHAGITIS: ICD-10-CM

## 2021-03-26 RX ORDER — PANTOPRAZOLE SODIUM 40 MG/1
TABLET, DELAYED RELEASE ORAL
Qty: 30 TABLET | Refills: 2 | Status: SHIPPED | OUTPATIENT
Start: 2021-03-26 | End: 2021-04-23

## 2021-03-26 NOTE — PROGRESS NOTES
CM MROR    Akash prepared and emailed to [employer] an [facility] req with ct's rct docs, income and March 2021 MAWD premiums  Yves Tejada approved the auth and will send to cm via fax once she is back in the office next week  Cm will then prepare a check req and email to supervisor for further processing  Cm will scan in the entire packet once it is complete

## 2021-03-29 ENCOUNTER — PATIENT OUTREACH (OUTPATIENT)
Dept: SURGERY | Facility: OTHER | Age: 61
End: 2021-03-29

## 2021-03-29 ENCOUNTER — IMMUNIZATIONS (OUTPATIENT)
Dept: FAMILY MEDICINE CLINIC | Facility: HOSPITAL | Age: 61
End: 2021-03-29

## 2021-03-29 DIAGNOSIS — Z23 ENCOUNTER FOR IMMUNIZATION: Primary | ICD-10-CM

## 2021-03-29 PROCEDURE — 0001A SARS-COV-2 / COVID-19 MRNA VACCINE (PFIZER-BIONTECH) 30 MCG: CPT

## 2021-03-29 PROCEDURE — 91300 SARS-COV-2 / COVID-19 MRNA VACCINE (PFIZER-BIONTECH) 30 MCG: CPT

## 2021-03-29 NOTE — PROGRESS NOTES
CT MROR    Cm received ct's approved auth  Cm will scan in once the auth is received from Mariano Duque via fax  Cm will also scan in once the check req is signed and processed by supervisor

## 2021-03-30 ENCOUNTER — PATIENT OUTREACH (OUTPATIENT)
Dept: SURGERY | Facility: OTHER | Age: 61
End: 2021-03-30

## 2021-03-30 NOTE — PROGRESS NOTES
CT MROR    Cm received via fax ct's approved Serena Pi for the next six months  Cm worked on ct's March MA check req, scanned and e-mailed to supervisor for further processing and will scan in to [facility] once cm receives the signed check req to complete the packet  [facility]  Please see scanned media for complete March 2021 MAWD packet

## 2021-04-10 DIAGNOSIS — J30.2 SEASONAL ALLERGIES: ICD-10-CM

## 2021-04-12 RX ORDER — CETIRIZINE HYDROCHLORIDE 10 MG/1
TABLET ORAL
Qty: 30 TABLET | Refills: 2 | Status: SHIPPED | OUTPATIENT
Start: 2021-04-12 | End: 2021-05-18 | Stop reason: SDUPTHER

## 2021-04-19 ENCOUNTER — PATIENT OUTREACH (OUTPATIENT)
Dept: SURGERY | Facility: OTHER | Age: 61
End: 2021-04-19

## 2021-04-19 NOTE — PROGRESS NOTES
CT MROR    Cm received ct's April MAWD premiums via e-mail  Cm worked on and prepared the April MAWD check req  Cm e-mailed the check req  to Supervisor for signatures and to further process the payment  Cm received the signed check req  And has scanned in the April MAWD packet  Please see scanned media

## 2021-04-20 DIAGNOSIS — I10 ESSENTIAL HYPERTENSION: ICD-10-CM

## 2021-04-20 DIAGNOSIS — E78.2 MIXED HYPERLIPIDEMIA: ICD-10-CM

## 2021-04-20 DIAGNOSIS — B20 HIV DISEASE (HCC): ICD-10-CM

## 2021-04-21 RX ORDER — DARUNAVIR ETHANOLATE AND COBICISTAT 800; 150 MG/1; MG/1
TABLET, FILM COATED ORAL
Qty: 30 TABLET | Refills: 5 | Status: SHIPPED | OUTPATIENT
Start: 2021-04-21 | End: 2021-05-18 | Stop reason: SDUPTHER

## 2021-04-21 RX ORDER — METOPROLOL SUCCINATE 25 MG/1
TABLET, EXTENDED RELEASE ORAL
Qty: 30 TABLET | Refills: 2 | Status: SHIPPED | OUTPATIENT
Start: 2021-04-21 | End: 2021-05-18 | Stop reason: SDUPTHER

## 2021-04-21 RX ORDER — ROSUVASTATIN CALCIUM 20 MG/1
TABLET, COATED ORAL
Qty: 30 TABLET | Refills: 5 | Status: SHIPPED | OUTPATIENT
Start: 2021-04-21 | End: 2021-05-18 | Stop reason: SDUPTHER

## 2021-04-22 ENCOUNTER — TELEPHONE (OUTPATIENT)
Dept: SURGERY | Facility: CLINIC | Age: 61
End: 2021-04-22

## 2021-04-22 ENCOUNTER — IMMUNIZATIONS (OUTPATIENT)
Dept: FAMILY MEDICINE CLINIC | Facility: HOSPITAL | Age: 61
End: 2021-04-22

## 2021-04-22 DIAGNOSIS — K21.9 GASTROESOPHAGEAL REFLUX DISEASE WITHOUT ESOPHAGITIS: ICD-10-CM

## 2021-04-22 DIAGNOSIS — Z23 ENCOUNTER FOR IMMUNIZATION: Primary | ICD-10-CM

## 2021-04-22 PROCEDURE — 91300 SARS-COV-2 / COVID-19 MRNA VACCINE (PFIZER-BIONTECH) 30 MCG: CPT | Performed by: INTERNAL MEDICINE

## 2021-04-22 PROCEDURE — 0002A SARS-COV-2 / COVID-19 MRNA VACCINE (PFIZER-BIONTECH) 30 MCG: CPT | Performed by: INTERNAL MEDICINE

## 2021-04-23 DIAGNOSIS — K21.9 GASTROESOPHAGEAL REFLUX DISEASE WITHOUT ESOPHAGITIS: ICD-10-CM

## 2021-04-23 RX ORDER — PANTOPRAZOLE SODIUM 40 MG/1
40 TABLET, DELAYED RELEASE ORAL 2 TIMES DAILY
Qty: 60 TABLET | Refills: 2 | Status: SHIPPED | OUTPATIENT
Start: 2021-04-23 | End: 2021-04-23 | Stop reason: HOSPADM

## 2021-04-23 RX ORDER — PANTOPRAZOLE SODIUM 40 MG/1
40 TABLET, DELAYED RELEASE ORAL 2 TIMES DAILY
Qty: 60 TABLET | Refills: 2 | Status: SHIPPED | OUTPATIENT
Start: 2021-04-23 | End: 2021-05-18 | Stop reason: SDUPTHER

## 2021-04-23 NOTE — TELEPHONE ENCOUNTER
Sent to [facility]   Accidentally sent to [facility]  Please call [facility] and tell them not to fill

## 2021-05-14 DIAGNOSIS — I21.4 NSTEMI (NON-ST ELEVATED MYOCARDIAL INFARCTION) (HCC): ICD-10-CM

## 2021-05-14 RX ORDER — CLOPIDOGREL BISULFATE 75 MG/1
TABLET ORAL
Qty: 30 TABLET | Refills: 5 | Status: SHIPPED | OUTPATIENT
Start: 2021-05-14 | End: 2021-05-18 | Stop reason: SDUPTHER

## 2021-05-17 ENCOUNTER — PATIENT OUTREACH (OUTPATIENT)
Dept: SURGERY | Facility: OTHER | Age: 61
End: 2021-05-17

## 2021-05-17 NOTE — PROGRESS NOTES
CT MROR    [facility] cm reached out to ct to remind ct of his upcoming appointment scheduled for 5/18/21 at 4pm

## 2021-05-18 ENCOUNTER — OFFICE VISIT (OUTPATIENT)
Dept: SURGERY | Facility: CLINIC | Age: 61
End: 2021-05-18
Payer: COMMERCIAL

## 2021-05-18 VITALS
HEART RATE: 83 BPM | WEIGHT: 202.4 LBS | BODY MASS INDEX: 31.77 KG/M2 | SYSTOLIC BLOOD PRESSURE: 120 MMHG | TEMPERATURE: 99.1 F | DIASTOLIC BLOOD PRESSURE: 70 MMHG | HEIGHT: 67 IN | OXYGEN SATURATION: 95 %

## 2021-05-18 DIAGNOSIS — B20 HIV DISEASE (HCC): ICD-10-CM

## 2021-05-18 DIAGNOSIS — J30.2 SEASONAL ALLERGIES: ICD-10-CM

## 2021-05-18 DIAGNOSIS — I25.2 HISTORY OF NON-ST ELEVATION MYOCARDIAL INFARCTION (NSTEMI): ICD-10-CM

## 2021-05-18 DIAGNOSIS — B20 HIV (HUMAN IMMUNODEFICIENCY VIRUS INFECTION) (HCC): ICD-10-CM

## 2021-05-18 DIAGNOSIS — I25.10 CORONARY ARTERY DISEASE INVOLVING NATIVE CORONARY ARTERY OF NATIVE HEART WITHOUT ANGINA PECTORIS: ICD-10-CM

## 2021-05-18 DIAGNOSIS — E78.2 MIXED HYPERLIPIDEMIA: ICD-10-CM

## 2021-05-18 DIAGNOSIS — I21.4 NSTEMI (NON-ST ELEVATED MYOCARDIAL INFARCTION) (HCC): ICD-10-CM

## 2021-05-18 DIAGNOSIS — E78.5 DYSLIPIDEMIA: ICD-10-CM

## 2021-05-18 DIAGNOSIS — I10 ESSENTIAL HYPERTENSION: Primary | ICD-10-CM

## 2021-05-18 DIAGNOSIS — R07.89 ATYPICAL CHEST PAIN: ICD-10-CM

## 2021-05-18 DIAGNOSIS — Z00.00 ANNUAL PHYSICAL EXAM: ICD-10-CM

## 2021-05-18 DIAGNOSIS — T65.292D TOXIC EFFECT OF TOBACCO, INTENTIONAL SELF-HARM, SUBSEQUENT ENCOUNTER: ICD-10-CM

## 2021-05-18 DIAGNOSIS — K21.9 GASTROESOPHAGEAL REFLUX DISEASE WITHOUT ESOPHAGITIS: ICD-10-CM

## 2021-05-18 PROCEDURE — 99396 PREV VISIT EST AGE 40-64: CPT | Performed by: NURSE PRACTITIONER

## 2021-05-18 PROCEDURE — 3008F BODY MASS INDEX DOCD: CPT | Performed by: NURSE PRACTITIONER

## 2021-05-18 PROCEDURE — 4004F PT TOBACCO SCREEN RCVD TLK: CPT | Performed by: NURSE PRACTITIONER

## 2021-05-18 PROCEDURE — 3725F SCREEN DEPRESSION PERFORMED: CPT | Performed by: NURSE PRACTITIONER

## 2021-05-18 PROCEDURE — 3078F DIAST BP <80 MM HG: CPT | Performed by: NURSE PRACTITIONER

## 2021-05-18 PROCEDURE — 3074F SYST BP LT 130 MM HG: CPT | Performed by: NURSE PRACTITIONER

## 2021-05-18 RX ORDER — EMTRICITABINE AND TENOFOVIR ALAFENAMIDE 200; 25 MG/1; MG/1
1 TABLET ORAL DAILY
Qty: 30 TABLET | Refills: 5 | Status: SHIPPED | OUTPATIENT
Start: 2021-05-18 | End: 2021-08-17 | Stop reason: SDUPTHER

## 2021-05-18 RX ORDER — LOSARTAN POTASSIUM AND HYDROCHLOROTHIAZIDE 25; 100 MG/1; MG/1
1 TABLET ORAL DAILY
Qty: 30 TABLET | Refills: 2 | Status: SHIPPED | OUTPATIENT
Start: 2021-05-18 | End: 2021-08-17 | Stop reason: SDUPTHER

## 2021-05-18 RX ORDER — PANTOPRAZOLE SODIUM 40 MG/1
40 TABLET, DELAYED RELEASE ORAL 2 TIMES DAILY
Qty: 60 TABLET | Refills: 5 | Status: SHIPPED | OUTPATIENT
Start: 2021-05-18 | End: 2021-08-17 | Stop reason: SDUPTHER

## 2021-05-18 RX ORDER — ASPIRIN 81 MG/1
81 TABLET ORAL DAILY
Qty: 30 TABLET | Refills: 5 | Status: SHIPPED | OUTPATIENT
Start: 2021-05-18 | End: 2021-08-17 | Stop reason: SDUPTHER

## 2021-05-18 RX ORDER — CLOPIDOGREL BISULFATE 75 MG/1
75 TABLET ORAL DAILY
Qty: 30 TABLET | Refills: 5 | Status: SHIPPED | OUTPATIENT
Start: 2021-05-18 | End: 2021-08-17 | Stop reason: SDUPTHER

## 2021-05-18 RX ORDER — DARUNAVIR ETHANOLATE AND COBICISTAT 800; 150 MG/1; MG/1
1 TABLET, FILM COATED ORAL DAILY
Qty: 30 TABLET | Refills: 5 | Status: SHIPPED | OUTPATIENT
Start: 2021-05-18 | End: 2021-08-17 | Stop reason: SDUPTHER

## 2021-05-18 RX ORDER — DOLUTEGRAVIR SODIUM 50 MG/1
50 TABLET, FILM COATED ORAL 2 TIMES DAILY
Qty: 60 TABLET | Refills: 5 | Status: SHIPPED | OUTPATIENT
Start: 2021-05-18 | End: 2021-08-17 | Stop reason: SDUPTHER

## 2021-05-18 RX ORDER — NICOTINE 21 MG/24HR
1 PATCH, TRANSDERMAL 24 HOURS TRANSDERMAL EVERY 24 HOURS
Qty: 28 PATCH | Refills: 2 | Status: SHIPPED | OUTPATIENT
Start: 2021-05-18 | End: 2021-11-16 | Stop reason: SDUPTHER

## 2021-05-18 RX ORDER — CETIRIZINE HYDROCHLORIDE 10 MG/1
10 TABLET ORAL DAILY
Qty: 30 TABLET | Refills: 5 | Status: SHIPPED | OUTPATIENT
Start: 2021-05-18 | End: 2021-08-17 | Stop reason: SDUPTHER

## 2021-05-18 RX ORDER — ROSUVASTATIN CALCIUM 20 MG/1
20 TABLET, COATED ORAL DAILY
Qty: 30 TABLET | Refills: 5 | Status: SHIPPED | OUTPATIENT
Start: 2021-05-18 | End: 2021-08-17 | Stop reason: SDUPTHER

## 2021-05-18 RX ORDER — METOPROLOL SUCCINATE 25 MG/1
25 TABLET, EXTENDED RELEASE ORAL DAILY
Qty: 30 TABLET | Refills: 5 | Status: SHIPPED | OUTPATIENT
Start: 2021-05-18 | End: 2021-08-17 | Stop reason: SDUPTHER

## 2021-05-18 NOTE — PROGRESS NOTES
Assessment/Plan:      Diagnoses and all orders for this visit:    Essential hypertension  -     losartan-hydrochlorothiazide (HYZAAR) 100-25 MG per tablet; Take 1 tablet by mouth daily  -     metoprolol succinate (TOPROL-XL) 25 mg 24 hr tablet; Take 1 tablet (25 mg total) by mouth daily    HIV disease ([facility] )  -     Darunavir-Cobicistat (Prezcobix) 800-150 MG TABS; Take 1 tablet by mouth daily    Toxic effect of tobacco, intentional self-harm, subsequent encounter  -     nicotine (NICODERM CQ) 21 mg/24 hr TD 24 hr patch; Place 1 patch on the skin every 24 hours    History of non-ST elevation myocardial infarction (NSTEMI)  -     aspirin (ECOTRIN LOW STRENGTH) 81 mg EC tablet; Take 1 tablet (81 mg total) by mouth daily    Coronary artery disease involving native coronary artery of native heart without angina pectoris  -     aspirin (ECOTRIN LOW STRENGTH) 81 mg EC tablet; Take 1 tablet (81 mg total) by mouth daily    Seasonal allergies  -     cetirizine (ZyrTEC) 10 mg tablet; Take 1 tablet (10 mg total) by mouth daily    NSTEMI (non-ST elevated myocardial infarction) ([facility])  -     clopidogrel (PLAVIX) 75 mg tablet; Take 1 tablet (75 mg total) by mouth daily    HIV (human immunodeficiency virus infection) ([place] [address] )  -     emtricitabine-tenofovir AF (Descovy) 200-25 MG tablet; Take 1 tablet by mouth daily  -     dolutegravir (Tivicay) 50 MG TABS; Take 1 tablet (50 mg total) by mouth 2 (two) times a day    Gastroesophageal reflux disease without esophagitis  -     pantoprazole (PROTONIX) 40 mg tablet; Take 1 tablet (40 mg total) by mouth 2 (two) times a day    Mixed hyperlipidemia  -     rosuvastatin (CRESTOR) 20 MG tablet; Take 1 tablet (20 mg total) by mouth daily    Annual physical exam          [address] continued maintenance of well-being  Discussion: Today patient presents with no mental health concerns   Patient grateful to still be working all through the [employer] pandemic and looking forward to a more complete return to normalcy including no masking and being able to smile at people  [facility] verbally shared in pt's sentiments  PHQ-9 screener completed  Score = 0    Smoking cessation: Pt wears the NRT patch and states it helps reduce craving  He still smokes cigars however has reduced significantly (5/wk vs ~15)  Pt still has the goal of complete cessation  [facility] explored pt's triggers/habits around smoking and provided suggestions and encouragement    Subjective:     Patient ID: Rojas Pineda is a 61 y o  male      HPI: The patient is being seen at the [facility] today for a routine behavioral health follow up     Objective:    Orientation    Person: Yes   Place: Yes   Time: Yes     Appearance     Well Developed: Yes   Healthy: Yes   Uncomfortable: No  Normal Body Odor: Yes   Grooming Unkempt: No  Poor Eye Contact: No    Mood and Affect    Appropriate: Yes   Euthymic: Yes   Angry: No  Anxious: No  Depressed: No    Harm to Self or Others: denied    Substance Abuse: none reported or observed

## 2021-05-18 NOTE — ASSESSMENT & PLAN NOTE
No results found for: [id number]  CD4 ABS   Date/Time Value Ref Range Status   03/15/2021 07:13  (L) 359 - 1519 /uL Final     HIV-1 RNA by PCR, Qn   Date/Time Value Ref Range Status   03/15/2021 07:13 AM <20 copies/mL Final     Comment:     HIV-1 RNA not detected  The reportable range for this assay is 20 to 10,000,000  copies HIV-1 RNA/mL  HIV-1 RNA Viral Load Log   Date/Time Value Ref Range Status   03/15/2021 07:13 AM COMMENT [id number]/mL Final     Comment:     Unable to calculate result since non-numeric result obtained for  component test        ART: Prezcobix, Tivicay, and Descovy        Denies side effects  Stressed the importance of adherence  Continue follow up with ID clinic  Reviewed most recent labs, including Cd4 and viral load  Discussed the risks and benefits of treatment options, instructions for management, importance of treatment adherence, and reduction of risk factor  Educated on possible  medication side effects  Counseled on routes of HIV transmission, including the risk of  infection  Emphasized that viral suppression is the best method to prevent HIV transmission  At this time pt denies the need for HIV testing of anyone in their life  Total encounter time was 45 minutes  Greater then 20 minutes were spent on counseling and patient education  Pt voices understanding and agreement with treatment plan

## 2021-05-18 NOTE — ASSESSMENT & PLAN NOTE
Blood pressure:   BP Readings from Last 3 Encounters:   05/18/21 120/70   03/23/21 128/78   03/02/21 140/88       Continue current antihypertensive  Educated on the following lifestyle modifications to lower BP and decrease cardiovascular risk factors  limit alcohol intake, reduce salt in diet, maintain a healthy weight, engage in 30 minutes of cardiovascular exercise daily, and not smoke

## 2021-05-18 NOTE — ASSESSMENT & PLAN NOTE
Counseled for greater than 15 minutes on the importance of smoking cessation  Advised to quit  Educated on the increased risk of heart and lung disease associated with smoking    Referred to [address] for enrollment in smoking cessation program

## 2021-05-18 NOTE — PROGRESS NOTES
CT MROR    Akash received ct's May MAWD alfie Bustos will prepare the check req and send to Supervisor to be further processed  Please see scanned media

## 2021-05-18 NOTE — PATIENT INSTRUCTIONS
Heart Healthy Diet   AMBULATORY CARE:   A heart healthy diet  is an eating plan low in unhealthy fats and sodium (salt)  The plan is high in healthy fats and fiber  A heart healthy diet helps improve your cholesterol levels and lowers your risk for heart disease and stroke  A dietitian will teach you how to read and understand food labels  Heart healthy diet guidelines to follow:   · Choose foods that contain healthy fats  ? Unsaturated fats  include monounsaturated and polyunsaturated fats  Unsaturated fat is found in foods such as soybean, canola, olive, corn, and safflower oils  It is also found in soft tub margarine that is made with liquid vegetable oil  ? Omega-3 fat  is found in certain fish, such as salmon, tuna, and trout, and in walnuts and flaxseed  Eat fish high in omega-3 fats at least 2 times a week  · Get 20 to 30 grams of fiber each day  Fruits, vegetables, whole-grain foods, and legumes (cooked beans) are good sources of fiber  · Limit or do not have unhealthy fats  ? Cholesterol  is found in animal foods, such as eggs and lobster, and in dairy products made from whole milk  Limit cholesterol to less than 200 mg each day  ? Saturated fat  is found in meats, such as newman and hamburger  It is also found in chicken or turkey skin, whole milk, and butter  Limit saturated fat to less than 7% of your total daily calories  ? Trans fat  is found in packaged foods, such as potato chips and cookies  It is also in hard margarine, some fried foods, and shortening  Do not eat foods that contain trans fats  · Limit sodium as directed  You may be told to limit sodium to 2,000 to 2,300 mg each day  Choose low-sodium or no-salt-added foods  Add little or no salt to food you prepare  Use herbs and spices in place of salt         Include the following in your heart healthy plan:  Ask your dietitian or healthcare provider how many servings to have from each of the following food groups:  · Grains:      ? Whole-wheat breads, cereals, and pastas, and brown rice    ? Low-fat, low-sodium crackers and chips    · Vegetables:      ? Broccoli, green beans, green peas, and spinach    ? Collards, kale, and lima beans    ? Carrots, sweet potatoes, tomatoes, and peppers    ? Canned vegetables with no salt added    · Fruits:      ? Bananas, peaches, pears, and pineapple    ? Grapes, raisins, and dates    ? Oranges, tangerines, grapefruit, orange juice, and grapefruit juice    ? Apricots, mangoes, melons, and papaya    ? Raspberries and strawberries    ? Canned fruit with no added sugar    · Low-fat dairy:      ? Nonfat (skim) milk, 1% milk, and low-fat almond, cashew, or soy milks fortified with calcium    ? Low-fat cheese, regular or frozen yogurt, and cottage cheese    · Meats and proteins:      ? Lean cuts of beef and pork (loin, leg, round), skinless chicken and turkey    ? Legumes, soy products, egg whites, or nuts    Limit or do not include the following in your heart healthy plan:   · Unhealthy fats and oils:      ? Whole or 2% milk, cream cheese, sour cream, or cheese    ? High-fat cuts of beef (T-bone steaks, ribs), chicken or turkey with skin, and organ meats such as liver    ? Butter, stick margarine, shortening, and cooking oils such as coconut or palm oil    · Foods and liquids high in sodium:      ? Packaged foods, such as frozen dinners, cookies, macaroni and cheese, and cereals with more than 300 mg of sodium per serving    ? Vegetables with added sodium, such as instant potatoes, vegetables with added sauces, or regular canned vegetables    ? Cured or smoked meats, such as hot dogs, newman, and sausage    ? High-sodium ketchup, barbecue sauce, salad dressing, pickles, olives, soy sauce, or miso    · Foods and liquids high in sugar:      ? Candy, cake, cookies, pies, or doughnuts    ? Soft drinks (soda), sports drinks, or sweetened tea    ?  Canned or dry mixes for cakes, soups, sauces, or gravies    Other healthy heart guidelines:   · Do not smoke  Nicotine and other chemicals in cigarettes and cigars can cause lung and heart damage  Ask your healthcare provider for information if you currently smoke and need help to quit  E-cigarettes or smokeless tobacco still contain nicotine  Talk to your healthcare provider before you use these products  · Limit or do not drink alcohol as directed  Alcohol can damage your heart and raise your blood pressure  Your healthcare provider may give you specific daily and weekly limits  The general recommended limit is 1 drink a day for women 21 or older and for men 72 or older  Do not have more than 3 drinks in a day or 7 in a week  The recommended limit is 2 drinks a day for men 24to 59years of age  Do not have more than 4 drinks in a day or 14 in a week  A drink of alcohol is 12 ounces of beer, 5 ounces of wine, or 1½ ounces of liquor  · Exercise regularly  Exercise can help you maintain a healthy weight and improve your blood pressure and cholesterol levels  Regular exercise can also decrease your risk for heart problems  Ask your healthcare provider about the best exercise plan for you  Do not start an exercise program without asking your healthcare provider  Follow up with your doctor or cardiologist as directed:  Write down your questions so you remember to ask them during your visits  © Copyright [address] Information is for End User's use only and may not be sold, redistributed or otherwise used for commercial purposes  All illustrations and images included in CareNotes® are the copyrighted property of [employer]  or [address] [place], [place] [address]   The above information is an  only  It is not intended as medical advice for individual conditions or treatments  Talk to your doctor, nurse or pharmacist before following any medical regimen to see if it is safe and effective for you      Wellness Visit for Adults   AMBULATORY CARE: A wellness visit  is when you see your healthcare provider to get screened for health problems  Your healthcare provider will also give you advice on how to stay healthy  Write down your questions so you remember to ask them  Ask your healthcare provider how often you should have a wellness visit  What happens at a wellness visit:  Your healthcare provider will ask about your health, and your family history of health problems  This includes high blood pressure, heart disease, and cancer  He or she will ask if you have symptoms that concern you, if you smoke, and about your mood  You may also be asked about your intake of medicines, supplements, food, and alcohol  Any of the following may be done:  · Your weight  will be checked  Your height may also be checked so your body mass index (BMI) can be calculated  Your BMI shows if you are at a healthy weight  · Your blood pressure  and heart rate will be checked  Your temperature may also be checked  · Blood and urine tests  may be done  Blood tests may be done to check your cholesterol levels  Abnormal cholesterol levels increase your risk for heart disease and stroke  You may also need a blood or urine test to check for diabetes if you are at increased risk  Urine tests may be done to look for signs of an infection or kidney disease  · A physical exam  includes checking your heartbeat and lungs with a stethoscope  Your healthcare provider may also check your skin to look for sun damage  · Screening tests  may be recommended  A screening test is done to check for diseases that may not cause symptoms  The screening tests you may need depend on your age, gender, family history, and lifestyle habits  For example, colorectal screening may be recommended if you are 48years old or older  Screening tests you need if you are a woman:   · A Pap smear  is used to screen for cervical cancer  Pap smears are usually done every 3 to 5 years depending on your age  You may need them more often if you have had abnormal Pap smear test results in the past  Ask your healthcare provider how often you should have a Pap smear  · A mammogram  is an x-ray of your breasts to screen for breast cancer  Experts recommend mammograms every 2 years starting at age 48 years  You may need a mammogram at age 52 years or younger if you have an increased risk for breast cancer  Talk to your healthcare provider about when you should start having mammograms and how often you need them  Vaccines you may need:   · Get an influenza vaccine  every year  The influenza vaccine protects you from the flu  Several types of viruses cause the flu  The viruses change over time, so new vaccines are made each year  · Get a tetanus-diphtheria (Td) booster vaccine  every 10 years  This vaccine protects you against tetanus and diphtheria  Tetanus is a severe infection that may cause painful muscle spasms and lockjaw  Diphtheria is a severe bacterial infection that causes a thick covering in the back of your mouth and throat  · Get a human papillomavirus (HPV) vaccine  if you are female and aged 23 to 32 or male 23 to 24 and never received it  This vaccine protects you from HPV infection  HPV is the most common infection spread by sexual contact  HPV may also cause vaginal, penile, and anal cancers  · Get a pneumococcal vaccine  if you are aged 72 years or older  The pneumococcal vaccine is an injection given to protect you from pneumococcal disease  Pneumococcal disease is an infection caused by pneumococcal bacteria  The infection may cause pneumonia, meningitis, or an ear infection  · Get a shingles vaccine  if you are 60 or older, even if you have had shingles before  The shingles vaccine is an injection to protect you from the varicella-zoster virus  This is the same virus that causes chickenpox   Shingles is a painful rash that develops in people who had chickenpox or have been exposed to the virus     How to eat healthy:  My Plate is a model for planning healthy meals  It shows the types and amounts of foods that should go on your plate  Fruits and vegetables make up about half of your plate, and grains and protein make up the other half  A serving of dairy is included on the side of your plate  The amount of calories and serving sizes you need depends on your age, gender, weight, and height  Examples of healthy foods are listed below:  · Eat a variety of vegetables  such as dark green, red, and orange vegetables  You can also include canned vegetables low in sodium (salt) and frozen vegetables without added butter or sauces  · Eat a variety of fresh fruits , canned fruit in 100% juice, frozen fruit, and dried fruit  · Include whole grains  At least half of the grains you eat should be whole grains  Examples include whole-wheat bread, wheat pasta, brown rice, and whole-grain cereals such as oatmeal     · Eat a variety of protein foods such as seafood (fish and shellfish), lean meat, and poultry without skin (turkey and chicken)  Examples of lean meats include pork leg, shoulder, or tenderloin, and beef round, sirloin, tenderloin, and extra lean ground beef  Other protein foods include eggs and egg substitutes, beans, peas, soy products, nuts, and seeds  · Choose low-fat dairy products such as skim or 1% milk or low-fat yogurt, cheese, and cottage cheese  · Limit unhealthy fats  such as butter, hard margarine, and shortening  Exercise:  Exercise at least 30 minutes per day on most days of the week  Some examples of exercise include walking, biking, dancing, and swimming  You can also fit in more physical activity by taking the stairs instead of the elevator or parking farther away from stores  Include muscle strengthening activities 2 days each week  Regular exercise provides many health benefits   It helps you manage your weight, and decreases your risk for type 2 diabetes, heart disease, stroke, and high blood pressure  Exercise can also help improve your mood  Ask your healthcare provider about the best exercise plan for you  General health and safety guidelines:   · Do not smoke  Nicotine and other chemicals in cigarettes and cigars can cause lung damage  Ask your healthcare provider for information if you currently smoke and need help to quit  E-cigarettes or smokeless tobacco still contain nicotine  Talk to your healthcare provider before you use these products  · Limit alcohol  A drink of alcohol is 12 ounces of beer, 5 ounces of wine, or 1½ ounces of liquor  · Lose weight, if needed  Being overweight increases your risk of certain health conditions  These include heart disease, high blood pressure, type 2 diabetes, and certain types of cancer  · Protect your skin  Do not sunbathe or use tanning beds  Use sunscreen with a SPF 15 or higher  Apply sunscreen at least 15 minutes before you go outside  Reapply sunscreen every 2 hours  Wear protective clothing, hats, and sunglasses when you are outside  · Drive safely  Always wear your seatbelt  Make sure everyone in your car wears a seatbelt  A seatbelt can save your life if you are in an accident  Do not use your cell phone when you are driving  This could distract you and cause an accident  Pull over if you need to make a call or send a text message  · Practice safe sex  Use latex condoms if are sexually active and have more than one partner  Your healthcare provider may recommend screening tests for sexually transmitted infections (STIs)  · Wear helmets, lifejackets, and protective gear  Always wear a helmet when you ride a bike or motorcycle, go skiing, or play sports that could cause a head injury  Wear protective equipment when you play sports  Wear a lifejacket when you are on a boat or doing water sports      © Copyright [address] Information is for End User's use only and may not be sold, redistributed or otherwise used for commercial purposes  All illustrations and images included in CareNotes® are the copyrighted property of [employer]  or Johana [employer]  The above information is an  only  It is not intended as medical advice for individual conditions or treatments  Talk to your doctor, nurse or pharmacist before following any medical regimen to see if it is safe and effective for you

## 2021-05-18 NOTE — PROGRESS NOTES
[address] [facility]    NAME: Trisha Banks  AGE: 61 y o  SEX: male  : 1960     DATE: 2021     Assessment and Plan:     Problem List Items Addressed This Visit        Digestive    Gastroesophageal reflux disease without esophagitis    Relevant Medications    pantoprazole (PROTONIX) 40 mg tablet       Cardiovascular and Mediastinum    Essential hypertension - Primary     Blood pressure:   BP Readings from Last 3 Encounters:   21 120/70   21 128/78   21 140/88       Continue current antihypertensive  Educated on the following lifestyle modifications to lower BP and decrease cardiovascular risk factors  limit alcohol intake, reduce salt in diet, maintain a healthy weight, engage in 30 minutes of cardiovascular exercise daily, and not smoke  Relevant Medications    losartan-hydrochlorothiazide (HYZAAR) 100-25 MG per tablet    metoprolol succinate (TOPROL-XL) 25 mg 24 hr tablet    Coronary artery disease involving native coronary artery of native heart without angina pectoris    Relevant Medications    aspirin (ECOTRIN LOW STRENGTH) 81 mg EC tablet    clopidogrel (PLAVIX) 75 mg tablet    metoprolol succinate (TOPROL-XL) 25 mg 24 hr tablet       Other    Dyslipidemia       Continue current statin  Tolerating medication well and denies muscle pain or weakness  LFTs are not elevated  Eat a low fat/low cholesterol diet  Follow up with dietician for additional education  [facility] disease ([facility] )     No results found for: AM2XLUN  CD4 ABS   Date/Time Value Ref Range Status   03/15/2021 07:13  (L) 359 - 1519 /uL Final     HIV-1 RNA by PCR, Qn   Date/Time Value Ref Range Status   03/15/2021 07:13 AM <20 copies/mL Final     Comment:     HIV-1 RNA not detected  The reportable range for this assay is 20 to 10,000,000  copies HIV-1 RNA/mL       HIV-1 RNA Viral Load Log   Date/Time Value Ref Range Status 03/15/2021 07:13 AM COMMENT neg19kujl/mL Final     Comment:     Unable to calculate result since non-numeric result obtained for  component test        ART: Prezcobix, Tivicay, and Descovy        Denies side effects  Stressed the importance of adherence  Continue follow up with ID clinic  Reviewed most recent labs, including Cd4 and viral load  Discussed the risks and benefits of treatment options, instructions for management, importance of treatment adherence, and reduction of risk factor  Educated on possible  medication side effects  Counseled on routes of HIV transmission, including the risk of  infection  Emphasized that viral suppression is the best method to prevent HIV transmission  At this time pt denies the need for HIV testing of anyone in their life  Total encounter time was 45 minutes  Greater then 20 minutes were spent on counseling and patient education  Pt voices understanding and agreement with treatment plan  Relevant Medications    emtricitabine-tenofovir AF (Descovy) 200-25 MG tablet    Darunavir-Cobicistat (Prezcobix) 800-150 MG TABS    dolutegravir (Tivicay) 50 MG TABS    Toxic effect of tobacco and nicotine     Counseled for greater than 15 minutes on the importance of smoking cessation  Advised to quit  Educated on the increased risk of heart and lung disease associated with smoking  Referred to [facility] for enrollment in smoking cessation program               Relevant Medications    nicotine (NICODERM CQ) 21 mg/24 hr TD 24 hr patch    Atypical chest pain     Evaluated by cardiology, who started Imdur  Patient unable to tolerate side effects  Cardiac cath recommended for f/u  Educated patient on recommendation and instructed to schedule f/u appointment with cardiology  Continue daily Plavix and ASA  Medication to be placed in packets to allow for better adherence             History of non-ST elevation myocardial infarction (NSTEMI)    Relevant Medications    aspirin (ECOTRIN LOW STRENGTH) 81 mg EC tablet    Seasonal allergies    Relevant Medications    cetirizine (ZyrTEC) 10 mg tablet      Other Visit Diagnoses     NSTEMI (non-ST elevated myocardial infarction) ([facility])        Relevant Medications    clopidogrel (PLAVIX) 75 mg tablet    metoprolol succinate (TOPROL-XL) 25 mg 24 hr tablet    HIV (human immunodeficiency virus infection) ([facility])        Relevant Medications    emtricitabine-tenofovir AF (Descovy) 200-25 MG tablet    Darunavir-Cobicistat (Prezcobix) 800-150 MG TABS    dolutegravir (Tivicay) 50 MG TABS    Mixed hyperlipidemia        Relevant Medications    rosuvastatin (CRESTOR) 20 MG tablet    Annual physical exam              Immunizations and preventive care screenings were discussed with patient today  Appropriate education was printed on patient's after visit summary  Counseling:  Dental Health: discussed importance of regular tooth brushing, flossing, and dental visits  Injury prevention: discussed safety/seat belts, safety helmets, smoke detectors, carbon dioxide detectors, and smoking near bedding or upholstery  · Exercise: the importance of regular exercise/physical activity was discussed  Recommend exercise 3-5 times per week for at least 30 minutes  BMI Counseling: Body mass index is 31 7 kg/m²  The BMI is above normal  Nutrition recommendations include encouraging healthy choices of fruits and vegetables, consuming healthier snacks and limiting drinks that contain sugar  Exercise recommendations include exercising 3-5 times per week  Tobacco Cessation Counseling: Tobacco cessation counseling was provided  The patient is sincerely urged to quit consumption of tobacco  He is ready to quit tobacco  Medication options and side effects of medication discussed  Nicotine patch was prescribed  Return in 3 months (on 8/18/2021)       Chief Complaint:     Chief Complaint   Patient presents with    Follow-up     Pt offers no c/o at this time  History of Present Illness:     Adult Annual Physical   Patient here for a comprehensive physical exam  The patient reports no problems  Diet and Physical Activity  · Diet/Nutrition: well balanced diet, frequent junk food and consuming 3-5 servings of fruits/vegetables daily  · Exercise: 3-4 times a week on average  Depression Screening  PHQ-9 Depression Screening    PHQ-9:   Frequency of the following problems over the past two weeks:      Little interest or pleasure in doing things: 0 - not at all  Feeling down, depressed, or hopeless: 0 - not at all  PHQ-2 Score: 0       General Health  · Sleep: gets 7-8 hours of sleep on average  · Hearing: normal - none   · Vision: wears glasses  · Dental: regular dental visits   Health  · Symptoms include: none     Review of Systems:     Review of Systems   Constitutional: Negative for chills and fever  HENT: Negative for ear pain and sore throat  Eyes: Negative for pain and visual disturbance  Respiratory: Negative for cough and shortness of breath  Cardiovascular: Negative for chest pain and palpitations  Gastrointestinal: Negative for abdominal pain and vomiting  Genitourinary: Negative for dysuria and hematuria  Musculoskeletal: Negative for arthralgias and back pain  Skin: Negative for color change and rash  Neurological: Negative for seizures and syncope  All other systems reviewed and are negative       Past Medical History:     Past Medical History:   Diagnosis Date    HIV (human immunodeficiency virus infection) ([facility] )     Hypertension     Opioid dependence ([facility] )       Past Surgical History:     Past Surgical History:   Procedure Laterality Date    CARDIAC SURGERY      LUMBAR LAMINECTOMY      STOMACH SURGERY        Family History:     Family History   Problem Relation Age of Onset    Alzheimer's disease Mother     Heart attack Father     Prostate cancer Brother       Social History: E-Cigarette/Vaping    E-Cigarette Use Never User      E-Cigarette/Vaping Substances    Nicotine No     THC No     CBD No     Flavoring No     Other No     Unknown No      Social History     Socioeconomic History    Marital status: /Civil Union     Spouse name: None    Number of children: None    Years of education: None    Highest education level: None   Occupational History    None   Social Needs    Financial resource strain: None    Food insecurity     Worry: None     Inability: None    Transportation needs     Medical: None     Non-medical: None   Tobacco Use    Smoking status: Current Every Day Smoker     Packs/day: 0 00     Types: Cigars    Smokeless tobacco: Never Used    Tobacco comment: 1-2 cigars/day   Substance and Sexual Activity    Alcohol use: Not Currently     Frequency: 2-4 times a month     Comment: rarely    Drug use: No    Sexual activity: Yes     Partners: Female   Lifestyle    Physical activity     Days per week: None     Minutes per session: None    Stress: None   Relationships    Social connections     Talks on phone: None     Gets together: None     Attends Temple service: None     Active member of club or organization: None     Attends meetings of clubs or organizations: None     Relationship status: None    Intimate partner violence     Fear of current or ex partner: None     Emotionally abused: None     Physically abused: None     Forced sexual activity: None   Other Topics Concern    None   Social History Narrative    None      Current Medications:     Current Outpatient Medications   Medication Sig Dispense Refill    albuterol (VENTOLIN HFA) 90 mcg/act inhaler Inhale 2 puffs every 6 (six) hours as needed for wheezing 18 g 0    aspirin (ECOTRIN LOW STRENGTH) 81 mg EC tablet Take 1 tablet (81 mg total) by mouth daily 30 tablet 5    cetirizine (ZyrTEC) 10 mg tablet Take 1 tablet (10 mg total) by mouth daily 30 tablet 5    clopidogrel (PLAVIX) 75 mg tablet Take 1 tablet (75 mg total) by mouth daily 30 tablet 5    Darunavir-Cobicistat (Prezcobix) 800-150 MG TABS Take 1 tablet by mouth daily 30 tablet 5    dolutegravir (Tivicay) 50 MG TABS Take 1 tablet (50 mg total) by mouth 2 (two) times a day 60 tablet 5    emtricitabine-tenofovir AF (Descovy) 200-25 MG tablet Take 1 tablet by mouth daily 30 tablet 5    losartan-hydrochlorothiazide (HYZAAR) 100-25 MG per tablet Take 1 tablet by mouth daily 30 tablet 2    metoprolol succinate (TOPROL-XL) 25 mg 24 hr tablet Take 1 tablet (25 mg total) by mouth daily 30 tablet 5    nitroglycerin (NITROSTAT) 0 4 mg SL tablet Place 1 tablet (0 4 mg total) under the tongue every 5 (five) minutes as needed for chest pain 15 tablet 0    pantoprazole (PROTONIX) 40 mg tablet Take 1 tablet (40 mg total) by mouth 2 (two) times a day 60 tablet 5    rosuvastatin (CRESTOR) 20 MG tablet Take 1 tablet (20 mg total) by mouth daily 30 tablet 5    nicotine (NICODERM CQ) 21 mg/24 hr TD 24 hr patch Place 1 patch on the skin every 24 hours 28 patch 2     No current facility-administered medications for this visit  Allergies:     No Known Allergies   Physical Exam:     /70   Pulse 83   Temp 99 1 °F (37 3 °C)   Ht 5' 7" (1 702 m)   Wt 91 8 kg (202 lb 6 4 oz)   SpO2 95%   BMI 31 70 kg/m²     Physical Exam  Vitals signs and nursing note reviewed  Constitutional:       Appearance: He is well-developed  HENT:      Head: Normocephalic and atraumatic  Eyes:      Conjunctiva/sclera: Conjunctivae normal    Neck:      Musculoskeletal: Neck supple  Cardiovascular:      Rate and Rhythm: Normal rate and regular rhythm  Heart sounds: No murmur  Pulmonary:      Effort: Pulmonary effort is normal  No respiratory distress  Breath sounds: Normal breath sounds  Abdominal:      Palpations: Abdomen is soft  Tenderness: There is no abdominal tenderness  Skin:     General: Skin is warm and dry     Neurological: Mental Status: He is alert            Giovanna [facility]  [facility] AT [facility]

## 2021-05-18 NOTE — ASSESSMENT & PLAN NOTE
Evaluated by cardiology, who started Imdur  Patient unable to tolerate side effects  Cardiac cath recommended for f/u  Educated patient on recommendation and instructed to schedule f/u appointment with cardiology  Continue daily Plavix and ASA  Medication to be placed in packets to allow for better adherence

## 2021-05-19 ENCOUNTER — PATIENT OUTREACH (OUTPATIENT)
Dept: SURGERY | Facility: OTHER | Age: 61
End: 2021-05-19

## 2021-06-12 ENCOUNTER — APPOINTMENT (OUTPATIENT)
Dept: LAB | Facility: HOSPITAL | Age: 61
End: 2021-06-12
Attending: INTERNAL MEDICINE
Payer: COMMERCIAL

## 2021-06-12 DIAGNOSIS — Z13.220 ENCOUNTER FOR LIPID SCREENING FOR CARDIOVASCULAR DISEASE: ICD-10-CM

## 2021-06-12 DIAGNOSIS — Z79.899 OTHER LONG TERM (CURRENT) DRUG THERAPY: ICD-10-CM

## 2021-06-12 DIAGNOSIS — K74.60 CIRRHOSIS OF LIVER WITHOUT ASCITES, UNSPECIFIED HEPATIC CIRRHOSIS TYPE (HCC): ICD-10-CM

## 2021-06-12 DIAGNOSIS — Z13.1 SCREENING FOR DIABETES MELLITUS: ICD-10-CM

## 2021-06-12 DIAGNOSIS — B20 HIV DISEASE (HCC): ICD-10-CM

## 2021-06-12 DIAGNOSIS — I10 ESSENTIAL HYPERTENSION: ICD-10-CM

## 2021-06-12 DIAGNOSIS — Z13.6 ENCOUNTER FOR LIPID SCREENING FOR CARDIOVASCULAR DISEASE: ICD-10-CM

## 2021-06-12 LAB
AFP-TM SERPL-MCNC: 5.2 NG/ML (ref 0.5–8)
ALBUMIN SERPL BCP-MCNC: 3.8 G/DL (ref 3.5–5)
ALP SERPL-CCNC: 107 U/L (ref 46–116)
ALT SERPL W P-5'-P-CCNC: 27 U/L (ref 12–78)
ANION GAP SERPL CALCULATED.3IONS-SCNC: 7 MMOL/L (ref 4–13)
AST SERPL W P-5'-P-CCNC: 25 U/L (ref 5–45)
BASOPHILS # BLD AUTO: 0.04 THOUSANDS/ΜL (ref 0–0.1)
BASOPHILS NFR BLD AUTO: 1 % (ref 0–1)
BILIRUB SERPL-MCNC: 0.67 MG/DL (ref 0.2–1)
BUN SERPL-MCNC: 23 MG/DL (ref 5–25)
CALCIUM SERPL-MCNC: 8.7 MG/DL (ref 8.3–10.1)
CHLORIDE SERPL-SCNC: 103 MMOL/L (ref 100–108)
CHOLEST SERPL-MCNC: 118 MG/DL (ref 50–200)
CO2 SERPL-SCNC: 28 MMOL/L (ref 21–32)
CREAT SERPL-MCNC: 0.99 MG/DL (ref 0.6–1.3)
EOSINOPHIL # BLD AUTO: 0.15 THOUSAND/ΜL (ref 0–0.61)
EOSINOPHIL NFR BLD AUTO: 2 % (ref 0–6)
ERYTHROCYTE [DISTWIDTH] IN BLOOD BY AUTOMATED COUNT: 14 % (ref 11.6–15.1)
EST. AVERAGE GLUCOSE BLD GHB EST-MCNC: 117 MG/DL
GFR SERPL CREATININE-BSD FRML MDRD: 82 ML/MIN/1.73SQ M
GLUCOSE P FAST SERPL-MCNC: 108 MG/DL (ref 65–99)
HBA1C MFR BLD: 5.7 %
HCT VFR BLD AUTO: 51.6 % (ref 36.5–49.3)
HDLC SERPL-MCNC: 28 MG/DL
HGB BLD-MCNC: 17.3 G/DL (ref 12–17)
IMM GRANULOCYTES # BLD AUTO: 0.02 THOUSAND/UL (ref 0–0.2)
IMM GRANULOCYTES NFR BLD AUTO: 0 % (ref 0–2)
LDLC SERPL CALC-MCNC: 48 MG/DL (ref 0–100)
LYMPHOCYTES # BLD AUTO: 0.97 THOUSANDS/ΜL (ref 0.6–4.47)
LYMPHOCYTES NFR BLD AUTO: 15 % (ref 14–44)
MCH RBC QN AUTO: 29.3 PG (ref 26.8–34.3)
MCHC RBC AUTO-ENTMCNC: 33.5 G/DL (ref 31.4–37.4)
MCV RBC AUTO: 88 FL (ref 82–98)
MONOCYTES # BLD AUTO: 0.83 THOUSAND/ΜL (ref 0.17–1.22)
MONOCYTES NFR BLD AUTO: 13 % (ref 4–12)
NEUTROPHILS # BLD AUTO: 4.46 THOUSANDS/ΜL (ref 1.85–7.62)
NEUTS SEG NFR BLD AUTO: 69 % (ref 43–75)
NRBC BLD AUTO-RTO: 0 /100 WBCS
PLATELET # BLD AUTO: 211 THOUSANDS/UL (ref 149–390)
PMV BLD AUTO: 9.4 FL (ref 8.9–12.7)
POTASSIUM SERPL-SCNC: 3.8 MMOL/L (ref 3.5–5.3)
PROT SERPL-MCNC: 7.7 G/DL (ref 6.4–8.2)
RBC # BLD AUTO: 5.9 MILLION/UL (ref 3.88–5.62)
SODIUM SERPL-SCNC: 138 MMOL/L (ref 136–145)
TRIGL SERPL-MCNC: 208 MG/DL
WBC # BLD AUTO: 6.47 THOUSAND/UL (ref 4.31–10.16)

## 2021-06-12 PROCEDURE — 87536 HIV-1 QUANT&REVRSE TRNSCRPJ: CPT

## 2021-06-12 PROCEDURE — 83036 HEMOGLOBIN GLYCOSYLATED A1C: CPT

## 2021-06-12 PROCEDURE — 82105 ALPHA-FETOPROTEIN SERUM: CPT

## 2021-06-12 PROCEDURE — 86360 T CELL ABSOLUTE COUNT/RATIO: CPT

## 2021-06-12 PROCEDURE — 80061 LIPID PANEL: CPT

## 2021-06-12 PROCEDURE — 80053 COMPREHEN METABOLIC PANEL: CPT

## 2021-06-12 PROCEDURE — 36415 COLL VENOUS BLD VENIPUNCTURE: CPT

## 2021-06-12 PROCEDURE — 85025 COMPLETE CBC W/AUTO DIFF WBC: CPT

## 2021-06-13 LAB
BASOPHILS # BLD AUTO: 0 X10E3/UL (ref 0–0.2)
BASOPHILS NFR BLD AUTO: 1 %
CD3+CD4+ CELLS # BLD: 152 /UL (ref 359–1519)
CD3+CD4+ CELLS NFR BLD: 16.9 % (ref 30.8–58.5)
CD3+CD4+ CELLS/CD3+CD8+ CLL BLD: 0.28 % (ref 0.92–3.72)
CD3+CD8+ CELLS # BLD: 538 /UL (ref 109–897)
CD3+CD8+ CELLS NFR BLD: 59.8 % (ref 12–35.5)
EOSINOPHIL # BLD AUTO: 0.2 X10E3/UL (ref 0–0.4)
EOSINOPHIL NFR BLD AUTO: 3 %
ERYTHROCYTE [DISTWIDTH] IN BLOOD BY AUTOMATED COUNT: 14 % (ref 11.6–15.4)
HCT VFR BLD AUTO: 50 % (ref 37.5–51)
HGB BLD-MCNC: 17.6 G/DL (ref 13–17.7)
IMM GRANULOCYTES # BLD: 0 X10E3/UL (ref 0–0.1)
IMM GRANULOCYTES NFR BLD: 1 %
LYMPHOCYTES # BLD AUTO: 0.9 X10E3/UL (ref 0.7–3.1)
LYMPHOCYTES NFR BLD AUTO: 14 %
MCH RBC QN AUTO: 30 PG (ref 26.6–33)
MCHC RBC AUTO-ENTMCNC: 35.2 G/DL (ref 31.5–35.7)
MCV RBC AUTO: 85 FL (ref 79–97)
MONOCYTES # BLD AUTO: 1 X10E3/UL (ref 0.1–0.9)
MONOCYTES NFR BLD AUTO: 15 %
NEUTROPHILS # BLD AUTO: 4.4 X10E3/UL (ref 1.4–7)
NEUTROPHILS NFR BLD AUTO: 66 %
PLATELET # BLD AUTO: 219 X10E3/UL (ref 150–450)
RBC # BLD AUTO: 5.86 X10E6/UL (ref 4.14–5.8)
WBC # BLD AUTO: 6.5 X10E3/UL (ref 3.4–10.8)

## 2021-06-14 LAB
HIV1 RNA # SERPL NAA+PROBE: 3030 COPIES/ML
HIV1 RNA SERPL NAA+PROBE-LOG#: 3.48 LOG10COPY/ML

## 2021-06-15 ENCOUNTER — PATIENT OUTREACH (OUTPATIENT)
Dept: SURGERY | Facility: OTHER | Age: 61
End: 2021-06-15

## 2021-06-15 NOTE — PROGRESS NOTES
CT DENNIS Bustos received ct's new MAWD auth for new polo year July 2021, from [place]  Please see scanned media

## 2021-06-16 ENCOUNTER — CLINICAL SUPPORT (OUTPATIENT)
Dept: DENTISTRY | Facility: CLINIC | Age: 61
End: 2021-06-16

## 2021-06-16 VITALS — TEMPERATURE: 98.3 F | HEART RATE: 73 BPM | SYSTOLIC BLOOD PRESSURE: 130 MMHG | DIASTOLIC BLOOD PRESSURE: 80 MMHG

## 2021-06-16 DIAGNOSIS — Z01.21 ENCOUNTER FOR DENTAL EXAMINATION AND CLEANING WITH ABNORMAL FINDINGS: Primary | ICD-10-CM

## 2021-06-16 DIAGNOSIS — Z00.00 ROUTINE CHECK-UP: ICD-10-CM

## 2021-06-16 PROCEDURE — D0210 INTRAORAL - COMPLETE SERIES OF RADIOGRAPHIC IMAGES: HCPCS

## 2021-06-16 PROCEDURE — D1110 PROPHYLAXIS - ADULT: HCPCS

## 2021-06-16 PROCEDURE — D0120 PERIODIC ORAL EVALUATION - ESTABLISHED PATIENT: HCPCS | Performed by: DENTIST

## 2021-06-16 NOTE — PROGRESS NOTES
Pt presents to dental clinic for exam      Pt is HIV + had most recent blood work done on 6/12  Reveals low CD4 count  Pt will need antibiotic coverage post extraction  Pt platelet count is also low 211 at this time but still within therapeutic range for extractions  Please note at time of extractions please review any newer blood work to confirm platelet count prior to extractions  Pt may require blood work to be done prior to extractions  Please consider a med consult to infectious disease once an extraction date is scheduled  Pt has severe wear and would like to retain his teeth  Pt understands he is on the path of a complete denture in the future but wants to maintain what he has  Recommended caries control and Phase 1, followed by extraction of #19 class 3 furcation involvement and caries  Extraction also of  #23-class 2 + mobility and thickened periodontal ligament, bone loss  Phase 2 consists of resin based partial to assess how patient manages and to give patient posterior support to decrease the wear and forces on the anterior teeth  Pt states he has pain on an off on the lower right side  No swelling or significant findings at this time  Will eval at next visit or prior to prelim impressions of partials  Pt also has white corrugated lesions present on the ridge of the lower right and lower left distal extensions  Extending from #29 to the right retromolar pad and from #19 to the left retromolar pad  Lesions alsp present  between #19 and #22  No ulcerations present and does not wipe off  Due to patients history of HIV recommend screenings every 3 months   AND recommend at time of extraction biopsy of lesion  Differential to include- hyperkeratosis, lichen planus, oral leukoplakia      Pt is also a cigar smoker    NV- resins  NNV- review blood work, extractions of #19 and #23   Biopsy of corrugated white tissue adjacent to #19

## 2021-06-16 NOTE — PROGRESS NOTES
Patient presents for  recall visit  Medical history updated in patient electronic medical record-  Parent denies any recent exposures for the family to coronavirus positive individuals, negative fever, negative sore throat, negative coughing, negative loss of taste or smell, no diarrhea or GI issues reported  High speed evacuation, N95 masks, face shield use, and other preventative measures utilized to prevent the spread of COVID-19  Pt hand sanitized, used Chlorhexidine pre procedural rinse, used RELEAF, external suction and High speed vacuum for polishing  RECALL EXAM, ADULT AYDEE,  FMASHLEY    CHIEF CONCERN: pt reports LR hurts off/on  No pain currently today  PAIN SCALE: 0  ASA CLASS: II  PLAQUE: mild   CALCULUS:  light calculus   BLEEDING: light   STAIN : light   ORAL HYGIENE:  good  PERIO: gingiva appear healthy although some mobilities/bone loss previously    Hand scaled, polished and flossed  Oral Hygiene Instruction :  recommended brushing 2 x daily for 2 minutes MIN, recommended flossing daily, reviewed dietary precautions  soft tissue evaluation    Soft tissue exam:  corrugated white keratinized tissue LL and LR under tongue ( crest of ridge and in between #29 to retromolar pad and 19+21) WATCH!!     Dr recommends Oral cancer screen each visit  Pt reports he smokes approximately 2 cigars daily and wears nicotine patch  ExtraOral exam:   Extraoral exam was normal    Dr Monserrat Ford exam=   Reviewed with patient clinical and radiographic findings and parent verbalized understanding  All questions and concerns addressed  Severe wear on anterior teeth/ some teeth can visually see pulp chamber       REFERRALS: no referrals needed    CARIES FINDINGS: #4-M, #6-M, #8 B(V), #11 B (V), #19 ext due to class III furcation and decay on distal, #22 ext, resin based partials recommended     Next Visit: start with fillings - Oral cancer screen (see above note)                    Please note: on 6/12/21 CD4 count low  When patient here for exts, check for newer blood work  platelets 372   Recommend antibiotics post extractions per dr      Next Recall: 6 month recall / Oral cancer screen  (see above note)

## 2021-06-18 ENCOUNTER — PATIENT OUTREACH (OUTPATIENT)
Dept: SURGERY | Facility: OTHER | Age: 61
End: 2021-06-18

## 2021-06-18 DIAGNOSIS — B20 HIV DISEASE (HCC): Primary | ICD-10-CM

## 2021-06-21 ENCOUNTER — PATIENT OUTREACH (OUTPATIENT)
Dept: SURGERY | Facility: OTHER | Age: 61
End: 2021-06-21

## 2021-06-22 ENCOUNTER — OFFICE VISIT (OUTPATIENT)
Dept: SURGERY | Facility: CLINIC | Age: 61
End: 2021-06-22
Payer: COMMERCIAL

## 2021-06-22 VITALS
HEART RATE: 75 BPM | OXYGEN SATURATION: 96 % | DIASTOLIC BLOOD PRESSURE: 71 MMHG | BODY MASS INDEX: 31.58 KG/M2 | HEIGHT: 67 IN | WEIGHT: 201.2 LBS | SYSTOLIC BLOOD PRESSURE: 127 MMHG | TEMPERATURE: 98 F

## 2021-06-22 DIAGNOSIS — I25.10 CORONARY ARTERY DISEASE INVOLVING NATIVE CORONARY ARTERY OF NATIVE HEART WITHOUT ANGINA PECTORIS: ICD-10-CM

## 2021-06-22 DIAGNOSIS — I10 ESSENTIAL HYPERTENSION: ICD-10-CM

## 2021-06-22 DIAGNOSIS — B20 HIV DISEASE (HCC): Primary | ICD-10-CM

## 2021-06-22 DIAGNOSIS — T65.292D TOXIC EFFECT OF TOBACCO, INTENTIONAL SELF-HARM, SUBSEQUENT ENCOUNTER: ICD-10-CM

## 2021-06-22 DIAGNOSIS — D75.1 POLYCYTHEMIA: ICD-10-CM

## 2021-06-22 DIAGNOSIS — R91.1 LUNG NODULE: ICD-10-CM

## 2021-06-22 DIAGNOSIS — K74.69 OTHER CIRRHOSIS OF LIVER (HCC): ICD-10-CM

## 2021-06-22 PROCEDURE — 3008F BODY MASS INDEX DOCD: CPT | Performed by: INTERNAL MEDICINE

## 2021-06-22 PROCEDURE — 4004F PT TOBACCO SCREEN RCVD TLK: CPT | Performed by: INTERNAL MEDICINE

## 2021-06-22 PROCEDURE — 3078F DIAST BP <80 MM HG: CPT | Performed by: INTERNAL MEDICINE

## 2021-06-22 PROCEDURE — 99215 OFFICE O/P EST HI 40 MIN: CPT | Performed by: INTERNAL MEDICINE

## 2021-06-22 PROCEDURE — 3074F SYST BP LT 130 MM HG: CPT | Performed by: INTERNAL MEDICINE

## 2021-06-22 NOTE — PROGRESS NOTES
Progress Note - Infectious Disease   Surekha Hamlin 61 y o  male MRN: [id number]  Unit/Bed#:  Encounter: [id number]      Impression/Plan:  1   HIV- now non adherent with ART and his viral load is increased to over 3000  His CD4 count is 152  Will continue the Tivicay, Descovy, Prezcobix  He is now back to taking his ART  He does believe he is having some intermittent GI symptoms with cramping and loose stool that is related taking his medications  For now will continue the ART, check genotype and integrase resistance now and follow up in 4-6 weeks     2  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  Right upper quadrant ultrasound for screening was negative and the alpha fetoprotein negative on the last check   Continue Q 6 month hepatocellular carcinoma screening      3   Polycythemia-likely secondary to the nicotine dependence   Seen by Hematology Oncology who agree with my assessment   Will continue to monitor the CBC with diff now that he is no longer smoking     4   Hypertension-asymptomatic and stable and reasonably controlled   Continue monitor     5  Coronary artery disease-complicated by acute MI status post PCI   Now asymptomatic   Follow up with Cardiology      6  Nicotine dependence-patient continues to smoke occasionally but has decreased his tobacco use  He will continue to try to completely quit     7  Lung nodule-repeat CT scan  annually  Patient was provided medication, adherence and prevention education    Subjective:  Routine follow-up for HIV  Patient  had 100% adherent with   Prezcobix,Tivicay and Descovy  But has now stopped his ART because of depression    Patient denies any notable side effects  Overall the feeling well  The patient denies any fever chills or sweats, denies any nausea vomiting or diarrhea, denies any cough or shortness of breath      ROS:  A complete review of systems is negative other than that noted above in the subjective    Followup portions patient history reviewed and updated as: Allergies, current medications, past medical history, past social history, past surgical history, and the problem list    Objective:  Vitals:  Vitals:    06/22/21 1607   BP: 127/71   Pulse: 75   Temp: 98 °F (36 7 °C)   SpO2: 96%   Weight: 91 3 kg (201 lb 3 2 oz)   Height: 5' 7" (1 702 m)       Physical Exam:   General Appearance:  Alert, interactive, appearing well,  nontoxic, no acute distress  Neck:   Supple without lymphadenopathy, no thyromegaly or masses   Throat: Oropharynx moist without lesions  Lungs:   Clear to auscultation bilaterally; no wheezes, rhonchi or rales; respirations unlabored   Heart:  RRR; no murmur, rub or gallop   Abdomen:   Soft, non-tender, non-distended, positive bowel sounds  Extremities: No clubbing, cyanosis or edema   Skin: No new rashes or lesions  No draining wounds noted         Labs, Imaging, & Other studies:   All pertinent labs and imaging studies were personally reviewed    Lab Results   Component Value Date     03/18/2017    K 3 8 06/12/2021     06/12/2021    CO2 28 06/12/2021    BUN 23 06/12/2021    CREATININE 0 99 06/12/2021    GLUF 108 (H) 06/12/2021    CALCIUM 8 7 06/12/2021    AST 25 06/12/2021    ALT 27 06/12/2021    ALKPHOS 107 06/12/2021    PROT 7 4 03/18/2017    BILITOT 1 1 03/18/2017    EGFR 82 06/12/2021     Lab Results   Component Value Date    WBC 6 47 06/12/2021    WBC 6 5 06/12/2021    HGB 17 3 (H) 06/12/2021    HGB 17 6 06/12/2021    HCT 51 6 (H) 06/12/2021    HCT 50 0 06/12/2021    MCV 88 06/12/2021    MCV 85 06/12/2021     06/12/2021     06/12/2021     No results found for: HEPCAB  Lab Results   Component Value Date    HEPBCAB REACTIVE (A) 10/08/2015    HEPBCAB REACTIVE (A) 10/08/2015    HEPBCAB REACTIVE (A) 10/08/2015    HEPBCAB REACTIVE (A) 10/08/2015     Lab Results   Component Value Date    RPR Non-Reactive 03/15/2021     CD4 ABS   Date/Time Value Ref Range Status   06/12/2021 07:04  (L) 359 - 1519 /uL Final     HIV-1 RNA by PCR, Qn   Date/Time Value Ref Range Status   06/12/2021 07:04 AM 3030 copies/mL Final     Comment:     The reportable range for this assay is 20 to 10,000,000  copies HIV-1 RNA/mL             Current Outpatient Medications:     albuterol (VENTOLIN HFA) 90 mcg/act inhaler, Inhale 2 puffs every 6 (six) hours as needed for wheezing, Disp: 18 g, Rfl: 0    aspirin (ECOTRIN LOW STRENGTH) 81 mg EC tablet, Take 1 tablet (81 mg total) by mouth daily, Disp: 30 tablet, Rfl: 5    cetirizine (ZyrTEC) 10 mg tablet, Take 1 tablet (10 mg total) by mouth daily, Disp: 30 tablet, Rfl: 5    clopidogrel (PLAVIX) 75 mg tablet, Take 1 tablet (75 mg total) by mouth daily, Disp: 30 tablet, Rfl: 5    Darunavir-Cobicistat (Prezcobix) 800-150 MG TABS, Take 1 tablet by mouth daily, Disp: 30 tablet, Rfl: 5    dolutegravir (Tivicay) 50 MG TABS, Take 1 tablet (50 mg total) by mouth 2 (two) times a day, Disp: 60 tablet, Rfl: 5    emtricitabine-tenofovir AF (Descovy) 200-25 MG tablet, Take 1 tablet by mouth daily, Disp: 30 tablet, Rfl: 5    losartan-hydrochlorothiazide (HYZAAR) 100-25 MG per tablet, Take 1 tablet by mouth daily, Disp: 30 tablet, Rfl: 2    metoprolol succinate (TOPROL-XL) 25 mg 24 hr tablet, Take 1 tablet (25 mg total) by mouth daily, Disp: 30 tablet, Rfl: 5    nitroglycerin (NITROSTAT) 0 4 mg SL tablet, Place 1 tablet (0 4 mg total) under the tongue every 5 (five) minutes as needed for chest pain, Disp: 15 tablet, Rfl: 0    pantoprazole (PROTONIX) 40 mg tablet, Take 1 tablet (40 mg total) by mouth 2 (two) times a day, Disp: 60 tablet, Rfl: 5    rosuvastatin (CRESTOR) 20 MG tablet, Take 1 tablet (20 mg total) by mouth daily, Disp: 30 tablet, Rfl: 5    nicotine (NICODERM CQ) 21 mg/24 hr TD 24 hr patch, Place 1 patch on the skin every 24 hours, Disp: 28 patch, Rfl: 2

## 2021-06-30 ENCOUNTER — PATIENT OUTREACH (OUTPATIENT)
Dept: SURGERY | Facility: OTHER | Age: 61
End: 2021-06-30

## 2021-06-30 NOTE — PROGRESS NOTES
CT MROR    Cm prepared ct's RCT, RST reminder letter and will mail out to ct  Cm will wait for the ct to respond to the letter to scheduled a time to RCT, RST  Cm also included the [employer] softwareak  Please see scanned media

## 2021-07-02 ENCOUNTER — DOCUMENTATION (OUTPATIENT)
Dept: SURGERY | Facility: CLINIC | Age: 61
End: 2021-07-02

## 2021-07-02 NOTE — PROGRESS NOTES
Pastoral Care Progress Note    2021  Patient: Tamra Bhatia : 1960  Encounter Date & Time: 2021   MRN: [id number]    The  met with Mr Aiyana Carolina for continued relationship of care and support  Mr Sameera Nickerson advised he was doing well  He shared things are much better and he had opportunities to see his grandchildren  He shared "the last year has been difficult, but a lot of other people had it worse"  Mr Sameera Nickerson expressed gratefulness and acknowledged his Supreme Being active and present in the world  The  listened empathetically and affirmed Mr Leonidas Mckeon sense of renewal   Mr Sameera Nickerson agreed the [facility] may do a spiritual assessment in a future visit  The  will notify Mr Sameera Nickerson ahead a time in order to allow time for the assessment                        [address] [address] Affiliation None   Spiritual Beliefs/Perceptions   Concept of God Accepting   God's Role in Disease Natural   Relationship with God Ambivalent   Support Systems Children;Family members   Psychosocial   Psychosocial (WDL) WDL   Length of Time/Family Visitation 16-30 min   Coping Responses   Patient Coping Accepting;Open/discussion   Patient Spiritual Assessment   Feelings of Well Being   (Things are getting better  )   Plan of Care   Care Plan Initiated Yes   Provide Spiritual Support (Visits) 1 time   Assessment Completed by: Unit visit

## 2021-07-08 ENCOUNTER — PATIENT OUTREACH (OUTPATIENT)
Dept: SURGERY | Facility: OTHER | Age: 61
End: 2021-07-08

## 2021-07-08 NOTE — PROGRESS NOTES
CT [facility]    CM mailed out doctor Hanny's letter to help the ct stay informed of [facility] changes with doctors  Please see scanned media

## 2021-07-26 ENCOUNTER — PATIENT OUTREACH (OUTPATIENT)
Dept: SURGERY | Facility: OTHER | Age: 61
End: 2021-07-26

## 2021-07-26 NOTE — PROGRESS NOTES
CT MROR    Akash received ct's July MAWD alfie Bustos worked on the check requisition packet and e-mailed to supervisor for further processing  Please see scanned media

## 2021-08-03 ENCOUNTER — PATIENT OUTREACH (OUTPATIENT)
Dept: SURGERY | Facility: OTHER | Age: 61
End: 2021-08-03

## 2021-08-03 NOTE — PROGRESS NOTES
CT DENNIS Bustos reached out to ct to remind the ct that he is due for RCT, RST on or before 8/5/21  Ct was surprised he was so close to the date and had not scheduled an appointment  Ct stated he would only be available after 3:30 on Friday however cm provided the ct with the virtual RCT, RST and offered the ct to do at his own availability but that cm would need a months worth of pay stubs and some time on the phone to go over goals and other assessments to RCT, RST the ct  The ct agreed to send in his pay stubs and medicals and to call cm on 8/4/21 after lunch to complete his RCT, RST over the phone  Cm will email the July newsletter for the ct to participate virtually since the ct is employed and lives over 45 minutes away-driving distance

## 2021-08-04 ENCOUNTER — PATIENT OUTREACH (OUTPATIENT)
Dept: SURGERY | Facility: OTHER | Age: 61
End: 2021-08-04

## 2021-08-04 NOTE — PROGRESS NOTES
CT# [id number] MROR     Ct and Cm agreed to complete RCT, RST over the phone due to COVID 19  No signatures were collected at this time  Ct decided to complete RCT, RST on the phone also since the ct was due by tomorrow  Ct states is in stable medical health  Ct reported seeing his ID, Dr Ronni Troy at the [facility] and getting labs done last 2021  Ct is scheduled to see Dr Ronni Troy on 21 and CRNP on 21  Ct went to see the optometrist at [address] [place] [address] in May 2021 and the dentist at [place] [address] 2021, ct is scheduled to see the dentist again on 2021 since she spoke about getting the ct a bridge  The ct currently eats with his front teeth since he is missing most of his molars and is in pain when eating solid foods with his gums  Cm had submitted ct's SFS program application and was approved  Ct was cleared for dental tx by his cardiologist  Pete Erwin stated he had his COVID 19 vaccines and that he did not feel any side effects at all  Ct states he is still smoking cigars and finds it hard to quit  However, ct stated that CRNP had recommended he could use the nicotine patch and smoke as long as he cuts down  Ct stated he is down to one cigar every two to three days  Ct understands the risks he takes by continuing to smoke and stated he is doing his best to cut down  Ct stated he only smokes when really stressed  Ct continues to be adherent to HIV care and is on Prescobix, Descovy and Tivicay (2) equals to 4 pills a day  Ct is on blood thinners but states feeling great  Ct is abstaining from risky behaviors, ct is in a monogamous relationship with his wife of over 20 years  Ct lives in his home with his wife, has a car has MAWD coverage  Ct stated he is in the journey to purchase his home  Ct will request an auth to continue to assist the ct with his MAWD premiums since it is due to  this month   Ct works a full time job at [employer] has no addictions or mental health concerns ct has no domestic violence concerns, is independent in ADL and has a strong support system  Ct has no dependents and no issues with language comprehension, legal or nutrition  Reassessment and Recertification is complete

## 2021-08-16 NOTE — PATIENT INSTRUCTIONS

## 2021-08-17 ENCOUNTER — OFFICE VISIT (OUTPATIENT)
Dept: SURGERY | Facility: CLINIC | Age: 61
End: 2021-08-17
Payer: COMMERCIAL

## 2021-08-17 VITALS
BODY MASS INDEX: 31.42 KG/M2 | HEIGHT: 67 IN | TEMPERATURE: 98.2 F | DIASTOLIC BLOOD PRESSURE: 65 MMHG | OXYGEN SATURATION: 95 % | HEART RATE: 74 BPM | WEIGHT: 200.2 LBS | SYSTOLIC BLOOD PRESSURE: 118 MMHG

## 2021-08-17 DIAGNOSIS — K21.9 GASTROESOPHAGEAL REFLUX DISEASE WITHOUT ESOPHAGITIS: ICD-10-CM

## 2021-08-17 DIAGNOSIS — I10 ESSENTIAL HYPERTENSION: ICD-10-CM

## 2021-08-17 DIAGNOSIS — I21.4 NSTEMI (NON-ST ELEVATED MYOCARDIAL INFARCTION) (HCC): ICD-10-CM

## 2021-08-17 DIAGNOSIS — I25.2 HISTORY OF NON-ST ELEVATION MYOCARDIAL INFARCTION (NSTEMI): ICD-10-CM

## 2021-08-17 DIAGNOSIS — T65.292D TOXIC EFFECT OF TOBACCO, INTENTIONAL SELF-HARM, SUBSEQUENT ENCOUNTER: ICD-10-CM

## 2021-08-17 DIAGNOSIS — K74.69 OTHER CIRRHOSIS OF LIVER (HCC): ICD-10-CM

## 2021-08-17 DIAGNOSIS — I25.10 CORONARY ARTERY DISEASE INVOLVING NATIVE CORONARY ARTERY OF NATIVE HEART WITHOUT ANGINA PECTORIS: ICD-10-CM

## 2021-08-17 DIAGNOSIS — E78.2 MIXED HYPERLIPIDEMIA: ICD-10-CM

## 2021-08-17 DIAGNOSIS — S89.91XD INJURY OF RIGHT LOWER EXTREMITY, SUBSEQUENT ENCOUNTER: Primary | ICD-10-CM

## 2021-08-17 DIAGNOSIS — J30.2 SEASONAL ALLERGIES: ICD-10-CM

## 2021-08-17 DIAGNOSIS — B20 HIV (HUMAN IMMUNODEFICIENCY VIRUS INFECTION) (HCC): ICD-10-CM

## 2021-08-17 DIAGNOSIS — B20 HIV DISEASE (HCC): ICD-10-CM

## 2021-08-17 PROCEDURE — 99214 OFFICE O/P EST MOD 30 MIN: CPT | Performed by: NURSE PRACTITIONER

## 2021-08-17 RX ORDER — EMTRICITABINE AND TENOFOVIR ALAFENAMIDE 200; 25 MG/1; MG/1
1 TABLET ORAL DAILY
Qty: 30 TABLET | Refills: 5 | Status: SHIPPED | OUTPATIENT
Start: 2021-08-17 | End: 2021-11-16 | Stop reason: SDUPTHER

## 2021-08-17 RX ORDER — PANTOPRAZOLE SODIUM 40 MG/1
40 TABLET, DELAYED RELEASE ORAL 2 TIMES DAILY
Qty: 60 TABLET | Refills: 5 | Status: SHIPPED | OUTPATIENT
Start: 2021-08-17 | End: 2021-11-16 | Stop reason: SDUPTHER

## 2021-08-17 RX ORDER — DARUNAVIR ETHANOLATE AND COBICISTAT 800; 150 MG/1; MG/1
1 TABLET, FILM COATED ORAL DAILY
Qty: 30 TABLET | Refills: 5 | Status: SHIPPED | OUTPATIENT
Start: 2021-08-17 | End: 2021-11-16 | Stop reason: SDUPTHER

## 2021-08-17 RX ORDER — ROSUVASTATIN CALCIUM 20 MG/1
20 TABLET, COATED ORAL DAILY
Qty: 30 TABLET | Refills: 5 | Status: SHIPPED | OUTPATIENT
Start: 2021-08-17 | End: 2021-11-16 | Stop reason: SDUPTHER

## 2021-08-17 RX ORDER — LOSARTAN POTASSIUM AND HYDROCHLOROTHIAZIDE 25; 100 MG/1; MG/1
1 TABLET ORAL DAILY
Qty: 30 TABLET | Refills: 2 | Status: SHIPPED | OUTPATIENT
Start: 2021-08-17 | End: 2021-11-16 | Stop reason: SDUPTHER

## 2021-08-17 RX ORDER — METOPROLOL SUCCINATE 25 MG/1
25 TABLET, EXTENDED RELEASE ORAL DAILY
Qty: 30 TABLET | Refills: 5 | Status: SHIPPED | OUTPATIENT
Start: 2021-08-17 | End: 2021-11-16 | Stop reason: SDUPTHER

## 2021-08-17 RX ORDER — CETIRIZINE HYDROCHLORIDE 10 MG/1
10 TABLET ORAL DAILY
Qty: 30 TABLET | Refills: 5 | Status: SHIPPED | OUTPATIENT
Start: 2021-08-17 | End: 2021-11-16 | Stop reason: SDUPTHER

## 2021-08-17 RX ORDER — DOLUTEGRAVIR SODIUM 50 MG/1
50 TABLET, FILM COATED ORAL 2 TIMES DAILY
Qty: 60 TABLET | Refills: 5 | Status: SHIPPED | OUTPATIENT
Start: 2021-08-17 | End: 2021-11-16 | Stop reason: SDUPTHER

## 2021-08-17 RX ORDER — CLOPIDOGREL BISULFATE 75 MG/1
75 TABLET ORAL DAILY
Qty: 30 TABLET | Refills: 5 | Status: SHIPPED | OUTPATIENT
Start: 2021-08-17 | End: 2021-11-16 | Stop reason: SDUPTHER

## 2021-08-17 RX ORDER — ASPIRIN 81 MG/1
81 TABLET ORAL DAILY
Qty: 30 TABLET | Refills: 5 | Status: SHIPPED | OUTPATIENT
Start: 2021-08-17 | End: 2021-11-16 | Stop reason: SDUPTHER

## 2021-08-17 NOTE — ASSESSMENT & PLAN NOTE
AFP 5 8 RUQ US ordered but not completed  Reminded to complete today  Educated on the importance of 6 month [facility]  surveillance

## 2021-08-17 NOTE — ASSESSMENT & PLAN NOTE
No results found for: [id number]  CD4 ABS   Date/Time Value Ref Range Status   2021 07:04  (L) 359 - 1519 /uL Final     HIV-1 RNA by PCR, Qn   Date/Time Value Ref Range Status   2021 07:04 AM 3030 copies/mL Final     Comment:     The reportable range for this assay is 20 to 10,000,000  copies HIV-1 RNA/mL  HIV-1 RNA Viral Load Log   Date/Time Value Ref Range Status   2021 07:04 AM 3 481 [id number]/mL Final         ART: Tivicay Descovy and Prezcobix        Denies side effects  Stressed the importance of adherence  Kash Engel had skipped several doses because he attributes his stomach issues to medication  Encouraged him to discuss concerns with ID provider at upcoming [address] [place] [address] appointment  Continue follow up with ID clinic  Reviewed most recent labs, including Cd4 and viral load  Discussed the risks and benefits of treatment options, instructions for management, importance of treatment adherence, and reduction of risk factor  Educated on possible  medication side effects  Counseled on routes of HIV transmission, including the risk of  infection  Emphasized that viral suppression is the best method to prevent HIV transmission  At this time pt denies the need for HIV testing of anyone in their life  Total encounter time was 45 minutes  Greater then 20 minutes were spent on counseling and patient education  Pt voices understanding and agreement with treatment plan

## 2021-08-17 NOTE — ASSESSMENT & PLAN NOTE
Blood pressure:  stable  BP Readings from Last 3 Encounters:   08/17/21 118/65   06/22/21 127/71   06/16/21 130/80       Continue current antihypertensive  Educated on the following lifestyle modifications to lower BP and decrease cardiovascular risk factors  limit alcohol intake, reduce salt in diet, maintain a healthy weight, engage in 30 minutes of cardiovascular exercise daily, and not smoke

## 2021-08-17 NOTE — ASSESSMENT & PLAN NOTE
Counseled for greater than 15 minutes on the importance of smoking cessation  Advised to quit  Educated on the increased risk of heart and lung disease associated with smoking    Referred to [address] for enrollment in smoking cessation program

## 2021-08-17 NOTE — PROGRESS NOTES
Assessment/Plan:    Toxic effect of tobacco and nicotine  Counseled for greater than 15 minutes on the importance of smoking cessation  Advised to quit  Educated on the increased risk of heart and lung disease associated with smoking  Referred to Gurwinder Aviles [facility] for enrollment in smoking cessation program          HIV disease ([facility] )  No results found for: [id number]  CD4 ABS   Date/Time Value Ref Range Status   2021 07:04  (L) 359 - 1519 /uL Final     HIV-1 RNA by PCR, Qn   Date/Time Value Ref Range Status   2021 07:04 AM 3030 copies/mL Final     Comment:     The reportable range for this assay is 20 to 10,000,000  copies HIV-1 RNA/mL  HIV-1 RNA Viral Load Log   Date/Time Value Ref Range Status   2021 07:04 AM 3 481 [id number]/mL Final         ART: Tivicay Descovy and Prezcobix        Denies side effects  Stressed the importance of adherence  Mariama Worley had skipped several doses because he attributes his stomach issues to medication  Encouraged him to discuss concerns with ID provider at upcoming [address] [place] [address] appointment  Continue follow up with ID clinic  Reviewed most recent labs, including Cd4 and viral load  Discussed the risks and benefits of treatment options, instructions for management, importance of treatment adherence, and reduction of risk factor  Educated on possible  medication side effects  Counseled on routes of HIV transmission, including the risk of  infection  Emphasized that viral suppression is the best method to prevent HIV transmission  At this time pt denies the need for HIV testing of anyone in their life  Total encounter time was 45 minutes  Greater then 20 minutes were spent on counseling and patient education  Pt voices understanding and agreement with treatment plan  Other cirrhosis of liver ([facility] )  AFP 5 8 RUQ US ordered but not completed  Reminded to complete today  Educated on the importance of 6 month [facility]  surveillance       Essential hypertension  Blood pressure:  stable  BP Readings from Last 3 Encounters:   08/17/21 118/65   06/22/21 127/71   06/16/21 130/80       Continue current antihypertensive  Educated on the following lifestyle modifications to lower BP and decrease cardiovascular risk factors  limit alcohol intake, reduce salt in diet, maintain a healthy weight, engage in 30 minutes of cardiovascular exercise daily, and not smoke  Diagnoses and all orders for this visit:    Injury of right lower extremity, subsequent encounter  -     Ambulatory referral to Dermatology; Future    Toxic effect of tobacco, intentional self-harm, subsequent encounter    HIV disease ([facility] )  -     Darunavir-Cobicistat (Prezcobix) 800-150 MG TABS; Take 1 tablet by mouth daily    Other cirrhosis of liver (HCC)    Essential hypertension  -     losartan-hydrochlorothiazide (HYZAAR) 100-25 MG per tablet; Take 1 tablet by mouth daily  -     metoprolol succinate (TOPROL-XL) 25 mg 24 hr tablet; Take 1 tablet (25 mg total) by mouth daily    History of non-ST elevation myocardial infarction (NSTEMI)  -     aspirin (ECOTRIN LOW STRENGTH) 81 mg EC tablet; Take 1 tablet (81 mg total) by mouth daily    Coronary artery disease involving native coronary artery of native heart without angina pectoris  -     aspirin (ECOTRIN LOW STRENGTH) 81 mg EC tablet; Take 1 tablet (81 mg total) by mouth daily    Seasonal allergies  -     cetirizine (ZyrTEC) 10 mg tablet; Take 1 tablet (10 mg total) by mouth daily    NSTEMI (non-ST elevated myocardial infarction) ([facility])  -     clopidogrel (PLAVIX) 75 mg tablet; Take 1 tablet (75 mg total) by mouth daily    HIV (human immunodeficiency virus infection) ([facility])  -     dolutegravir (Tivicay) 50 MG TABS; Take 1 tablet (50 mg total) by mouth 2 (two) times a day  -     emtricitabine-tenofovir AF (Descovy) 200-25 MG tablet;  Take 1 tablet by mouth daily    Gastroesophageal reflux disease without esophagitis  -     pantoprazole (PROTONIX) 40 mg tablet; Take 1 tablet (40 mg total) by mouth 2 (two) times a day    Mixed hyperlipidemia  -     rosuvastatin (CRESTOR) 20 MG tablet; Take 1 tablet (20 mg total) by mouth daily          Subjective:      Patient ID: Ephraim Sykes is a 64 y o  male  Mariama Worley is here today for three-month PCP follow-up of chronic conditions  PMHx significant for HIV, HCV SVR s/p treatment, cirrhosis,HTN,hyperlipidemia, and NSTEMI in January 2019 with cardiac intervention and stent placed  Mariama Worley is complaining of continued area of hair loss and now new open comedones on left leg  This area was previously the site of an abscess treated with antibiotic and has resolved  Mariama Worley continues to  Self debride the area and states there is no longer a palpable abscess, but he is concerned about ongoing hair loss  He is questioning if he needs further work up  Mariama Worley is also c/o   Diarrhea and abdominal pain  He is unsure this is due to medication or food intake  Mariama Worley has a history diverticulitis  He was recommended to use fiber daily PCP visit but he has the supplement is difficult to take on a daily basis due to taste  Mariama Worley did notice an improvement in stool pattern when use the fiber consistently  Reviewed previous instructions with patient today  The following portions of the patient's history were reviewed and updated as appropriate: allergies, current medications, past family history, past medical history, past social history, past surgical history and problem list     Review of Systems   Constitutional: Negative for chills and fever  HENT: Negative for ear pain and sore throat  Eyes: Negative for pain and visual disturbance  Respiratory: Negative for cough and shortness of breath  Cardiovascular: Negative for chest pain and palpitations  Gastrointestinal: Positive for diarrhea  Negative for vomiting  Genitourinary: Negative for dysuria and hematuria  Musculoskeletal: Negative for arthralgias and back pain  Skin: Negative for color change and rash  Neurological: Negative for seizures and syncope  All other systems reviewed and are negative  Objective:      /65   Pulse 74   Temp 98 2 °F (36 8 °C)   Ht 5' 7" (1 702 m)   Wt 90 8 kg (200 lb 3 2 oz)   SpO2 95%   BMI 31 36 kg/m²       Lab Results   Component Value Date     03/18/2017    K 3 8 06/12/2021     06/12/2021    CO2 28 06/12/2021    BUN 23 06/12/2021    CREATININE 0 99 06/12/2021    GLUF 108 (H) 06/12/2021    CALCIUM 8 7 06/12/2021    AST 25 06/12/2021    ALT 27 06/12/2021    ALKPHOS 107 06/12/2021    PROT 7 4 03/18/2017    BILITOT 1 1 03/18/2017    EGFR 82 06/12/2021     Lab Results   Component Value Date    WBC 6 47 06/12/2021    WBC 6 5 06/12/2021    HGB 17 3 (H) 06/12/2021    HGB 17 6 06/12/2021    HCT 51 6 (H) 06/12/2021    HCT 50 0 06/12/2021    MCV 88 06/12/2021    MCV 85 06/12/2021     06/12/2021     06/12/2021     No results found for: HEPCAB  Lab Results   Component Value Date    HEPBCAB REACTIVE (A) 10/08/2015    HEPBCAB REACTIVE (A) 10/08/2015    HEPBCAB REACTIVE (A) 10/08/2015    HEPBCAB REACTIVE (A) 10/08/2015     Lab Results   Component Value Date    RPR Non-Reactive 03/15/2021            Physical Exam  Constitutional:       General: He is not in acute distress  Appearance: He is well-developed  HENT:      Head: Normocephalic  Right Ear: External ear normal       Left Ear: External ear normal       Nose: Nose normal       Mouth/Throat:      Pharynx: No oropharyngeal exudate  Eyes:      General:         Right eye: No discharge  Left eye: No discharge  Conjunctiva/sclera: Conjunctivae normal       Pupils: Pupils are equal, round, and reactive to light  Neck:      Thyroid: No thyromegaly  Cardiovascular:      Rate and Rhythm: Normal rate and regular rhythm  Heart sounds: Normal heart sounds  No murmur heard       Pulmonary:      Effort: Pulmonary effort is normal  Breath sounds: Normal breath sounds  No wheezing  Abdominal:      General: Bowel sounds are normal       Palpations: Abdomen is soft  There is no mass  Tenderness: There is no abdominal tenderness  Musculoskeletal:         General: No tenderness  Normal range of motion  Cervical back: Normal range of motion  Lymphadenopathy:      Cervical: No cervical adenopathy  Skin:     General: Skin is warm and dry  Findings: No rash  Neurological:      Mental Status: He is alert and oriented to person, place, and time     Psychiatric:         Behavior: Behavior normal

## 2021-08-19 ENCOUNTER — PATIENT OUTREACH (OUTPATIENT)
Dept: SURGERY | Facility: OTHER | Age: 61
End: 2021-08-19

## 2021-08-19 NOTE — PROGRESS NOTES
CT DENNIS Bustos received ct's MAWD bill for August  Cm noticed the ct had an  auth and requested a new auth  Please see scanned media

## 2021-08-20 ENCOUNTER — PATIENT OUTREACH (OUTPATIENT)
Dept: SURGERY | Facility: OTHER | Age: 61
End: 2021-08-20

## 2021-08-20 NOTE — PROGRESS NOTES
CT MROR    Cm prepared ct's August MAWD check req  And submitted to Supervisor to be processed  Please see scanned media

## 2021-08-23 ENCOUNTER — PATIENT OUTREACH (OUTPATIENT)
Dept: SURGERY | Facility: OTHER | Age: 61
End: 2021-08-23

## 2021-08-23 NOTE — PROGRESS NOTES
CODY COLLINS    During the [facility] meeting the ct was talked about   This ct will be returning to care with the [address] [place] [address] since he has been cleared by his cardiologist

## 2021-08-24 ENCOUNTER — APPOINTMENT (OUTPATIENT)
Dept: LAB | Facility: HOSPITAL | Age: 61
End: 2021-08-24
Attending: INTERNAL MEDICINE
Payer: COMMERCIAL

## 2021-08-24 DIAGNOSIS — B20 HIV DISEASE (HCC): ICD-10-CM

## 2021-08-24 LAB
ALBUMIN SERPL BCP-MCNC: 3.8 G/DL (ref 3.5–5)
ALP SERPL-CCNC: 91 U/L (ref 46–116)
ALT SERPL W P-5'-P-CCNC: 22 U/L (ref 12–78)
ANION GAP SERPL CALCULATED.3IONS-SCNC: 10 MMOL/L (ref 4–13)
AST SERPL W P-5'-P-CCNC: 21 U/L (ref 5–45)
BASOPHILS # BLD AUTO: 0.06 THOUSANDS/ΜL (ref 0–0.1)
BASOPHILS NFR BLD AUTO: 1 % (ref 0–1)
BILIRUB SERPL-MCNC: 0.6 MG/DL (ref 0.2–1)
BUN SERPL-MCNC: 21 MG/DL (ref 5–25)
CALCIUM SERPL-MCNC: 8.8 MG/DL (ref 8.3–10.1)
CHLORIDE SERPL-SCNC: 103 MMOL/L (ref 100–108)
CO2 SERPL-SCNC: 24 MMOL/L (ref 21–32)
CREAT SERPL-MCNC: 1.13 MG/DL (ref 0.6–1.3)
EOSINOPHIL # BLD AUTO: 0.19 THOUSAND/ΜL (ref 0–0.61)
EOSINOPHIL NFR BLD AUTO: 3 % (ref 0–6)
ERYTHROCYTE [DISTWIDTH] IN BLOOD BY AUTOMATED COUNT: 14.8 % (ref 11.6–15.1)
GFR SERPL CREATININE-BSD FRML MDRD: 70 ML/MIN/1.73SQ M
GLUCOSE P FAST SERPL-MCNC: 129 MG/DL (ref 65–99)
HCT VFR BLD AUTO: 53.5 % (ref 36.5–49.3)
HGB BLD-MCNC: 17.6 G/DL (ref 12–17)
IMM GRANULOCYTES # BLD AUTO: 0.03 THOUSAND/UL (ref 0–0.2)
IMM GRANULOCYTES NFR BLD AUTO: 1 % (ref 0–2)
LYMPHOCYTES # BLD AUTO: 1.1 THOUSANDS/ΜL (ref 0.6–4.47)
LYMPHOCYTES NFR BLD AUTO: 17 % (ref 14–44)
MCH RBC QN AUTO: 29.1 PG (ref 26.8–34.3)
MCHC RBC AUTO-ENTMCNC: 32.9 G/DL (ref 31.4–37.4)
MCV RBC AUTO: 88 FL (ref 82–98)
MONOCYTES # BLD AUTO: 0.79 THOUSAND/ΜL (ref 0.17–1.22)
MONOCYTES NFR BLD AUTO: 12 % (ref 4–12)
NEUTROPHILS # BLD AUTO: 4.4 THOUSANDS/ΜL (ref 1.85–7.62)
NEUTS SEG NFR BLD AUTO: 66 % (ref 43–75)
NRBC BLD AUTO-RTO: 0 /100 WBCS
PLATELET # BLD AUTO: 226 THOUSANDS/UL (ref 149–390)
PMV BLD AUTO: 9.8 FL (ref 8.9–12.7)
POTASSIUM SERPL-SCNC: 3.8 MMOL/L (ref 3.5–5.3)
PROT SERPL-MCNC: 8 G/DL (ref 6.4–8.2)
RBC # BLD AUTO: 6.05 MILLION/UL (ref 3.88–5.62)
SODIUM SERPL-SCNC: 137 MMOL/L (ref 136–145)
WBC # BLD AUTO: 6.57 THOUSAND/UL (ref 4.31–10.16)

## 2021-08-24 PROCEDURE — 87906 NFCT AGT GNTYP ALYS HIV1: CPT

## 2021-08-24 PROCEDURE — 87536 HIV-1 QUANT&REVRSE TRNSCRPJ: CPT

## 2021-08-24 PROCEDURE — 85025 COMPLETE CBC W/AUTO DIFF WBC: CPT

## 2021-08-24 PROCEDURE — 80053 COMPREHEN METABOLIC PANEL: CPT

## 2021-08-24 PROCEDURE — 87900 PHENOTYPE INFECT AGENT DRUG: CPT

## 2021-08-24 PROCEDURE — 36415 COLL VENOUS BLD VENIPUNCTURE: CPT

## 2021-08-24 PROCEDURE — 87901 NFCT AGT GNTYP ALYS HIV1 REV: CPT

## 2021-08-26 LAB
HIV1 RNA # SERPL NAA+PROBE: 30 COPIES/ML
HIV1 RNA SERPL NAA+PROBE-LOG#: 1.48 LOG10COPY/ML

## 2021-08-30 NOTE — PROGRESS NOTES
Pastoral Care Progress Note    2021  Patient: Carla Croft : 1960  Encounter Date & Time: 2021   MRN: [id number]      The  met with Mr lEsie Solis to maintain a relationship of care and to conduct a spiritual assessment  Mr Elsie Solis shared he was well and agreed to do an assessment with the   Mr Elsie Solis main source of hope, strength, comfort and peace comes from his relationship with his family  He shared his daughter keeps him on track regarding his health and wellbeing  He expressed gratefulness for his family's support and turns to family during difficulty  He advised family gives him a reason to be to live  Mr Elsie Solis is Jehovah's witness but does not go to mass  Sharing, "I don't need to go to Holiness to be close to God "  Mr Elsie Solis reports God is the foundation and his lizeth informs his values and helps him to remain connected to life and humanity  "I believe my relationship with God is good "  Mr Elsie Solis reports his HIV diagnosis brought him closer to God  He stays connected to God through prayer and the love he demonstrates to others  Mr Elsie Solis advised there are no conflicts between his beliefs and decisions about medical care  He declined completing an Advanced Directive because he felt he might jinx himself  However, he would like to speak to the  in the future about being more patient in his life  SPIRITUAL CARE ACTION PLAN      To promote a renewed sense of hope in my life I must:  (Patient is not sure and need more time to think about it )      I need to accept: To be more compassionate with myself and accept where I am in the moment        "Where I am in life medically, right now this is where I am and how it is going to be "       Identify the relationships in my life that are positive and those which are destructive:    Positive: Family Negative: None at this time      Identify the problem areas of my life, and then determine what course of action I need to take to bring balance back into my life:    I must continue to improve my communication with my family  I must engage them about my struggles  I must learn to have self compassion  08/17/21 [address]  (Does not attend a parish  )   Current Jain Involvement Patient not active with Anabaptism   Spiritual Beliefs/Perceptions   Concept of God Accepting   God's Role in Disease Natural   Relationship with God Close   Unmet Spiritual Needs None mentioned   Conflicts/Concerns   (Maintaining a sense of self awareness)   Spiritual Strengths   (Connections with family and God )   Support Systems Children;Spouse/significant other   Psychosocial   Psychosocial (WDL) WDL   Length of Time/Family Visitation 31 min -1hr   Stress Factors   Patient Stress Factors None identified; Other (Comment)  ("I am in a good place  ")   Coping Responses   Patient Coping Accepting;Open/discussion   Patient Spiritual Assessment   Fear of Death and Unknown   (Did not want to fill out 5 Wishes )   Feelings of Well Being   (Expressed feelings of gabe, peace and stability  )   Plan of [facility] Initiated Yes   Provide Spiritual Support (Visits) 1 time   Comments   (Will send copy of assessment  )   Assessment Completed by: Unit visit; Other (Comment)  (Spiritual Assessment)      08/17/21 1600   Clinical Encounter Type   Visited With Patient   Routine Visit Follow-up   Continue Visiting Yes  (As needed  )   Zoroastrianism Encounters   Zoroastrianism Needs   (Talked about his Sabianist lizeth)   Patient Spiritual Encounters   Spiritual Assessment 5   Coping 5   Grief Resolution 5   Spiritual Encounter Notes See progress note

## 2021-09-01 LAB
HIV RT+PR MUT DET ISLT: NORMAL
HIV RT+PR MUT DET ISLT: NORMAL

## 2021-09-08 LAB — MISCELLANEOUS LAB TEST RESULT: NORMAL

## 2021-09-20 ENCOUNTER — PATIENT OUTREACH (OUTPATIENT)
Dept: SURGERY | Facility: OTHER | Age: 61
End: 2021-09-20

## 2021-09-20 NOTE — PROGRESS NOTES
CT MROR    Ct received ct's September MAASHLEY bill  Cm worked on ct's check requisition September [employer]ASHLEY packet  Cm e-mailed supervisor the September MAWD bill and check req  To be processed  Please see scanned media

## 2021-09-28 ENCOUNTER — OFFICE VISIT (OUTPATIENT)
Dept: SURGERY | Facility: CLINIC | Age: 61
End: 2021-09-28
Payer: COMMERCIAL

## 2021-09-28 VITALS
SYSTOLIC BLOOD PRESSURE: 137 MMHG | DIASTOLIC BLOOD PRESSURE: 72 MMHG | OXYGEN SATURATION: 96 % | WEIGHT: 204 LBS | HEART RATE: 81 BPM | BODY MASS INDEX: 32.02 KG/M2 | TEMPERATURE: 97.5 F | HEIGHT: 67 IN

## 2021-09-28 DIAGNOSIS — D75.1 POLYCYTHEMIA: ICD-10-CM

## 2021-09-28 DIAGNOSIS — I10 ESSENTIAL HYPERTENSION: ICD-10-CM

## 2021-09-28 DIAGNOSIS — T65.292D TOXIC EFFECT OF TOBACCO, INTENTIONAL SELF-HARM, SUBSEQUENT ENCOUNTER: ICD-10-CM

## 2021-09-28 DIAGNOSIS — K74.69 OTHER CIRRHOSIS OF LIVER (HCC): ICD-10-CM
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
## 2021-09-28 DIAGNOSIS — B20 HIV DISEASE (HCC): Primary | ICD-10-CM

## 2021-09-28 DIAGNOSIS — R91.1 LUNG NODULE: ICD-10-CM

## 2021-09-28 DIAGNOSIS — Z23 NEED FOR INFLUENZA VACCINATION: ICD-10-CM

## 2021-09-28 PROCEDURE — 99215 OFFICE O/P EST HI 40 MIN: CPT | Performed by: INTERNAL MEDICINE

## 2021-09-28 NOTE — PROGRESS NOTES
Progress Note - Infectious Disease   Med Early 64 y o  male MRN: [id number]  Unit/Bed#:  Encounter: [id number]      Impression/Plan:  1   HIV- now adherent with ART and his viral load has decreased to 30  His CD4 count is 152  Will continue the Tivicay, Descovy, Prezcobix  He is now back to taking his ART  Genotype does not reveal any new resistance  Continue ART, recheck labs in 2 months, follow-up in 3 months  Stressed adherence      2  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  Right upper quadrant ultrasound pending, and the alpha fetoprotein negative on the last check   Continue Q 6 month hepatocellular carcinoma screening      3   Polycythemia-likely secondary to the nicotine dependence   Seen by Hematology Oncology who agree with my assessment   Will continue to monitor the CBC with diff now that he is no longer smoking     4   Hypertension-asymptomatic and stable and reasonably controlled   Continue monitor     5  Coronary artery disease-complicated by acute MI status post PCI   Now asymptomatic   Follow up with Cardiology      6  Nicotine dependence-patient continues to smoke occasionally but has decreased his tobacco use   He will continue to try to completely quit  He is using nicotine patches      7  Lung nodule-repeat CT scan  annually  8  Tick bite-followed by rash  Tick was only in place for less than 30 minutes and the rash was extremely small  No real risk of any tick-borne infection      Patient was provided medication, adherence and prevention education    Subjective:  Routine follow-up for HIV  Patient claims 100% adherence with   Tivicay, Descovy, Prezcobix   Patient denies any notable side effects  Overall the feeling well  The patient denies any fever chills or sweats, denies any nausea vomiting or diarrhea, denies any cough or shortness of breath      ROS:  A complete review of systems is negative other than that noted above in the subjective    Followup portions patient history reviewed and updated as: Allergies, current medications, past medical history, past social history, past surgical history, and the problem list    Objective:  Vitals:  Vitals:    09/28/21 1631   BP: 137/72   Pulse: 81   Temp: 97 5 °F (36 4 °C)   SpO2: 96%   Weight: 92 5 kg (204 lb)   Height: 5' 7" (1 702 m)       Physical Exam:   General Appearance:  Alert, interactive, appearing well,  nontoxic, no acute distress  Neck:   Supple without lymphadenopathy, no thyromegaly or masses   Throat: Oropharynx moist without lesions  Lungs:   Clear to auscultation bilaterally; no wheezes, rhonchi or rales; respirations unlabored   Heart:  RRR; no murmur, rub or gallop   Abdomen:   Soft, non-tender, non-distended, positive bowel sounds  Extremities: No clubbing, cyanosis or edema   Skin: No new rashes or lesions  No draining wounds noted         Labs, Imaging, & Other studies:   All pertinent labs and imaging studies were personally reviewed    Lab Results   Component Value Date     03/18/2017    K 3 8 08/24/2021     08/24/2021    CO2 24 08/24/2021    BUN 21 08/24/2021    CREATININE 1 13 08/24/2021    GLUF 129 (H) 08/24/2021    CALCIUM 8 8 08/24/2021    AST 21 08/24/2021    ALT 22 08/24/2021    ALKPHOS 91 08/24/2021    PROT 7 4 03/18/2017    BILITOT 1 1 03/18/2017    EGFR 70 08/24/2021     Lab Results   Component Value Date    WBC 6 57 08/24/2021    HGB 17 6 (H) 08/24/2021    HCT 53 5 (H) 08/24/2021    MCV 88 08/24/2021     08/24/2021     No results found for: HEPCAB  Lab Results   Component Value Date    HEPBCAB REACTIVE (A) 10/08/2015    HEPBCAB REACTIVE (A) 10/08/2015    HEPBCAB REACTIVE (A) 10/08/2015    HEPBCAB REACTIVE (A) 10/08/2015     Lab Results   Component Value Date    RPR Non-Reactive 03/15/2021     CD4 ABS   Date/Time Value Ref Range Status   06/12/2021 07:04  (L) 359 - 1519 /uL Final     HIV-1 RNA by PCR, Qn   Date/Time Value Ref Range Status   08/24/2021 07:55 AM 30 copies/mL Final     Comment:     The reportable range for this assay is 20 to 10,000,000  copies HIV-1 RNA/mL             Current Outpatient Medications:     albuterol (VENTOLIN HFA) 90 mcg/act inhaler, Inhale 2 puffs every 6 (six) hours as needed for wheezing, Disp: 18 g, Rfl: 0    aspirin (ECOTRIN LOW STRENGTH) 81 mg EC tablet, Take 1 tablet (81 mg total) by mouth daily, Disp: 30 tablet, Rfl: 5    cetirizine (ZyrTEC) 10 mg tablet, Take 1 tablet (10 mg total) by mouth daily, Disp: 30 tablet, Rfl: 5    clopidogrel (PLAVIX) 75 mg tablet, Take 1 tablet (75 mg total) by mouth daily, Disp: 30 tablet, Rfl: 5    Darunavir-Cobicistat (Prezcobix) 800-150 MG TABS, Take 1 tablet by mouth daily, Disp: 30 tablet, Rfl: 5    dolutegravir (Tivicay) 50 MG TABS, Take 1 tablet (50 mg total) by mouth 2 (two) times a day, Disp: 60 tablet, Rfl: 5    emtricitabine-tenofovir AF (Descovy) 200-25 MG tablet, Take 1 tablet by mouth daily, Disp: 30 tablet, Rfl: 5    losartan-hydrochlorothiazide (HYZAAR) 100-25 MG per tablet, Take 1 tablet by mouth daily, Disp: 30 tablet, Rfl: 2    metoprolol succinate (TOPROL-XL) 25 mg 24 hr tablet, Take 1 tablet (25 mg total) by mouth daily, Disp: 30 tablet, Rfl: 5    nitroglycerin (NITROSTAT) 0 4 mg SL tablet, Place 1 tablet (0 4 mg total) under the tongue every 5 (five) minutes as needed for chest pain, Disp: 15 tablet, Rfl: 0    pantoprazole (PROTONIX) 40 mg tablet, Take 1 tablet (40 mg total) by mouth 2 (two) times a day, Disp: 60 tablet, Rfl: 5    rosuvastatin (CRESTOR) 20 MG tablet, Take 1 tablet (20 mg total) by mouth daily, Disp: 30 tablet, Rfl: 5    nicotine (NICODERM CQ) 21 mg/24 hr TD 24 hr patch, Place 1 patch on the skin every 24 hours, Disp: 28 patch, Rfl: 2

## 2021-09-29 NOTE — PROGRESS NOTES
Pastoral Care Progress Note    2021  Patient: Devora Browning : 1960  Encounter Date & Time: 2021   MRN: [id number]        The  briefly checked on Ms Rodriguez to maintain a relationship of trust and support  He shared he was doing well and did not have any spiritual needs for the  to address at this time  Further support as needed

## 2021-10-21 ENCOUNTER — PATIENT OUTREACH (OUTPATIENT)
Dept: SURGERY | Facility: CLINIC | Age: 61
End: 2021-10-21

## 2021-10-25 ENCOUNTER — TELEPHONE (OUTPATIENT)
Dept: SURGERY | Facility: CLINIC | Age: 61
End: 2021-10-25

## 2021-10-25 ENCOUNTER — PATIENT OUTREACH (OUTPATIENT)
Dept: SURGERY | Facility: CLINIC | Age: 61
End: 2021-10-25

## 2021-10-26 DIAGNOSIS — H44.009 EYE INFECTION, UNSPECIFIED LATERALITY: Primary | ICD-10-CM

## 2021-10-26 RX ORDER — NEOMYCIN SULFATE, POLYMYXIN B SULFATE, BACITRACIN ZINC, HYDROCORTISONE 3.5; 10000; 400; 1 MG/G; [USP'U]/G; [USP'U]/G; MG/G
OINTMENT OPHTHALMIC 3 TIMES DAILY
Qty: 3.5 G | Refills: 0 | Status: SHIPPED | OUTPATIENT
Start: 2021-10-26 | End: 2022-04-08 | Stop reason: ALTCHOICE

## 2021-11-16 ENCOUNTER — OFFICE VISIT (OUTPATIENT)
Dept: SURGERY | Facility: CLINIC | Age: 61
End: 2021-11-16
Payer: COMMERCIAL

## 2021-11-16 VITALS
TEMPERATURE: 97.8 F | SYSTOLIC BLOOD PRESSURE: 130 MMHG | HEIGHT: 67 IN | BODY MASS INDEX: 32.21 KG/M2 | WEIGHT: 205.2 LBS | OXYGEN SATURATION: 95 % | HEART RATE: 91 BPM | DIASTOLIC BLOOD PRESSURE: 83 MMHG

## 2021-11-16 DIAGNOSIS — I10 ESSENTIAL HYPERTENSION: ICD-10-CM

## 2021-11-16 DIAGNOSIS — H00.11 CHALAZION OF RIGHT UPPER EYELID: ICD-10-CM

## 2021-11-16 DIAGNOSIS — K21.9 GASTROESOPHAGEAL REFLUX DISEASE WITHOUT ESOPHAGITIS: ICD-10-CM

## 2021-11-16 DIAGNOSIS — E78.2 MIXED HYPERLIPIDEMIA: ICD-10-CM

## 2021-11-16 DIAGNOSIS — I25.10 CORONARY ARTERY DISEASE INVOLVING NATIVE CORONARY ARTERY OF NATIVE HEART WITHOUT ANGINA PECTORIS: ICD-10-CM

## 2021-11-16 DIAGNOSIS — I21.4 NSTEMI (NON-ST ELEVATED MYOCARDIAL INFARCTION) (HCC): ICD-10-CM

## 2021-11-16 DIAGNOSIS — J30.2 SEASONAL ALLERGIES: ICD-10-CM

## 2021-11-16 DIAGNOSIS — T65.292D TOXIC EFFECT OF TOBACCO, INTENTIONAL SELF-HARM, SUBSEQUENT ENCOUNTER: ICD-10-CM

## 2021-11-16 DIAGNOSIS — H10.33 ACUTE CONJUNCTIVITIS OF BOTH EYES, UNSPECIFIED ACUTE CONJUNCTIVITIS TYPE: Primary | ICD-10-CM

## 2021-11-16 DIAGNOSIS — Z23 NEED FOR TDAP VACCINATION: ICD-10-CM

## 2021-11-16 DIAGNOSIS — B20 HIV DISEASE (HCC): ICD-10-CM

## 2021-11-16 DIAGNOSIS — B20 HIV (HUMAN IMMUNODEFICIENCY VIRUS INFECTION) (HCC): ICD-10-CM

## 2021-11-16 DIAGNOSIS — I25.2 HISTORY OF NON-ST ELEVATION MYOCARDIAL INFARCTION (NSTEMI): ICD-10-CM

## 2021-11-16 DIAGNOSIS — H00.14 CHALAZION OF LEFT UPPER EYELID: ICD-10-CM

## 2021-11-16 PROBLEM — H00.13 CHALAZION OF RIGHT EYE: Status: ACTIVE | Noted: 2021-11-16

## 2021-11-16 PROBLEM — H00.16 CHALAZION OF LEFT EYE: Status: ACTIVE | Noted: 2021-11-16

## 2021-11-16 PROCEDURE — 90471 IMMUNIZATION ADMIN: CPT

## 2021-11-16 PROCEDURE — 99214 OFFICE O/P EST MOD 30 MIN: CPT | Performed by: NURSE PRACTITIONER

## 2021-11-16 PROCEDURE — 90682 RIV4 VACC RECOMBINANT DNA IM: CPT

## 2021-11-16 PROCEDURE — 90472 IMMUNIZATION ADMIN EACH ADD: CPT

## 2021-11-16 PROCEDURE — 90715 TDAP VACCINE 7 YRS/> IM: CPT

## 2021-11-16 RX ORDER — METOPROLOL SUCCINATE 25 MG/1
25 TABLET, EXTENDED RELEASE ORAL DAILY
Qty: 30 TABLET | Refills: 5 | Status: SHIPPED | OUTPATIENT
Start: 2021-11-16 | End: 2022-02-15 | Stop reason: SDUPTHER

## 2021-11-16 RX ORDER — ASPIRIN 81 MG/1
81 TABLET ORAL DAILY
Qty: 30 TABLET | Refills: 5 | Status: SHIPPED | OUTPATIENT
Start: 2021-11-16 | End: 2022-02-15 | Stop reason: SDUPTHER

## 2021-11-16 RX ORDER — CLOPIDOGREL BISULFATE 75 MG/1
75 TABLET ORAL DAILY
Qty: 30 TABLET | Refills: 5 | Status: SHIPPED | OUTPATIENT
Start: 2021-11-16 | End: 2022-02-15 | Stop reason: SDUPTHER

## 2021-11-16 RX ORDER — POLYETHYLENE GLYCOL 3350 17 G
4 POWDER IN PACKET (EA) ORAL AS NEEDED
Qty: 100 EACH | Refills: 0 | Status: SHIPPED | OUTPATIENT
Start: 2021-11-16 | End: 2021-12-06

## 2021-11-16 RX ORDER — MOXIFLOXACIN 5 MG/ML
1 SOLUTION/ DROPS OPHTHALMIC 3 TIMES DAILY
Qty: 3 ML | Refills: 0 | Status: SHIPPED | OUTPATIENT
Start: 2021-11-16 | End: 2022-02-15 | Stop reason: HOSPADM

## 2021-11-16 RX ORDER — LOSARTAN POTASSIUM AND HYDROCHLOROTHIAZIDE 25; 100 MG/1; MG/1
1 TABLET ORAL DAILY
Qty: 30 TABLET | Refills: 2 | Status: SHIPPED | OUTPATIENT
Start: 2021-11-16 | End: 2022-02-15 | Stop reason: HOSPADM

## 2021-11-16 RX ORDER — EMTRICITABINE AND TENOFOVIR ALAFENAMIDE 200; 25 MG/1; MG/1
1 TABLET ORAL DAILY
Qty: 30 TABLET | Refills: 5 | Status: SHIPPED | OUTPATIENT
Start: 2021-11-16 | End: 2022-02-15 | Stop reason: SDUPTHER

## 2021-11-16 RX ORDER — ROSUVASTATIN CALCIUM 20 MG/1
20 TABLET, COATED ORAL DAILY
Qty: 30 TABLET | Refills: 5 | Status: SHIPPED | OUTPATIENT
Start: 2021-11-16 | End: 2022-02-15 | Stop reason: SDUPTHER

## 2021-11-16 RX ORDER — DOLUTEGRAVIR SODIUM 50 MG/1
50 TABLET, FILM COATED ORAL 2 TIMES DAILY
Qty: 60 TABLET | Refills: 5 | Status: SHIPPED | OUTPATIENT
Start: 2021-11-16 | End: 2022-02-15 | Stop reason: SDUPTHER

## 2021-11-16 RX ORDER — NEOMYCIN SULFATE, POLYMYXIN B SULFATE, AND DEXAMETHASONE 3.5; 10000; 1 MG/G; [USP'U]/G; MG/G
OINTMENT OPHTHALMIC
COMMUNITY
Start: 2021-10-26 | End: 2022-02-15 | Stop reason: HOSPADM

## 2021-11-16 RX ORDER — DARUNAVIR ETHANOLATE AND COBICISTAT 800; 150 MG/1; MG/1
1 TABLET, FILM COATED ORAL DAILY
Qty: 30 TABLET | Refills: 5 | Status: SHIPPED | OUTPATIENT
Start: 2021-11-16 | End: 2022-02-15 | Stop reason: SDUPTHER

## 2021-11-16 RX ORDER — PANTOPRAZOLE SODIUM 40 MG/1
40 TABLET, DELAYED RELEASE ORAL 2 TIMES DAILY
Qty: 60 TABLET | Refills: 5 | Status: SHIPPED | OUTPATIENT
Start: 2021-11-16 | End: 2022-02-15 | Stop reason: SDUPTHER

## 2021-11-16 RX ORDER — NICOTINE 21 MG/24HR
1 PATCH, TRANSDERMAL 24 HOURS TRANSDERMAL EVERY 24 HOURS
Qty: 28 PATCH | Refills: 2 | Status: SHIPPED | OUTPATIENT
Start: 2021-11-16 | End: 2022-01-21

## 2021-11-16 RX ORDER — CETIRIZINE HYDROCHLORIDE 10 MG/1
10 TABLET ORAL DAILY
Qty: 30 TABLET | Refills: 5 | Status: SHIPPED | OUTPATIENT
Start: 2021-11-16 | End: 2022-02-15 | Stop reason: SDUPTHER

## 2021-11-18 ENCOUNTER — PATIENT OUTREACH (OUTPATIENT)
Dept: SURGERY | Facility: CLINIC | Age: 61
End: 2021-11-18

## 2021-11-24 ENCOUNTER — PATIENT OUTREACH (OUTPATIENT)
Dept: SURGERY | Facility: CLINIC | Age: 61
End: 2021-11-24

## 2021-11-26 ENCOUNTER — PATIENT OUTREACH (OUTPATIENT)
Dept: SURGERY | Facility: CLINIC | Age: 61
End: 2021-11-26

## 2021-12-03 DIAGNOSIS — T65.292D TOXIC EFFECT OF TOBACCO, INTENTIONAL SELF-HARM, SUBSEQUENT ENCOUNTER: ICD-10-CM

## 2021-12-06 RX ORDER — POLYETHYLENE GLYCOL 3350 17 G
POWDER IN PACKET (EA) ORAL
Qty: 72 LOZENGE | Refills: 3 | Status: SHIPPED | OUTPATIENT
Start: 2021-12-06 | End: 2022-02-15 | Stop reason: SDUPTHER

## 2021-12-22 ENCOUNTER — CLINICAL SUPPORT (OUTPATIENT)
Dept: DENTISTRY | Facility: CLINIC | Age: 61
End: 2021-12-22

## 2021-12-22 VITALS — DIASTOLIC BLOOD PRESSURE: 82 MMHG | HEART RATE: 71 BPM | TEMPERATURE: 97.8 F | SYSTOLIC BLOOD PRESSURE: 124 MMHG

## 2021-12-22 DIAGNOSIS — Z01.20 ENCOUNTER FOR DENTAL EXAM AND CLEANING W/O ABNORMAL FINDINGS: Primary | ICD-10-CM

## 2021-12-22 PROCEDURE — D0220 INTRAORAL - PERIAPICAL FIRST RADIOGRAPHIC IMAGE: HCPCS

## 2021-12-22 PROCEDURE — D1110 PROPHYLAXIS - ADULT: HCPCS

## 2021-12-22 PROCEDURE — D0120 PERIODIC ORAL EVALUATION - ESTABLISHED PATIENT: HCPCS | Performed by: DENTIST

## 2021-12-27 ENCOUNTER — PATIENT OUTREACH (OUTPATIENT)
Dept: SURGERY | Facility: CLINIC | Age: 61
End: 2021-12-27

## 2021-12-29 ENCOUNTER — PATIENT OUTREACH (OUTPATIENT)
Dept: SURGERY | Facility: CLINIC | Age: 61
End: 2021-12-29

## 2022-01-04 ENCOUNTER — TELEPHONE (OUTPATIENT)
Dept: SURGERY | Facility: CLINIC | Age: 62
End: 2022-01-04

## 2022-01-04 NOTE — TELEPHONE ENCOUNTER
Pt is having meds shipped out to him on 01/06/22 and they have losartan- hydrochlorothiazide on back order  Pharmacy would like to know if they can exchange the medication to either valsartan hydrochlorothiazide   160-25mg or the irbesartan - hydrochlorothiazide 300-12 5 mg  What are your recommendations?

## 2022-01-14 ENCOUNTER — PATIENT OUTREACH (OUTPATIENT)
Dept: SURGERY | Facility: CLINIC | Age: 62
End: 2022-01-14

## 2022-01-14 ENCOUNTER — IMMUNIZATIONS (OUTPATIENT)
Dept: FAMILY MEDICINE CLINIC | Facility: HOSPITAL | Age: 62
End: 2022-01-14

## 2022-01-14 DIAGNOSIS — Z23 ENCOUNTER FOR IMMUNIZATION: Primary | ICD-10-CM

## 2022-01-14 PROCEDURE — 0001A COVID-19 PFIZER VACC 0.3 ML: CPT

## 2022-01-14 PROCEDURE — 91300 COVID-19 PFIZER VACC 0.3 ML: CPT

## 2022-01-20 DIAGNOSIS — T65.292D TOXIC EFFECT OF TOBACCO, INTENTIONAL SELF-HARM, SUBSEQUENT ENCOUNTER: ICD-10-CM

## 2022-01-20 DIAGNOSIS — I10 PRIMARY HYPERTENSION: Primary | ICD-10-CM

## 2022-01-21 RX ORDER — IRBESARTAN AND HYDROCHLOROTHIAZIDE 300; 12.5 MG/1; MG/1
TABLET, FILM COATED ORAL
Qty: 30 TABLET | Refills: 2 | Status: SHIPPED | OUTPATIENT
Start: 2022-01-21 | End: 2022-02-15 | Stop reason: SDUPTHER

## 2022-01-21 RX ORDER — NICOTINE 21 MG/24HR
1 PATCH, TRANSDERMAL 24 HOURS TRANSDERMAL EVERY 24 HOURS
Qty: 28 PATCH | Refills: 2 | Status: SHIPPED | OUTPATIENT
Start: 2022-01-21 | End: 2022-02-15 | Stop reason: SDUPTHER

## 2022-01-24 ENCOUNTER — PATIENT OUTREACH (OUTPATIENT)
Dept: SURGERY | Facility: CLINIC | Age: 62
End: 2022-01-24

## 2022-01-24 NOTE — PROGRESS NOTES
Ct sent this cm a copy of January [facility] bill      CM completed appropriate paperwork to address [facility] premium  Requisition check was signed  CM emailed bill to Garland Contreras was scanned into ct's chart

## 2022-01-25 ENCOUNTER — PATIENT OUTREACH (OUTPATIENT)
Dept: SURGERY | Facility: CLINIC | Age: 62
End: 2022-01-25

## 2022-01-25 NOTE — PROGRESS NOTES
Corrections were made to [facility] payment packet and re-sent to supervisor, Dalton Ferrer  Copy of [facility] documents were scanned into ct's chart

## 2022-02-07 ENCOUNTER — TELEPHONE (OUTPATIENT)
Dept: SURGERY | Facility: OTHER | Age: 62
End: 2022-02-07

## 2022-02-14 ENCOUNTER — TELEPHONE (OUTPATIENT)
Dept: SURGERY | Facility: OTHER | Age: 62
End: 2022-02-14

## 2022-02-14 NOTE — TELEPHONE ENCOUNTER
"CM rescheduled Re-Cert for next week  Ct agreed with Change "-2/10    Cm received a call from Ct to reschedule Re-cert for 3/58

## 2022-02-15 ENCOUNTER — OFFICE VISIT (OUTPATIENT)
Dept: SURGERY | Facility: CLINIC | Age: 62
End: 2022-02-15
Payer: COMMERCIAL

## 2022-02-15 VITALS
HEIGHT: 67 IN | WEIGHT: 208.6 LBS | TEMPERATURE: 98 F | HEART RATE: 80 BPM | BODY MASS INDEX: 32.74 KG/M2 | SYSTOLIC BLOOD PRESSURE: 144 MMHG | OXYGEN SATURATION: 93 % | DIASTOLIC BLOOD PRESSURE: 71 MMHG

## 2022-02-15 DIAGNOSIS — I10 PRIMARY HYPERTENSION: ICD-10-CM

## 2022-02-15 DIAGNOSIS — R07.89 ATYPICAL CHEST PAIN: ICD-10-CM

## 2022-02-15 DIAGNOSIS — J30.2 SEASONAL ALLERGIES: ICD-10-CM

## 2022-02-15 DIAGNOSIS — E78.2 MIXED HYPERLIPIDEMIA: ICD-10-CM

## 2022-02-15 DIAGNOSIS — K74.69 OTHER CIRRHOSIS OF LIVER (HCC): ICD-10-CM

## 2022-02-15 DIAGNOSIS — B20 HIV DISEASE (HCC): Primary | ICD-10-CM

## 2022-02-15 DIAGNOSIS — T65.292D TOXIC EFFECT OF TOBACCO, INTENTIONAL SELF-HARM, SUBSEQUENT ENCOUNTER: ICD-10-CM

## 2022-02-15 DIAGNOSIS — I10 ESSENTIAL HYPERTENSION: ICD-10-CM

## 2022-02-15 DIAGNOSIS — I25.2 HISTORY OF NON-ST ELEVATION MYOCARDIAL INFARCTION (NSTEMI): ICD-10-CM

## 2022-02-15 DIAGNOSIS — I21.4 NSTEMI (NON-ST ELEVATED MYOCARDIAL INFARCTION) (HCC): ICD-10-CM

## 2022-02-15 DIAGNOSIS — B20 HIV (HUMAN IMMUNODEFICIENCY VIRUS INFECTION) (HCC): ICD-10-CM

## 2022-02-15 DIAGNOSIS — K21.9 GASTROESOPHAGEAL REFLUX DISEASE WITHOUT ESOPHAGITIS: ICD-10-CM

## 2022-02-15 DIAGNOSIS — R05.9 COUGH: ICD-10-CM

## 2022-02-15 DIAGNOSIS — I25.10 CORONARY ARTERY DISEASE INVOLVING NATIVE CORONARY ARTERY OF NATIVE HEART WITHOUT ANGINA PECTORIS: ICD-10-CM

## 2022-02-15 PROBLEM — K52.9 CHRONIC DIARRHEA: Status: RESOLVED | Noted: 2020-06-09 | Resolved: 2022-02-15

## 2022-02-15 PROCEDURE — 99214 OFFICE O/P EST MOD 30 MIN: CPT | Performed by: NURSE PRACTITIONER

## 2022-02-15 RX ORDER — PANTOPRAZOLE SODIUM 40 MG/1
40 TABLET, DELAYED RELEASE ORAL 2 TIMES DAILY
Qty: 60 TABLET | Refills: 5 | Status: SHIPPED | OUTPATIENT
Start: 2022-02-15 | End: 2022-05-17 | Stop reason: HOSPADM

## 2022-02-15 RX ORDER — ASPIRIN 81 MG/1
81 TABLET ORAL DAILY
Qty: 30 TABLET | Refills: 5 | Status: SHIPPED | OUTPATIENT
Start: 2022-02-15 | End: 2022-05-17 | Stop reason: SDUPTHER

## 2022-02-15 RX ORDER — IRBESARTAN AND HYDROCHLOROTHIAZIDE 300; 12.5 MG/1; MG/1
1 TABLET, FILM COATED ORAL EVERY MORNING
Qty: 30 TABLET | Refills: 2 | Status: SHIPPED | OUTPATIENT
Start: 2022-02-15 | End: 2022-05-10

## 2022-02-15 RX ORDER — ROSUVASTATIN CALCIUM 20 MG/1
20 TABLET, COATED ORAL DAILY
Qty: 30 TABLET | Refills: 5 | Status: SHIPPED | OUTPATIENT
Start: 2022-02-15 | End: 2022-05-17 | Stop reason: SDUPTHER

## 2022-02-15 RX ORDER — POLYETHYLENE GLYCOL 3350 17 G
4 POWDER IN PACKET (EA) ORAL AS NEEDED
Qty: 72 LOZENGE | Refills: 3 | Status: SHIPPED | OUTPATIENT
Start: 2022-02-15 | End: 2022-06-13

## 2022-02-15 RX ORDER — EMTRICITABINE AND TENOFOVIR ALAFENAMIDE 200; 25 MG/1; MG/1
1 TABLET ORAL DAILY
Qty: 30 TABLET | Refills: 5 | Status: SHIPPED | OUTPATIENT
Start: 2022-02-15 | End: 2022-05-17 | Stop reason: SDUPTHER

## 2022-02-15 RX ORDER — CLOPIDOGREL BISULFATE 75 MG/1
75 TABLET ORAL DAILY
Qty: 30 TABLET | Refills: 5 | Status: SHIPPED | OUTPATIENT
Start: 2022-02-15 | End: 2022-04-08 | Stop reason: ALTCHOICE

## 2022-02-15 RX ORDER — DARUNAVIR ETHANOLATE AND COBICISTAT 800; 150 MG/1; MG/1
1 TABLET, FILM COATED ORAL DAILY
Qty: 30 TABLET | Refills: 5 | Status: SHIPPED | OUTPATIENT
Start: 2022-02-15 | End: 2022-02-17 | Stop reason: SDUPTHER

## 2022-02-15 RX ORDER — ALBUTEROL SULFATE 90 UG/1
2 AEROSOL, METERED RESPIRATORY (INHALATION) EVERY 6 HOURS PRN
Qty: 18 G | Refills: 0 | Status: SHIPPED | OUTPATIENT
Start: 2022-02-15 | End: 2022-03-21

## 2022-02-15 RX ORDER — CETIRIZINE HYDROCHLORIDE 10 MG/1
10 TABLET ORAL DAILY
Qty: 30 TABLET | Refills: 5 | Status: SHIPPED | OUTPATIENT
Start: 2022-02-15 | End: 2022-05-17 | Stop reason: SDUPTHER

## 2022-02-15 RX ORDER — DOLUTEGRAVIR SODIUM 50 MG/1
50 TABLET, FILM COATED ORAL 2 TIMES DAILY
Qty: 60 TABLET | Refills: 5 | Status: SHIPPED | OUTPATIENT
Start: 2022-02-15 | End: 2022-05-17 | Stop reason: SDUPTHER

## 2022-02-15 RX ORDER — NICOTINE 21 MG/24HR
1 PATCH, TRANSDERMAL 24 HOURS TRANSDERMAL EVERY 24 HOURS
Qty: 28 PATCH | Refills: 2 | Status: SHIPPED | OUTPATIENT
Start: 2022-02-15 | End: 2022-05-10

## 2022-02-15 RX ORDER — METOPROLOL SUCCINATE 25 MG/1
25 TABLET, EXTENDED RELEASE ORAL DAILY
Qty: 30 TABLET | Refills: 5 | Status: SHIPPED | OUTPATIENT
Start: 2022-02-15 | End: 2022-05-17 | Stop reason: SDUPTHER

## 2022-02-15 NOTE — PROGRESS NOTES
Assessment/Plan:    Essential hypertension  Blood pressure:   BP Readings from Last 3 Encounters:   02/15/22 144/71   21 124/82   21 130/83       Continue current antihypertensive  Educated on the following lifestyle modifications to lower BP and decrease cardiovascular risk factors  limit alcohol intake, reduce salt in diet, maintain a healthy weight, engage in 30 minutes of cardiovascular exercise daily, and not smoke  HIV disease ([facility] [address] )  No results found for: [id number]  CD4 ABS   Date/Time Value Ref Range Status   2021 07:04  (L) 359 - 1519 /uL Final     HIV-1 RNA by PCR, Qn   Date/Time Value Ref Range Status   2021 07:55 AM 30 copies/mL Final     Comment:     The reportable range for this assay is 20 to 10,000,000  copies HIV-1 RNA/mL  HIV-1 RNA Viral Load Log   Date/Time Value Ref Range Status   2021 07:55 AM 1 477 [id number]/mL Final         ART: Prezcobix,Tivicay , and Descovy        Denies side effects  Stressed the importance of adherence  Continue follow up with ID clinic  Reviewed most recent labs, including Cd4 and viral load  Discussed the risks and benefits of treatment options, instructions for management, importance of treatment adherence, and reduction of risk factor  Educated on possible  medication side effects  Counseled on routes of HIV transmission, including the risk of  infection  Emphasized that viral suppression is the best method to prevent HIV transmission  At this time pt denies the need for HIV testing of anyone in their life  Total encounter time was 45 minutes  Greater then 20 minutes were spent on counseling and patient education  Pt voices understanding and agreement with treatment plan  Toxic effect of tobacco and nicotine  Counseled for greater than 15 minutes on the importance of smoking cessation  Advised to quit  Educated on the increased risk of heart and lung disease associated with smoking  Other cirrhosis of liver (HCC)  Repeat AFP and RUQ US ordered for 6 month [place] [address]  surveillance  Educated on the importance of screening  Diagnoses and all orders for this visit:    HIV disease ([place] [address] )  -     Darunavir-Cobicistat (Prezcobix) 800-150 MG TABS; Take 1 tablet by mouth daily    Essential hypertension  -     metoprolol succinate (TOPROL-XL) 25 mg 24 hr tablet; Take 1 tablet (25 mg total) by mouth daily    Toxic effect of tobacco, intentional self-harm, subsequent encounter  -     nicotine polacrilex (COMMIT) 4 MG lozenge; Apply 1 lozenge (4 mg total) to the mouth or throat as needed for smoking cessation  -     nicotine (NICODERM CQ) 21 mg/24 hr TD 24 hr patch; Place 1 patch on the skin every 24 hours    History of non-ST elevation myocardial infarction (NSTEMI)  -     Ambulatory referral to Cardiology; Future  -     aspirin (ECOTRIN LOW STRENGTH) 81 mg EC tablet; Take 1 tablet (81 mg total) by mouth daily    Coronary artery disease involving native coronary artery of native heart without angina pectoris  -     Ambulatory referral to Cardiology; Future  -     aspirin (ECOTRIN LOW STRENGTH) 81 mg EC tablet; Take 1 tablet (81 mg total) by mouth daily    Atypical chest pain  -     Ambulatory referral to Cardiology; Future    Cough  -     albuterol (Ventolin HFA) 90 mcg/act inhaler; Inhale 2 puffs every 6 (six) hours as needed for wheezing    Seasonal allergies  -     cetirizine (ZyrTEC) 10 mg tablet; Take 1 tablet (10 mg total) by mouth daily    NSTEMI (non-ST elevated myocardial infarction) (HCC)  -     clopidogrel (PLAVIX) 75 mg tablet; Take 1 tablet (75 mg total) by mouth daily    HIV (human immunodeficiency virus infection) ([facility])  -     dolutegravir (Tivicay) 50 MG TABS; Take 1 tablet (50 mg total) by mouth 2 (two) times a day  -     emtricitabine-tenofovir AF (Descovy) 200-25 MG tablet;  Take 1 tablet by mouth daily    Primary hypertension  -     irbesartan-hydrochlorothiazide (AVALIDE) 300-12 5 MG per tablet; Take 1 tablet by mouth every morning    Gastroesophageal reflux disease without esophagitis  -     pantoprazole (PROTONIX) 40 mg tablet; Take 1 tablet (40 mg total) by mouth 2 (two) times a day    Mixed hyperlipidemia  -     rosuvastatin (CRESTOR) 20 MG tablet; Take 1 tablet (20 mg total) by mouth daily    Other cirrhosis of liver ([facility] [address] )  -     US right upper quadrant; Future  -     AFP tumor marker; Future          Subjective:      Patient ID: Shashi Solis is a 64 y o  male  Alethea Borges is here today for three-month PCP follow-up of chronic conditions  PMHx significant for HIV, HCV SVR s/p treatment, cirrhosis,HTN,hyperlipidemia, and NSTEMI in January 2019 with cardiac intervention and stent placed  Alethea Borges is complaining of continued  Chest pain  He describes intermittent chest pain of varying intensity that resolves in less then 5 minutes  He will use TUMS or nitro to relieve pain  He was referred to cardiology who recommended a repeat cath  Alethea Borges did not return to cardiology due to fear of having cath  He is now willing to reschedule with cardiology to discuss further treatment options due to contiuned pain  The following portions of the patient's history were reviewed and updated as appropriate: allergies, current medications, past family history, past medical history, past social history, past surgical history and problem list     Review of Systems   Constitutional: Negative for chills and fever  HENT: Negative for ear pain and sore throat  Eyes: Negative for pain and visual disturbance  Respiratory: Negative for cough and shortness of breath  Cardiovascular: Negative for chest pain and palpitations  Gastrointestinal: Negative for abdominal pain and vomiting  Genitourinary: Negative for dysuria and hematuria  Musculoskeletal: Negative for arthralgias and back pain  Skin: Negative for color change and rash  Neurological: Negative for seizures and syncope     All other systems reviewed and are negative  Objective:      /71   Pulse 80   Temp 98 °F (36 7 °C)   Ht 5' 7" (1 702 m)   Wt 94 6 kg (208 lb 9 6 oz)   SpO2 93%   BMI 32 67 kg/m²       Lab Results   Component Value Date     03/18/2017    K 3 8 08/24/2021     08/24/2021    CO2 24 08/24/2021    BUN 21 08/24/2021    CREATININE 1 13 08/24/2021    GLUF 129 (H) 08/24/2021    CALCIUM 8 8 08/24/2021    AST 21 08/24/2021    ALT 22 08/24/2021    ALKPHOS 91 08/24/2021    PROT 7 4 03/18/2017    BILITOT 1 1 03/18/2017    EGFR 70 08/24/2021     Lab Results   Component Value Date    WBC 6 57 08/24/2021    HGB 17 6 (H) 08/24/2021    HCT 53 5 (H) 08/24/2021    MCV 88 08/24/2021     08/24/2021     No results found for: HEPCAB  Lab Results   Component Value Date    HEPBCAB REACTIVE (A) 10/08/2015    HEPBCAB REACTIVE (A) 10/08/2015    HEPBCAB REACTIVE (A) 10/08/2015    HEPBCAB REACTIVE (A) 10/08/2015     Lab Results   Component Value Date    RPR Non-Reactive 03/15/2021          Physical Exam  Constitutional:       General: He is not in acute distress  Appearance: He is well-developed  HENT:      Head: Normocephalic  Right Ear: External ear normal       Left Ear: External ear normal       Nose: Nose normal       Mouth/Throat:      Pharynx: No oropharyngeal exudate  Eyes:      General:         Right eye: No discharge  Left eye: No discharge  Conjunctiva/sclera: Conjunctivae normal       Pupils: Pupils are equal, round, and reactive to light  Neck:      Thyroid: No thyromegaly  Cardiovascular:      Rate and Rhythm: Normal rate and regular rhythm  Heart sounds: Normal heart sounds  No murmur heard  Pulmonary:      Effort: Pulmonary effort is normal       Breath sounds: Normal breath sounds  No wheezing  Abdominal:      General: Bowel sounds are normal       Palpations: Abdomen is soft  There is no mass  Tenderness: There is no abdominal tenderness     Musculoskeletal: General: No tenderness  Normal range of motion  Cervical back: Normal range of motion  Lymphadenopathy:      Cervical: No cervical adenopathy  Skin:     General: Skin is warm and dry  Findings: No rash  Neurological:      Mental Status: He is alert and oriented to person, place, and time     Psychiatric:         Behavior: Behavior normal

## 2022-02-15 NOTE — PATIENT INSTRUCTIONS

## 2022-02-15 NOTE — ASSESSMENT & PLAN NOTE
Blood pressure:   BP Readings from Last 3 Encounters:   02/15/22 144/71   12/22/21 124/82   11/16/21 130/83       Continue current antihypertensive  Educated on the following lifestyle modifications to lower BP and decrease cardiovascular risk factors  limit alcohol intake, reduce salt in diet, maintain a healthy weight, engage in 30 minutes of cardiovascular exercise daily, and not smoke

## 2022-02-15 NOTE — ASSESSMENT & PLAN NOTE
No results found for: [id number]  CD4 ABS   Date/Time Value Ref Range Status   2021 07:04  (L) 359 - 1519 /uL Final     HIV-1 RNA by PCR, Qn   Date/Time Value Ref Range Status   2021 07:55 AM 30 copies/mL Final     Comment:     The reportable range for this assay is 20 to 10,000,000  copies HIV-1 RNA/mL  HIV-1 RNA Viral Load Log   Date/Time Value Ref Range Status   2021 07:55 AM 1 477 [id number]/mL Final         ART: Prezcobix,Tivicay , and Descovy        Denies side effects  Stressed the importance of adherence  Continue follow up with ID clinic  Reviewed most recent labs, including Cd4 and viral load  Discussed the risks and benefits of treatment options, instructions for management, importance of treatment adherence, and reduction of risk factor  Educated on possible  medication side effects  Counseled on routes of HIV transmission, including the risk of  infection  Emphasized that viral suppression is the best method to prevent HIV transmission  At this time pt denies the need for HIV testing of anyone in their life  Total encounter time was 45 minutes  Greater then 20 minutes were spent on counseling and patient education  Pt voices understanding and agreement with treatment plan

## 2022-02-15 NOTE — ASSESSMENT & PLAN NOTE
Repeat AFP and RUQ US ordered for 6 month [place] [address]  surveillance  Educated on the importance of screening

## 2022-02-16 ENCOUNTER — TELEPHONE (OUTPATIENT)
Dept: SURGERY | Facility: CLINIC | Age: 62
End: 2022-02-16

## 2022-02-16 NOTE — TELEPHONE ENCOUNTER
Pt called because he received a message from [facility]  that his prescriptions were discontinued through them, and were now being sent to [facility] pharmacy  He also received a text that he had a prescription ready at [facility]

## 2022-02-16 NOTE — TELEPHONE ENCOUNTER
Left message for pt, letting him know the issue has been resolved and his meds would be coming from [facility]

## 2022-02-17 ENCOUNTER — PATIENT OUTREACH (OUTPATIENT)
Dept: SURGERY | Facility: OTHER | Age: 62
End: 2022-02-17

## 2022-02-17 DIAGNOSIS — B20 HIV DISEASE (HCC): ICD-10-CM

## 2022-02-17 NOTE — PROGRESS NOTES
Cm met Ct to complete Re-cert, [facility] and YAMILEX's  Ct informed Cm that he is doing well  He is not engaging in risky behaviors, is taking his medication daily as prescribed and is getting his lab work done, Saturday and seeing Dr Tameka Panda on Wednesday  Ct brought his paystubs, bank statements, medication list and photo ID to be copied and scanned  Ct stated he is concerned about his teeth he has been working with the Dentist office in [facility] for 5 years to get dentures and it has not happened yet  Ct states the plan is always changing because he sees different dentists each visit  His next appointment is in march and he stated he will be advocating for himself to have a set plan in place to execute the dentures to be put in  Ct stated he will update cm with what occurs in March  No signatures were obtained due to covid 19    See media for documents

## 2022-02-18 RX ORDER — DARUNAVIR ETHANOLATE AND COBICISTAT 800; 150 MG/1; MG/1
1 TABLET, FILM COATED ORAL DAILY
Qty: 30 TABLET | Refills: 5 | Status: SHIPPED | OUTPATIENT
Start: 2022-02-18 | End: 2022-05-17 | Stop reason: SDUPTHER

## 2022-02-19 ENCOUNTER — APPOINTMENT (OUTPATIENT)
Dept: LAB | Facility: HOSPITAL | Age: 62
End: 2022-02-19
Attending: INTERNAL MEDICINE
Payer: COMMERCIAL

## 2022-02-19 DIAGNOSIS — K74.69 OTHER CIRRHOSIS OF LIVER (HCC): ICD-10-CM

## 2022-02-19 DIAGNOSIS — B20 HIV DISEASE (HCC): ICD-10-CM

## 2022-02-19 LAB
AFP-TM SERPL-MCNC: 5.4 NG/ML (ref 0.5–8)
ALBUMIN SERPL BCP-MCNC: 3.8 G/DL (ref 3.5–5)
ALP SERPL-CCNC: 95 U/L (ref 46–116)
ALT SERPL W P-5'-P-CCNC: 23 U/L (ref 12–78)
ANION GAP SERPL CALCULATED.3IONS-SCNC: 9 MMOL/L (ref 4–13)
AST SERPL W P-5'-P-CCNC: 20 U/L (ref 5–45)
BASOPHILS # BLD AUTO: 0.05 THOUSANDS/ΜL (ref 0–0.1)
BASOPHILS NFR BLD AUTO: 1 % (ref 0–1)
BILIRUB SERPL-MCNC: 0.74 MG/DL (ref 0.2–1)
BUN SERPL-MCNC: 20 MG/DL (ref 5–25)
CALCIUM SERPL-MCNC: 9.1 MG/DL (ref 8.3–10.1)
CHLORIDE SERPL-SCNC: 104 MMOL/L (ref 100–108)
CO2 SERPL-SCNC: 28 MMOL/L (ref 21–32)
CREAT SERPL-MCNC: 1.17 MG/DL (ref 0.6–1.3)
EOSINOPHIL # BLD AUTO: 0.11 THOUSAND/ΜL (ref 0–0.61)
EOSINOPHIL NFR BLD AUTO: 2 % (ref 0–6)
ERYTHROCYTE [DISTWIDTH] IN BLOOD BY AUTOMATED COUNT: 15.2 % (ref 11.6–15.1)
GFR SERPL CREATININE-BSD FRML MDRD: 66 ML/MIN/1.73SQ M
GLUCOSE P FAST SERPL-MCNC: 117 MG/DL (ref 65–99)
HCT VFR BLD AUTO: 52.5 % (ref 36.5–49.3)
HGB BLD-MCNC: 17.6 G/DL (ref 12–17)
IMM GRANULOCYTES # BLD AUTO: 0.04 THOUSAND/UL (ref 0–0.2)
IMM GRANULOCYTES NFR BLD AUTO: 1 % (ref 0–2)
LYMPHOCYTES # BLD AUTO: 0.97 THOUSANDS/ΜL (ref 0.6–4.47)
LYMPHOCYTES NFR BLD AUTO: 18 % (ref 14–44)
MCH RBC QN AUTO: 29.3 PG (ref 26.8–34.3)
MCHC RBC AUTO-ENTMCNC: 33.5 G/DL (ref 31.4–37.4)
MCV RBC AUTO: 88 FL (ref 82–98)
MONOCYTES # BLD AUTO: 0.73 THOUSAND/ΜL (ref 0.17–1.22)
MONOCYTES NFR BLD AUTO: 14 % (ref 4–12)
NEUTROPHILS # BLD AUTO: 3.49 THOUSANDS/ΜL (ref 1.85–7.62)
NEUTS SEG NFR BLD AUTO: 64 % (ref 43–75)
NRBC BLD AUTO-RTO: 0 /100 WBCS
PLATELET # BLD AUTO: 207 THOUSANDS/UL (ref 149–390)
PMV BLD AUTO: 9.6 FL (ref 8.9–12.7)
POTASSIUM SERPL-SCNC: 4.3 MMOL/L (ref 3.5–5.3)
PROT SERPL-MCNC: 7.9 G/DL (ref 6.4–8.2)
RBC # BLD AUTO: 6 MILLION/UL (ref 3.88–5.62)
SODIUM SERPL-SCNC: 141 MMOL/L (ref 136–145)
WBC # BLD AUTO: 5.39 THOUSAND/UL (ref 4.31–10.16)

## 2022-02-19 PROCEDURE — 86360 T CELL ABSOLUTE COUNT/RATIO: CPT

## 2022-02-19 PROCEDURE — 80053 COMPREHEN METABOLIC PANEL: CPT

## 2022-02-19 PROCEDURE — 36415 COLL VENOUS BLD VENIPUNCTURE: CPT

## 2022-02-19 PROCEDURE — 85025 COMPLETE CBC W/AUTO DIFF WBC: CPT

## 2022-02-19 PROCEDURE — 87536 HIV-1 QUANT&REVRSE TRNSCRPJ: CPT

## 2022-02-19 PROCEDURE — 82105 ALPHA-FETOPROTEIN SERUM: CPT

## 2022-02-19 PROCEDURE — 86480 TB TEST CELL IMMUN MEASURE: CPT

## 2022-02-20 LAB
BASOPHILS # BLD AUTO: 0 X10E3/UL (ref 0–0.2)
BASOPHILS NFR BLD AUTO: 1 %
CD3+CD4+ CELLS # BLD: 138 /UL (ref 359–1519)
CD3+CD4+ CELLS NFR BLD: 15.3 % (ref 30.8–58.5)
CD3+CD4+ CELLS/CD3+CD8+ CLL BLD: 0.27 % (ref 0.92–3.72)
CD3+CD8+ CELLS # BLD: 509 /UL (ref 109–897)
CD3+CD8+ CELLS NFR BLD: 56.6 % (ref 12–35.5)
EOSINOPHIL # BLD AUTO: 0.1 X10E3/UL (ref 0–0.4)
EOSINOPHIL NFR BLD AUTO: 2 %
ERYTHROCYTE [DISTWIDTH] IN BLOOD BY AUTOMATED COUNT: 14.5 % (ref 11.6–15.4)
HCT VFR BLD AUTO: 44.8 % (ref 37.5–51)
HGB BLD-MCNC: 15.1 G/DL (ref 13–17.7)
IMM GRANULOCYTES # BLD: 0 X10E3/UL (ref 0–0.1)
IMM GRANULOCYTES NFR BLD: 1 %
LYMPHOCYTES # BLD AUTO: 0.9 X10E3/UL (ref 0.7–3.1)
LYMPHOCYTES NFR BLD AUTO: 17 %
MCH RBC QN AUTO: 29.2 PG (ref 26.6–33)
MCHC RBC AUTO-ENTMCNC: 33.7 G/DL (ref 31.5–35.7)
MCV RBC AUTO: 87 FL (ref 79–97)
MONOCYTES # BLD AUTO: 0.8 X10E3/UL (ref 0.1–0.9)
MONOCYTES NFR BLD AUTO: 15 %
NEUTROPHILS # BLD AUTO: 3.4 X10E3/UL (ref 1.4–7)
NEUTROPHILS NFR BLD AUTO: 64 %
PLATELET # BLD AUTO: 261 X10E3/UL (ref 150–450)
RBC # BLD AUTO: 5.18 X10E6/UL (ref 4.14–5.8)
WBC # BLD AUTO: 5.2 X10E3/UL (ref 3.4–10.8)

## 2022-02-21 ENCOUNTER — PATIENT OUTREACH (OUTPATIENT)
Dept: SURGERY | Facility: OTHER | Age: 62
End: 2022-02-21

## 2022-02-22 ENCOUNTER — OFFICE VISIT (OUTPATIENT)
Dept: SURGERY | Facility: CLINIC | Age: 62
End: 2022-02-22
Payer: COMMERCIAL

## 2022-02-22 VITALS
TEMPERATURE: 97.9 F | SYSTOLIC BLOOD PRESSURE: 128 MMHG | DIASTOLIC BLOOD PRESSURE: 70 MMHG | BODY MASS INDEX: 32.39 KG/M2 | WEIGHT: 206.4 LBS | OXYGEN SATURATION: 94 % | HEART RATE: 73 BPM | HEIGHT: 67 IN

## 2022-02-22 DIAGNOSIS — Z72.89 OTHER PROBLEMS RELATED TO LIFESTYLE: ICD-10-CM

## 2022-02-22 DIAGNOSIS — D75.1 POLYCYTHEMIA: ICD-10-CM

## 2022-02-22 DIAGNOSIS — Z20.2 CONTACT WITH AND (SUSPECTED) EXPOSURE TO INFECTIONS WITH A PREDOMINANTLY SEXUAL MODE OF TRANSMISSION: ICD-10-CM

## 2022-02-22 DIAGNOSIS — D72.89 OTHER SPECIFIED DISORDERS OF WHITE BLOOD CELLS: ICD-10-CM

## 2022-02-22 DIAGNOSIS — R91.1 LUNG NODULE: ICD-10-CM

## 2022-02-22 DIAGNOSIS — T65.292D TOXIC EFFECT OF TOBACCO, INTENTIONAL SELF-HARM, SUBSEQUENT ENCOUNTER: ICD-10-CM

## 2022-02-22 DIAGNOSIS — Z11.3 ENCOUNTER FOR SCREENING FOR BACTERIAL SEXUALLY TRANSMITTED DISEASE: ICD-10-CM

## 2022-02-22 DIAGNOSIS — R91.1 LUNG NODULE: Primary | ICD-10-CM

## 2022-02-22 DIAGNOSIS — I10 ESSENTIAL HYPERTENSION: ICD-10-CM

## 2022-02-22 DIAGNOSIS — B20 HIV DISEASE (HCC): Primary | ICD-10-CM

## 2022-02-22 DIAGNOSIS — K74.69 OTHER CIRRHOSIS OF LIVER (HCC): ICD-10-CM

## 2022-02-22 DIAGNOSIS — I25.10 CORONARY ARTERY DISEASE INVOLVING NATIVE CORONARY ARTERY OF NATIVE HEART WITHOUT ANGINA PECTORIS: ICD-10-CM

## 2022-02-22 LAB
GAMMA INTERFERON BACKGROUND BLD IA-ACNC: 0.04 IU/ML
HIV1 RNA # SERPL NAA+PROBE: <20 COPIES/ML
HIV1 RNA SERPL NAA+PROBE-LOG#: NORMAL LOG10COPY/ML
M TB IFN-G BLD-IMP: NEGATIVE
M TB IFN-G CD4+ BCKGRND COR BLD-ACNC: 0 IU/ML
M TB IFN-G CD4+ BCKGRND COR BLD-ACNC: 0 IU/ML
MITOGEN IGNF BCKGRD COR BLD-ACNC: >10 IU/ML

## 2022-02-22 PROCEDURE — 99215 OFFICE O/P EST HI 40 MIN: CPT | Performed by: INTERNAL MEDICINE

## 2022-02-22 NOTE — PROGRESS NOTES
Assessment    Annual Nutritional Assessment    Clinical Data/Client History    HIV: yes  AIDS: no    : Aiyana Curtis    Socio- Economic Status: Cooks    Living Situation: House    Food Prep/Access: Refrigerator, Stove and Microwave    Functional Status: Ambulatory, Able to [employer] and he does most of the cooking for the family  Activity Level: patient states he is very active at work, also does a lot around the house & yard  Plays  Golf in the good weather  Oral Problems: Chewing Difficulty yes due to missing teeth, takes his time chewing all foods  , Pain none reported, Inability to Chew able to "gum" foods     Last Dental Exam: "It's been ongoing, just about every month "    Procedures Performed: teeth extracted, being fit for dentures  Typical Food/Beverage Intake:    · Breakfast bagel, eggs & newman ( usually just bagel or toast)  · Lunch tuna or ham s/w  · Dinner full " hot meal", often pasta/ [place] food and cooked veggies  · Snacks loves cookies  · Fluids mostly seltzer water    Appetite: Excellent    Supplements: No n/a    Food Intolerance: patient has reflux, sometimes very painful  Weight Change Percent: minimal    Usual Body Weight: 202 5 lbs  In May 2021    Current Body Weight: 204 5 lbs; est  IBW for height: 148 lbs; patient is 138 % of IBW  Adjusted body weight: 162lbs ( 74 kg)  Nutrition Diagnosis    Problem: Altered GI function    Related to: Lack of nutrition related education    As Evidenced By: Patient Interview    Intervention Diet Prescription    Nutritional needs based on: 74 kg    · 1900 to 2000 kcal  · 74 to 88 gm protein  · 1900 to 2000 ml fluid  · 2 gm Na    Current intake: adequate    Snack/Supplement Recommendations: avoid late night snacking  Nutrition Recommendations: reviewed ways to improve symptoms of reflux  Goals: avoid late night eating, and eat more slowly       Nutrition Education Intervention: Provided     Person Educated: patient    Topics Discussed: Treatment of GERD  Teaching Method: Verbal    Readiness to Change: Preparation:  (Getting ready to change)     Visit Summary    Annual nutritional assessment completed  Patient with good appetite and po intake  He is in the process of being fit for dentures, so for now he is "gumming " his foods  He admits sometimes he eats large chunks of food, and thinks this can contribute to the heartburn  Reviewed healthy eating guidelines in general    Patient aware that his Triglycerides are high, reviewed need to avoid excess sweets ( cookies) and saturated fats  Patient verbalized understanding    David Chacon, DAWITN, LDN, CDCES

## 2022-02-22 NOTE — PROGRESS NOTES
Progress Note - Infectious Disease   Berwyn Cogan 64 y o  male MRN: [id number]  Unit/Bed#:  Encounter: [id number]      Impression/Plan:  1   HIV- now adherent with ART and his viral load has become undetectable   His CD4 count is 138  Will continue the Tivicay, Descovy, Prezcobix   He is now back to taking his ART  Genotype does not reveal any new resistanceence  Recheck labs in 2 months and follow up in 3 months      2  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  Right upper quadrant ultrasound pending, and the alpha fetoprotein negative on the last check   Continue Q 6 month hepatocellular carcinoma screening      3   Polycythemia-likely secondary to the nicotine dependence   Seen by Hematology Oncology who agree with my assessment   Will continue to monitor the CBC with diff when quit smoking     4   Hypertension-asymptomatic and stable and reasonably controlled   Continue monitor     5  Coronary artery disease-complicated by acute MI status post PCI   Now asymptomatic   Follow up with Cardiology      6  Nicotine dependence-patient continues to smoke occasionally but has decreased his tobacco use and plans to quit by his next visit with me  He is using nicotine patches      7  Lung nodule-repeat CT scan  annually  Discussed with the primary who ordered the CT now      Patient was provided medication, adherence and prevention education    Subjective:  Routine follow-up for HIV  Patient claims 100% adherence with Prezcobix, Tivicay, Descovy    Patient denies any notable side effects  Overall the feeling well  The patient denies any fever chills or sweats, denies any nausea vomiting or diarrhea, denies any cough or shortness of breath  ROS:  A complete review of systems is negative other than that noted above in the subjective    Followup portions patient history reviewed and updated as:   Allergies, current medications, past medical history, past social history, past surgical history, and the problem list    Objective:  Vitals:  Vitals:    02/22/22 1659   BP: 128/70   Pulse: 73   Temp: 97 9 °F (36 6 °C)   SpO2: 94%   Weight: 93 6 kg (206 lb 6 4 oz)   Height: 5' 7" (1 702 m)       Physical Exam:   General Appearance:  Alert, interactive, appearing well,  nontoxic, no acute distress  Neck:   Supple without lymphadenopathy, no thyromegaly or masses   Throat: Oropharynx moist without lesions  Lungs:   Clear to auscultation bilaterally; no wheezes, rhonchi or rales; respirations unlabored   Heart:  RRR; no murmur, rub or gallop   Abdomen:   Soft, non-tender, non-distended, positive bowel sounds  Extremities: No clubbing, cyanosis or edema   Skin: No new rashes or lesions  No draining wounds noted         Labs, Imaging, & Other studies:   All pertinent labs and imaging studies were personally reviewed    Lab Results   Component Value Date     03/18/2017    K 4 3 02/19/2022     02/19/2022    CO2 28 02/19/2022    BUN 20 02/19/2022    CREATININE 1 17 02/19/2022    GLUF 117 (H) 02/19/2022    CALCIUM 9 1 02/19/2022    AST 20 02/19/2022    ALT 23 02/19/2022    ALKPHOS 95 02/19/2022    PROT 7 4 03/18/2017    BILITOT 1 1 03/18/2017    EGFR 66 02/19/2022     Lab Results   Component Value Date    WBC 5 39 02/19/2022    WBC 5 2 02/19/2022    HGB 17 6 (H) 02/19/2022    HGB 15 1 02/19/2022    HCT 52 5 (H) 02/19/2022    HCT 44 8 02/19/2022    MCV 88 02/19/2022    MCV 87 02/19/2022     02/19/2022     02/19/2022     No results found for: HEPCAB  Lab Results   Component Value Date    HEPBCAB REACTIVE (A) 10/08/2015    HEPBCAB REACTIVE (A) 10/08/2015    HEPBCAB REACTIVE (A) 10/08/2015    HEPBCAB REACTIVE (A) 10/08/2015     Lab Results   Component Value Date    RPR Non-Reactive 03/15/2021     CD4 ABS   Date/Time Value Ref Range Status   02/19/2022 08:04  (L) 359 - 1519 /uL Final     HIV-1 RNA by PCR, Qn   Date/Time Value Ref Range Status   02/19/2022 08:04 AM <20 copies/mL Final     Comment:     HIV-1 RNA not detected  The reportable range for this assay is 20 to 10,000,000  copies HIV-1 RNA/mL             Current Outpatient Medications:     albuterol (Ventolin HFA) 90 mcg/act inhaler, Inhale 2 puffs every 6 (six) hours as needed for wheezing, Disp: 18 g, Rfl: 0    aspirin (ECOTRIN LOW STRENGTH) 81 mg EC tablet, Take 1 tablet (81 mg total) by mouth daily, Disp: 30 tablet, Rfl: 5    cetirizine (ZyrTEC) 10 mg tablet, Take 1 tablet (10 mg total) by mouth daily, Disp: 30 tablet, Rfl: 5    clopidogrel (PLAVIX) 75 mg tablet, Take 1 tablet (75 mg total) by mouth daily, Disp: 30 tablet, Rfl: 5    Darunavir-Cobicistat (Prezcobix) 800-150 MG TABS, Take 1 tablet by mouth daily, Disp: 30 tablet, Rfl: 5    dolutegravir (Tivicay) 50 MG TABS, Take 1 tablet (50 mg total) by mouth 2 (two) times a day, Disp: 60 tablet, Rfl: 5    emtricitabine-tenofovir AF (Descovy) 200-25 MG tablet, Take 1 tablet by mouth daily, Disp: 30 tablet, Rfl: 5    irbesartan-hydrochlorothiazide (AVALIDE) 300-12 5 MG per tablet, Take 1 tablet by mouth every morning, Disp: 30 tablet, Rfl: 2    metoprolol succinate (TOPROL-XL) 25 mg 24 hr tablet, Take 1 tablet (25 mg total) by mouth daily, Disp: 30 tablet, Rfl: 5    neomycin-bacitracin-polymyxin-hydrocortisone (CORTISPORIN) 1 % ophthalmic ointment, Apply to eye 3 (three) times a day for 10 days, Disp: 3 5 g, Rfl: 0    nicotine (NICODERM CQ) 21 mg/24 hr TD 24 hr patch, Place 1 patch on the skin every 24 hours, Disp: 28 patch, Rfl: 2    nicotine polacrilex (COMMIT) 4 MG lozenge, Apply 1 lozenge (4 mg total) to the mouth or throat as needed for smoking cessation, Disp: 72 lozenge, Rfl: 3    nitroglycerin (NITROSTAT) 0 4 mg SL tablet, Place 1 tablet (0 4 mg total) under the tongue every 5 (five) minutes as needed for chest pain, Disp: 15 tablet, Rfl: 0    pantoprazole (PROTONIX) 40 mg tablet, Take 1 tablet (40 mg total) by mouth 2 (two) times a day, Disp: 60 tablet, Rfl: 5    rosuvastatin (CRESTOR) 20 MG tablet, Take 1 tablet (20 mg total) by mouth daily, Disp: 30 tablet, Rfl: 5

## 2022-02-24 ENCOUNTER — PATIENT OUTREACH (OUTPATIENT)
Dept: SURGERY | Facility: OTHER | Age: 62
End: 2022-02-24

## 2022-03-11 ENCOUNTER — TELEPHONE (OUTPATIENT)
Dept: SURGERY | Facility: CLINIC | Age: 62
End: 2022-03-11

## 2022-03-11 ENCOUNTER — PATIENT OUTREACH (OUTPATIENT)
Dept: SURGERY | Facility: OTHER | Age: 62
End: 2022-03-11

## 2022-03-11 NOTE — TELEPHONE ENCOUNTER
Pt called because he received a letter that his ct scan was not approved, but I explained that we received the approval this week, and he's good to go  Pt expressed understanding

## 2022-03-11 NOTE — PROGRESS NOTES
CM received a call from Ct regarding his dental apt  Ct stated that he was scheduled for his Dentist apt today  Benoit contacted Ct and informed him they have no dentists in the office today  They rescheduled him for August  Ct was upset because as he stated during our RCT  He has been trying to get his teeth pulled and get dentures for 5 years  Ct called Cm to tell her, as proof that the dentist office keep pushing him off  Ct was upset he has to wait another 5 months

## 2022-03-14 ENCOUNTER — TELEPHONE (OUTPATIENT)
Dept: SURGERY | Facility: CLINIC | Age: 62
End: 2022-03-14

## 2022-03-14 ENCOUNTER — OFFICE VISIT (OUTPATIENT)
Dept: DENTISTRY | Facility: CLINIC | Age: 62
End: 2022-03-14

## 2022-03-14 VITALS — DIASTOLIC BLOOD PRESSURE: 80 MMHG | HEART RATE: 94 BPM | TEMPERATURE: 98.5 F | SYSTOLIC BLOOD PRESSURE: 119 MMHG

## 2022-03-14 DIAGNOSIS — K02.9 CARIES: Primary | ICD-10-CM

## 2022-03-14 PROCEDURE — D2330 RESIN-BASED COMPOSITE - 1 SURFACE, ANTERIOR: HCPCS | Performed by: DENTIST

## 2022-03-14 PROCEDURE — D0191 ASSESSMENT OF A PATIENT: HCPCS | Performed by: DENTIST

## 2022-03-14 NOTE — PROGRESS NOTES
61yoM presents for #6M restoration  Reviewed medical and dental history (ASA III) and existing radiographs  Pain level 0 today  Pt requests to have a restoration on his upper anterior tooth today  No anesthesia administered per Dr Nini Sheppard  Prepped tooth #6 with 245 carbide on high speed  Caries removed with round carbide on slow speed  Isolation with cotton rolls and dri-angles Etch with 37% H2PO4, rinse, dry  Applied Adhese with 20 second scrub once, gentle air dry and light cured for 10s  Restored with Tetric bulk steve shade A2 and light cured  Refined with finishing burs, polished with enhance point  Verified occlusion and contacts  Pt left satisfied  Pt was advised to return for a periodic at the next visit  Plan is to continue rest of treatment once medical clearance form is received from Dr Mesha Parker (was faxed over to physician's office)       NV: recall exam + preprosthetic treatment planning session for removable partial dentures

## 2022-03-14 NOTE — TELEPHONE ENCOUNTER
Pt wanted to double check if it was okay for him to take plavix and crestor together, he read somewhere that they shouldn't be taken together

## 2022-03-15 NOTE — TELEPHONE ENCOUNTER
The can be increased liver enzymes or low platelets    We monitor them closely  He has been on this regiment for quite some time safely  Please call him and let him know  We can also discuss it further at his next appointment

## 2022-03-18 DIAGNOSIS — R05.9 COUGH: ICD-10-CM

## 2022-03-19 ENCOUNTER — HOSPITAL ENCOUNTER (OUTPATIENT)
Dept: CT IMAGING | Facility: HOSPITAL | Age: 62
Discharge: HOME | End: 2022-03-19
Payer: COMMERCIAL

## 2022-03-19 ENCOUNTER — HOSPITAL ENCOUNTER (OUTPATIENT)
Dept: ULTRASOUND IMAGING | Facility: HOSPITAL | Age: 62
Discharge: HOME | End: 2022-03-19
Payer: COMMERCIAL

## 2022-03-19 DIAGNOSIS — K74.69 OTHER CIRRHOSIS OF LIVER (HCC): ICD-10-CM

## 2022-03-19 DIAGNOSIS — R91.1 LUNG NODULE: ICD-10-CM

## 2022-03-19 PROCEDURE — 71250 CT THORAX DX C-: CPT

## 2022-03-19 PROCEDURE — G1004 CDSM NDSC: HCPCS

## 2022-03-19 PROCEDURE — 76705 ECHO EXAM OF ABDOMEN: CPT

## 2022-03-21 RX ORDER — ALBUTEROL SULFATE 90 UG/1
AEROSOL, METERED RESPIRATORY (INHALATION)
Qty: 18 G | Refills: 0 | Status: SHIPPED | OUTPATIENT
Start: 2022-03-21

## 2022-04-08 ENCOUNTER — OFFICE VISIT (OUTPATIENT)
Dept: CARDIOLOGY CLINIC | Facility: CLINIC | Age: 62
End: 2022-04-08
Payer: COMMERCIAL

## 2022-04-08 VITALS
HEART RATE: 86 BPM | SYSTOLIC BLOOD PRESSURE: 122 MMHG | WEIGHT: 208 LBS | BODY MASS INDEX: 32.65 KG/M2 | OXYGEN SATURATION: 95 % | HEIGHT: 67 IN | DIASTOLIC BLOOD PRESSURE: 80 MMHG | RESPIRATION RATE: 16 BRPM

## 2022-04-08 DIAGNOSIS — I25.2 HISTORY OF NON-ST ELEVATION MYOCARDIAL INFARCTION (NSTEMI): ICD-10-CM

## 2022-04-08 DIAGNOSIS — I25.10 CORONARY ARTERY DISEASE INVOLVING NATIVE CORONARY ARTERY OF NATIVE HEART WITHOUT ANGINA PECTORIS: ICD-10-CM

## 2022-04-08 DIAGNOSIS — R07.89 ATYPICAL CHEST PAIN: ICD-10-CM

## 2022-04-08 PROCEDURE — 99214 OFFICE O/P EST MOD 30 MIN: CPT | Performed by: INTERNAL MEDICINE

## 2022-04-08 NOTE — PROGRESS NOTES
Cardiology Office Note    Zina Lockett 64 y o  male MRN: [id number]    04/08/22          Assessment:  1  CAD s/p TRENT to the mLCx  (1/2019)  2  Mild aortic stenosis   3  Hypertension  4  Hyperlipidemia  5  Hx of Hep C- treated  6  Hx of HIV    Plan:  · Cont aspirin and crestor  · Cont toprol-xl and avalide  · Regular aerobic activity advised  Ambulatory blood pressure monitoring and maintaining a low sodium diet was advised  · He was advised to notify us with the onset of cardiac symptoms  Follow up: 6 month  or sooner as needed    1  History of non-ST elevation myocardial infarction (NSTEMI)  Ambulatory referral to Cardiology   2  Coronary artery disease involving native coronary artery of native heart without angina pectoris  Ambulatory referral to Cardiology   3  Atypical chest pain  Ambulatory referral to Cardiology       HPI: Zina Lockett is a 64y o  year old male with history of CAD s/p PCI, mild aortic stenosis, and hypertension presents for routine follow-up  Past medical history:  · He [facility] with chest pain  He had a 10 beat run of polymorphic VT  He underwent cardiac catheterization and subsequent PCI to the mid circumflex  · TTE 1/2019: EF: 50%, g1DD, mild MR,AS, AI, and TR  · Pharmacologic MPI 3/2019- negative for ischemia  · Holter 11/2019- Ventricular ectopy 0 3% of total beats- no significant arrhythmia noted  · TTE 1/2020: EF: 60%, g1dd, mild AS      He has been doing well from a cardiac standpoint since his last evaluation  His previously noted chest pain has resolved  He notes that his pharmacy did not send him prescription for Plavix over the past few weeks and ever since stopping Plavix his chest pain resolved  He was previously prescribed Imdur however never started it  He works a very active job and denies exertional limitation  He denies any other cardiac concerns at this time        Family History: father with MI in his [de-identified]    Social history: Prior 20 cigar a week smoker however has cut down to about 3 per week      No Known Allergies      Current Outpatient Medications:     albuterol (PROVENTIL HFA,VENTOLIN HFA) 90 mcg/act inhaler, INHALE 2 PUFFS BY MOUTH EVERY SIX HOURS AS NEEDED FOR WHEEZING, Disp: 18 g, Rfl: 0    aspirin (ECOTRIN LOW STRENGTH) 81 mg EC tablet, Take 1 tablet (81 mg total) by mouth daily, Disp: 30 tablet, Rfl: 5    cetirizine (ZyrTEC) 10 mg tablet, Take 1 tablet (10 mg total) by mouth daily, Disp: 30 tablet, Rfl: 5    Darunavir-Cobicistat (Prezcobix) 800-150 MG TABS, Take 1 tablet by mouth daily, Disp: 30 tablet, Rfl: 5    dolutegravir (Tivicay) 50 MG TABS, Take 1 tablet (50 mg total) by mouth 2 (two) times a day, Disp: 60 tablet, Rfl: 5    emtricitabine-tenofovir AF (Descovy) 200-25 MG tablet, Take 1 tablet by mouth daily, Disp: 30 tablet, Rfl: 5    irbesartan-hydrochlorothiazide (AVALIDE) 300-12 5 MG per tablet, Take 1 tablet by mouth every morning, Disp: 30 tablet, Rfl: 2    metoprolol succinate (TOPROL-XL) 25 mg 24 hr tablet, Take 1 tablet (25 mg total) by mouth daily, Disp: 30 tablet, Rfl: 5    nicotine (NICODERM CQ) 21 mg/24 hr TD 24 hr patch, Place 1 patch on the skin every 24 hours, Disp: 28 patch, Rfl: 2    nicotine polacrilex (COMMIT) 4 MG lozenge, Apply 1 lozenge (4 mg total) to the mouth or throat as needed for smoking cessation, Disp: 72 lozenge, Rfl: 3    nitroglycerin (NITROSTAT) 0 4 mg SL tablet, Place 1 tablet (0 4 mg total) under the tongue every 5 (five) minutes as needed for chest pain, Disp: 15 tablet, Rfl: 0    pantoprazole (PROTONIX) 40 mg tablet, Take 1 tablet (40 mg total) by mouth 2 (two) times a day, Disp: 60 tablet, Rfl: 5    rosuvastatin (CRESTOR) 20 MG tablet, Take 1 tablet (20 mg total) by mouth daily, Disp: 30 tablet, Rfl: 5    clopidogrel (PLAVIX) 75 mg tablet, Take 1 tablet (75 mg total) by mouth daily (Patient not taking: Reported on 4/8/2022 ), Disp: 30 tablet, Rfl: 5    Past Medical History:   Diagnosis Date    HIV (human immunodeficiency virus infection) ([facility] )     Hypertension     Opioid dependence ([facility] )        Family History   Problem Relation Age of Onset    Alzheimer's disease Mother     Heart attack Father     Prostate cancer Brother        Past Surgical History:   Procedure Laterality Date    CARDIAC SURGERY      LUMBAR LAMINECTOMY      STOMACH SURGERY         Social History     Socioeconomic History    Marital status: /Civil Union     Spouse name: Not on file    Number of children: Not on file    Years of education: Not on file    Highest education level: Not on file   Occupational History    Not on file   Tobacco Use    Smoking status: Current Every Day Smoker     Packs/day: 0 00     Types: Cigars    Smokeless tobacco: Never Used    Tobacco comment: 5 cigars/week   Vaping Use    Vaping Use: Never used   Substance and Sexual Activity    Alcohol use: Not Currently     Comment: rarely    Drug use: No    Sexual activity: Yes     Partners: Female     Birth control/protection: Condom Male   Other Topics Concern    Not on file   Social History Narrative    Not on file     Social Determinants of Health     Financial Resource Strain: Not on file   Food Insecurity: No Food Insecurity    Worried About Running Out of Food in the Last Year: Never true    Sanjay of Food in the Last Year: Never true   Transportation Needs: Not on file   Physical Activity: Not on file   Stress: Not on file   Social Connections: Not on file   Intimate Partner Violence: Not on file   Housing Stability: Not on file       Review of Systems   Constitutional: Negative for diaphoresis, weight gain and weight loss  HENT: Negative for congestion  Cardiovascular: Negative for chest pain, dyspnea on exertion, irregular heartbeat, leg swelling, near-syncope, orthopnea, palpitations, paroxysmal nocturnal dyspnea and syncope     Respiratory: Negative for shortness of breath, sleep disturbances due to breathing and snoring  Hematologic/Lymphatic: Does not bruise/bleed easily  Skin: Negative for rash  Musculoskeletal: Negative for myalgias  Gastrointestinal: Negative for nausea and vomiting  Neurological: Negative for excessive daytime sleepiness and light-headedness  Psychiatric/Behavioral: The patient is not nervous/anxious  Vitals: /80 (BP Location: Left arm, Patient Position: Sitting)   Pulse 86   Resp 16   Ht 5' 7" (1 702 m)   Wt 94 3 kg (208 lb)   SpO2 95%   BMI 32 58 kg/m²       Physical Exam:     GEN: Alert and oriented x 3, in no acute distress  Well appearing and well nourished  HEENT: Sclera anicteric, conjunctivae pink, mucous membranes moist  Oropharynx clear  NECK: Supple, no carotid bruits, no significant JVD  Trachea midline, no thyromegaly  HEART: Regular rhythm, normal S1 and S2, no murmurs, clicks, gallops or rubs  PMI nondisplaced, no thrills  LUNGS: Clear to auscultation bilaterally; no wheezes, rales, or rhonchi  No increased work of breathing or signs of respiratory distress  ABDOMEN: Soft, nontender, nondistended, normoactive bowel sounds  EXTREMITIES: Skin warm and well perfused, no clubbing, cyanosis, or edema  NEURO: No focal findings  Normal speech  Mood and affect normal    SKIN: Normal without suspicious lesions on exposed skin

## 2022-05-06 ENCOUNTER — PATIENT OUTREACH (OUTPATIENT)
Dept: SURGERY | Facility: OTHER | Age: 62
End: 2022-05-06

## 2022-05-10 ENCOUNTER — PATIENT OUTREACH (OUTPATIENT)
Dept: SURGERY | Facility: OTHER | Age: 62
End: 2022-05-10

## 2022-05-10 DIAGNOSIS — I10 PRIMARY HYPERTENSION: ICD-10-CM

## 2022-05-10 DIAGNOSIS — T65.292D TOXIC EFFECT OF TOBACCO, INTENTIONAL SELF-HARM, SUBSEQUENT ENCOUNTER: ICD-10-CM

## 2022-05-10 RX ORDER — NICOTINE 21 MG/24HR
PATCH, TRANSDERMAL 24 HOURS TRANSDERMAL
Qty: 28 PATCH | Refills: 2 | Status: SHIPPED | OUTPATIENT
Start: 2022-05-10

## 2022-05-10 RX ORDER — IRBESARTAN AND HYDROCHLOROTHIAZIDE 300; 12.5 MG/1; MG/1
TABLET, FILM COATED ORAL
Qty: 30 TABLET | Refills: 2 | Status: SHIPPED | OUTPATIENT
Start: 2022-05-10 | End: 2022-05-17 | Stop reason: SDUPTHER

## 2022-05-10 NOTE — PROGRESS NOTES
Cm called Ct and let him a message reminding him to get his labs done before his upcoming appointment

## 2022-05-11 NOTE — PROGRESS NOTES
CT# [id number] [facility] called akash to schedule his RCT, RST since he is due by 9/6/19  Ct and Akash [facility] on 8/30/[facility] at 3:30pm to complete RCT, RST  No

## 2022-05-17 ENCOUNTER — OFFICE VISIT (OUTPATIENT)
Dept: SURGERY | Facility: CLINIC | Age: 62
End: 2022-05-17
Payer: COMMERCIAL

## 2022-05-17 VITALS
OXYGEN SATURATION: 93 % | SYSTOLIC BLOOD PRESSURE: 133 MMHG | DIASTOLIC BLOOD PRESSURE: 82 MMHG | TEMPERATURE: 97.6 F | WEIGHT: 207.4 LBS | BODY MASS INDEX: 32.55 KG/M2 | HEART RATE: 85 BPM | HEIGHT: 67 IN

## 2022-05-17 DIAGNOSIS — I10 PRIMARY HYPERTENSION: ICD-10-CM

## 2022-05-17 DIAGNOSIS — B20 HIV DISEASE (HCC): ICD-10-CM

## 2022-05-17 DIAGNOSIS — T65.292D TOXIC EFFECT OF TOBACCO, INTENTIONAL SELF-HARM, SUBSEQUENT ENCOUNTER: Primary | ICD-10-CM

## 2022-05-17 DIAGNOSIS — I25.10 CORONARY ARTERY DISEASE INVOLVING NATIVE CORONARY ARTERY OF NATIVE HEART WITHOUT ANGINA PECTORIS: ICD-10-CM

## 2022-05-17 DIAGNOSIS — I25.2 HISTORY OF NON-ST ELEVATION MYOCARDIAL INFARCTION (NSTEMI): ICD-10-CM

## 2022-05-17 DIAGNOSIS — E78.2 MIXED HYPERLIPIDEMIA: ICD-10-CM

## 2022-05-17 DIAGNOSIS — J30.2 SEASONAL ALLERGIES: ICD-10-CM

## 2022-05-17 DIAGNOSIS — K21.9 GASTROESOPHAGEAL REFLUX DISEASE WITHOUT ESOPHAGITIS: ICD-10-CM

## 2022-05-17 DIAGNOSIS — B20 HIV (HUMAN IMMUNODEFICIENCY VIRUS INFECTION) (HCC): ICD-10-CM

## 2022-05-17 DIAGNOSIS — I10 ESSENTIAL HYPERTENSION: ICD-10-CM

## 2022-05-17 PROCEDURE — 99214 OFFICE O/P EST MOD 30 MIN: CPT | Performed by: NURSE PRACTITIONER

## 2022-05-17 RX ORDER — EMTRICITABINE AND TENOFOVIR ALAFENAMIDE 200; 25 MG/1; MG/1
1 TABLET ORAL DAILY
Qty: 30 TABLET | Refills: 5 | Status: SHIPPED | OUTPATIENT
Start: 2022-05-17

## 2022-05-17 RX ORDER — METOPROLOL SUCCINATE 25 MG/1
25 TABLET, EXTENDED RELEASE ORAL DAILY
Qty: 30 TABLET | Refills: 5 | Status: SHIPPED | OUTPATIENT
Start: 2022-05-17

## 2022-05-17 RX ORDER — CETIRIZINE HYDROCHLORIDE 10 MG/1
10 TABLET ORAL DAILY
Qty: 30 TABLET | Refills: 5 | Status: SHIPPED | OUTPATIENT
Start: 2022-05-17

## 2022-05-17 RX ORDER — DOLUTEGRAVIR SODIUM 50 MG/1
50 TABLET, FILM COATED ORAL 2 TIMES DAILY
Qty: 60 TABLET | Refills: 5 | Status: SHIPPED | OUTPATIENT
Start: 2022-05-17

## 2022-05-17 RX ORDER — ASPIRIN 81 MG/1
81 TABLET ORAL DAILY
Qty: 30 TABLET | Refills: 5 | Status: SHIPPED | OUTPATIENT
Start: 2022-05-17

## 2022-05-17 RX ORDER — ROSUVASTATIN CALCIUM 20 MG/1
20 TABLET, COATED ORAL DAILY
Qty: 30 TABLET | Refills: 5 | Status: SHIPPED | OUTPATIENT
Start: 2022-05-17

## 2022-05-17 RX ORDER — DARUNAVIR ETHANOLATE AND COBICISTAT 800; 150 MG/1; MG/1
1 TABLET, FILM COATED ORAL DAILY
Qty: 30 TABLET | Refills: 5 | Status: SHIPPED | OUTPATIENT
Start: 2022-05-17

## 2022-05-17 RX ORDER — IRBESARTAN AND HYDROCHLOROTHIAZIDE 300; 12.5 MG/1; MG/1
1 TABLET, FILM COATED ORAL EVERY MORNING
Qty: 30 TABLET | Refills: 2 | Status: SHIPPED | OUTPATIENT
Start: 2022-05-17

## 2022-05-17 NOTE — ASSESSMENT & PLAN NOTE
Counseled for greater than 15 minutes on the importance of smoking cessation  Advised to quit  Educated on the increased risk of heart and lung disease associated with smoking    Referred to Gurwinder Aviles [address] [place] [address] for enrollment in smoking cessation program

## 2022-05-17 NOTE — ASSESSMENT & PLAN NOTE
Blood pressure: Stable  BP Readings from Last 3 Encounters:   05/17/22 133/82   04/08/22 122/80   03/14/22 119/80       Continue current antihypertensive  Educated on the following lifestyle modifications to lower BP and decrease cardiovascular risk factors  limit alcohol intake, reduce salt in diet, maintain a healthy weight, engage in 30 minutes of cardiovascular exercise daily, and not smoke

## 2022-05-17 NOTE — PROGRESS NOTES
Assessment/Plan:    Essential hypertension  Blood pressure: Stable  BP Readings from Last 3 Encounters:   22 133/82   22 122/80   22 119/80       Continue current antihypertensive  Educated on the following lifestyle modifications to lower BP and decrease cardiovascular risk factors  limit alcohol intake, reduce salt in diet, maintain a healthy weight, engage in 30 minutes of cardiovascular exercise daily, and not smoke  HIV disease ([facility] )  No results found for: [id number]  CD4 ABS   Date/Time Value Ref Range Status   2022 08:04  (L) 359 - 1519 /uL Final     HIV-1 RNA by PCR, Qn   Date/Time Value Ref Range Status   2022 08:04 AM <20 copies/mL Final     Comment:     HIV-1 RNA not detected  The reportable range for this assay is 20 to 10,000,000  copies HIV-1 RNA/mL  HIV-1 RNA Viral Load Log   Date/Time Value Ref Range Status   2022 08:04 AM COMMENT [id number]/mL Final     Comment:     Unable to calculate result since non-numeric result obtained for  component test          ART: Theadore Do, prezcobix        Denies side effects  Stressed the importance of adherence  Continue follow up with ID clinic  Reviewed most recent labs, including Cd4 and viral load  Discussed the risks and benefits of treatment options, instructions for management, importance of treatment adherence, and reduction of risk factor  Educated on possible  medication side effects  Counseled on routes of HIV transmission, including the risk of  infection  Emphasized that viral suppression is the best method to prevent HIV transmission  At this time pt denies the need for HIV testing of anyone in their life  Total encounter time was 45 minutes  Greater then 20 minutes were spent on counseling and patient education  Pt voices understanding and agreement with treatment plan          Toxic effect of tobacco and nicotine  Counseled for greater than 15 minutes on the importance of smoking cessation  Advised to quit  Educated on the increased risk of heart and lung disease associated with smoking  Referred to Gurwinder Aviles [facility] for enrollment in smoking cessation program        Gastroesophageal reflux disease without esophagitis  Currently asymptomatic  Trial stop of PPI  Educated to avoid potential food triggers  Diagnoses and all orders for this visit:    Toxic effect of tobacco, intentional self-harm, subsequent encounter    History of non-ST elevation myocardial infarction (NSTEMI)  -     aspirin (ECOTRIN LOW STRENGTH) 81 mg EC tablet; Take 1 tablet (81 mg total) by mouth in the morning  Coronary artery disease involving native coronary artery of native heart without angina pectoris  -     aspirin (ECOTRIN LOW STRENGTH) 81 mg EC tablet; Take 1 tablet (81 mg total) by mouth in the morning  Seasonal allergies  -     cetirizine (ZyrTEC) 10 mg tablet; Take 1 tablet (10 mg total) by mouth in the morning  HIV disease ([facility] )  -     Darunavir-Cobicistat (Prezcobix) 800-150 MG TABS; Take 1 tablet by mouth daily    HIV (human immunodeficiency virus infection) ([facility] )  -     dolutegravir (Tivicay) 50 MG TABS; Take 1 tablet (50 mg total) by mouth in the morning and 1 tablet (50 mg total) in the evening   -     emtricitabine-tenofovir AF (Descovy) 200-25 MG tablet; Take 1 tablet by mouth in the morning  Primary hypertension  -     irbesartan-hydrochlorothiazide (AVALIDE) 300-12 5 MG per tablet; Take 1 tablet by mouth every morning    Essential hypertension  -     metoprolol succinate (TOPROL-XL) 25 mg 24 hr tablet; Take 1 tablet (25 mg total) by mouth in the morning  Mixed hyperlipidemia  -     rosuvastatin (CRESTOR) 20 MG tablet; Take 1 tablet (20 mg total) by mouth in the morning  Gastroesophageal reflux disease without esophagitis          Subjective:      Patient ID: Fátima Duran is a 64 y o  male  Arianna Avila is here today for three-month PCP follow-up of chronic conditions    PMx significant for HIV, HCV SVR s/p treatment, cirrhosis,HTN,hyperlipidemia, and NSTEMI in January 2019 with cardiac intervention and stent placed  Dat Jacobs is doing well and has no acute complaints  The following portions of the patient's history were reviewed and updated as appropriate: allergies, current medications, past family history, past medical history, past social history, past surgical history and problem list     Review of Systems   Constitutional: Negative for chills and fever  HENT: Negative for ear pain and sore throat  Eyes: Negative for pain and visual disturbance  Respiratory: Negative for cough and shortness of breath  Cardiovascular: Negative for chest pain and palpitations  Gastrointestinal: Negative for abdominal pain and vomiting  Genitourinary: Negative for dysuria and hematuria  Musculoskeletal: Negative for arthralgias and back pain  Skin: Negative for color change and rash  Neurological: Negative for seizures and syncope  All other systems reviewed and are negative          Objective:      /82   Pulse 85   Temp 97 6 °F (36 4 °C)   Ht 5' 7" (1 702 m)   Wt 94 1 kg (207 lb 6 4 oz)   SpO2 93%   BMI 32 48 kg/m²       Lab Results   Component Value Date     03/18/2017    K 4 3 02/19/2022     02/19/2022    CO2 28 02/19/2022    BUN 20 02/19/2022    CREATININE 1 17 02/19/2022    GLUF 117 (H) 02/19/2022    CALCIUM 9 1 02/19/2022    AST 20 02/19/2022    ALT 23 02/19/2022    ALKPHOS 95 02/19/2022    PROT 7 4 03/18/2017    BILITOT 1 1 03/18/2017    EGFR 66 02/19/2022     Lab Results   Component Value Date    WBC 5 39 02/19/2022    WBC 5 2 02/19/2022    HGB 17 6 (H) 02/19/2022    HGB 15 1 02/19/2022    HCT 52 5 (H) 02/19/2022    HCT 44 8 02/19/2022    MCV 88 02/19/2022    MCV 87 02/19/2022     02/19/2022     02/19/2022     No results found for: HEPCAB  Lab Results   Component Value Date    HEPBCAB REACTIVE (A) 10/08/2015    HEPBCAB REACTIVE (A) 10/08/2015    HEPBCAB REACTIVE (A) 10/08/2015    HEPBCAB REACTIVE (A) 10/08/2015     Lab Results   Component Value Date    RPR Non-Reactive 03/15/2021            Physical Exam  Constitutional:       General: He is not in acute distress  Appearance: He is well-developed  HENT:      Head: Normocephalic  Right Ear: External ear normal       Left Ear: External ear normal       Nose: Nose normal       Mouth/Throat:      Pharynx: No oropharyngeal exudate  Eyes:      General:         Right eye: No discharge  Left eye: No discharge  Conjunctiva/sclera: Conjunctivae normal       Pupils: Pupils are equal, round, and reactive to light  Neck:      Thyroid: No thyromegaly  Cardiovascular:      Rate and Rhythm: Normal rate and regular rhythm  Heart sounds: Normal heart sounds  No murmur heard  Pulmonary:      Effort: Pulmonary effort is normal       Breath sounds: Normal breath sounds  No wheezing  Abdominal:      General: Bowel sounds are normal       Palpations: Abdomen is soft  There is no mass  Tenderness: There is no abdominal tenderness  Musculoskeletal:         General: No tenderness  Normal range of motion  Cervical back: Normal range of motion  Lymphadenopathy:      Cervical: No cervical adenopathy  Skin:     General: Skin is warm and dry  Findings: No rash  Neurological:      Mental Status: He is alert and oriented to person, place, and time     Psychiatric:         Behavior: Behavior normal

## 2022-05-17 NOTE — ASSESSMENT & PLAN NOTE
No results found for: IH9QNAP  CD4 ABS   Date/Time Value Ref Range Status   2022 08:04  (L) 359 - 1519 /uL Final     HIV-1 RNA by PCR, Qn   Date/Time Value Ref Range Status   2022 08:04 AM <20 copies/mL Final     Comment:     HIV-1 RNA not detected  The reportable range for this assay is 20 to 10,000,000  copies HIV-1 RNA/mL  HIV-1 RNA Viral Load Log   Date/Time Value Ref Range Status   2022 08:04 AM COMMENT [id number]/mL Final     Comment:     Unable to calculate result since non-numeric result obtained for  component test          ART: alka oWozcobix        Denies side effects  Stressed the importance of adherence  Continue follow up with ID clinic  Reviewed most recent labs, including Cd4 and viral load  Discussed the risks and benefits of treatment options, instructions for management, importance of treatment adherence, and reduction of risk factor  Educated on possible  medication side effects  Counseled on routes of HIV transmission, including the risk of  infection  Emphasized that viral suppression is the best method to prevent HIV transmission  At this time pt denies the need for HIV testing of anyone in their life  Total encounter time was 45 minutes  Greater then 20 minutes were spent on counseling and patient education  Pt voices understanding and agreement with treatment plan

## 2022-05-18 ENCOUNTER — APPOINTMENT (OUTPATIENT)
Dept: LAB | Facility: HOSPITAL | Age: 62
End: 2022-05-18
Payer: COMMERCIAL

## 2022-05-18 LAB
ALBUMIN SERPL BCP-MCNC: 3.7 G/DL (ref 3.5–5)
ALP SERPL-CCNC: 96 U/L (ref 46–116)
ALT SERPL W P-5'-P-CCNC: 24 U/L (ref 12–78)
ANION GAP SERPL CALCULATED.3IONS-SCNC: 11 MMOL/L (ref 4–13)
AST SERPL W P-5'-P-CCNC: 27 U/L (ref 5–45)
BASOPHILS # BLD AUTO: 0.05 THOUSANDS/ΜL (ref 0–0.1)
BASOPHILS NFR BLD AUTO: 1 % (ref 0–1)
BILIRUB SERPL-MCNC: 0.7 MG/DL (ref 0.2–1)
BUN SERPL-MCNC: 17 MG/DL (ref 5–25)
CALCIUM SERPL-MCNC: 8.8 MG/DL (ref 8.3–10.1)
CHLORIDE SERPL-SCNC: 105 MMOL/L (ref 100–108)
CO2 SERPL-SCNC: 23 MMOL/L (ref 21–32)
CREAT SERPL-MCNC: 0.93 MG/DL (ref 0.6–1.3)
EOSINOPHIL # BLD AUTO: 0.13 THOUSAND/ΜL (ref 0–0.61)
EOSINOPHIL NFR BLD AUTO: 3 % (ref 0–6)
ERYTHROCYTE [DISTWIDTH] IN BLOOD BY AUTOMATED COUNT: 14 % (ref 11.6–15.1)
GFR SERPL CREATININE-BSD FRML MDRD: 88 ML/MIN/1.73SQ M
GLUCOSE P FAST SERPL-MCNC: 125 MG/DL (ref 65–99)
HCT VFR BLD AUTO: 51.9 % (ref 36.5–49.3)
HGB BLD-MCNC: 17.2 G/DL (ref 12–17)
IMM GRANULOCYTES # BLD AUTO: 0.02 THOUSAND/UL (ref 0–0.2)
IMM GRANULOCYTES NFR BLD AUTO: 0 % (ref 0–2)
LYMPHOCYTES # BLD AUTO: 0.81 THOUSANDS/ΜL (ref 0.6–4.47)
LYMPHOCYTES NFR BLD AUTO: 16 % (ref 14–44)
MCH RBC QN AUTO: 29.3 PG (ref 26.8–34.3)
MCHC RBC AUTO-ENTMCNC: 33.1 G/DL (ref 31.4–37.4)
MCV RBC AUTO: 88 FL (ref 82–98)
MONOCYTES # BLD AUTO: 0.72 THOUSAND/ΜL (ref 0.17–1.22)
MONOCYTES NFR BLD AUTO: 14 % (ref 4–12)
NEUTROPHILS # BLD AUTO: 3.51 THOUSANDS/ΜL (ref 1.85–7.62)
NEUTS SEG NFR BLD AUTO: 66 % (ref 43–75)
NRBC BLD AUTO-RTO: 0 /100 WBCS
PLATELET # BLD AUTO: 179 THOUSANDS/UL (ref 149–390)
PMV BLD AUTO: 10.2 FL (ref 8.9–12.7)
POTASSIUM SERPL-SCNC: 3.8 MMOL/L (ref 3.5–5.3)
PROT SERPL-MCNC: 7.7 G/DL (ref 6.4–8.2)
RBC # BLD AUTO: 5.88 MILLION/UL (ref 3.88–5.62)
RPR SER QL: NORMAL
SODIUM SERPL-SCNC: 139 MMOL/L (ref 136–145)
WBC # BLD AUTO: 5.24 THOUSAND/UL (ref 4.31–10.16)

## 2022-05-18 PROCEDURE — 87536 HIV-1 QUANT&REVRSE TRNSCRPJ: CPT | Performed by: INTERNAL MEDICINE

## 2022-05-18 PROCEDURE — 86592 SYPHILIS TEST NON-TREP QUAL: CPT | Performed by: INTERNAL MEDICINE

## 2022-05-18 PROCEDURE — 85025 COMPLETE CBC W/AUTO DIFF WBC: CPT | Performed by: INTERNAL MEDICINE

## 2022-05-18 PROCEDURE — 80053 COMPREHEN METABOLIC PANEL: CPT | Performed by: INTERNAL MEDICINE

## 2022-05-18 PROCEDURE — 36415 COLL VENOUS BLD VENIPUNCTURE: CPT | Performed by: INTERNAL MEDICINE

## 2022-05-20 LAB
HIV1 RNA # SERPL NAA+PROBE: 1370 COPIES/ML
HIV1 RNA SERPL NAA+PROBE-LOG#: 3.14 LOG10COPY/ML

## 2022-05-24 ENCOUNTER — OFFICE VISIT (OUTPATIENT)
Dept: SURGERY | Facility: CLINIC | Age: 62
End: 2022-05-24
Payer: COMMERCIAL

## 2022-05-24 VITALS
HEIGHT: 67 IN | OXYGEN SATURATION: 96 % | DIASTOLIC BLOOD PRESSURE: 70 MMHG | BODY MASS INDEX: 32.33 KG/M2 | SYSTOLIC BLOOD PRESSURE: 125 MMHG | WEIGHT: 206 LBS | TEMPERATURE: 98.7 F | HEART RATE: 74 BPM

## 2022-05-24 DIAGNOSIS — Z13.1 SCREENING FOR DIABETES MELLITUS: ICD-10-CM

## 2022-05-24 DIAGNOSIS — T65.294D TOXIC EFFECT OF TOBACCO, UNDETERMINED INTENT, SUBSEQUENT ENCOUNTER: ICD-10-CM

## 2022-05-24 DIAGNOSIS — D75.1 POLYCYTHEMIA: ICD-10-CM

## 2022-05-24 DIAGNOSIS — I25.10 CORONARY ARTERY DISEASE INVOLVING NATIVE CORONARY ARTERY OF NATIVE HEART WITHOUT ANGINA PECTORIS: ICD-10-CM

## 2022-05-24 DIAGNOSIS — K74.69 OTHER CIRRHOSIS OF LIVER (HCC): ICD-10-CM

## 2022-05-24 DIAGNOSIS — B20 HIV DISEASE (HCC): Primary | ICD-10-CM

## 2022-05-24 DIAGNOSIS — I10 ESSENTIAL HYPERTENSION: ICD-10-CM

## 2022-05-24 DIAGNOSIS — R91.1 LUNG NODULE: ICD-10-CM

## 2022-05-24 DIAGNOSIS — Z79.899 OTHER LONG TERM (CURRENT) DRUG THERAPY: ICD-10-CM

## 2022-05-24 DIAGNOSIS — B20 HIV INFECTION, UNSPECIFIED SYMPTOM STATUS (HCC): Primary | ICD-10-CM

## 2022-05-24 DIAGNOSIS — Z13.220 ENCOUNTER FOR LIPID SCREENING FOR CARDIOVASCULAR DISEASE: ICD-10-CM

## 2022-05-24 DIAGNOSIS — Z13.6 ENCOUNTER FOR LIPID SCREENING FOR CARDIOVASCULAR DISEASE: ICD-10-CM

## 2022-05-24 PROCEDURE — 99215 OFFICE O/P EST HI 40 MIN: CPT | Performed by: INTERNAL MEDICINE

## 2022-05-24 NOTE — PROGRESS NOTES
Progress Note - Infectious Disease   Leigh Zuniga 64 y o  male MRN: [id number]  Unit/Bed#:  Encounter: [id number]      Impression/Plan:  1   HIV-patient had been failing his ART with a viral load of 1370  Patient insists he has never missed any doses  Will continue the Tivicay, Descovy, Prezcobix  Recheck HIV RNA now and a viral load remains elevated will check HIV integrase and genotype and see back after those results are available  If HIV RNA is suppressed in follow-up patient can be followed up in 3 months      2  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  Right upper quadrant ultrasound negative, and the alpha fetoprotein negative on the last check   Continue Q 6 month hepatocellular carcinoma screening      3   Polycythemia-likely secondary to the nicotine dependence   Seen by Hematology Oncology who agree with my assessment   Will continue to monitor the CBC with diff when quit smoking     4   Hypertension-asymptomatic and stable and reasonably controlled   Continue monitor     5  Coronary artery disease-complicated by acute MI status post PCI   Now asymptomatic   Follow up with Cardiology      6  Nicotine dependence-patient continues to smoke occasionally but has decreased his tobacco use and plans to quit by his next visit with me   Leonel Del Toro is using nicotine patches and working on quitting      7  Lung nodule-repeat CT scan stable  continue annual CT scan follow-up    Patient was provided medication, adherence and prevention education    Subjective:  Routine follow-up for HIV  Patient claims 100% adherence with Tivicay, Prezcobix, Descovy   He had stopped his ART for a while because he felt like he was having side effects however he later realize the side effects related to his Plavix  Therefore he has restarted his medications and has been adherent once again  Patient denies any notable side effects  Overall the feeling well    The patient denies any fever chills or sweats, denies any nausea vomiting or diarrhea, denies any cough or shortness of breath  ROS:  A complete review of systems is negative other than that noted above in the subjective    Followup portions patient history reviewed and updated as: Allergies, current medications, past medical history, past social history, past surgical history, and the problem list    Objective:  Vitals:  Vitals:    05/24/22 1723   BP: 125/70   Pulse: 74   Temp: 98 7 °F (37 1 °C)   SpO2: 96%   Weight: 93 4 kg (206 lb)   Height: 5' 7" (1 702 m)       Physical Exam:   General Appearance:  Alert, interactive, appearing well,  nontoxic, no acute distress  Neck:   Supple without lymphadenopathy, no thyromegaly or masses   Throat: Oropharynx moist without lesions  Lungs:   Clear to auscultation bilaterally; no wheezes, rhonchi or rales; respirations unlabored   Heart:  RRR; no murmur, rub or gallop   Abdomen:   Soft, non-tender, non-distended, positive bowel sounds  Extremities: No clubbing, cyanosis or edema   Skin: No new rashes or lesions  No draining wounds noted         Labs, Imaging, & Other studies:   All pertinent labs and imaging studies were personally reviewed    Lab Results   Component Value Date     03/18/2017    K 3 8 05/18/2022     05/18/2022    CO2 23 05/18/2022    BUN 17 05/18/2022    CREATININE 0 93 05/18/2022    GLUF 125 (H) 05/18/2022    CALCIUM 8 8 05/18/2022    AST 27 05/18/2022    ALT 24 05/18/2022    ALKPHOS 96 05/18/2022    PROT 7 4 03/18/2017    BILITOT 1 1 03/18/2017    EGFR 88 05/18/2022     Lab Results   Component Value Date    WBC 5 24 05/18/2022    HGB 17 2 (H) 05/18/2022    HCT 51 9 (H) 05/18/2022    MCV 88 05/18/2022     05/18/2022     No results found for: HEPCAB  Lab Results   Component Value Date    HEPBCAB REACTIVE (A) 10/08/2015    HEPBCAB REACTIVE (A) 10/08/2015    HEPBCAB REACTIVE (A) 10/08/2015    HEPBCAB REACTIVE (A) 10/08/2015     Lab Results   Component Value Date RPR Non-Reactive 05/18/2022     CD4 ABS   Date/Time Value Ref Range Status   02/19/2022 08:04  (L) 359 - 1519 /uL Final     HIV-1 RNA by PCR, Qn   Date/Time Value Ref Range Status   05/18/2022 07:12 AM 1370 copies/mL Final     Comment:     The reportable range for this assay is 20 to 10,000,000  copies HIV-1 RNA/mL             Current Outpatient Medications:     albuterol (PROVENTIL HFA,VENTOLIN HFA) 90 mcg/act inhaler, INHALE 2 PUFFS BY MOUTH EVERY SIX HOURS AS NEEDED FOR WHEEZING, Disp: 18 g, Rfl: 0    aspirin (ECOTRIN LOW STRENGTH) 81 mg EC tablet, Take 1 tablet (81 mg total) by mouth in the morning , Disp: 30 tablet, Rfl: 5    cetirizine (ZyrTEC) 10 mg tablet, Take 1 tablet (10 mg total) by mouth in the morning , Disp: 30 tablet, Rfl: 5    Darunavir-Cobicistat (Prezcobix) 800-150 MG TABS, Take 1 tablet by mouth daily, Disp: 30 tablet, Rfl: 5    dolutegravir (Tivicay) 50 MG TABS, Take 1 tablet (50 mg total) by mouth in the morning and 1 tablet (50 mg total) in the evening , Disp: 60 tablet, Rfl: 5    emtricitabine-tenofovir AF (Descovy) 200-25 MG tablet, Take 1 tablet by mouth in the morning , Disp: 30 tablet, Rfl: 5    irbesartan-hydrochlorothiazide (AVALIDE) 300-12 5 MG per tablet, Take 1 tablet by mouth every morning, Disp: 30 tablet, Rfl: 2    metoprolol succinate (TOPROL-XL) 25 mg 24 hr tablet, Take 1 tablet (25 mg total) by mouth in the morning , Disp: 30 tablet, Rfl: 5    nicotine (NICODERM CQ) 21 mg/24 hr TD 24 hr patch, APPLY 1 PATCH TO THE SKIN DAILY, Disp: 28 patch, Rfl: 2    nicotine polacrilex (COMMIT) 4 MG lozenge, Apply 1 lozenge (4 mg total) to the mouth or throat as needed for smoking cessation, Disp: 72 lozenge, Rfl: 3    nitroglycerin (NITROSTAT) 0 4 mg SL tablet, Place 1 tablet (0 4 mg total) under the tongue every 5 (five) minutes as needed for chest pain, Disp: 15 tablet, Rfl: 0    rosuvastatin (CRESTOR) 20 MG tablet, Take 1 tablet (20 mg total) by mouth in the morning , Disp: 30 tablet, Rfl: 5

## 2022-05-25 ENCOUNTER — PATIENT OUTREACH (OUTPATIENT)
Dept: SURGERY | Facility: CLINIC | Age: 62
End: 2022-05-25

## 2022-05-25 NOTE — PROGRESS NOTES
S met with pt to complete yearly PHQ-9  Pt stated he is not experiencing any stress or anxiety and attributes this to his support system of family and friends  Pt scored 1 on PHQ-9   Pt shared that he is doing well and not in need of intervention with this [facility]  Pt is a smoker and was offered smoking cessation, declined at this time  Pt is in stage of change-pre contemplation

## 2022-06-02 ENCOUNTER — PATIENT OUTREACH (OUTPATIENT)
Dept: SURGERY | Facility: OTHER | Age: 62
End: 2022-06-02

## 2022-06-13 DIAGNOSIS — T65.292D TOXIC EFFECT OF TOBACCO, INTENTIONAL SELF-HARM, SUBSEQUENT ENCOUNTER: ICD-10-CM

## 2022-06-13 RX ORDER — POLYETHYLENE GLYCOL 3350 17 G
POWDER IN PACKET (EA) ORAL
Qty: 72 LOZENGE | Refills: 3 | Status: SHIPPED | OUTPATIENT
Start: 2022-06-13

## 2022-07-11 NOTE — PROGRESS NOTES
Assessment/Plan:   [facility] met with pt to complete yearly PHQ-9  Pt stated he is not experiencing any stress or anxiety and attributes this to his support system of family and friends  Pt scored 1 on PHQ-9   Pt shared that he is doing well and not in need of intervention with this [facility]       Pt is a smoker and was offered smoking cessation, declined at this time  Pt is in stage of change-pre contemplation  [facility]     Today patient present with   Chief Complaint   Patient presents with    Follow-up     Pt offers no c/o at this time  Patient would likely benefit from annual PHQ-9 update  Consider/focus/continue none at this time  Stage of change:   Plan/ Behavioral Recommendations: annual PHQ-9      Diagnoses and all orders for this visit:    HIV disease ([facility] [address] )  -     CBC and differential  -     T-helper cells CD4/CD8 %  -     Comprehensive metabolic panel  -     HIV-1 RNA, quantitative, PCR  -     Lipid Panel with Direct LDL reflex  -     Hemoglobin A1C    Screening for diabetes mellitus  -     Hemoglobin A1C    Other long term (current) drug therapy  -     Hemoglobin A1C    Encounter for lipid screening for cardiovascular disease  -     Lipid Panel with Direct LDL reflex    Other cirrhosis of liver (HCC)    Essential hypertension    Toxic effect of tobacco, undetermined intent, subsequent encounter    Coronary artery disease involving native coronary artery of native heart without angina pectoris    Lung nodule    Polycythemia          Subjective:     Patient ID: Elly Romero is a 58 y o  male  HPI    History of Present Illness:      Patient is being seen for annual behavioral health assessment  Patient denies current behavioral health concerns      [unfilled]       Review of Systems      Objective:     Physical Exam      [facility]    Orientation     Person: yes    Place: yes    Time: yes    Appearance    Well Developed: yes healthy    Uncomfortable: no    Normal Body Odor: yes    Smells of Feces: no    Smells of Urine: no    Disheveled: no    Well Nourished: yes weight WNL of ideal    Grooming Unkempt: no    Poor Eye Contact: no    Hirsute: no    Looks Tired: no    Acutely Exhausted: noappearance reflects stated age    Mood and Affect:     Appropriate: yes    Euthymicyes    Irritable: no    Angry: no    Anxious: no    Depressed:no    Blunted:no    Labile: no    Restricted: no    Harm to Self or Others: pt denies SI/HI    Substance Abuse: pt denies

## 2022-07-12 ENCOUNTER — PATIENT OUTREACH (OUTPATIENT)
Dept: SURGERY | Facility: OTHER | Age: 62
End: 2022-07-12

## 2022-07-12 NOTE — PROGRESS NOTES
Cm spoke to ct regarding his MAWD premium  He was concerned there was a balance and the bill is not getting paid  Cm informed ct she has been paying them but [facility] has there own process and has been very slow during covid  Ct was understanding and stated his wife will be sending the premium bill to cm's email today

## 2022-07-13 ENCOUNTER — PATIENT OUTREACH (OUTPATIENT)
Dept: SURGERY | Facility: OTHER | Age: 62
End: 2022-07-13

## 2022-07-13 NOTE — PROGRESS NOTES
Cm received June Monthly preium  Cm submitted bill for June to Supervisor  Cm also received approval from [employer] to pay other missed bills, in previous months  This month we will be paying $280

## 2022-07-20 ENCOUNTER — PATIENT OUTREACH (OUTPATIENT)
Dept: SURGERY | Facility: OTHER | Age: 62
End: 2022-07-20

## 2022-07-20 NOTE — PROGRESS NOTES
Akash sent a text message to ct stating, "If you could do this online survey that is about 60 questions regarding the [facility]  Questions are about Case management, ID care, labs and if all your needs are being met  It would really help us do our jobs better with your feed back  Thank you!" Akash then attached the link for the survey

## 2022-07-25 ENCOUNTER — PATIENT OUTREACH (OUTPATIENT)
Dept: SURGERY | Facility: OTHER | Age: 62
End: 2022-07-25

## 2022-07-25 NOTE — PROGRESS NOTES
CM received July MAWD Premium bill  CM completed check req and letter of payment  CM sent packet to supervisor for approval and submission      See media

## 2022-08-15 DIAGNOSIS — K21.9 GASTROESOPHAGEAL REFLUX DISEASE WITHOUT ESOPHAGITIS: Primary | ICD-10-CM

## 2022-08-15 DIAGNOSIS — T65.292D TOXIC EFFECT OF TOBACCO, INTENTIONAL SELF-HARM, SUBSEQUENT ENCOUNTER: ICD-10-CM

## 2022-08-15 DIAGNOSIS — I10 PRIMARY HYPERTENSION: ICD-10-CM

## 2022-08-15 RX ORDER — NICOTINE 21 MG/24HR
PATCH, TRANSDERMAL 24 HOURS TRANSDERMAL
Qty: 28 PATCH | Refills: 2 | Status: SHIPPED | OUTPATIENT
Start: 2022-08-15

## 2022-08-15 RX ORDER — IRBESARTAN AND HYDROCHLOROTHIAZIDE 300; 12.5 MG/1; MG/1
TABLET, FILM COATED ORAL
Qty: 30 TABLET | Refills: 2 | Status: SHIPPED | OUTPATIENT
Start: 2022-08-15

## 2022-08-15 RX ORDER — PANTOPRAZOLE SODIUM 40 MG/1
TABLET, DELAYED RELEASE ORAL
Qty: 60 TABLET | Refills: 2 | Status: SHIPPED | OUTPATIENT
Start: 2022-08-15

## 2022-09-01 ENCOUNTER — PATIENT OUTREACH (OUTPATIENT)
Dept: SURGERY | Facility: OTHER | Age: 62
End: 2022-09-01

## 2022-09-21 NOTE — PROGRESS NOTES
[address] MELGAR received ct's September MAWD premium statement via email  Akash prepared an Authorization request for the MAWD premiums and faxed the forms to Tobi Moise and Edmund Peña from [place]   Cm also emailed ct's Recert forms and income proof to [place] for [employer]  Referred To Mid-Level For Closure Text (Leave Blank If You Do Not Want): After obtaining clear surgical margins the patient was sent to Daniel Gordon PA-C for surgical repair.  The patient understands they will receive post-surgical care and follow-up from the mid-level provider.

## 2022-09-27 ENCOUNTER — PATIENT OUTREACH (OUTPATIENT)
Dept: SURGERY | Facility: OTHER | Age: 62
End: 2022-09-27

## 2022-09-27 NOTE — PROGRESS NOTES
Cm received MAWD bill from ct via email   Cm completed Check req and sent to supervisor for approval

## 2022-09-28 ENCOUNTER — OFFICE VISIT (OUTPATIENT)
Dept: DENTISTRY | Facility: CLINIC | Age: 62
End: 2022-09-28

## 2022-09-28 VITALS — DIASTOLIC BLOOD PRESSURE: 106 MMHG | SYSTOLIC BLOOD PRESSURE: 162 MMHG | HEART RATE: 83 BPM

## 2022-09-28 DIAGNOSIS — K08.109 TEETH MISSING: Primary | ICD-10-CM

## 2022-09-28 DIAGNOSIS — Z01.21 ENCOUNTER FOR DENTAL EXAMINATION AND CLEANING WITH ABNORMAL FINDINGS: ICD-10-CM

## 2022-09-28 DIAGNOSIS — K08.89 PAIN, DENTAL: ICD-10-CM

## 2022-09-28 PROCEDURE — D1110 PROPHYLAXIS - ADULT: HCPCS | Performed by: DENTIST

## 2022-09-28 PROCEDURE — D0220 INTRAORAL - PERIAPICAL FIRST RADIOGRAPHIC IMAGE: HCPCS | Performed by: DENTIST

## 2022-09-28 PROCEDURE — D0120 PERIODIC ORAL EVALUATION - ESTABLISHED PATIENT: HCPCS | Performed by: DENTIST

## 2022-09-28 PROCEDURE — WIS5000 PRELIMINARY IMPRESSIONS: Performed by: DENTIST

## 2022-09-28 NOTE — PROGRESS NOTES
Recall and Treatment Planning    Ori London presents for periodic exam and to finalize treatment plan  Reviewed PMH, pt states he no longer takes Plavix and take a new blood thinner, but cannot remember the name  Pt stated he was having sensitivity on tooth #29  ASA: 3  Pain: 0/10, sensitivity on #29    PA radiograph of #29 taken  No PAP noted  Clinical exam revealed pt had exposed dentin due to grinding habit  Periodontal charting completed  Pockets range from 2-6 mm  Mobility of 1: Teeth 8, 9, 19, 25, 26  Mobility of 2: Tooth 24    Caries noted on #11 F(V) and #19 B extending on root  #19 has recession with furcation involvement  Pt provided with tx options along with benefits/risks/pros/cons:  1  Crown remaining teeth  2  Maxillary complete denture with mandibular metal partial denture with 2-4 crowns  3  Maxillary resin partial denture with extracting teeth #8, 9, 10 for esthetics with mandibular metal partial denture with 2-4 crowns    Pt elected for tx option 3  He understands that not all the teeth on top will look alike and remaining teeth will still be fractured/discolored  Pt understands that in the future, he may need a complete maxillary denture  Pt elected to keep only teeth #22 and 27 which will be crowned  Pt elected to have remaining mandibular dentition extracted  Prophylaxis completed with ultrasonic  and hand instrumentation  Soft plaque removed and supragingival calculus removed  Pt did not have a lot of plaque build up  Polished with prophy cup and paste  Flossed and provided Oral Health Instructions  Demonstrated proper brushing and flossing technique  Completed oral cancer screening, no abnormal findings  Instructed pt to speak with PCP about blood thinners and instructions on holding medications  Patient left satisfied and ambulatory      NV: crown preps and temporization of #22 and #27

## 2022-10-05 ENCOUNTER — PATIENT OUTREACH (OUTPATIENT)
Dept: SURGERY | Facility: OTHER | Age: 62
End: 2022-10-05

## 2022-10-05 NOTE — PROGRESS NOTES
CM completed Assessment via telephone  CM also spoke to Ct about dental procedures and if [employer] covers them  CM confirmed that [facility] in [place] has contract with [employer] in covering procedures with mutual clients  Cm requested ct speak with Aster and contact her back

## 2022-10-10 DIAGNOSIS — T65.292D TOXIC EFFECT OF TOBACCO, INTENTIONAL SELF-HARM, SUBSEQUENT ENCOUNTER: ICD-10-CM

## 2022-10-12 ENCOUNTER — OFFICE VISIT (OUTPATIENT)
Dept: DENTISTRY | Facility: CLINIC | Age: 62
End: 2022-10-12

## 2022-10-12 VITALS — SYSTOLIC BLOOD PRESSURE: 145 MMHG | DIASTOLIC BLOOD PRESSURE: 87 MMHG | HEART RATE: 76 BPM

## 2022-10-12 DIAGNOSIS — Z46.3 ENCOUNTER FOR FITTING OF DENTURES: Primary | ICD-10-CM

## 2022-10-12 DIAGNOSIS — Z98.811: ICD-10-CM

## 2022-10-12 PROCEDURE — WIS2000 CROWN PREP: Performed by: DENTIST

## 2022-10-12 RX ORDER — POLYETHYLENE GLYCOL 3350 17 G
POWDER IN PACKET (EA) ORAL
Qty: 72 LOZENGE | Refills: 3 | Status: SHIPPED | OUTPATIENT
Start: 2022-10-12

## 2022-10-12 NOTE — PROGRESS NOTES
Patient presents for a dental crowns and verbally consents for treatment:  Reviewed health history-  Pt is ASA type II  Treatment consents signed: Yes  Perio: Gingivitis   Pain Scale:0  Caries Assessment: high  Radiographs: Films are current  Oral Hygiene instruction reviewed and given  Hygiene recall visits recommended to the patient  Oral cancer screening done and no pathology noted on ROM, FOM , Palate,soft tissue or tongue  Went over the tx plan again  Pt has lost vertical height, has grinded some of his teeth all way down close to the pulp being exposed  Pt mentioned his teeth are getting very sensitive and painful  Discussed the tx plan options from last visit again  Pt choose to remove all his upper teeth and save only #22 and #27 as a final treatment plan  Today [place] prep #22 and #27  Gave 2 Carpule 4% Septocaine 1:100K epi infiltrations  Prepped #22 and #27  Took Final impression and pt choose A1 shade  Case sent to the lab for fabrication of survey crowns  Provisonal done and cemented  Gave pt referral to [facility] for removal of #4,6,8,9,10,11,12,19,24,25,26,28,29  Specified to keep #22 and #27  All questions answered  Pt left in good health  NV: delivery crown #22 ,27 when case is back from lab    LEE Rodriguez     Prognosis is Good  Referrals Needed: No      Next visit:     LEE Dewey

## 2022-10-27 ENCOUNTER — APPOINTMENT (OUTPATIENT)
Dept: LAB | Facility: HOSPITAL | Age: 62
End: 2022-10-27
Payer: COMMERCIAL

## 2022-10-27 DIAGNOSIS — B20 HIV INFECTION, UNSPECIFIED SYMPTOM STATUS (HCC): ICD-10-CM

## 2022-10-27 LAB
ALBUMIN SERPL BCP-MCNC: 3.8 G/DL (ref 3.5–5)
ALP SERPL-CCNC: 99 U/L (ref 46–116)
ALT SERPL W P-5'-P-CCNC: 22 U/L (ref 12–78)
ANION GAP SERPL CALCULATED.3IONS-SCNC: 1 MMOL/L (ref 4–13)
AST SERPL W P-5'-P-CCNC: 23 U/L (ref 5–45)
BASOPHILS # BLD AUTO: 0.04 THOUSANDS/ΜL (ref 0–0.1)
BASOPHILS NFR BLD AUTO: 1 % (ref 0–1)
BILIRUB SERPL-MCNC: 0.8 MG/DL (ref 0.2–1)
BILIRUB UR QL STRIP: NEGATIVE
BUN SERPL-MCNC: 19 MG/DL (ref 5–25)
CALCIUM SERPL-MCNC: 9 MG/DL (ref 8.3–10.1)
CHLORIDE SERPL-SCNC: 108 MMOL/L (ref 96–108)
CHOLEST SERPL-MCNC: 125 MG/DL
CLARITY UR: CLEAR
CO2 SERPL-SCNC: 27 MMOL/L (ref 21–32)
COLOR UR: YELLOW
CREAT SERPL-MCNC: 1.18 MG/DL (ref 0.6–1.3)
EOSINOPHIL # BLD AUTO: 0.07 THOUSAND/ΜL (ref 0–0.61)
EOSINOPHIL NFR BLD AUTO: 1 % (ref 0–6)
ERYTHROCYTE [DISTWIDTH] IN BLOOD BY AUTOMATED COUNT: 14.5 % (ref 11.6–15.1)
EST. AVERAGE GLUCOSE BLD GHB EST-MCNC: 123 MG/DL
GFR SERPL CREATININE-BSD FRML MDRD: 65 ML/MIN/1.73SQ M
GLUCOSE P FAST SERPL-MCNC: 115 MG/DL (ref 65–99)
GLUCOSE UR STRIP-MCNC: NEGATIVE MG/DL
HBA1C MFR BLD: 5.9 %
HCT VFR BLD AUTO: 51.7 % (ref 36.5–49.3)
HDLC SERPL-MCNC: 34 MG/DL
HGB BLD-MCNC: 17.3 G/DL (ref 12–17)
HGB UR QL STRIP.AUTO: NEGATIVE
IMM GRANULOCYTES # BLD AUTO: 0.02 THOUSAND/UL (ref 0–0.2)
IMM GRANULOCYTES NFR BLD AUTO: 0 % (ref 0–2)
KETONES UR STRIP-MCNC: NEGATIVE MG/DL
LDLC SERPL CALC-MCNC: 68 MG/DL (ref 0–100)
LEUKOCYTE ESTERASE UR QL STRIP: NEGATIVE
LYMPHOCYTES # BLD AUTO: 1.09 THOUSANDS/ΜL (ref 0.6–4.47)
LYMPHOCYTES NFR BLD AUTO: 15 % (ref 14–44)
MCH RBC QN AUTO: 29.1 PG (ref 26.8–34.3)
MCHC RBC AUTO-ENTMCNC: 33.5 G/DL (ref 31.4–37.4)
MCV RBC AUTO: 87 FL (ref 82–98)
MONOCYTES # BLD AUTO: 1.01 THOUSAND/ΜL (ref 0.17–1.22)
MONOCYTES NFR BLD AUTO: 14 % (ref 4–12)
NEUTROPHILS # BLD AUTO: 5.23 THOUSANDS/ΜL (ref 1.85–7.62)
NEUTS SEG NFR BLD AUTO: 69 % (ref 43–75)
NITRITE UR QL STRIP: NEGATIVE
NRBC BLD AUTO-RTO: 0 /100 WBCS
PH UR STRIP.AUTO: 5 [PH]
PLATELET # BLD AUTO: 210 THOUSANDS/UL (ref 149–390)
PMV BLD AUTO: 9.6 FL (ref 8.9–12.7)
POTASSIUM SERPL-SCNC: 4 MMOL/L (ref 3.5–5.3)
PROT SERPL-MCNC: 8.2 G/DL (ref 6.4–8.4)
PROT UR STRIP-MCNC: NEGATIVE MG/DL
RBC # BLD AUTO: 5.94 MILLION/UL (ref 3.88–5.62)
SODIUM SERPL-SCNC: 136 MMOL/L (ref 135–147)
SP GR UR STRIP.AUTO: 1.02 (ref 1–1.03)
TRIGL SERPL-MCNC: 117 MG/DL
UROBILINOGEN UR STRIP-ACNC: 2 MG/DL
WBC # BLD AUTO: 7.46 THOUSAND/UL (ref 4.31–10.16)

## 2022-10-28 LAB
BASOPHILS # BLD AUTO: 0 X10E3/UL (ref 0–0.2)
BASOPHILS NFR BLD AUTO: 0 %
C TRACH DNA SPEC QL NAA+PROBE: NEGATIVE
CD3+CD4+ CELLS # BLD: 178 /UL (ref 359–1519)
CD3+CD4+ CELLS NFR BLD: 17.8 % (ref 30.8–58.5)
CD3+CD4+ CELLS/CD3+CD8+ CLL BLD: 0.36 % (ref 0.92–3.72)
CD3+CD8+ CELLS # BLD: 490 /UL (ref 109–897)
CD3+CD8+ CELLS NFR BLD: 49 % (ref 12–35.5)
EOSINOPHIL # BLD AUTO: 0.1 X10E3/UL (ref 0–0.4)
EOSINOPHIL NFR BLD AUTO: 1 %
ERYTHROCYTE [DISTWIDTH] IN BLOOD BY AUTOMATED COUNT: 13.9 % (ref 11.6–15.4)
HCT VFR BLD AUTO: 53.3 % (ref 37.5–51)
HGB BLD-MCNC: 18.1 G/DL (ref 13–17.7)
IMM GRANULOCYTES # BLD: 0 X10E3/UL (ref 0–0.1)
IMM GRANULOCYTES NFR BLD: 0 %
LYMPHOCYTES # BLD AUTO: 1 X10E3/UL (ref 0.7–3.1)
LYMPHOCYTES NFR BLD AUTO: 15 %
MCH RBC QN AUTO: 29.5 PG (ref 26.6–33)
MCHC RBC AUTO-ENTMCNC: 34 G/DL (ref 31.5–35.7)
MCV RBC AUTO: 87 FL (ref 79–97)
MONOCYTES # BLD AUTO: 0.9 X10E3/UL (ref 0.1–0.9)
MONOCYTES NFR BLD AUTO: 14 %
N GONORRHOEA DNA SPEC QL NAA+PROBE: NEGATIVE
NEUTROPHILS # BLD AUTO: 4.8 X10E3/UL (ref 1.4–7)
NEUTROPHILS NFR BLD AUTO: 70 %
PLATELET # BLD AUTO: 224 X10E3/UL (ref 150–450)
RBC # BLD AUTO: 6.14 X10E6/UL (ref 4.14–5.8)
WBC # BLD AUTO: 6.9 X10E3/UL (ref 3.4–10.8)

## 2022-10-29 LAB
HIV1 RNA # SERPL NAA+PROBE: <20 COPIES/ML
HIV1 RNA SERPL NAA+PROBE-LOG#: NORMAL LOG10COPY/ML

## 2022-10-31 ENCOUNTER — PATIENT OUTREACH (OUTPATIENT)
Dept: SURGERY | Facility: OTHER | Age: 62
End: 2022-10-31

## 2022-10-31 NOTE — PROGRESS NOTES
Cm received October GRISEL Ferrera  Cm completed Check Req  And submitted to supervisor for approval     See Media

## 2022-11-01 ENCOUNTER — OFFICE VISIT (OUTPATIENT)
Dept: SURGERY | Facility: CLINIC | Age: 62
End: 2022-11-01

## 2022-11-01 VITALS
SYSTOLIC BLOOD PRESSURE: 115 MMHG | BODY MASS INDEX: 32.39 KG/M2 | OXYGEN SATURATION: 97 % | HEIGHT: 67 IN | HEART RATE: 80 BPM | WEIGHT: 206.4 LBS | DIASTOLIC BLOOD PRESSURE: 67 MMHG | TEMPERATURE: 98.7 F

## 2022-11-01 DIAGNOSIS — K74.69 OTHER CIRRHOSIS OF LIVER (HCC): ICD-10-CM

## 2022-11-01 DIAGNOSIS — Z79.899 OTHER LONG TERM (CURRENT) DRUG THERAPY: ICD-10-CM

## 2022-11-01 DIAGNOSIS — Z23 NEED FOR INFLUENZA VACCINATION: ICD-10-CM

## 2022-11-01 DIAGNOSIS — I10 ESSENTIAL HYPERTENSION: ICD-10-CM

## 2022-11-01 DIAGNOSIS — B20 HIV DISEASE (HCC): Primary | ICD-10-CM

## 2022-11-01 DIAGNOSIS — D75.1 POLYCYTHEMIA: ICD-10-CM

## 2022-11-01 DIAGNOSIS — I25.10 CORONARY ARTERY DISEASE INVOLVING NATIVE CORONARY ARTERY OF NATIVE HEART WITHOUT ANGINA PECTORIS: ICD-10-CM

## 2022-11-01 DIAGNOSIS — Z13.6 ENCOUNTER FOR LIPID SCREENING FOR CARDIOVASCULAR DISEASE: ICD-10-CM

## 2022-11-01 DIAGNOSIS — R91.1 LUNG NODULE: ICD-10-CM

## 2022-11-01 DIAGNOSIS — T65.292D TOXIC EFFECT OF TOBACCO, INTENTIONAL SELF-HARM, SUBSEQUENT ENCOUNTER: ICD-10-CM

## 2022-11-01 DIAGNOSIS — Z13.1 SCREENING FOR DIABETES MELLITUS: ICD-10-CM

## 2022-11-01 DIAGNOSIS — Z13.220 ENCOUNTER FOR LIPID SCREENING FOR CARDIOVASCULAR DISEASE: ICD-10-CM

## 2022-11-01 NOTE — PROGRESS NOTES
Progress Note - Infectious Disease   Mat Form 58 y o  male MRN: [id number]  Unit/Bed#:  Encounter: [id number]      Impression/Plan:  1   HIV-doing well on ART with an undetectable viral load and a CD4 count of 178  Continue ART, recheck labs in 2 months and follow up in 3 months       2  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  Right upper quadrant ultrasound negative, and the alpha fetoprotein negative on the last check   Continue Q 6 month hepatocellular carcinoma screening      3   Polycythemia-likely secondary to the nicotine dependence   Seen by Hematology Oncology who agree with my assessment   Will continue to monitor the CBC with diff when quit smoking     4   Hypertension-asymptomatic and stable and well controlled   Continue monitor     5  Coronary artery disease-complicated by acute MI status post PCI   Now asymptomatic   Follow up with Cardiology      6  Nicotine dependence-patient continues to smoke occasionally but has decreased his tobacco use and plans to quit by his next visit with me  Shira Suggs is using nicotine patches and working on quitting      7  Lung nodule-repeat CT scan stable  Continue annual CT scan follow-up       Patient was provided medication, adherence and prevention education    Subjective:  Routine follow-up for HIV  Patient claims 100% adherence with Tivicay Descovy, Prezcobix  Patient denies any notable side effects  Overall the feeling well  The patient denies any fever chills or sweats, denies any nausea vomiting or diarrhea, denies any cough or shortness of breath  ROS:  A complete review of systems is negative other than that noted above in the subjective    Followup portions patient history reviewed and updated as:   Allergies, current medications, past medical history, past social history, past surgical history, and the problem list    Objective:  Vitals:  Vitals:    11/01/22 1647   BP: 115/67   Pulse: 80   Temp: 98 7 °F (37 1 °C) SpO2: 97%   Weight: 93 6 kg (206 lb 6 4 oz)   Height: 5' 7" (1 702 m)       Physical Exam:   General Appearance:  Alert, interactive, appearing well,  nontoxic, no acute distress  Neck:   Supple without lymphadenopathy, no thyromegaly or masses   Throat: Oropharynx moist without lesions  Lungs:   Clear to auscultation bilaterally; no wheezes, rhonchi or rales; respirations unlabored   Heart:  RRR; no murmur, rub or gallop   Abdomen:   Soft, non-tender, non-distended, positive bowel sounds  Extremities: No clubbing, cyanosis or edema   Skin: No new rashes or lesions  No draining wounds noted  Labs, Imaging, & Other studies:   All pertinent labs and imaging studies were personally reviewed    Lab Results   Component Value Date     03/18/2017    K 4 0 10/27/2022     10/27/2022    CO2 27 10/27/2022    BUN 19 10/27/2022    CREATININE 1 18 10/27/2022    GLUF 115 (H) 10/27/2022    CALCIUM 9 0 10/27/2022    AST 23 10/27/2022    ALT 22 10/27/2022    ALKPHOS 99 10/27/2022    PROT 7 4 03/18/2017    BILITOT 1 1 03/18/2017    EGFR 65 10/27/2022     Lab Results   Component Value Date    WBC 6 9 10/27/2022    WBC 7 46 10/27/2022    HGB 18 1 (H) 10/27/2022    HGB 17 3 (H) 10/27/2022    HCT 53 3 (H) 10/27/2022    HCT 51 7 (H) 10/27/2022    MCV 87 10/27/2022    MCV 87 10/27/2022     10/27/2022     10/27/2022     No results found for: HEPCAB  Lab Results   Component Value Date    HEPBCAB REACTIVE (A) 10/08/2015    HEPBCAB REACTIVE (A) 10/08/2015    HEPBCAB REACTIVE (A) 10/08/2015    HEPBCAB REACTIVE (A) 10/08/2015     Lab Results   Component Value Date    RPR Non-Reactive 05/18/2022     CD4 ABS   Date/Time Value Ref Range Status   10/27/2022 10:08  (L) 359 - 1519 /uL Final     HIV-1 RNA by PCR, Qn   Date/Time Value Ref Range Status   10/27/2022 10:08 AM <20 copies/mL Final     Comment:     HIV-1 RNA detected  The reportable range for this assay is 20 to 10,000,000  copies HIV-1 RNA/mL  Current Outpatient Medications:   •  albuterol (PROVENTIL HFA,VENTOLIN HFA) 90 mcg/act inhaler, INHALE 2 PUFFS BY MOUTH EVERY SIX HOURS AS NEEDED FOR WHEEZING, Disp: 18 g, Rfl: 0  •  aspirin (ECOTRIN LOW STRENGTH) 81 mg EC tablet, Take 1 tablet (81 mg total) by mouth in the morning , Disp: 30 tablet, Rfl: 5  •  cetirizine (ZyrTEC) 10 mg tablet, Take 1 tablet (10 mg total) by mouth in the morning , Disp: 30 tablet, Rfl: 5  •  Darunavir-Cobicistat (Prezcobix) 800-150 MG TABS, Take 1 tablet by mouth daily, Disp: 30 tablet, Rfl: 5  •  dolutegravir (Tivicay) 50 MG TABS, Take 1 tablet (50 mg total) by mouth in the morning and 1 tablet (50 mg total) in the evening , Disp: 60 tablet, Rfl: 5  •  emtricitabine-tenofovir AF (Descovy) 200-25 MG tablet, Take 1 tablet by mouth in the morning , Disp: 30 tablet, Rfl: 5  •  irbesartan-hydrochlorothiazide (AVALIDE) 300-12 5 MG per tablet, TAKE 1 TABLET BY MOUTH DAILY IN THE MORNING, Disp: 30 tablet, Rfl: 2  •  metoprolol succinate (TOPROL-XL) 25 mg 24 hr tablet, Take 1 tablet (25 mg total) by mouth in the morning , Disp: 30 tablet, Rfl: 5  •  nicotine (NICODERM CQ) 21 mg/24 hr TD 24 hr patch, APPLY 1 PATCH TO THE SKIN DAILY, Disp: 28 patch, Rfl: 2  •  nicotine polacrilex (COMMIT) 4 MG lozenge, DISSOLVE 1 LOZENGE IN MOUTH/THROAT AS NEEDED FOR SMOKING CESSATION, Disp: 72 lozenge, Rfl: 3  •  nitroglycerin (NITROSTAT) 0 4 mg SL tablet, Place 1 tablet (0 4 mg total) under the tongue every 5 (five) minutes as needed for chest pain, Disp: 15 tablet, Rfl: 0  •  pantoprazole (PROTONIX) 40 mg tablet, TAKE 1 TABLET BY MOUTH TWICE A DAY IN MORNING AND EVENING, Disp: 60 tablet, Rfl: 2  •  rosuvastatin (CRESTOR) 20 MG tablet, Take 1 tablet (20 mg total) by mouth in the morning , Disp: 30 tablet, Rfl: 5

## 2022-11-02 ENCOUNTER — DOCUMENTATION (OUTPATIENT)
Dept: SURGERY | Facility: CLINIC | Age: 62
End: 2022-11-02

## 2022-11-02 NOTE — PROGRESS NOTES
Diet Consult:   Pt noted with A1C trending up  Pt visited during [facility] clinic hours  Lab Results   Component Value Date    HGBA1C 5 9 (H) 10/27/2022   Pt reported recently quitting smoking which has caused him to snack more  Pt is aware of his A1C trending upward & would like to make some dietary changes  He often eats a lot of junk food at night  RD discussed options for healthier snacks  Pt reports his partner will assist him in shopping & repairing healthier snacks  Pt's weight has been stable but he would like to lose some weight  Wt Readings from Last 3 Encounters:   11/01/22 93 6 kg (206 lb 6 4 oz)   05/24/22 93 4 kg (206 lb)   05/17/22 94 1 kg (207 lb 6 4 oz)     Goal set to replace sugar and high fat night snack with foods like fruit, lowfat cheese, whole grain crackers  RD contact information provided if pt would like to follow up   Will continue to provide nutrition education as needed & monitor labs and weight trend    -Mitchell Nur RDN,LDN

## 2022-11-04 DIAGNOSIS — K21.9 GASTROESOPHAGEAL REFLUX DISEASE WITHOUT ESOPHAGITIS: ICD-10-CM

## 2022-11-04 DIAGNOSIS — T65.292D TOXIC EFFECT OF TOBACCO, INTENTIONAL SELF-HARM, SUBSEQUENT ENCOUNTER: ICD-10-CM

## 2022-11-04 DIAGNOSIS — I10 PRIMARY HYPERTENSION: ICD-10-CM

## 2022-11-07 RX ORDER — IRBESARTAN AND HYDROCHLOROTHIAZIDE 300; 12.5 MG/1; MG/1
TABLET, FILM COATED ORAL
Qty: 30 TABLET | Refills: 2 | Status: SHIPPED | OUTPATIENT
Start: 2022-11-07

## 2022-11-07 RX ORDER — NICOTINE 21 MG/24HR
PATCH, TRANSDERMAL 24 HOURS TRANSDERMAL
Qty: 28 PATCH | Refills: 2 | Status: SHIPPED | OUTPATIENT
Start: 2022-11-07

## 2022-11-07 RX ORDER — PANTOPRAZOLE SODIUM 40 MG/1
TABLET, DELAYED RELEASE ORAL
Qty: 60 TABLET | Refills: 2 | Status: SHIPPED | OUTPATIENT
Start: 2022-11-07

## 2022-11-15 ENCOUNTER — OFFICE VISIT (OUTPATIENT)
Dept: SURGERY | Facility: CLINIC | Age: 62
End: 2022-11-15

## 2022-11-15 VITALS
SYSTOLIC BLOOD PRESSURE: 137 MMHG | TEMPERATURE: 98 F | HEIGHT: 67 IN | HEART RATE: 76 BPM | WEIGHT: 208.4 LBS | BODY MASS INDEX: 32.71 KG/M2 | OXYGEN SATURATION: 95 % | DIASTOLIC BLOOD PRESSURE: 81 MMHG

## 2022-11-15 DIAGNOSIS — B20 HIV DISEASE (HCC): ICD-10-CM

## 2022-11-15 DIAGNOSIS — R73.03 PREDIABETES: ICD-10-CM

## 2022-11-15 DIAGNOSIS — Z12.11 COLON CANCER SCREENING: ICD-10-CM

## 2022-11-15 DIAGNOSIS — E66.9 OBESITY (BMI 30.0-34.9): ICD-10-CM

## 2022-11-15 DIAGNOSIS — Z12.2 ENCOUNTER FOR SCREENING FOR LUNG CANCER: ICD-10-CM

## 2022-11-15 DIAGNOSIS — Z00.00 ANNUAL PHYSICAL EXAM: ICD-10-CM

## 2022-11-15 DIAGNOSIS — T65.292D TOXIC EFFECT OF TOBACCO, INTENTIONAL SELF-HARM, SUBSEQUENT ENCOUNTER: ICD-10-CM

## 2022-11-15 DIAGNOSIS — F17.210 SMOKING GREATER THAN 20 PACK YEARS: ICD-10-CM

## 2022-11-15 DIAGNOSIS — Z23 NEED FOR MENACTRA VACCINATION: Primary | ICD-10-CM

## 2022-11-15 PROBLEM — H00.16 CHALAZION OF LEFT EYE: Status: RESOLVED | Noted: 2021-11-16 | Resolved: 2022-11-15

## 2022-11-15 PROBLEM — H00.13 CHALAZION OF RIGHT EYE: Status: RESOLVED | Noted: 2021-11-16 | Resolved: 2022-11-15

## 2022-11-15 NOTE — ASSESSMENT & PLAN NOTE
Lab Results   Component Value Date/Time    HGBA1C 5 9 (H) 10/27/2022 10:08 AM    HGBA1C 5 4 03/11/2016 08:48 AM    HGBA1C 5 4 03/11/2016 08:48 AM    HGBA1C 5 4 03/11/2016 08:48 AM    HGBA1C 5 4 03/11/2016 08:48 AM          Educated to follow diabetic diet, maintain a healthy weight, and exercise regularly  Referred to dietician for additional education  Chauncey Tavarez

## 2022-11-15 NOTE — PATIENT INSTRUCTIONS
Wellness Visit for Adults   AMBULATORY CARE:   A wellness visit  is when you see your healthcare provider to get screened for health problems  Your healthcare provider will also give you advice on how to stay healthy  Write down your questions so you remember to ask them  Ask your healthcare provider how often you should have a wellness visit  What happens at a wellness visit:  Your healthcare provider will ask about your health, and your family history of health problems  This includes high blood pressure, heart disease, and cancer  He or she will ask if you have symptoms that concern you, if you smoke, and about your mood  You may also be asked about your intake of medicines, supplements, food, and alcohol  Any of the following may be done:  · Your weight  will be checked  Your height may also be checked so your body mass index (BMI) can be calculated  Your BMI shows if you are at a healthy weight  · Your blood pressure  and heart rate will be checked  Your temperature may also be checked  · Blood and urine tests  may be done  Blood tests may be done to check your cholesterol levels  Abnormal cholesterol levels increase your risk for heart disease and stroke  You may also need a blood or urine test to check for diabetes if you are at increased risk  Urine tests may be done to look for signs of an infection or kidney disease  · A physical exam  includes checking your heartbeat and lungs with a stethoscope  Your healthcare provider may also check your skin to look for sun damage  · Screening tests  may be recommended  A screening test is done to check for diseases that may not cause symptoms  The screening tests you may need depend on your age, gender, family history, and lifestyle habits  For example, colorectal screening may be recommended if you are 48years old or older  Screening tests you need if you are a woman:   · A Pap smear  is used to screen for cervical cancer   Pap smears are usually done every 3 to 5 years depending on your age  You may need them more often if you have had abnormal Pap smear test results in the past  Ask your healthcare provider how often you should have a Pap smear  · A mammogram  is an x-ray of your breasts to screen for breast cancer  Experts recommend mammograms every 2 years starting at age 48 years  You may need a mammogram at age 52 years or younger if you have an increased risk for breast cancer  Talk to your healthcare provider about when you should start having mammograms and how often you need them  Vaccines you may need:   · Get an influenza vaccine  every year  The influenza vaccine protects you from the flu  Several types of viruses cause the flu  The viruses change over time, so new vaccines are made each year  · Get a tetanus-diphtheria (Td) booster vaccine  every 10 years  This vaccine protects you against tetanus and diphtheria  Tetanus is a severe infection that may cause painful muscle spasms and lockjaw  Diphtheria is a severe bacterial infection that causes a thick covering in the back of your mouth and throat  · Get a human papillomavirus (HPV) vaccine  if you are female and aged 23 to 32 or male 23 to 24 and never received it  This vaccine protects you from HPV infection  HPV is the most common infection spread by sexual contact  HPV may also cause vaginal, penile, and anal cancers  · Get a pneumococcal vaccine  if you are aged 72 years or older  The pneumococcal vaccine is an injection given to protect you from pneumococcal disease  Pneumococcal disease is an infection caused by pneumococcal bacteria  The infection may cause pneumonia, meningitis, or an ear infection  · Get a shingles vaccine  if you are 60 or older, even if you have had shingles before  The shingles vaccine is an injection to protect you from the varicella-zoster virus  This is the same virus that causes chickenpox   Shingles is a painful rash that develops in people who had chickenpox or have been exposed to the virus  How to eat healthy:  My Plate is a model for planning healthy meals  It shows the types and amounts of foods that should go on your plate  Fruits and vegetables make up about half of your plate, and grains and protein make up the other half  A serving of dairy is included on the side of your plate  The amount of calories and serving sizes you need depends on your age, gender, weight, and height  Examples of healthy foods are listed below:  · Eat a variety of vegetables  such as dark green, red, and orange vegetables  You can also include canned vegetables low in sodium (salt) and frozen vegetables without added butter or sauces  · Eat a variety of fresh fruits , canned fruit in 100% juice, frozen fruit, and dried fruit  · Include whole grains  At least half of the grains you eat should be whole grains  Examples include whole-wheat bread, wheat pasta, brown rice, and whole-grain cereals such as oatmeal     · Eat a variety of protein foods such as seafood (fish and shellfish), lean meat, and poultry without skin (turkey and chicken)  Examples of lean meats include pork leg, shoulder, or tenderloin, and beef round, sirloin, tenderloin, and extra lean ground beef  Other protein foods include eggs and egg substitutes, beans, peas, soy products, nuts, and seeds  · Choose low-fat dairy products such as skim or 1% milk or low-fat yogurt, cheese, and cottage cheese  · Limit unhealthy fats  such as butter, hard margarine, and shortening  Exercise:  Exercise at least 30 minutes per day on most days of the week  Some examples of exercise include walking, biking, dancing, and swimming  You can also fit in more physical activity by taking the stairs instead of the elevator or parking farther away from stores  Include muscle strengthening activities 2 days each week  Regular exercise provides many health benefits   It helps you manage your weight, and decreases your risk for type 2 diabetes, heart disease, stroke, and high blood pressure  Exercise can also help improve your mood  Ask your healthcare provider about the best exercise plan for you  General health and safety guidelines:   · Do not smoke  Nicotine and other chemicals in cigarettes and cigars can cause lung damage  Ask your healthcare provider for information if you currently smoke and need help to quit  E-cigarettes or smokeless tobacco still contain nicotine  Talk to your healthcare provider before you use these products  · Limit alcohol  A drink of alcohol is 12 ounces of beer, 5 ounces of wine, or 1½ ounces of liquor  · Lose weight, if needed  Being overweight increases your risk of certain health conditions  These include heart disease, high blood pressure, type 2 diabetes, and certain types of cancer  · Protect your skin  Do not sunbathe or use tanning beds  Use sunscreen with a SPF 15 or higher  Apply sunscreen at least 15 minutes before you go outside  Reapply sunscreen every 2 hours  Wear protective clothing, hats, and sunglasses when you are outside  · Drive safely  Always wear your seatbelt  Make sure everyone in your car wears a seatbelt  A seatbelt can save your life if you are in an accident  Do not use your cell phone when you are driving  This could distract you and cause an accident  Pull over if you need to make a call or send a text message  · Practice safe sex  Use latex condoms if are sexually active and have more than one partner  Your healthcare provider may recommend screening tests for sexually transmitted infections (STIs)  · Wear helmets, lifejackets, and protective gear  Always wear a helmet when you ride a bike or motorcycle, go skiing, or play sports that could cause a head injury  Wear protective equipment when you play sports  Wear a lifejacket when you are on a boat or doing water sports      © Copyright [employer] 2022 Information is for End User's use only and may not be sold, redistributed or otherwise used for commercial purposes  All illustrations and images included in CareNotes® are the copyrighted property of [employer]  or Johana Mike  The above information is an  only  It is not intended as medical advice for individual conditions or treatments  Talk to your doctor, nurse or pharmacist before following any medical regimen to see if it is safe and effective for you  Weight Management   AMBULATORY CARE:   Why it is important to manage your weight:  Being overweight increases your risk of health conditions such as heart disease, high blood pressure, type 2 diabetes, and certain types of cancer  It can also increase your risk for osteoarthritis, sleep apnea, and other respiratory problems  Aim for a slow, steady weight loss  Even a small amount of weight loss can lower your risk of health problems  Risks of being overweight:  Extra weight can cause many health problems, including the following:  · Diabetes (high blood sugar level)    · High blood pressure or high cholesterol    · Heart disease    · Stroke    · Gallbladder or liver disease    · Cancer of the colon, breast, prostate, liver, or kidney    · Sleep apnea    · Arthritis or gout    Screening  is done to check for health conditions before you have signs or symptoms  If you are 28to 79years old, your blood sugar level may be checked every 3 years for signs of prediabetes or diabetes  Your healthcare provider will check your blood pressure at each visit  High blood pressure can lead to a stroke or other problems  Your provider may check for signs of heart disease, cancer, or other health problems  How to lose weight safely:  A safe and healthy way to lose weight is to eat fewer calories and get regular exercise  · You can lose up about 1 pound a week by decreasing the number of calories you eat by 500 calories each day   You can decrease calories by eating smaller portion sizes or by cutting out high-calorie foods  Read labels to find out how many calories are in the foods you eat  · You can also burn calories with exercise such as walking, swimming, or biking  You will be more likely to keep weight off if you make these changes part of your lifestyle  Exercise at least 30 minutes per day on most days of the week  You can also fit in more physical activity by taking the stairs instead of the elevator or parking farther away from stores  Ask your healthcare provider about the best exercise plan for you  Healthy meal plan for weight management:  A healthy meal plan includes a variety of foods, contains fewer calories, and helps you stay healthy  A healthy meal plan includes the following:     · Eat whole-grain foods more often  A healthy meal plan should contain fiber  Fiber is the part of grains, fruits, and vegetables that is not broken down by your body  Whole-grain foods are healthy and provide extra fiber in your diet  Some examples of whole-grain foods are whole-wheat breads and pastas, oatmeal, brown rice, and bulgur  · Eat a variety of vegetables every day  Include dark, leafy greens such as spinach, kale, shaun greens, and mustard greens  Eat yellow and orange vegetables such as carrots, sweet potatoes, and winter squash  · Eat a variety of fruits every day  Choose fresh or canned fruit (canned in its own juice or light syrup) instead of juice  Fruit juice has very little or no fiber  · Eat low-fat dairy foods  Drink fat-free (skim) milk or 1% milk  Eat fat-free yogurt and low-fat cottage cheese  Try low-fat cheeses such as mozzarella and other reduced-fat cheeses  · Choose meat and other protein foods that are low in fat  Choose beans or other legumes such as split peas or lentils  Choose fish, skinless poultry (chicken or turkey), or lean cuts of red meat (beef or pork)   Before you cook meat or poultry, cut off any visible fat  · Use less fat and oil  Try baking foods instead of frying them  Add less fat, such as margarine, sour cream, regular salad dressing and mayonnaise to foods  Eat fewer high-fat foods  Some examples of high-fat foods include french fries, doughnuts, ice cream, and cakes  · Eat fewer sweets  Limit foods and drinks that are high in sugar  This includes candy, cookies, regular soda, and sweetened drinks  Ways to decrease calories:   · Eat smaller portions  ? Use a small plate with smaller servings  ? Do not eat second helpings  ? When you eat at a restaurant, ask for a box and place half of your meal in the box before you eat  ? Share an entrée with someone else  · Replace high-calorie snacks with healthy, low-calorie snacks  ? Choose fresh fruit, vegetables, fat-free rice cakes, or air-popped popcorn instead of potato chips, nuts, or chocolate  ? Choose water or calorie-free drinks instead of soda or sweetened drinks  · Do not shop for groceries when you are hungry  You may be more likely to make unhealthy food choices  Take a grocery list of healthy foods and shop after you have eaten  · Eat regular meals  Do not skip meals  Skipping meals can lead to overeating later in the day  This can make it harder for you to lose weight  Eat a healthy snack in place of a meal if you do not have time to eat a regular meal  Talk with a dietitian to help you create a meal plan and schedule that is right for you  Other things to consider as you try to lose weight:   · Be aware of situations that may give you the urge to overeat, such as eating while watching television  Find ways to avoid these situations  For example, read a book, go for a walk, or do crafts  · Meet with a weight loss support group or friends who are also trying to lose weight  This may help you stay motivated to continue working on your weight loss goals      © Copyright [employer] 2022 Information is for End User's use only and may not be sold, redistributed or otherwise used for commercial purposes  All illustrations and images included in CareNotes® are the copyrighted property of [employer]  or Johana Mike  The above information is an  only  It is not intended as medical advice for individual conditions or treatments  Talk to your doctor, nurse or pharmacist before following any medical regimen to see if it is safe and effective for you

## 2022-11-15 NOTE — ASSESSMENT & PLAN NOTE
No results found for: ET2BBSV  CD4 ABS   Date/Time Value Ref Range Status   10/27/2022 10:08  (L) 359 - 1519 /uL Final     HIV-1 RNA by PCR, Qn   Date/Time Value Ref Range Status   10/27/2022 10:08 AM <20 copies/mL Final     Comment:     HIV-1 RNA detected  The reportable range for this assay is 20 to 10,000,000  copies HIV-1 RNA/mL  HIV-1 RNA Viral Load Log   Date/Time Value Ref Range Status   10/27/2022 10:08 AM COMMENT [id number]/mL Final     Comment:     Unable to calculate result since non-numeric result obtained for  component test          ART: [place] [facility] [place]        Denies side effects  Stressed the importance of adherence  Continue follow up with ID clinic  Reviewed most recent labs, including Cd4 and viral load  Discussed the risks and benefits of treatment options, instructions for management, importance of treatment adherence, and reduction of risk factor  Educated on possible  medication side effects  Counseled on routes of HIV transmission, including the risk of  infection  Emphasized that viral suppression is the best method to prevent HIV transmission  At this time pt denies the need for HIV testing of anyone in their life  Total encounter time was 45 minutes  Greater then 20 minutes were spent on counseling and patient education  Pt voices understanding and agreement with treatment plan

## 2022-11-15 NOTE — ASSESSMENT & PLAN NOTE
Counseled for greater than 15 minutes on the importance of smoking cessation  Advised to quit  Educated on the increased risk of heart and lung disease associated with smoking    Referred to Gurwinder Aviles [address] [place] [address] for enrollment in smoking cessation program

## 2022-11-15 NOTE — PROGRESS NOTES
[address]    NAME: Crow Range  AGE: 58 y o  SEX: male  : 1960     DATE: 11/15/2022     Assessment and Plan:     Problem List Items Addressed This Visit        Other    HIV disease ([address] )     No results found for: [id number]  CD4 ABS   Date/Time Value Ref Range Status   10/27/2022 10:08  (L) 359 - 1519 /uL Final     HIV-1 RNA by PCR, Qn   Date/Time Value Ref Range Status   10/27/2022 10:08 AM <20 copies/mL Final     Comment:     HIV-1 RNA detected  The reportable range for this assay is 20 to 10,000,000  copies HIV-1 RNA/mL  HIV-1 RNA Viral Load Log   Date/Time Value Ref Range Status   10/27/2022 10:08 AM COMMENT [id number]/mL Final     Comment:     Unable to calculate result since non-numeric result obtained for  component test          ART: Tivicay and Descovy        Denies side effects  Stressed the importance of adherence  Continue follow up with ID clinic  Reviewed most recent labs, including Cd4 and viral load  Discussed the risks and benefits of treatment options, instructions for management, importance of treatment adherence, and reduction of risk factor  Educated on possible  medication side effects  Counseled on routes of HIV transmission, including the risk of  infection  Emphasized that viral suppression is the best method to prevent HIV transmission  At this time pt denies the need for HIV testing of anyone in their life  Total encounter time was 45 minutes  Greater then 20 minutes were spent on counseling and patient education  Pt voices understanding and agreement with treatment plan  Toxic effect of tobacco and nicotine     Counseled for greater than 15 minutes on the importance of smoking cessation  Advised to quit  Educated on the increased risk of heart and lung disease associated with smoking    Referred to [facility] for enrollment in smoking cessation program  Prediabetes     Lab Results   Component Value Date/Time    HGBA1C 5 9 (H) 10/27/2022 10:08 AM    HGBA1C 5 4 03/11/2016 08:48 AM    HGBA1C 5 4 03/11/2016 08:48 AM    HGBA1C 5 4 03/11/2016 08:48 AM    HGBA1C 5 4 03/11/2016 08:48 AM          Educated to follow diabetic diet, maintain a healthy weight, and exercise regularly  Referred to dietician for additional education                              Other Visit Diagnoses     Need for Menactra vaccination    -  Primary    Relevant Orders    MENINGOCOCCAL ACYW-135 TT CONJUGATE (Completed)    Colon cancer screening        Relevant Orders    Cologuard    Annual physical exam        Obesity (BMI 30 0-34  9)        Encounter for screening for lung cancer        Relevant Orders    CT lung screening program    Smoking greater than 20 pack years        Relevant Orders    CT lung screening program          Immunizations and preventive care screenings were discussed with patient today  Appropriate education was printed on patient's after visit summary  Discussed risks and benefits of prostate cancer screening  We discussed the controversial history of PSA screening for prostate cancer in the [place] as well as the risk of over detection and over treatment of prostate cancer by way of PSA screening  The patient understands that PSA blood testing is an imperfect way to screen for prostate cancer and that elevated PSA levels in the blood may also be caused by infection, inflammation, prostatic trauma or manipulation, urological procedures, or by benign prostatic enlargement  The role of the digital rectal examination in prostate cancer screening was also discussed and I discussed with him that there is large interobserver variability in the findings of digital rectal examination  Counseling:  · none    BMI Counseling: Body mass index is 32 64 kg/m²  The BMI is above normal  Nutrition recommendations include decreasing portion sizes and consuming healthier snacks  Exercise recommendations include exercising 3-5 times per week  Rationale for BMI follow-up plan is due to patient being overweight or obese  No follow-ups on file  Chief Complaint:     Chief Complaint   Patient presents with   • Follow-up     PT OFFERS NO C/O AT THIS TIME  History of Present Illness:     Adult Annual Physical   Patient here for a comprehensive physical exam  The patient reports no problems  Diet and Physical Activity  · Diet/Nutrition: well balanced diet  · Exercise: no formal exercise  Depression Screening  PHQ-2/9 Depression Screening         General Health  · Sleep: sleeps poorly and snores loudly  Does not want to be tested for sleep apnea  Educated on potental risks of untreated apnea   · Hearing: normal - bilateral   · Vision: wears glasses  · Dental: regular dental visits   Health  · Symptoms include: none     Review of Systems:     Review of Systems   Constitutional: Negative for chills and fever  HENT: Negative for ear pain and sore throat  Eyes: Negative for pain and visual disturbance  Respiratory: Negative for cough and shortness of breath  Cardiovascular: Negative for chest pain and palpitations  Gastrointestinal: Negative for abdominal pain and vomiting  Genitourinary: Negative for dysuria and hematuria  Musculoskeletal: Negative for arthralgias and back pain  Skin: Negative for color change and rash  Neurological: Negative for seizures and syncope  All other systems reviewed and are negative       Past Medical History:     Past Medical History:   Diagnosis Date   • HIV (human immunodeficiency virus infection) ([facility] )    • Hypertension    • Opioid dependence ([facility] )       Past Surgical History:     Past Surgical History:   Procedure Laterality Date   • CARDIAC SURGERY     • LUMBAR LAMINECTOMY     • STOMACH SURGERY        Family History:     Family History   Problem Relation Age of Onset   • Alzheimer's disease Mother    • Heart attack Father    • Prostate cancer Brother       Social History:     Social History     Socioeconomic History   • Marital status: /Civil Union     Spouse name: None   • Number of children: None   • Years of education: None   • Highest education level: None   Occupational History   • None   Tobacco Use   • Smoking status: Current Every Day Smoker     Packs/day: 0 00     Types: Cigars   • Smokeless tobacco: Never Used   • Tobacco comment: 2 cigars/week   Vaping Use   • Vaping Use: Never used   Substance and Sexual Activity   • Alcohol use: Not Currently     Comment: rarely   • Drug use: No   • Sexual activity: Yes     Partners: Female     Birth control/protection: Condom Male   Other Topics Concern   • None   Social History Narrative   • None     Social Determinants of Health     Financial Resource Strain: Medium Risk   • Difficulty of Paying Living Expenses: Somewhat hard   Food Insecurity: Food Insecurity Present   • Worried About Running Out of Food in the Last Year: Sometimes true   • Ran Out of Food in the Last Year: Sometimes true   Transportation Needs: No Transportation Needs   • Lack of Transportation (Medical): No   • Lack of Transportation (Non-Medical): No   Physical Activity: Not on file   Stress: Not on file   Social Connections: Not on file   Intimate Partner Violence: Not on file   Housing Stability: Not on file      Current Medications:     Current Outpatient Medications   Medication Sig Dispense Refill   • albuterol (PROVENTIL HFA,VENTOLIN HFA) 90 mcg/act inhaler INHALE 2 PUFFS BY MOUTH EVERY SIX HOURS AS NEEDED FOR WHEEZING 18 g 0   • aspirin (ECOTRIN LOW STRENGTH) 81 mg EC tablet Take 1 tablet (81 mg total) by mouth in the morning  30 tablet 5   • cetirizine (ZyrTEC) 10 mg tablet Take 1 tablet (10 mg total) by mouth in the morning   30 tablet 5   • Darunavir-Cobicistat (Prezcobix) 800-150 MG TABS Take 1 tablet by mouth daily 30 tablet 5   • dolutegravir (Tivicay) 50 MG TABS Take 1 tablet (50 mg total) by mouth in the morning and 1 tablet (50 mg total) in the evening  60 tablet 5   • emtricitabine-tenofovir AF (Descovy) 200-25 MG tablet Take 1 tablet by mouth in the morning  30 tablet 5   • irbesartan-hydrochlorothiazide (AVALIDE) 300-12 5 MG per tablet TAKE 1 TABLET BY MOUTH DAILY IN THE MORNING 30 tablet 2   • metoprolol succinate (TOPROL-XL) 25 mg 24 hr tablet Take 1 tablet (25 mg total) by mouth in the morning  30 tablet 5   • nicotine (NICODERM CQ) 21 mg/24 hr TD 24 hr patch APPLY 1 PATCH TO THE SKIN DAILY 28 patch 2   • nicotine polacrilex (COMMIT) 4 MG lozenge DISSOLVE 1 LOZENGE IN MOUTH/THROAT AS NEEDED FOR SMOKING CESSATION 72 lozenge 3   • nitroglycerin (NITROSTAT) 0 4 mg SL tablet Place 1 tablet (0 4 mg total) under the tongue every 5 (five) minutes as needed for chest pain 15 tablet 0   • pantoprazole (PROTONIX) 40 mg tablet TAKE 1 TABLET BY MOUTH TWICE A DAY IN MORNING AND EVENING 60 tablet 2   • rosuvastatin (CRESTOR) 20 MG tablet Take 1 tablet (20 mg total) by mouth in the morning  30 tablet 5     No current facility-administered medications for this visit  Allergies:     No Known Allergies   Physical Exam:     /81   Pulse 76   Temp 98 °F (36 7 °C)   Ht 5' 7" (1 702 m)   Wt 94 5 kg (208 lb 6 4 oz)   SpO2 95%   BMI 32 64 kg/m²     Physical Exam  Vitals and nursing note reviewed  Constitutional:       Appearance: He is well-developed  HENT:      Head: Normocephalic and atraumatic  Eyes:      Conjunctiva/sclera: Conjunctivae normal    Cardiovascular:      Rate and Rhythm: Normal rate and regular rhythm  Heart sounds: No murmur heard  Pulmonary:      Effort: Pulmonary effort is normal  No respiratory distress  Breath sounds: Normal breath sounds  Abdominal:      Palpations: Abdomen is soft  Tenderness: There is no abdominal tenderness  Musculoskeletal:      Cervical back: Neck supple  Skin:     General: Skin is warm and dry     Neurological: Mental Status: He is alert            [facility]ovanna [facility]

## 2022-11-16 ENCOUNTER — DOCUMENTATION (OUTPATIENT)
Dept: SURGERY | Facility: CLINIC | Age: 62
End: 2022-11-16

## 2022-11-16 NOTE — PROGRESS NOTES
Follow up Assessment:   Pt seen during PCP visit  He has kept his goal to not to snack at night  Person Educated: person     Topics Discussed: current interventions reviewed     Teaching Method: Verbal    Goals    Goal #1 Continue to skip snacking or have healthy snack at night until next appointment  Visit Summary  Pt is doing well with interventions  RD provided encouragement to continue  Will follow up at next appointment     Radha Doran RDN,LDN

## 2022-11-22 ENCOUNTER — PATIENT OUTREACH (OUTPATIENT)
Dept: SURGERY | Facility: OTHER | Age: 62
End: 2022-11-22

## 2022-11-29 ENCOUNTER — PATIENT OUTREACH (OUTPATIENT)
Dept: SURGERY | Facility: OTHER | Age: 62
End: 2022-11-29

## 2022-11-29 NOTE — PROGRESS NOTES
Cm received request for Auth approval from [facility] for Ct to obtain two crowns, $1,365x2 and a $300 fee  Cm sent competed auth packet to supervisor 11 28 22  CM received approval for auth 11 29 22 Cm sent approval Aliyah Carmenau form to [facility]      See media for completed packet, bill and approved Auth

## 2022-12-01 ENCOUNTER — PATIENT OUTREACH (OUTPATIENT)
Dept: SURGERY | Facility: OTHER | Age: 62
End: 2022-12-01

## 2022-12-01 NOTE — PROGRESS NOTES
Cm received [facility] bill for the month of Nov  And cm completed check req and sent to supervisor for approval     See media

## 2022-12-13 LAB — COLOGUARD RESULT REPORTABLE: NEGATIVE

## 2022-12-19 ENCOUNTER — TELEPHONE (OUTPATIENT)
Dept: SURGERY | Facility: CLINIC | Age: 62
End: 2022-12-19

## 2022-12-19 NOTE — TELEPHONE ENCOUNTER
ADWIT left voicemail following up on goals set at lest session  RD also invited pt to weight management class next month  Will continue to provide education & support as needed

## 2022-12-21 ENCOUNTER — PATIENT OUTREACH (OUTPATIENT)
Dept: SURGERY | Facility: OTHER | Age: 62
End: 2022-12-21

## 2022-12-21 NOTE — PROGRESS NOTES
[facility] bill received via e-mail   Cm completed check req and sent to supervisor for approval     See media

## 2022-12-30 ENCOUNTER — OFFICE VISIT (OUTPATIENT)
Dept: DENTISTRY | Facility: CLINIC | Age: 62
End: 2022-12-30

## 2022-12-30 VITALS — DIASTOLIC BLOOD PRESSURE: 81 MMHG | SYSTOLIC BLOOD PRESSURE: 126 MMHG | HEART RATE: 78 BPM

## 2022-12-30 DIAGNOSIS — Z98.811 DENTAL CROWNS STATUS: Primary | ICD-10-CM

## 2022-12-30 DIAGNOSIS — B20 HIV DISEASE (HCC): ICD-10-CM

## 2022-12-30 NOTE — PROGRESS NOTES
Patient presents for crown #22 and #27 insertion and verbally consents for treatment:  Reviewed health history-  Pt is ASA type III  Treatment consents signed: Yes  Perio: plaque  Pain Scale:3  Caries Assessment: high  Radiographs: Films are current  Oral Hygiene instruction reviewed and given  Hygiene recall visits recommended to the patient      Patient agrees with the proposed treatment plan  Pt reports no pain or problems from the temporary crowns  Only some sensitivity on the #29 due to grinding and close to pulp  Offered palliative tx on #29, Pt agreed and consented  Gluma placed and covered occlusion with Resin to reduce sensitivity on #29  Tried in PFM crowns for #22 and #27  Fit was good  Took PA's to confirm seating and seating was also good with no noted open margins  Pt gave final ok to cement after checking with pt's mirror  Cemented with permanent cement Calibra  Occlusion adjusted  Pt has appointment with [facility] for extractions of his teeth and will return to start Upper complete denture and lower partial as soon as healing is achieved  All questions answered  Pt left satisfied and in good health  Prognosis is Good  Referrals Needed: yes oms       Next visit: check healing after teeth are extracted       Ludy Judd

## 2022-12-31 RX ORDER — DARUNAVIR ETHANOLATE AND COBICISTAT 800; 150 MG/1; MG/1
1 TABLET, FILM COATED ORAL DAILY
Qty: 30 TABLET | Refills: 5 | Status: SHIPPED | OUTPATIENT
Start: 2022-12-31

## 2023-01-07 ENCOUNTER — HOSPITAL ENCOUNTER (OUTPATIENT)
Dept: ULTRASOUND IMAGING | Facility: HOSPITAL | Age: 63
Discharge: HOME | End: 2023-01-07
Attending: INTERNAL MEDICINE

## 2023-01-07 ENCOUNTER — HOSPITAL ENCOUNTER (OUTPATIENT)
Dept: CT IMAGING | Facility: HOSPITAL | Age: 63
Discharge: HOME | End: 2023-01-07
Attending: INTERNAL MEDICINE

## 2023-01-07 DIAGNOSIS — Z12.2 ENCOUNTER FOR SCREENING FOR LUNG CANCER: ICD-10-CM

## 2023-01-07 DIAGNOSIS — K74.69 OTHER CIRRHOSIS OF LIVER (HCC): ICD-10-CM

## 2023-01-07 DIAGNOSIS — F17.210 SMOKING GREATER THAN 20 PACK YEARS: ICD-10-CM

## 2023-01-23 ENCOUNTER — PATIENT OUTREACH (OUTPATIENT)
Dept: SURGERY | Facility: OTHER | Age: 63
End: 2023-01-23

## 2023-01-23 NOTE — PROGRESS NOTES
CM received [facility] bill via e-mail from ct   Cm completed check req and sent to supervisor for approval     See media

## 2023-01-30 ENCOUNTER — APPOINTMENT (OUTPATIENT)
Dept: LAB | Facility: HOSPITAL | Age: 63
End: 2023-01-30

## 2023-01-30 ENCOUNTER — PATIENT OUTREACH (OUTPATIENT)
Dept: SURGERY | Facility: OTHER | Age: 63
End: 2023-01-30

## 2023-01-30 DIAGNOSIS — I10 ESSENTIAL HYPERTENSION: ICD-10-CM

## 2023-01-30 DIAGNOSIS — E78.2 MIXED HYPERLIPIDEMIA: ICD-10-CM

## 2023-01-30 DIAGNOSIS — T65.292D TOXIC EFFECT OF TOBACCO, INTENTIONAL SELF-HARM, SUBSEQUENT ENCOUNTER: ICD-10-CM

## 2023-01-30 DIAGNOSIS — J30.2 SEASONAL ALLERGIES: ICD-10-CM

## 2023-01-30 DIAGNOSIS — B20 HIV (HUMAN IMMUNODEFICIENCY VIRUS INFECTION) (HCC): ICD-10-CM

## 2023-01-30 DIAGNOSIS — K21.9 GASTROESOPHAGEAL REFLUX DISEASE WITHOUT ESOPHAGITIS: ICD-10-CM

## 2023-01-30 DIAGNOSIS — I10 PRIMARY HYPERTENSION: ICD-10-CM

## 2023-01-30 LAB
ALBUMIN SERPL BCP-MCNC: 3.8 G/DL (ref 3.5–5)
ALP SERPL-CCNC: 108 U/L (ref 46–116)
ALT SERPL W P-5'-P-CCNC: 26 U/L (ref 12–78)
ANION GAP SERPL CALCULATED.3IONS-SCNC: 2 MMOL/L (ref 4–13)
AST SERPL W P-5'-P-CCNC: 19 U/L (ref 5–45)
BASOPHILS # BLD AUTO: 0.05 THOUSANDS/ÂΜL (ref 0–0.1)
BASOPHILS NFR BLD AUTO: 1 % (ref 0–1)
BILIRUB SERPL-MCNC: 0.56 MG/DL (ref 0.2–1)
BUN SERPL-MCNC: 18 MG/DL (ref 5–25)
CALCIUM SERPL-MCNC: 9.7 MG/DL (ref 8.3–10.1)
CHLORIDE SERPL-SCNC: 107 MMOL/L (ref 96–108)
CHOLEST SERPL-MCNC: 129 MG/DL
CO2 SERPL-SCNC: 29 MMOL/L (ref 21–32)
CREAT SERPL-MCNC: 1.24 MG/DL (ref 0.6–1.3)
EOSINOPHIL # BLD AUTO: 0.12 THOUSAND/ÂΜL (ref 0–0.61)
EOSINOPHIL NFR BLD AUTO: 2 % (ref 0–6)
ERYTHROCYTE [DISTWIDTH] IN BLOOD BY AUTOMATED COUNT: 15.4 % (ref 11.6–15.1)
EST. AVERAGE GLUCOSE BLD GHB EST-MCNC: 126 MG/DL
GFR SERPL CREATININE-BSD FRML MDRD: 61 ML/MIN/1.73SQ M
GLUCOSE P FAST SERPL-MCNC: 141 MG/DL (ref 65–99)
HBA1C MFR BLD: 6 %
HCT VFR BLD AUTO: 57.8 % (ref 36.5–49.3)
HDLC SERPL-MCNC: 35 MG/DL
HGB BLD-MCNC: 18.6 G/DL (ref 12–17)
IMM GRANULOCYTES # BLD AUTO: 0.04 THOUSAND/UL (ref 0–0.2)
IMM GRANULOCYTES NFR BLD AUTO: 1 % (ref 0–2)
LDLC SERPL CALC-MCNC: 59 MG/DL (ref 0–100)
LYMPHOCYTES # BLD AUTO: 0.97 THOUSANDS/ÂΜL (ref 0.6–4.47)
LYMPHOCYTES NFR BLD AUTO: 16 % (ref 14–44)
MCH RBC QN AUTO: 28.4 PG (ref 26.8–34.3)
MCHC RBC AUTO-ENTMCNC: 32.2 G/DL (ref 31.4–37.4)
MCV RBC AUTO: 88 FL (ref 82–98)
MONOCYTES # BLD AUTO: 0.78 THOUSAND/ÂΜL (ref 0.17–1.22)
MONOCYTES NFR BLD AUTO: 12 % (ref 4–12)
NEUTROPHILS # BLD AUTO: 4.31 THOUSANDS/ÂΜL (ref 1.85–7.62)
NEUTS SEG NFR BLD AUTO: 68 % (ref 43–75)
NRBC BLD AUTO-RTO: 0 /100 WBCS
PLATELET # BLD AUTO: 224 THOUSANDS/UL (ref 149–390)
PMV BLD AUTO: 9.7 FL (ref 8.9–12.7)
POTASSIUM SERPL-SCNC: 4.9 MMOL/L (ref 3.5–5.3)
PROT SERPL-MCNC: 8.1 G/DL (ref 6.4–8.4)
RBC # BLD AUTO: 6.54 MILLION/UL (ref 3.88–5.62)
SODIUM SERPL-SCNC: 138 MMOL/L (ref 135–147)
TRIGL SERPL-MCNC: 176 MG/DL
WBC # BLD AUTO: 6.27 THOUSAND/UL (ref 4.31–10.16)

## 2023-01-31 LAB
BASOPHILS # BLD AUTO: 0.1 X10E3/UL (ref 0–0.2)
BASOPHILS NFR BLD AUTO: 1 %
CD3+CD4+ CELLS # BLD: 156 /UL (ref 359–1519)
CD3+CD4+ CELLS NFR BLD: 17.3 % (ref 30.8–58.5)
CD3+CD4+ CELLS/CD3+CD8+ CLL BLD: 0.34 % (ref 0.92–3.72)
CD3+CD8+ CELLS # BLD: 453 /UL (ref 109–897)
CD3+CD8+ CELLS NFR BLD: 50.3 % (ref 12–35.5)
EOSINOPHIL # BLD AUTO: 0.1 X10E3/UL (ref 0–0.4)
EOSINOPHIL NFR BLD AUTO: 2 %
ERYTHROCYTE [DISTWIDTH] IN BLOOD BY AUTOMATED COUNT: 15.2 % (ref 11.6–15.4)
HCT VFR BLD AUTO: 58.2 % (ref 37.5–51)
HGB BLD-MCNC: 19.9 G/DL (ref 13–17.7)
IMM GRANULOCYTES # BLD: 0 X10E3/UL (ref 0–0.1)
IMM GRANULOCYTES NFR BLD: 1 %
LYMPHOCYTES # BLD AUTO: 0.9 X10E3/UL (ref 0.7–3.1)
LYMPHOCYTES NFR BLD AUTO: 14 %
MCH RBC QN AUTO: 29.3 PG (ref 26.6–33)
MCHC RBC AUTO-ENTMCNC: 34.2 G/DL (ref 31.5–35.7)
MCV RBC AUTO: 86 FL (ref 79–97)
MONOCYTES # BLD AUTO: 0.7 X10E3/UL (ref 0.1–0.9)
MONOCYTES NFR BLD AUTO: 11 %
NEUTROPHILS # BLD AUTO: 4.3 X10E3/UL (ref 1.4–7)
NEUTROPHILS NFR BLD AUTO: 71 %
PLATELET # BLD AUTO: 199 X10E3/UL (ref 150–450)
RBC # BLD AUTO: 6.8 X10E6/UL (ref 4.14–5.8)
WBC # BLD AUTO: 6 X10E3/UL (ref 3.4–10.8)

## 2023-01-31 RX ORDER — NICOTINE 21 MG/24HR
PATCH, TRANSDERMAL 24 HOURS TRANSDERMAL
Qty: 28 PATCH | Refills: 2 | Status: SHIPPED | OUTPATIENT
Start: 2023-01-31

## 2023-01-31 RX ORDER — DOLUTEGRAVIR SODIUM 50 MG/1
TABLET, FILM COATED ORAL
Qty: 60 TABLET | Refills: 5 | Status: SHIPPED | OUTPATIENT
Start: 2023-01-31

## 2023-01-31 RX ORDER — POLYETHYLENE GLYCOL 3350 17 G
POWDER IN PACKET (EA) ORAL
Qty: 72 LOZENGE | Refills: 3 | Status: SHIPPED | OUTPATIENT
Start: 2023-01-31

## 2023-01-31 RX ORDER — PANTOPRAZOLE SODIUM 40 MG/1
TABLET, DELAYED RELEASE ORAL
Qty: 60 TABLET | Refills: 2 | Status: SHIPPED | OUTPATIENT
Start: 2023-01-31

## 2023-01-31 RX ORDER — CETIRIZINE HYDROCHLORIDE 10 MG/1
TABLET ORAL
Qty: 30 TABLET | Refills: 5 | Status: SHIPPED | OUTPATIENT
Start: 2023-01-31

## 2023-01-31 RX ORDER — EMTRICITABINE AND TENOFOVIR ALAFENAMIDE 200; 25 MG/1; MG/1
1 TABLET ORAL DAILY
Qty: 30 TABLET | Refills: 5 | Status: SHIPPED | OUTPATIENT
Start: 2023-01-31

## 2023-01-31 RX ORDER — IRBESARTAN AND HYDROCHLOROTHIAZIDE 300; 12.5 MG/1; MG/1
TABLET, FILM COATED ORAL
Qty: 30 TABLET | Refills: 2 | Status: SHIPPED | OUTPATIENT
Start: 2023-01-31

## 2023-01-31 RX ORDER — ROSUVASTATIN CALCIUM 20 MG/1
TABLET, COATED ORAL
Qty: 30 TABLET | Refills: 5 | Status: SHIPPED | OUTPATIENT
Start: 2023-01-31

## 2023-01-31 RX ORDER — METOPROLOL SUCCINATE 25 MG/1
TABLET, EXTENDED RELEASE ORAL
Qty: 30 TABLET | Refills: 5 | Status: SHIPPED | OUTPATIENT
Start: 2023-01-31

## 2023-02-01 LAB
HIV1 RNA # SERPL NAA+PROBE: <20 COPIES/ML
HIV1 RNA SERPL NAA+PROBE-LOG#: NORMAL LOG10COPY/ML

## 2023-02-07 ENCOUNTER — OFFICE VISIT (OUTPATIENT)
Dept: SURGERY | Facility: CLINIC | Age: 63
End: 2023-02-07

## 2023-02-07 VITALS
TEMPERATURE: 98.2 F | OXYGEN SATURATION: 96 % | DIASTOLIC BLOOD PRESSURE: 61 MMHG | BODY MASS INDEX: 33.3 KG/M2 | WEIGHT: 212.2 LBS | SYSTOLIC BLOOD PRESSURE: 137 MMHG | HEART RATE: 78 BPM | HEIGHT: 67 IN

## 2023-02-07 DIAGNOSIS — I25.10 CORONARY ARTERY DISEASE INVOLVING NATIVE CORONARY ARTERY OF NATIVE HEART WITHOUT ANGINA PECTORIS: ICD-10-CM

## 2023-02-07 DIAGNOSIS — B20 HIV DISEASE (HCC): Primary | ICD-10-CM

## 2023-02-07 DIAGNOSIS — Z11.1 SCREENING-PULMONARY TB: ICD-10-CM

## 2023-02-07 DIAGNOSIS — I10 ESSENTIAL HYPERTENSION: ICD-10-CM

## 2023-02-07 DIAGNOSIS — K74.69 OTHER CIRRHOSIS OF LIVER (HCC): ICD-10-CM

## 2023-02-07 DIAGNOSIS — Z20.2 CONTACT WITH AND (SUSPECTED) EXPOSURE TO INFECTIONS WITH A PREDOMINANTLY SEXUAL MODE OF TRANSMISSION: ICD-10-CM

## 2023-02-07 DIAGNOSIS — T65.294D TOXIC EFFECT OF TOBACCO, UNDETERMINED INTENT, SUBSEQUENT ENCOUNTER: ICD-10-CM

## 2023-02-07 DIAGNOSIS — Z11.3 ENCOUNTER FOR SCREENING FOR BACTERIAL SEXUALLY TRANSMITTED DISEASE: ICD-10-CM

## 2023-02-07 DIAGNOSIS — D72.89 OTHER SPECIFIED DISORDERS OF WHITE BLOOD CELLS: ICD-10-CM

## 2023-02-07 DIAGNOSIS — D75.1 POLYCYTHEMIA: ICD-10-CM

## 2023-02-07 DIAGNOSIS — Z72.89 OTHER PROBLEMS RELATED TO LIFESTYLE: ICD-10-CM

## 2023-02-07 DIAGNOSIS — R91.1 LUNG NODULE: ICD-10-CM

## 2023-02-07 NOTE — PROGRESS NOTES
Progress Note - Infectious Disease   Jonh Crouch 58 y o  male MRN: [id number]  Unit/Bed#:  Encounter: [id number]      Impression/Plan:  1   HIV-doing well on ART with an undetectable viral load and a CD4 count of 156   Continue ART, recheck labs in 2 months and follow up in 3 months       2  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  Right upper quadrant ultrasound negative, and the alpha fetoprotein negative on the last check   Continue Q 6 month hepatocellular carcinoma screening      3   Polycythemia-likely secondary to the nicotine dependence   Seen by Hematology Oncology who agree with my assessment   Will continue to monitor the CBC with diff when quit smoking     4   Hypertension-asymptomatic and stable and well controlled   Continue monitor     5  Coronary artery disease-complicated by acute MI status post PCI  Now asymptomatic   Follow up with Cardiology      6  Nicotine dependence-patient continues to smoke occasionally but has decreased his tobacco use and plans to quit by his next visit with [facility] is using nicotine patches and working on quitting      7  Lung nodule-repeat CT scan stable  Continue annual CT scan follow-up     Patient was provided medication, adherence and prevention education    Subjective:  Routine follow-up for HIV  Patient claims 100% adherence with Descovy, Tivicay, Prezcobix  Patient denies any notable side effects  Overall the feeling well  The patient denies any fever chills or sweats, denies any nausea vomiting or diarrhea, denies any cough or shortness of breath  ROS:  A complete review of systems is negative other than that noted above in the subjective    Followup portions patient history reviewed and updated as:   Allergies, current medications, past medical history, past social history, past surgical history, and the problem list    Objective:  Vitals:  Vitals:    02/07/23 1747   BP: 137/61   Pulse: 78   Temp: 98 2 °F (36 8 °C) SpO2: 96%   Weight: 96 3 kg (212 lb 3 2 oz)   Height: 5' 7" (1 702 m)       Physical Exam:   General Appearance:  Alert, interactive, appearing well,  nontoxic, no acute distress  Neck:   Supple without lymphadenopathy, no thyromegaly or masses   Throat: Oropharynx moist without lesions  Lungs:   Clear to auscultation bilaterally; no wheezes, rhonchi or rales; respirations unlabored   Heart:  RRR; no murmur, rub or gallop   Abdomen:   Soft, non-tender, non-distended, positive bowel sounds  Extremities: No clubbing, cyanosis or edema   Skin: No new rashes or lesions  No draining wounds noted         Labs, Imaging, & Other studies:   All pertinent labs and imaging studies were personally reviewed    Lab Results   Component Value Date     03/18/2017    K 4 9 01/30/2023     01/30/2023    CO2 29 01/30/2023    BUN 18 01/30/2023    CREATININE 1 24 01/30/2023    GLUF 141 (H) 01/30/2023    CALCIUM 9 7 01/30/2023    AST 19 01/30/2023    ALT 26 01/30/2023    ALKPHOS 108 01/30/2023    PROT 7 4 03/18/2017    BILITOT 1 1 03/18/2017    EGFR 61 01/30/2023     Lab Results   Component Value Date    WBC 6 27 01/30/2023    WBC 6 0 01/30/2023    HGB 18 6 (H) 01/30/2023    HGB 19 9 (H) 01/30/2023    HCT 57 8 (H) 01/30/2023    HCT 58 2 (H) 01/30/2023    MCV 88 01/30/2023    MCV 86 01/30/2023     01/30/2023     01/30/2023     No results found for: HEPCAB  Lab Results   Component Value Date    HEPBCAB REACTIVE (A) 10/08/2015    HEPBCAB REACTIVE (A) 10/08/2015    HEPBCAB REACTIVE (A) 10/08/2015    HEPBCAB REACTIVE (A) 10/08/2015     Lab Results   Component Value Date    RPR Non-Reactive 05/18/2022     CD4 ABS   Date/Time Value Ref Range Status   01/30/2023 07:28  (L) 359 - 1519 /uL Final     HIV-1 RNA by PCR, Qn   Date/Time Value Ref Range Status   01/30/2023 07:28 AM <20 copies/mL Final     Comment:     HIV-1 RNA not detected  The reportable range for this assay is 20 to 10,000,000  copies HIV-1 RNA/mL             Current Outpatient Medications:   •  albuterol (PROVENTIL HFA,VENTOLIN HFA) 90 mcg/act inhaler, INHALE 2 PUFFS BY MOUTH EVERY SIX HOURS AS NEEDED FOR WHEEZING, Disp: 18 g, Rfl: 0  •  aspirin (ECOTRIN LOW STRENGTH) 81 mg EC tablet, Take 1 tablet (81 mg total) by mouth in the morning , Disp: 30 tablet, Rfl: 5  •  cetirizine (ZyrTEC) 10 mg tablet, TAKE 1 TABLET BY MOUTH DAILY IN THE MORNING, Disp: 30 tablet, Rfl: 5  •  Descovy 200-25 MG tablet, TAKE 1 TABLET BY MOUTH IN THE MORNING, Disp: 30 tablet, Rfl: 5  •  irbesartan-hydrochlorothiazide (AVALIDE) 300-12 5 MG per tablet, TAKE 1 TABLET BY MOUTH DAILY IN THE MORNING, Disp: 30 tablet, Rfl: 2  •  metoprolol succinate (TOPROL-XL) 25 mg 24 hr tablet, TAKE 1 TABLET BY MOUTH DAILY IN THE MORNING, Disp: 30 tablet, Rfl: 5  •  nicotine (NICODERM CQ) 21 mg/24 hr TD 24 hr patch, APPLY 1 PATCH TO THE SKIN DAILY, Disp: 28 patch, Rfl: 2  •  nicotine polacrilex (COMMIT) 4 MG lozenge, DISSOLVE 1 LOZENGE IN MOUTH/THROAT AS NEEDED FOR SMOKING CESSATION, Disp: 72 lozenge, Rfl: 3  •  nitroglycerin (NITROSTAT) 0 4 mg SL tablet, Place 1 tablet (0 4 mg total) under the tongue every 5 (five) minutes as needed for chest pain, Disp: 15 tablet, Rfl: 0  •  pantoprazole (PROTONIX) 40 mg tablet, TAKE 1 TABLET BY MOUTH TWICE A DAY IN MORNING AND EVENING, Disp: 60 tablet, Rfl: 2  •  Prezcobix 800-150 MG TABS, TAKE 1 TABLET BY MOUTH DAILY, Disp: 30 tablet, Rfl: 5  •  rosuvastatin (CRESTOR) 20 MG tablet, TAKE 1 TABLET BY MOUTH DAILY IN THE MORNING, Disp: 30 tablet, Rfl: 5  •  Tivicay 50 MG TABS, TAKE 1 TABLET BY MOUTH IN THE MORNING AND 1 TABLET IN THE EVENING, Disp: 60 tablet, Rfl: 5

## 2023-02-08 ENCOUNTER — DOCUMENTATION (OUTPATIENT)
Dept: SURGERY | Facility: CLINIC | Age: 63
End: 2023-02-08

## 2023-02-08 NOTE — PROGRESS NOTES
Assessment    Mario seen by RD in conjunction with ID f/u  pt is established patient (last annual was 02/22/2022)    PMHx:  Cirrhosis of the liver, GERD, HTN, HLD, HIV, prediabetes      Clinical Data/Client History    CD4 count:  453  Viral load:  <20  ART:   Descovy      , Patient Navigator: Nam Dias 1947 Coordinator: N/A    Food assistance: grocery shops    Living situation: House or apartment     Psychosocial factors: N/A    Mobility:  self ambulates    Physical activity: reports doing more excessive in the summer(golfing/yard work), lots of walking at work       Oral problems: denied difficulties chewing and swallowing       Typical food/beverage intake:    · Breakfast bagel   · Lunch sandwich, soup, salad or pizza   · Dinner pork, [place] food, salad, chicken or meatloaf   · Snacks has been trying to limit snack, fruit   · Beverages coffee & water     Appetite: Excellent    Vitamin, mineral, herbal supplements: N/A    Oral/enteral nutrition supplements:  N/A    GI problems: heartburn and denied n/v/d/c    Food allergies/intolerances:  N/A    Weight history: Wt Readings from Last 3 Encounters:   02/07/23 96 3 kg (212 lb 3 2 oz)   11/15/22 94 5 kg (208 lb 6 4 oz)   11/01/22 93 6 kg (206 lb 6 4 oz)     Current body weight:  212  Height:  67"  BMI:  33(overweight)  IBW:  148  %IBW  143%    Weight change: Mild gradual weight gain noted, +6#(2 9%) x 3 months         Nutrition-related labs:    Lab Results   Component Value Date    HGBA1C 6 0 (H) 01/30/2023     Lab Results   Component Value Date    SODIUM 138 01/30/2023    K 4 9 01/30/2023     01/30/2023    CO2 29 01/30/2023    BUN 18 01/30/2023    CREATININE 1 24 01/30/2023    GLUC 101 03/20/2019    CALCIUM 9 7 01/30/2023         Nutrition-related medications: metoprolol, crestor, pantoprazole     Physical findings/skin integrity:  Appears to be nutritionally stable       Nutrition Diagnosis    Problem: overweight/obesity     Related to: decreased physical activity     As Evidenced By: patient interview  weight gain       Estimated Nutritional Needs    Marvel Bashir REE: CBW 96kg    · ~1400kcal kcal (based on: 15kcal/kg-weight loss)  · ~76g protein (based on:  08g/kg)  · ~2400ml fluid (based on: 25ml/kg)      Current intake estimation: meeting needs       Nutrition Intervention/Recommendations    Nutrition education intervention: provided    Nutrition recommendations: incorporate Myplate meals daily     Supplement recommendations: No supplement recommendations at this time      Teaching Method: verbal  printed material (Myplate print out )    Person Educated: PT    Comprehension: excellent    Receptivity: excellent    Expected compliance: excellent    Collaboration/referral of nutrition care: Has mobile market coupon     Goals:  Prevent weight gain until next visit  Incorporate Myplate into meal planning  Continue to reduce nightly snacking  Monitoring/Evaluation:  Will continue to monitor appetite, weight trend, nutrition related labs and need for nutrition education  Visit Summary  As discussed in previous visit pt continues to reduce snacking  Pt reports gradual weight gain r/t decreased physical activity in the winter  RD encouraged pt to try indoor exercise or take walks when the sun is out  Myplate information provided  Will continue to encourage healthy eating patterns and provide education as needed     Dhaval De La Torre RDN,LDN

## 2023-02-10 ENCOUNTER — PATIENT OUTREACH (OUTPATIENT)
Dept: SURGERY | Facility: OTHER | Age: 63
End: 2023-02-10

## 2023-02-10 NOTE — PROGRESS NOTES
Cm met Ct to complete Re-cert, Acuity and YAMILEX's  Ct informed Cm that he is doing well  He is not engaging in risky behaviors, is taking his medication daily as prescribed and is getting his lab work done, next week he seeing Dr Vanessa Mello  Ct brought his paystubs, bank statements, insurance card and photo ID to be copied and scanned  Ct stated he is getting his teeth removed soon through [facility] and having dentures put in  Ct is in stable house, is self sufficient and his own vehicle  Ct reports having no issues      No signatures were obtained due to covid 19     See media for documents

## 2023-02-17 ENCOUNTER — VBI (OUTPATIENT)
Dept: ADMINISTRATIVE | Facility: OTHER | Age: 63
End: 2023-02-17

## 2023-02-23 ENCOUNTER — PATIENT OUTREACH (OUTPATIENT)
Dept: SURGERY | Facility: OTHER | Age: 63
End: 2023-02-23

## 2023-03-23 ENCOUNTER — PATIENT OUTREACH (OUTPATIENT)
Dept: SURGERY | Facility: OTHER | Age: 63
End: 2023-03-23

## 2023-03-23 NOTE — PROGRESS NOTES
CM received [facility] statement for month of March from ct  Cm completed check req and sent to supervisor

## 2023-04-06 ENCOUNTER — PATIENT OUTREACH (OUTPATIENT)
Dept: SURGERY | Facility: OTHER | Age: 63
End: 2023-04-06

## 2023-04-24 DIAGNOSIS — I10 PRIMARY HYPERTENSION: ICD-10-CM

## 2023-04-24 DIAGNOSIS — K21.9 GASTROESOPHAGEAL REFLUX DISEASE WITHOUT ESOPHAGITIS: ICD-10-CM

## 2023-04-24 DIAGNOSIS — T65.292D TOXIC EFFECT OF TOBACCO, INTENTIONAL SELF-HARM, SUBSEQUENT ENCOUNTER: ICD-10-CM

## 2023-04-25 RX ORDER — NICOTINE 21 MG/24HR
PATCH, TRANSDERMAL 24 HOURS TRANSDERMAL
Qty: 28 PATCH | Refills: 2 | Status: SHIPPED | OUTPATIENT
Start: 2023-04-25

## 2023-04-25 RX ORDER — IRBESARTAN AND HYDROCHLOROTHIAZIDE 300; 12.5 MG/1; MG/1
TABLET, FILM COATED ORAL
Qty: 30 TABLET | Refills: 2 | Status: SHIPPED | OUTPATIENT
Start: 2023-04-25

## 2023-04-25 RX ORDER — PANTOPRAZOLE SODIUM 40 MG/1
TABLET, DELAYED RELEASE ORAL
Qty: 60 TABLET | Refills: 2 | Status: SHIPPED | OUTPATIENT
Start: 2023-04-25

## 2023-05-05 ENCOUNTER — APPOINTMENT (OUTPATIENT)
Dept: LAB | Facility: HOSPITAL | Age: 63
End: 2023-05-05

## 2023-05-05 LAB
ALBUMIN SERPL BCP-MCNC: 4.4 G/DL (ref 3.5–5)
ALP SERPL-CCNC: 97 U/L (ref 34–104)
ALT SERPL W P-5'-P-CCNC: 15 U/L (ref 7–52)
ANION GAP SERPL CALCULATED.3IONS-SCNC: 7 MMOL/L (ref 4–13)
AST SERPL W P-5'-P-CCNC: 16 U/L (ref 13–39)
BASOPHILS # BLD AUTO: 0.07 THOUSANDS/ΜL (ref 0–0.1)
BASOPHILS NFR BLD AUTO: 1 % (ref 0–1)
BILIRUB SERPL-MCNC: 0.72 MG/DL (ref 0.2–1)
BUN SERPL-MCNC: 19 MG/DL (ref 5–25)
CALCIUM SERPL-MCNC: 9.9 MG/DL (ref 8.4–10.2)
CHLORIDE SERPL-SCNC: 106 MMOL/L (ref 96–108)
CO2 SERPL-SCNC: 24 MMOL/L (ref 21–32)
CREAT SERPL-MCNC: 1.06 MG/DL (ref 0.6–1.3)
EOSINOPHIL # BLD AUTO: 0.16 THOUSAND/ΜL (ref 0–0.61)
EOSINOPHIL NFR BLD AUTO: 3 % (ref 0–6)
ERYTHROCYTE [DISTWIDTH] IN BLOOD BY AUTOMATED COUNT: 14.4 % (ref 11.6–15.1)
GFR SERPL CREATININE-BSD FRML MDRD: 74 ML/MIN/1.73SQ M
GLUCOSE P FAST SERPL-MCNC: 129 MG/DL (ref 65–99)
HCT VFR BLD AUTO: 52.6 % (ref 36.5–49.3)
HGB BLD-MCNC: 17.7 G/DL (ref 12–17)
IMM GRANULOCYTES # BLD AUTO: 0.02 THOUSAND/UL (ref 0–0.2)
IMM GRANULOCYTES NFR BLD AUTO: 0 % (ref 0–2)
LYMPHOCYTES # BLD AUTO: 1.02 THOUSANDS/ΜL (ref 0.6–4.47)
LYMPHOCYTES NFR BLD AUTO: 18 % (ref 14–44)
MCH RBC QN AUTO: 29.1 PG (ref 26.8–34.3)
MCHC RBC AUTO-ENTMCNC: 33.7 G/DL (ref 31.4–37.4)
MCV RBC AUTO: 87 FL (ref 82–98)
MONOCYTES # BLD AUTO: 0.75 THOUSAND/ΜL (ref 0.17–1.22)
MONOCYTES NFR BLD AUTO: 13 % (ref 4–12)
NEUTROPHILS # BLD AUTO: 3.82 THOUSANDS/ΜL (ref 1.85–7.62)
NEUTS SEG NFR BLD AUTO: 65 % (ref 43–75)
NRBC BLD AUTO-RTO: 0 /100 WBCS
PLATELET # BLD AUTO: 213 THOUSANDS/UL (ref 149–390)
PMV BLD AUTO: 9.9 FL (ref 8.9–12.7)
POTASSIUM SERPL-SCNC: 3.9 MMOL/L (ref 3.5–5.3)
PROT SERPL-MCNC: 8.1 G/DL (ref 6.4–8.4)
RBC # BLD AUTO: 6.08 MILLION/UL (ref 3.88–5.62)
SODIUM SERPL-SCNC: 137 MMOL/L (ref 135–147)
TREPONEMA PALLIDUM IGG+IGM AB [PRESENCE] IN SERUM OR PLASMA BY IMMUNOASSAY: NORMAL
WBC # BLD AUTO: 5.84 THOUSAND/UL (ref 4.31–10.16)

## 2023-05-06 LAB
BASOPHILS # BLD AUTO: 0.1 X10E3/UL (ref 0–0.2)
BASOPHILS NFR BLD AUTO: 1 %
CD3+CD4+ CELLS # BLD: 178 /UL (ref 359–1519)
CD3+CD4+ CELLS NFR BLD: 17.8 % (ref 30.8–58.5)
CD3+CD4+ CELLS/CD3+CD8+ CLL BLD: 0.35 % (ref 0.92–3.72)
CD3+CD8+ CELLS # BLD: 509 /UL (ref 109–897)
CD3+CD8+ CELLS NFR BLD: 50.9 % (ref 12–35.5)
EOSINOPHIL # BLD AUTO: 0.2 X10E3/UL (ref 0–0.4)
EOSINOPHIL NFR BLD AUTO: 3 %
ERYTHROCYTE [DISTWIDTH] IN BLOOD BY AUTOMATED COUNT: 14.2 % (ref 11.6–15.4)
HCT VFR BLD AUTO: 52.3 % (ref 37.5–51)
HGB BLD-MCNC: 18.1 G/DL (ref 13–17.7)
IMM GRANULOCYTES # BLD: 0 X10E3/UL (ref 0–0.1)
IMM GRANULOCYTES NFR BLD: 1 %
LYMPHOCYTES # BLD AUTO: 1 X10E3/UL (ref 0.7–3.1)
LYMPHOCYTES NFR BLD AUTO: 17 %
MCH RBC QN AUTO: 29.6 PG (ref 26.6–33)
MCHC RBC AUTO-ENTMCNC: 34.6 G/DL (ref 31.5–35.7)
MCV RBC AUTO: 86 FL (ref 79–97)
MONOCYTES # BLD AUTO: 0.8 X10E3/UL (ref 0.1–0.9)
MONOCYTES NFR BLD AUTO: 14 %
NEUTROPHILS # BLD AUTO: 3.9 X10E3/UL (ref 1.4–7)
NEUTROPHILS NFR BLD AUTO: 64 %
PLATELET # BLD AUTO: 215 X10E3/UL (ref 150–450)
RBC # BLD AUTO: 6.12 X10E6/UL (ref 4.14–5.8)
WBC # BLD AUTO: 6 X10E3/UL (ref 3.4–10.8)

## 2023-05-07 LAB
GAMMA INTERFERON BACKGROUND BLD IA-ACNC: 1.29 IU/ML
M TB IFN-G BLD-IMP: NEGATIVE
M TB IFN-G CD4+ BCKGRND COR BLD-ACNC: -0.08 IU/ML
M TB IFN-G CD4+ BCKGRND COR BLD-ACNC: -0.24 IU/ML
MITOGEN IGNF BCKGRD COR BLD-ACNC: 8.95 IU/ML

## 2023-05-08 LAB
HIV1 RNA # SERPL NAA+PROBE: <20 COPIES/ML
HIV1 RNA SERPL NAA+PROBE-LOG#: NORMAL LOG10COPY/ML

## 2023-05-09 ENCOUNTER — OFFICE VISIT (OUTPATIENT)
Dept: SURGERY | Facility: CLINIC | Age: 63
End: 2023-05-09

## 2023-05-09 VITALS
TEMPERATURE: 98.2 F | SYSTOLIC BLOOD PRESSURE: 131 MMHG | BODY MASS INDEX: 32.8 KG/M2 | HEART RATE: 76 BPM | DIASTOLIC BLOOD PRESSURE: 73 MMHG | WEIGHT: 209 LBS | HEIGHT: 67 IN | OXYGEN SATURATION: 93 %

## 2023-05-09 DIAGNOSIS — Z72.89 OTHER PROBLEMS RELATED TO LIFESTYLE: ICD-10-CM

## 2023-05-09 DIAGNOSIS — E66.9 OBESITY (BMI 30.0-34.9): ICD-10-CM

## 2023-05-09 DIAGNOSIS — Z20.2 CONTACT WITH AND (SUSPECTED) EXPOSURE TO INFECTIONS WITH A PREDOMINANTLY SEXUAL MODE OF TRANSMISSION: ICD-10-CM

## 2023-05-09 DIAGNOSIS — D72.89 OTHER SPECIFIED DISORDERS OF WHITE BLOOD CELLS: ICD-10-CM

## 2023-05-09 DIAGNOSIS — K74.69 OTHER CIRRHOSIS OF LIVER (HCC): ICD-10-CM

## 2023-05-09 DIAGNOSIS — Z11.3 ENCOUNTER FOR SCREENING FOR BACTERIAL SEXUALLY TRANSMITTED DISEASE: ICD-10-CM

## 2023-05-09 DIAGNOSIS — B20 HIV DISEASE (HCC): Primary | ICD-10-CM

## 2023-05-09 NOTE — PROGRESS NOTES
Progress Note - Infectious Disease   Aidee Tran 58 y o  male MRN: [id number]  Unit/Bed#:  Encounter: [id number]      Impression/Plan:  1   HIV-doing well on ART with an undetectable viral load and a CD4 count of 178   Continue ART, recheck labs in 2 months and follow up in 3 months   We will change to every 6-month follow-up after next visit if undetectable     2  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  Right upper quadrant ultrasound negative, and the alpha fetoprotein negative on the last check   Continue Q 6 month hepatocellular carcinoma screening      3   Polycythemia-likely secondary to the nicotine dependence   Seen by Hematology Oncology who agree with my assessment   Will continue to monitor the CBC with diff when quit smoking     4   Hypertension-asymptomatic and stable and well controlled   Continue monitor     5  Coronary artery disease-complicated by acute MI status post PCI  Now asymptomatic   Follow up with Cardiology      6  Nicotine dependence-patient continues to smoke but has decreased the amount substantially   He is using nicotine patches and working on quitting      7  Lung nodule- Most recent CT scan in January with no remaining nodule  No additional follow-up needed    8  Obesity  Stressed the importance of weight loss through diet and exercise  Counseled the patient to decrease sugar intake    Discussed the above management plan with the primary service    Patient was provided medication, adherence and prevention education    Subjective:  Routine follow-up for HIV  Patient claims 100% adherence with Descovy Prezcobix and Tivicay     Patient denies any notable side effects  Overall the feeling well  The patient denies any fever chills or sweats, denies any nausea vomiting or diarrhea, denies any cough or shortness of breath      ROS:  A complete review of systems is negative other than that noted above in the subjective    Followup portions patient "history reviewed and updated as: Allergies, current medications, past medical history, past social history, past surgical history, and the problem list    Objective:  Vitals:  Vitals:    05/09/23 1707   BP: 131/73   Pulse: 76   Temp: 98 2 °F (36 8 °C)   SpO2: 93%   Weight: 94 8 kg (209 lb)   Height: 5' 7\" (1 702 m)       Physical Exam:   General Appearance:  Alert, interactive, appearing well,  nontoxic, no acute distress  Neck:   Supple without lymphadenopathy, no thyromegaly or masses   Throat: Oropharynx moist without lesions  Lungs:   Clear to auscultation bilaterally; no wheezes, rhonchi or rales; respirations unlabored   Heart:  RRR; no murmur, rub or gallop   Abdomen:   Soft, non-tender, non-distended, positive bowel sounds  Extremities: No clubbing, cyanosis or edema   Skin: No new rashes or lesions  No draining wounds noted         Labs, Imaging, & Other studies:   All pertinent labs and imaging studies were personally reviewed    Lab Results   Component Value Date     03/18/2017    K 3 9 05/05/2023     05/05/2023    CO2 24 05/05/2023    BUN 19 05/05/2023    CREATININE 1 06 05/05/2023    GLUF 129 (H) 05/05/2023    CALCIUM 9 9 05/05/2023    AST 16 05/05/2023    ALT 15 05/05/2023    ALKPHOS 97 05/05/2023    PROT 7 4 03/18/2017    BILITOT 1 1 03/18/2017    EGFR 74 05/05/2023     Lab Results   Component Value Date    WBC 5 84 05/05/2023    WBC 6 0 05/05/2023    HGB 17 7 (H) 05/05/2023    HGB 18 1 (H) 05/05/2023    HCT 52 6 (H) 05/05/2023    HCT 52 3 (H) 05/05/2023    MCV 87 05/05/2023    MCV 86 05/05/2023     05/05/2023     05/05/2023     No results found for: HEPCAB  Lab Results   Component Value Date    HEPBCAB REACTIVE (A) 10/08/2015    HEPBCAB REACTIVE (A) 10/08/2015    HEPBCAB REACTIVE (A) 10/08/2015    HEPBCAB REACTIVE (A) 10/08/2015     Lab Results   Component Value Date    RPR Non-Reactive 05/18/2022     CD4 ABS   Date/Time Value Ref Range Status   05/05/2023 07:21 AM " 178 (L) 359 - 1519 /uL Final     HIV-1 RNA by PCR, Qn   Date/Time Value Ref Range Status   05/05/2023 07:21 AM <20 copies/mL Final     Comment:     HIV-1 RNA detected  The reportable range for this assay is 20 to 10,000,000  copies HIV-1 RNA/mL  CT chest-no remaining pulmonary nodule    Images personally reviewed by me in PACS    Current Outpatient Medications:   •  albuterol (PROVENTIL HFA,VENTOLIN HFA) 90 mcg/act inhaler, INHALE 2 PUFFS BY MOUTH EVERY SIX HOURS AS NEEDED FOR WHEEZING, Disp: 18 g, Rfl: 0  •  cetirizine (ZyrTEC) 10 mg tablet, TAKE 1 TABLET BY MOUTH DAILY IN THE MORNING, Disp: 30 tablet, Rfl: 5  •  Descovy 200-25 MG tablet, TAKE 1 TABLET BY MOUTH IN THE MORNING, Disp: 30 tablet, Rfl: 5  •  irbesartan-hydrochlorothiazide (AVALIDE) 300-12 5 MG per tablet, TAKE 1 TABLET BY MOUTH DAILY IN THE MORNING, Disp: 30 tablet, Rfl: 2  •  metoprolol succinate (TOPROL-XL) 25 mg 24 hr tablet, TAKE 1 TABLET BY MOUTH DAILY IN THE MORNING, Disp: 30 tablet, Rfl: 5  •  nicotine (NICODERM CQ) 21 mg/24 hr TD 24 hr patch, APPLY 1 PATCH TO THE SKIN DAILY, Disp: 28 patch, Rfl: 2  •  nitroglycerin (NITROSTAT) 0 4 mg SL tablet, Place 1 tablet (0 4 mg total) under the tongue every 5 (five) minutes as needed for chest pain, Disp: 15 tablet, Rfl: 0  •  pantoprazole (PROTONIX) 40 mg tablet, TAKE 1 TABLET BY MOUTH TWICE A DAY IN MORNING AND EVENING, Disp: 60 tablet, Rfl: 2  •  Prezcobix 800-150 MG TABS, TAKE 1 TABLET BY MOUTH DAILY, Disp: 30 tablet, Rfl: 5  •  rosuvastatin (CRESTOR) 20 MG tablet, TAKE 1 TABLET BY MOUTH DAILY IN THE MORNING, Disp: 30 tablet, Rfl: 5  •  Tivicay 50 MG TABS, TAKE 1 TABLET BY MOUTH IN THE MORNING AND 1 TABLET IN THE EVENING, Disp: 60 tablet, Rfl: 5  •  aspirin (ECOTRIN LOW STRENGTH) 81 mg EC tablet, Take 1 tablet (81 mg total) by mouth in the morning   (Patient not taking: Reported on 5/9/2023), Disp: 30 tablet, Rfl: 5  •  nicotine polacrilex (COMMIT) 4 MG lozenge, DISSOLVE 1 LOZENGE IN MOUTH/THROAT AS NEEDED FOR SMOKING CESSATION (Patient not taking: Reported on 5/9/2023), Disp: 72 lozenge, Rfl: 3

## 2023-05-15 DIAGNOSIS — T65.292D TOXIC EFFECT OF TOBACCO, INTENTIONAL SELF-HARM, SUBSEQUENT ENCOUNTER: ICD-10-CM

## 2023-05-15 RX ORDER — POLYETHYLENE GLYCOL 3350 17 G
POWDER IN PACKET (EA) ORAL
Qty: 72 LOZENGE | Refills: 3 | Status: SHIPPED | OUTPATIENT
Start: 2023-05-15

## 2023-05-22 ENCOUNTER — PATIENT OUTREACH (OUTPATIENT)
Dept: SURGERY | Facility: OTHER | Age: 63
End: 2023-05-22

## 2023-05-22 NOTE — PROGRESS NOTES
Cm received Cts May [employer]WD premium  Cm processed check req and sent to supervisor for approval      See media

## 2023-05-23 ENCOUNTER — OFFICE VISIT (OUTPATIENT)
Dept: SURGERY | Facility: CLINIC | Age: 63
End: 2023-05-23

## 2023-05-23 VITALS
DIASTOLIC BLOOD PRESSURE: 69 MMHG | WEIGHT: 211 LBS | SYSTOLIC BLOOD PRESSURE: 128 MMHG | TEMPERATURE: 98.5 F | HEART RATE: 83 BPM | BODY MASS INDEX: 33.12 KG/M2 | HEIGHT: 67 IN | OXYGEN SATURATION: 93 %

## 2023-05-23 DIAGNOSIS — I25.10 CORONARY ARTERY DISEASE INVOLVING NATIVE CORONARY ARTERY OF NATIVE HEART WITHOUT ANGINA PECTORIS: ICD-10-CM

## 2023-05-23 DIAGNOSIS — B20 HIV DISEASE (HCC): ICD-10-CM

## 2023-05-23 DIAGNOSIS — I25.2 HISTORY OF NON-ST ELEVATION MYOCARDIAL INFARCTION (NSTEMI): ICD-10-CM

## 2023-05-23 DIAGNOSIS — K21.9 GASTROESOPHAGEAL REFLUX DISEASE WITHOUT ESOPHAGITIS: ICD-10-CM

## 2023-05-23 DIAGNOSIS — I10 ESSENTIAL HYPERTENSION: ICD-10-CM

## 2023-05-23 DIAGNOSIS — M25.511 ACUTE PAIN OF RIGHT SHOULDER: Primary | ICD-10-CM

## 2023-05-23 DIAGNOSIS — I10 PRIMARY HYPERTENSION: ICD-10-CM

## 2023-05-23 DIAGNOSIS — E78.2 MIXED HYPERLIPIDEMIA: ICD-10-CM

## 2023-05-23 DIAGNOSIS — R07.9 CHEST PAIN, UNSPECIFIED TYPE: ICD-10-CM

## 2023-05-23 DIAGNOSIS — B20 HIV (HUMAN IMMUNODEFICIENCY VIRUS INFECTION) (HCC): ICD-10-CM

## 2023-05-23 DIAGNOSIS — R73.03 PREDIABETES: ICD-10-CM

## 2023-05-23 DIAGNOSIS — T65.294D TOXIC EFFECT OF TOBACCO, UNDETERMINED INTENT, SUBSEQUENT ENCOUNTER: ICD-10-CM

## 2023-05-23 RX ORDER — IRBESARTAN AND HYDROCHLOROTHIAZIDE 300; 12.5 MG/1; MG/1
1 TABLET, FILM COATED ORAL EVERY MORNING
Qty: 30 TABLET | Refills: 2 | Status: SHIPPED | OUTPATIENT
Start: 2023-05-23

## 2023-05-23 RX ORDER — DARUNAVIR ETHANOLATE AND COBICISTAT 800; 150 MG/1; MG/1
1 TABLET, FILM COATED ORAL DAILY
Qty: 30 TABLET | Refills: 5 | Status: SHIPPED | OUTPATIENT
Start: 2023-05-23

## 2023-05-23 RX ORDER — NAPROXEN 500 MG/1
500 TABLET ORAL 2 TIMES DAILY WITH MEALS
Qty: 60 TABLET | Refills: 1 | Status: SHIPPED | OUTPATIENT
Start: 2023-05-23

## 2023-05-23 RX ORDER — NITROGLYCERIN 0.4 MG/1
0.4 TABLET SUBLINGUAL
Qty: 15 TABLET | Refills: 0 | Status: SHIPPED | OUTPATIENT
Start: 2023-05-23

## 2023-05-23 RX ORDER — EMTRICITABINE AND TENOFOVIR ALAFENAMIDE 200; 25 MG/1; MG/1
1 TABLET ORAL DAILY
Qty: 30 TABLET | Refills: 5 | Status: SHIPPED | OUTPATIENT
Start: 2023-05-23

## 2023-05-23 RX ORDER — DOLUTEGRAVIR SODIUM 50 MG/1
50 TABLET, FILM COATED ORAL
Qty: 60 TABLET | Refills: 5 | Status: SHIPPED | OUTPATIENT
Start: 2023-05-23

## 2023-05-23 RX ORDER — PANTOPRAZOLE SODIUM 40 MG/1
40 TABLET, DELAYED RELEASE ORAL DAILY
Qty: 60 TABLET | Refills: 2 | Status: SHIPPED | OUTPATIENT
Start: 2023-05-23

## 2023-05-23 RX ORDER — ROSUVASTATIN CALCIUM 20 MG/1
20 TABLET, COATED ORAL EVERY MORNING
Qty: 30 TABLET | Refills: 5 | Status: SHIPPED | OUTPATIENT
Start: 2023-05-23

## 2023-05-23 RX ORDER — METOPROLOL SUCCINATE 25 MG/1
25 TABLET, EXTENDED RELEASE ORAL EVERY MORNING
Qty: 30 TABLET | Refills: 5 | Status: SHIPPED | OUTPATIENT
Start: 2023-05-23

## 2023-05-23 RX ORDER — TIZANIDINE HYDROCHLORIDE 4 MG/1
4 CAPSULE, GELATIN COATED ORAL 3 TIMES DAILY
Qty: 30 CAPSULE | Refills: 0 | Status: SHIPPED | OUTPATIENT
Start: 2023-05-23

## 2023-05-23 NOTE — ASSESSMENT & PLAN NOTE
Blood pressure:   BP Readings from Last 3 Encounters:   05/23/23 128/69   05/09/23 131/73   02/07/23 137/61       Continue current antihypertensive  Educated on the following lifestyle modifications to lower BP and decrease cardiovascular risk factors  limit alcohol intake, reduce salt in diet, maintain a healthy weight, engage in 30 minutes of cardiovascular exercise daily, and not smoke

## 2023-05-23 NOTE — ASSESSMENT & PLAN NOTE
Using nicotine patch  Down to smoking 1 cigar a week  Counseled for greater than 15 minutes on the importance of smoking cessation  Advised to quit  Educated on the increased risk of heart and lung disease associated with smoking

## 2023-05-23 NOTE — PROGRESS NOTES
Assessment/Plan:   [facility] [employer] [facility] met with pt during PCP appt  To complete PHQ-9  Pt scored 2, minimal depression noted  Pt stated he is doing well no concerns with his MH  Pt is active at work  Pt smokes a few cigars a week and is utilizing the patch to quit  [facility]     Today patient present with   Chief Complaint   Patient presents with   • Follow-up     Pt offers no c/o at this time  Patient would likely benefit from annual PHQ-9  Consider/focus/continue monitor pts emotional, cognitive and behavioral display of stability  Stage of change: Action  Plan/ Behavioral Recommendations: ongoing [address] interventions as per pt's coordinated care and/or pts request of services  Diagnoses and all orders for this visit:    Acute pain of right shoulder  -     naproxen (EC NAPROSYN) 500 MG EC tablet; Take 1 tablet (500 mg total) by mouth 2 (two) times a day with meals  -     TiZANidine (ZANAFLEX) 4 MG capsule; Take 1 capsule (4 mg total) by mouth 3 (three) times a day    History of non-ST elevation myocardial infarction (NSTEMI)  -     aspirin (ECOTRIN LOW STRENGTH) 81 mg EC tablet; Take 1 tablet (81 mg total) by mouth daily    Coronary artery disease involving native coronary artery of native heart without angina pectoris  -     aspirin (ECOTRIN LOW STRENGTH) 81 mg EC tablet; Take 1 tablet (81 mg total) by mouth daily    HIV (human immunodeficiency virus infection) ([facility] [address] )  -     emtricitabine-tenofovir AF (Descovy) 200-25 MG tablet; Take 1 tablet by mouth daily  -     dolutegravir (Tivicay) 50 MG TABS; Take 1 tablet (50 mg total) by mouth 2 (two) times a day before meals before breakfast and dinner    Primary hypertension  -     irbesartan-hydrochlorothiazide (AVALIDE) 300-12 5 MG per tablet; Take 1 tablet by mouth every morning    Essential hypertension  -     metoprolol succinate (TOPROL-XL) 25 mg 24 hr tablet;  Take 1 tablet (25 mg total) by mouth every morning    Chest pain, unspecified type  - nitroglycerin (NITROSTAT) 0 4 mg SL tablet; Place 1 tablet (0 4 mg total) under the tongue every 5 (five) minutes as needed for chest pain    Gastroesophageal reflux disease without esophagitis  -     pantoprazole (PROTONIX) 40 mg tablet; Take 1 tablet (40 mg total) by mouth daily    HIV disease (HCC)  -     Darunavir-Cobicistat (Prezcobix) 800-150 MG TABS; Take 1 tablet by mouth daily    Mixed hyperlipidemia  -     rosuvastatin (CRESTOR) 20 MG tablet; Take 1 tablet (20 mg total) by mouth every morning    Toxic effect of tobacco, undetermined intent, subsequent encounter    Prediabetes          Subjective:     Patient ID: Martin Garcia is a 58 y o  male  HPI    History of Present Illness:      Patient is being seen for annual behavioral health assessment  Patient denies current behavioral health concerns      [unfilled]       Review of Systems      Objective:     Physical Exam      [facility]    Orientation     Person: yes    Place: yes    Time: yes    Appearance    Well Developed: yes healthy    Uncomfortable: no    Normal Body Odor: yes    Smells of Feces: no    Smells of Urine: no    Disheveled: no    Well Nourished: yes overweight    Grooming Unkempt: no    Poor Eye Contact: no    Hirsute: no    Looks Tired: no    Acutely Exhausted: noappearance reflects stated age    Mood and Affect:     Appropriate: yes    Euthymicyes    Irritable: no    Angry: no    Anxious: no    Depressed:no    Blunted:no    Labile: no    Restricted: no    Harm to Self or Others: pt denies SI/HI    Substance Abuse: Pt denies history

## 2023-05-23 NOTE — ASSESSMENT & PLAN NOTE
Lab Results   Component Value Date/Time    HGBA1C 6 0 (H) 01/30/2023 07:28 AM    HGBA1C 5 4 03/11/2016 08:48 AM    HGBA1C 5 4 03/11/2016 08:48 AM    HGBA1C 5 4 03/11/2016 08:48 AM    HGBA1C 5 4 03/11/2016 08:48 AM            Educated to follow diabetic diet, maintain a healthy weight, and exercise regularly  Referred to dietician for additional education

## 2023-05-23 NOTE — ASSESSMENT & PLAN NOTE
No results found for: [id number]  CD4 ABS   Date/Time Value Ref Range Status   2023 07:21  (L) 359 - 1519 /uL Final     HIV-1 RNA by PCR, Qn   Date/Time Value Ref Range Status   2023 07:21 AM <20 copies/mL Final     Comment:     HIV-1 RNA detected  The reportable range for this assay is 20 to 10,000,000  copies HIV-1 RNA/mL  HIV-1 RNA Viral Load Log   Date/Time Value Ref Range Status   2023 07:21 AM COMMENT [id number]/mL Final     Comment:     Unable to calculate result since non-numeric result obtained for  component test          ART: Tivicay, Prezcobix, and Descovy         Denies side effects  Stressed the importance of adherence  Continue follow up with ID clinic  Reviewed most recent labs, including Cd4 and viral load  Discussed the risks and benefits of treatment options, instructions for management, importance of treatment adherence, and reduction of risk factor  Educated on possible  medication side effects  Counseled on routes of HIV transmission, including the risk of  infection  Emphasized that viral suppression is the best method to prevent HIV transmission  At this time pt denies the need for HIV testing of anyone in their life  Total encounter time was 45 minutes  Greater then 20 minutes were spent on counseling and patient education  Pt voices understanding and agreement with treatment plan

## 2023-05-23 NOTE — ASSESSMENT & PLAN NOTE
Symptoms well controlled with current medication  Educated on lifestyle modifications to improve GERD  Encouraged avoidance of acidic foods including citrus,onions, coffee, carbonated beverages, mint ,chocolate and fried foods  Encouraged abstinence from caffeine, tobacco, and alcohol  Educated to avoid laying down directly after eating a large meal  and consume smaller more frequent meals  Recommend a healthy body weight to decrease symptoms

## 2023-05-23 NOTE — PROGRESS NOTES
Assessment/Plan:    Essential hypertension  Blood pressure:   BP Readings from Last 3 Encounters:   23 128/69   23 131/73   23 137/61       Continue current antihypertensive  Educated on the following lifestyle modifications to lower BP and decrease cardiovascular risk factors  limit alcohol intake, reduce salt in diet, maintain a healthy weight, engage in 30 minutes of cardiovascular exercise daily, and not smoke  HIV disease ([facility] [address] )  No results found for: CR3PNSN  CD4 ABS   Date/Time Value Ref Range Status   2023 07:21  (L) 359 - 1519 /uL Final     HIV-1 RNA by PCR, Qn   Date/Time Value Ref Range Status   2023 07:21 AM <20 copies/mL Final     Comment:     HIV-1 RNA detected  The reportable range for this assay is 20 to 10,000,000  copies HIV-1 RNA/mL  HIV-1 RNA Viral Load Log   Date/Time Value Ref Range Status   2023 07:21 AM COMMENT [id number]/mL Final     Comment:     Unable to calculate result since non-numeric result obtained for  component test          ART: Tivicay, Prezcobix, and Descovy         Denies side effects  Stressed the importance of adherence  Continue follow up with ID clinic  Reviewed most recent labs, including Cd4 and viral load  Discussed the risks and benefits of treatment options, instructions for management, importance of treatment adherence, and reduction of risk factor  Educated on possible  medication side effects  Counseled on routes of HIV transmission, including the risk of  infection  Emphasized that viral suppression is the best method to prevent HIV transmission  At this time pt denies the need for HIV testing of anyone in their life  Total encounter time was 45 minutes  Greater then 20 minutes were spent on counseling and patient education  Pt voices understanding and agreement with treatment plan  Toxic effect of tobacco and nicotine  Using nicotine patch  Down to smoking 1 cigar a week  Counseled for greater than 15 minutes on the importance of smoking cessation  Advised to quit  Educated on the increased risk of heart and lung disease associated with smoking  Gastroesophageal reflux disease without esophagitis  Symptoms well controlled with current medication  Educated on lifestyle modifications to improve GERD  Encouraged avoidance of acidic foods including citrus,onions, coffee, carbonated beverages, mint ,chocolate and fried foods  Encouraged abstinence from caffeine, tobacco, and alcohol  Educated to avoid laying down directly after eating a large meal  and consume smaller more frequent meals  Recommend a healthy body weight to decrease symptoms  Prediabetes  Lab Results   Component Value Date/Time    HGBA1C 6 0 (H) 01/30/2023 07:28 AM    HGBA1C 5 4 03/11/2016 08:48 AM    HGBA1C 5 4 03/11/2016 08:48 AM    HGBA1C 5 4 03/11/2016 08:48 AM    HGBA1C 5 4 03/11/2016 08:48 AM            Educated to follow diabetic diet, maintain a healthy weight, and exercise regularly  Referred to dietician for additional education  Diagnoses and all orders for this visit:    Acute pain of right shoulder  -     naproxen (EC NAPROSYN) 500 MG EC tablet; Take 1 tablet (500 mg total) by mouth 2 (two) times a day with meals  -     TiZANidine (ZANAFLEX) 4 MG capsule; Take 1 capsule (4 mg total) by mouth 3 (three) times a day    History of non-ST elevation myocardial infarction (NSTEMI)  -     aspirin (ECOTRIN LOW STRENGTH) 81 mg EC tablet; Take 1 tablet (81 mg total) by mouth daily    Coronary artery disease involving native coronary artery of native heart without angina pectoris  -     aspirin (ECOTRIN LOW STRENGTH) 81 mg EC tablet; Take 1 tablet (81 mg total) by mouth daily    HIV (human immunodeficiency virus infection) ([facility] )  -     emtricitabine-tenofovir AF (Descovy) 200-25 MG tablet;  Take 1 tablet by mouth daily  -     dolutegravir (Tivicay) 50 MG TABS; Take 1 tablet (50 mg total) by mouth 2 (two) times a day before meals before breakfast and dinner    Primary hypertension  -     irbesartan-hydrochlorothiazide (AVALIDE) 300-12 5 MG per tablet; Take 1 tablet by mouth every morning    Essential hypertension  -     metoprolol succinate (TOPROL-XL) 25 mg 24 hr tablet; Take 1 tablet (25 mg total) by mouth every morning    Chest pain, unspecified type  -     nitroglycerin (NITROSTAT) 0 4 mg SL tablet; Place 1 tablet (0 4 mg total) under the tongue every 5 (five) minutes as needed for chest pain    Gastroesophageal reflux disease without esophagitis  -     pantoprazole (PROTONIX) 40 mg tablet; Take 1 tablet (40 mg total) by mouth daily    HIV disease (HCC)  -     Darunavir-Cobicistat (Prezcobix) 800-150 MG TABS; Take 1 tablet by mouth daily    Mixed hyperlipidemia  -     rosuvastatin (CRESTOR) 20 MG tablet; Take 1 tablet (20 mg total) by mouth every morning    Toxic effect of tobacco, undetermined intent, subsequent encounter    Prediabetes          Subjective:      Patient ID: Aid Sanchez is a 58 y o  male  Gera Green is here today for three-month PCP follow-up of chronic conditions  PMHx significant for HIV, HCV SVR s/p treatment, cirrhosis,HTN,hyperlipidemia, and NSTEMI in January 2019 with cardiac intervention and stent placed  Gera Green is c/o right shoulder pain  He slipped two weeks ago while fishing  He has full ROM, but is self limiting full overhead extension due to pain  He denies edema or bruising  He declines x-ray at this time  The following portions of the patient's history were reviewed and updated as appropriate: allergies, current medications, past family history, past medical history, past social history, past surgical history and problem list     Review of Systems   Constitutional: Negative for chills and fever  HENT: Negative for ear pain and sore throat  Eyes: Negative for pain and visual disturbance  Respiratory: Negative for cough and shortness of breath  "  Cardiovascular: Negative for chest pain and palpitations  Gastrointestinal: Negative for abdominal pain and vomiting  Genitourinary: Negative for dysuria and hematuria  Musculoskeletal: Negative for arthralgias and back pain  Shoulder pain   Skin: Negative for color change and rash  Neurological: Negative for seizures and syncope  All other systems reviewed and are negative  Objective:      /69   Pulse 83   Temp 98 5 °F (36 9 °C)   Ht 5' 7\" (1 702 m)   Wt 95 7 kg (211 lb)   SpO2 93%   BMI 33 05 kg/m²       Lab Results   Component Value Date     03/18/2017    K 3 9 05/05/2023     05/05/2023    CO2 24 05/05/2023    BUN 19 05/05/2023    CREATININE 1 06 05/05/2023    GLUF 129 (H) 05/05/2023    CALCIUM 9 9 05/05/2023    AST 16 05/05/2023    ALT 15 05/05/2023    ALKPHOS 97 05/05/2023    PROT 7 4 03/18/2017    BILITOT 1 1 03/18/2017    EGFR 74 05/05/2023     Lab Results   Component Value Date    WBC 5 84 05/05/2023    WBC 6 0 05/05/2023    HGB 17 7 (H) 05/05/2023    HGB 18 1 (H) 05/05/2023    HCT 52 6 (H) 05/05/2023    HCT 52 3 (H) 05/05/2023    MCV 87 05/05/2023    MCV 86 05/05/2023     05/05/2023     05/05/2023     No results found for: HEPCAB  Lab Results   Component Value Date    HEPBCAB REACTIVE (A) 10/08/2015    HEPBCAB REACTIVE (A) 10/08/2015    HEPBCAB REACTIVE (A) 10/08/2015    HEPBCAB REACTIVE (A) 10/08/2015     Lab Results   Component Value Date    RPR Non-Reactive 05/18/2022            Physical Exam  Constitutional:       General: He is not in acute distress  Appearance: He is well-developed  HENT:      Head: Normocephalic  Right Ear: External ear normal       Left Ear: External ear normal       Nose: Nose normal       Mouth/Throat:      Pharynx: No oropharyngeal exudate  Eyes:      General:         Right eye: No discharge  Left eye: No discharge        Conjunctiva/sclera: Conjunctivae normal       Pupils: Pupils are equal, " round, and reactive to light  Neck:      Thyroid: No thyromegaly  Cardiovascular:      Rate and Rhythm: Normal rate and regular rhythm  Heart sounds: Normal heart sounds  No murmur heard  Pulmonary:      Effort: Pulmonary effort is normal       Breath sounds: Normal breath sounds  No wheezing  Abdominal:      General: Bowel sounds are normal       Palpations: Abdomen is soft  There is no mass  Tenderness: There is no abdominal tenderness  Musculoskeletal:         General: Normal range of motion  Right shoulder: Tenderness present  No swelling or deformity  Normal range of motion  Normal strength  Left shoulder: Normal       Cervical back: Normal range of motion  Lymphadenopathy:      Cervical: No cervical adenopathy  Skin:     General: Skin is warm and dry  Findings: No rash  Neurological:      Mental Status: He is alert and oriented to person, place, and time     Psychiatric:         Behavior: Behavior normal

## 2023-06-12 ENCOUNTER — PATIENT OUTREACH (OUTPATIENT)
Dept: SURGERY | Facility: OTHER | Age: 63
End: 2023-06-12

## 2023-06-12 NOTE — PROGRESS NOTES
"Cm received an e-mail from Joselyn Oleary: \"June Jack,  I am not sure if something went wrong with [employer] premium statements, but the below statement shows that nothing was due for May  \"    Supervisor: \"Hello,  It seems that some of the bills I approved actually had a 0$ balance  Can you all double check your bills and try to connect with Mellen to confirm? If this is true I will ask to halt checks that I approved  If they were cashed we will have to confirm a credit applied  \"      "

## 2023-06-18 NOTE — PROGRESS NOTES
CT# [id number], [address]    Ct emailed cm his July [place] premium to be processed  Cm prepared ct's July [place] premium, with approved auth, letter and check requisition; Cm emailed it to supervisor to be processed  Cm scanned July MAWD paperwork  Home PT

## 2023-06-26 ENCOUNTER — PATIENT OUTREACH (OUTPATIENT)
Dept: SURGERY | Facility: OTHER | Age: 63
End: 2023-06-26

## 2023-06-26 NOTE — PROGRESS NOTES
Cm received an e-mail with Cts June 2023 MAWD premium  Cm prepared cts check req and sent to supervisor  See media

## 2023-07-17 DIAGNOSIS — J30.2 SEASONAL ALLERGIES: ICD-10-CM

## 2023-07-17 DIAGNOSIS — T65.292D TOXIC EFFECT OF TOBACCO, INTENTIONAL SELF-HARM, SUBSEQUENT ENCOUNTER: ICD-10-CM

## 2023-07-18 RX ORDER — CETIRIZINE HYDROCHLORIDE 10 MG/1
TABLET ORAL
Qty: 30 TABLET | Refills: 5 | Status: SHIPPED | OUTPATIENT
Start: 2023-07-18

## 2023-07-18 RX ORDER — NICOTINE 21 MG/24HR
PATCH, TRANSDERMAL 24 HOURS TRANSDERMAL
Qty: 28 PATCH | Refills: 2 | Status: SHIPPED | OUTPATIENT
Start: 2023-07-18

## 2023-07-19 ENCOUNTER — OFFICE VISIT (OUTPATIENT)
Dept: DENTISTRY | Facility: CLINIC | Age: 63
End: 2023-07-19

## 2023-07-19 ENCOUNTER — PATIENT OUTREACH (OUTPATIENT)
Dept: SURGERY | Facility: CLINIC | Age: 63
End: 2023-07-19

## 2023-07-19 VITALS — SYSTOLIC BLOOD PRESSURE: 146 MMHG | HEART RATE: 81 BPM | DIASTOLIC BLOOD PRESSURE: 89 MMHG

## 2023-07-19 DIAGNOSIS — K08.101: Primary | ICD-10-CM

## 2023-07-19 PROCEDURE — D0191 ASSESSMENT OF A PATIENT: HCPCS | Performed by: DENTIST

## 2023-07-19 NOTE — PROGRESS NOTES
Patient presents for healing eval after teeth extractions and verbally consents for treatment:  Reviewed health history-  Pt is ASA type III  Treatment consents signed: Yes  Perio: plaque  Pain Scale:1, discomfort from extractions  Caries Assessment: medium  Radiographs: Films are current  Oral Hygiene instruction reviewed and given  Hygiene recall visits recommended to the patient  Oral cancer screening done and no pathology noted on ROM, FOM , Palate,soft tissue or tongue. Pt had extractions from Oral surgery and presents today to eval healing of the gums. Upper maxilla has some sharp areas bone fragments protruding out and giving his some discomfort. Lower looks good and still healing. Recommended to return to Oral Surgery to smooth any sharp areas of bone prior to get the dentures started. Pt agreed. Referral for Alveoloplasty given. All questions answered. Pt left satisfied and in good health. Prognosis is Good. Referrals Needed: No      Next visit: 4 weeks after alveoloplasty done     J. Lowanda Curling

## 2023-07-24 ENCOUNTER — PATIENT OUTREACH (OUTPATIENT)
Dept: SURGERY | Facility: OTHER | Age: 63
End: 2023-07-24

## 2023-07-24 NOTE — PROGRESS NOTES
TOM rec'd email from ct wife for July MAWD premium. No balance due. CM sent to supervisor.     See media

## 2023-08-11 DIAGNOSIS — I10 PRIMARY HYPERTENSION: ICD-10-CM

## 2023-08-16 RX ORDER — IRBESARTAN AND HYDROCHLOROTHIAZIDE 300; 12.5 MG/1; MG/1
1 TABLET, FILM COATED ORAL EVERY MORNING
Qty: 30 TABLET | Refills: 2 | Status: SHIPPED | OUTPATIENT
Start: 2023-08-16

## 2023-08-25 ENCOUNTER — PATIENT OUTREACH (OUTPATIENT)
Dept: SURGERY | Facility: OTHER | Age: 63
End: 2023-08-25

## 2023-08-25 NOTE — PROGRESS NOTES
Akash received e-mail from [address] wife Katja Cao including Cts August premium with questions regarding why the bill says insufficient funds. Cts premium also increased form $63/month to $90/month. Akash discussed with supervisor and then called [facility] to obtain more information. [facility] stated they have not YAMILEX on file. Akash emailed [facility] YAMILE[employer] and rep stated it will take a few days to process. Cm to call [facility] again next week to follow up. Akash also called Cts wife to update her and she stated that the premium may have went up due to an increase in her Social Security/senior living.

## 2023-08-29 ENCOUNTER — PATIENT OUTREACH (OUTPATIENT)
Dept: SURGERY | Facility: OTHER | Age: 63
End: 2023-08-29

## 2023-08-29 NOTE — PROGRESS NOTES
Cm called [facility] to receive additional information regarding Cts August premium as it states insufficient funds. Rep stated that his premium did increase and the insufficient funds is from Leonela not being paid. Cm informed Julys bill read zero dollars due for total amount due. Rep stated that his original premium of $63.00 for July is still due and that Cts new premium is now $90.00. Cm then called Ct to inform him of above and ct thanked cm. Ct also informed Cm that his [employer] used to cover eye exam and glasses, however now only covers the exam. Cm offered to send Ct a voucher for [address] for eye exam and lenses. Cm sent via mail     Cm completed Cts August MAWD check req.  And sent to supervisor for approval.    See media

## 2023-08-30 ENCOUNTER — PATIENT OUTREACH (OUTPATIENT)
Dept: SURGERY | Facility: OTHER | Age: 63
End: 2023-08-30

## 2023-08-30 NOTE — PROGRESS NOTES
Cm spoke with supervisor to get suggestions of where ct can go to get glasses for prescription.  Supervisor suggested ct go to [facility] to receive glasses

## 2023-09-11 ENCOUNTER — PATIENT OUTREACH (OUTPATIENT)
Dept: SURGERY | Facility: CLINIC | Age: 63
End: 2023-09-11

## 2023-09-21 ENCOUNTER — PATIENT OUTREACH (OUTPATIENT)
Dept: SURGERY | Facility: CLINIC | Age: 63
End: 2023-09-21

## 2023-09-21 NOTE — PROGRESS NOTES
Cm received ct MAWD premium for September. Total amount due was $243 as past amount due shows $153. Supervisor approved $153 amount due from August. Cm called [employer] s/w rep who stated August check was not received that she can see on her end but rep stated she will submit ticket for Cm to receive call back that confirms August MAWD payment was received which would then make September MAWD payment $90 as this is ct premium cost each month. Ct wife called Akash as she was told ct MAWD may get terminated as total amount due for MAWD payment is $243. Cm stated to ct wife that she called [employer] this morning to confirm check was received for last month because [facility] did process payment. Cm then called supervisor for guidance. Supervisor suggested that Cm reach out to [employer] to confirm check was cashed. Cm emailed [employer] to confirm this- waiting on response.

## 2023-09-25 ENCOUNTER — PATIENT OUTREACH (OUTPATIENT)
Dept: SURGERY | Facility: CLINIC | Age: 63
End: 2023-09-25

## 2023-09-26 ENCOUNTER — PATIENT OUTREACH (OUTPATIENT)
Dept: SURGERY | Facility: CLINIC | Age: 63
End: 2023-09-26

## 2023-09-26 NOTE — PROGRESS NOTES
Shashank Sebastian confirmed ct MAWD August check was sent to cash management for processing. Cm notified ct wife to provide update and stated Cm sent September MAWD to supervisor for approval/processing.

## 2023-09-27 ENCOUNTER — PATIENT OUTREACH (OUTPATIENT)
Dept: SURGERY | Facility: CLINIC | Age: 63
End: 2023-09-27

## 2023-09-27 NOTE — PROGRESS NOTES
Janiya Neal stated check is outstanding. Akash called [facility] 2x. Spoke with rep on the first call who stated payment for August MAWD was not received even with providing check number. Cm confirmed with Denson Shaper that payment for August MAWD was sent. Akash called [employer] back and spoke with different rep who confirmed ct has no outstanding balance and ct is paid up to August for MAWD payments as September MAWD payment was not yet rec'd.

## 2023-10-02 ENCOUNTER — PATIENT OUTREACH (OUTPATIENT)
Dept: SURGERY | Facility: CLINIC | Age: 63
End: 2023-10-02

## 2023-10-02 NOTE — PROGRESS NOTES
Cm mailed ct functional fitness flyer. Cm received in basket from clinic stating ct labs need to get done by Wednesday for upcoming appt with Dr Alethea Alcantar. Cm called ct and LVM notifying.

## 2023-10-07 ENCOUNTER — APPOINTMENT (OUTPATIENT)
Dept: LAB | Facility: HOSPITAL | Age: 63
End: 2023-10-07
Payer: COMMERCIAL

## 2023-10-07 DIAGNOSIS — K74.69 OTHER CIRRHOSIS OF LIVER (HCC): ICD-10-CM

## 2023-10-07 LAB
AFP-TM SERPL-MCNC: 3.97 NG/ML (ref 0–9)
ALBUMIN SERPL BCP-MCNC: 4.2 G/DL (ref 3.5–5)
ALP SERPL-CCNC: 93 U/L (ref 34–104)
ALT SERPL W P-5'-P-CCNC: 19 U/L (ref 7–52)
ANION GAP SERPL CALCULATED.3IONS-SCNC: 6 MMOL/L
AST SERPL W P-5'-P-CCNC: 29 U/L (ref 13–39)
BASOPHILS # BLD AUTO: 0.06 THOUSANDS/ÂΜL (ref 0–0.1)
BASOPHILS NFR BLD AUTO: 1 % (ref 0–1)
BILIRUB SERPL-MCNC: 0.81 MG/DL (ref 0.2–1)
BUN SERPL-MCNC: 19 MG/DL (ref 5–25)
CALCIUM SERPL-MCNC: 9.2 MG/DL (ref 8.4–10.2)
CHLORIDE SERPL-SCNC: 107 MMOL/L (ref 96–108)
CO2 SERPL-SCNC: 24 MMOL/L (ref 21–32)
CREAT SERPL-MCNC: 1.01 MG/DL (ref 0.6–1.3)
EOSINOPHIL # BLD AUTO: 0.2 THOUSAND/ÂΜL (ref 0–0.61)
EOSINOPHIL NFR BLD AUTO: 4 % (ref 0–6)
ERYTHROCYTE [DISTWIDTH] IN BLOOD BY AUTOMATED COUNT: 14.5 % (ref 11.6–15.1)
GFR SERPL CREATININE-BSD FRML MDRD: 78 ML/MIN/1.73SQ M
GLUCOSE P FAST SERPL-MCNC: 107 MG/DL (ref 65–99)
HCT VFR BLD AUTO: 51.5 % (ref 36.5–49.3)
HGB BLD-MCNC: 17.2 G/DL (ref 12–17)
IMM GRANULOCYTES # BLD AUTO: 0.02 THOUSAND/UL (ref 0–0.2)
IMM GRANULOCYTES NFR BLD AUTO: 0 % (ref 0–2)
LYMPHOCYTES # BLD AUTO: 1 THOUSANDS/ÂΜL (ref 0.6–4.47)
LYMPHOCYTES NFR BLD AUTO: 19 % (ref 14–44)
MCH RBC QN AUTO: 29 PG (ref 26.8–34.3)
MCHC RBC AUTO-ENTMCNC: 33.4 G/DL (ref 31.4–37.4)
MCV RBC AUTO: 87 FL (ref 82–98)
MONOCYTES # BLD AUTO: 0.7 THOUSAND/ÂΜL (ref 0.17–1.22)
MONOCYTES NFR BLD AUTO: 13 % (ref 4–12)
NEUTROPHILS # BLD AUTO: 3.42 THOUSANDS/ÂΜL (ref 1.85–7.62)
NEUTS SEG NFR BLD AUTO: 63 % (ref 43–75)
NRBC BLD AUTO-RTO: 0 /100 WBCS
PLATELET # BLD AUTO: 224 THOUSANDS/UL (ref 149–390)
PMV BLD AUTO: 9.6 FL (ref 8.9–12.7)
POTASSIUM SERPL-SCNC: 3.9 MMOL/L (ref 3.5–5.3)
PROT SERPL-MCNC: 7.5 G/DL (ref 6.4–8.4)
RBC # BLD AUTO: 5.93 MILLION/UL (ref 3.88–5.62)
SODIUM SERPL-SCNC: 137 MMOL/L (ref 135–147)
WBC # BLD AUTO: 5.4 THOUSAND/UL (ref 4.31–10.16)

## 2023-10-07 PROCEDURE — 82105 ALPHA-FETOPROTEIN SERUM: CPT

## 2023-10-07 PROCEDURE — 36415 COLL VENOUS BLD VENIPUNCTURE: CPT

## 2023-10-09 LAB — HIV1 RNA # PLAS NAA DL=20: ABNORMAL {COPIES}/ML

## 2023-10-10 ENCOUNTER — OFFICE VISIT (OUTPATIENT)
Dept: SURGERY | Facility: CLINIC | Age: 63
End: 2023-10-10
Payer: COMMERCIAL

## 2023-10-10 VITALS
TEMPERATURE: 97.8 F | SYSTOLIC BLOOD PRESSURE: 127 MMHG | HEIGHT: 67 IN | WEIGHT: 202.6 LBS | DIASTOLIC BLOOD PRESSURE: 77 MMHG | OXYGEN SATURATION: 95 % | BODY MASS INDEX: 31.8 KG/M2 | HEART RATE: 81 BPM

## 2023-10-10 DIAGNOSIS — D75.1 POLYCYTHEMIA: ICD-10-CM

## 2023-10-10 DIAGNOSIS — I10 ESSENTIAL HYPERTENSION: ICD-10-CM

## 2023-10-10 DIAGNOSIS — K74.69 OTHER CIRRHOSIS OF LIVER (HCC): ICD-10-CM

## 2023-10-10 DIAGNOSIS — B20 HIV DISEASE (HCC): Primary | ICD-10-CM

## 2023-10-10 DIAGNOSIS — I25.10 CORONARY ARTERY DISEASE INVOLVING NATIVE CORONARY ARTERY OF NATIVE HEART WITHOUT ANGINA PECTORIS: ICD-10-CM

## 2023-10-10 DIAGNOSIS — Z23 INFLUENZA VACCINE NEEDED: ICD-10-CM

## 2023-10-10 DIAGNOSIS — T65.292D TOXIC EFFECT OF TOBACCO, INTENTIONAL SELF-HARM, SUBSEQUENT ENCOUNTER: ICD-10-CM

## 2023-10-10 DIAGNOSIS — R91.1 LUNG NODULE: ICD-10-CM

## 2023-10-10 LAB
BASOPHILS # BLD AUTO: 0 X10E3/UL (ref 0–0.2)
BASOPHILS NFR BLD AUTO: 1 %
CD3+CD4+ CELLS # BLD: 180 /UL (ref 359–1519)
CD3+CD4+ CELLS NFR BLD: 18 % (ref 30.8–58.5)
CD3+CD4+ CELLS/CD3+CD8+ CLL BLD: 0.31 % (ref 0.92–3.72)
CD3+CD8+ CELLS # BLD: 583 /UL (ref 109–897)
CD3+CD8+ CELLS NFR BLD: 58.3 % (ref 12–35.5)
EOSINOPHIL # BLD AUTO: 0.2 X10E3/UL (ref 0–0.4)
EOSINOPHIL NFR BLD AUTO: 3 %
ERYTHROCYTE [DISTWIDTH] IN BLOOD BY AUTOMATED COUNT: 13.6 % (ref 11.6–15.4)
HCT VFR BLD AUTO: 51.8 % (ref 37.5–51)
HGB BLD-MCNC: 16.7 G/DL (ref 13–17.7)
IMM GRANULOCYTES # BLD: 0 X10E3/UL (ref 0–0.1)
IMM GRANULOCYTES NFR BLD: 1 %
LYMPHOCYTES # BLD AUTO: 1 X10E3/UL (ref 0.7–3.1)
LYMPHOCYTES NFR BLD AUTO: 19 %
MCH RBC QN AUTO: 28.9 PG (ref 26.6–33)
MCHC RBC AUTO-ENTMCNC: 32.2 G/DL (ref 31.5–35.7)
MCV RBC AUTO: 90 FL (ref 79–97)
MONOCYTES # BLD AUTO: 0.5 X10E3/UL (ref 0.1–0.9)
MONOCYTES NFR BLD AUTO: 9 %
MORPHOLOGY BLD-IMP: ABNORMAL
NEUTROPHILS # BLD AUTO: 3.4 X10E3/UL (ref 1.4–7)
NEUTROPHILS NFR BLD AUTO: 67 %
PLATELET # BLD AUTO: 250 X10E3/UL (ref 150–450)
RBC # BLD AUTO: 5.78 X10E6/UL (ref 4.14–5.8)
WBC # BLD AUTO: 5.1 X10E3/UL (ref 3.4–10.8)

## 2023-10-10 PROCEDURE — 90686 IIV4 VACC NO PRSV 0.5 ML IM: CPT

## 2023-10-10 PROCEDURE — 99215 OFFICE O/P EST HI 40 MIN: CPT | Performed by: INTERNAL MEDICINE

## 2023-10-10 PROCEDURE — 90471 IMMUNIZATION ADMIN: CPT

## 2023-10-10 NOTE — PROGRESS NOTES
Progress Note - Infectious Disease   Zakia Hernandez 61 y.o. male MRN: [id number]  Unit/Bed#:  Encounter: [id number]      Impression/Plan:  1.  HIV-doing well on ART with an undetectable viral load and a CD4 count of 180.  Patient having trouble remembering taking so many medications. We will try Biktarvy and then recheck labs in 4 weeks and follow-up in 6 weeks. Stressed adherence. If patient fails treatment, will need to change back to combination of boosted PI and integrase and Descovy     2. 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. Right upper quadrant ultrasound negative, and the alpha fetoprotein negative on the last check.  Continue Q 6 month hepatocellular carcinoma screening.     3.  Polycythemia-likely secondary to the nicotine dependence.  Seen by Hematology Oncology who agree with my assessment.  Will continue to monitor the CBC with diff when quit smoking     4.  Hypertension-asymptomatic and stable and well controlled.  Continue monitor     5. Coronary artery disease-complicated by acute MI status post PCI. Now asymptomatic.  Follow up with Cardiology.     6. Nicotine dependence-patient continues to smoke but has decreased the amount substantially.  He is using nicotine patches and working on quitting.     7. Lung nodule- Most recent CT scan in January with no remaining nodule. No additional follow-up needed     8. Obesity. Stressed the importance of weight loss through diet and exercise. He has lost 7 pounds since his last visit with me. Patient was provided medication, adherence and prevention education    Subjective:  Routine follow-up for HIV. Patient claims 100% adherence with Descovy, Tivicay, Prezcobix   . Patient denies any notable side effects. Overall the feeling well. The patient denies any fever chills or sweats, denies any nausea vomiting or diarrhea, denies any cough or shortness of breath.     ROS:  A complete review of systems is negative other than that noted above in the subjective    Followup portions patient history reviewed and updated as: Allergies, current medications, past medical history, past social history, past surgical history, and the problem list    Objective:  Vitals:  Vitals:    10/10/23 1651   BP: 127/77   Pulse: 81   Temp: 97.8 °F (36.6 °C)   SpO2: 95%   Weight: 91.9 kg (202 lb 9.6 oz)   Height: 5' 7" (1.702 m)       Physical Exam:   General Appearance:  Alert, interactive, appearing well,  nontoxic, no acute distress. Neck:   Supple without lymphadenopathy, no thyromegaly or masses   Throat: Oropharynx moist without lesions. Lungs:   Clear to auscultation bilaterally; no wheezes, rhonchi or rales; respirations unlabored   Heart:  RRR; no murmur, rub or gallop   Abdomen:   Soft, non-tender, non-distended, positive bowel sounds. Extremities: No clubbing, cyanosis or edema   Skin: No new rashes or lesions. No draining wounds noted.        Labs, Imaging, & Other studies:   All pertinent labs and imaging studies were personally reviewed    Lab Results   Component Value Date     03/18/2017    K 3.9 10/07/2023     10/07/2023    CO2 24 10/07/2023    BUN 19 10/07/2023    CREATININE 1.01 10/07/2023    GLUF 107 (H) 10/07/2023    CALCIUM 9.2 10/07/2023    AST 29 10/07/2023    ALT 19 10/07/2023    ALKPHOS 93 10/07/2023    PROT 7.4 03/18/2017    BILITOT 1.1 03/18/2017    EGFR 78 10/07/2023     Lab Results   Component Value Date    WBC 5.40 10/07/2023    WBC 5.1 10/07/2023    HGB 17.2 (H) 10/07/2023    HGB 16.7 10/07/2023    HCT 51.5 (H) 10/07/2023    HCT 51.8 (H) 10/07/2023    MCV 87 10/07/2023    MCV 90 10/07/2023     10/07/2023     10/07/2023     No results found for: "HEPCAB"  Lab Results   Component Value Date    HEPBCAB REACTIVE (A) 10/08/2015    HEPBCAB REACTIVE (A) 10/08/2015    HEPBCAB REACTIVE (A) 10/08/2015    HEPBCAB REACTIVE (A) 10/08/2015     Lab Results   Component Value Date    RPR Non-Reactive 05/18/2022     CD4 ABS   Date/Time Value Ref Range Status   10/07/2023 08:52  (L) 359 - 1519 /uL Final     HIV-1 RNA by PCR, Qn   Date/Time Value Ref Range Status   05/05/2023 07:21 AM <20 copies/mL Final     Comment:     HIV-1 RNA detected  The reportable range for this assay is 20 to 10,000,000  copies HIV-1 RNA/mL.      HIV-1 TARGET   Date/Time Value Ref Range Status   10/07/2023 08:52 AM Detected <20 cp/mL (A) Not Detected Final           Current Outpatient Medications:   •  albuterol (PROVENTIL HFA,VENTOLIN HFA) 90 mcg/act inhaler, INHALE 2 PUFFS BY MOUTH EVERY SIX HOURS AS NEEDED FOR WHEEZING, Disp: 18 g, Rfl: 0  •  aspirin (ECOTRIN LOW STRENGTH) 81 mg EC tablet, Take 1 tablet (81 mg total) by mouth daily, Disp: 30 tablet, Rfl: 5  •  cetirizine (ZyrTEC) 10 mg tablet, TAKE 1 TABLET BY MOUTH DAILY IN THE MORNING, Disp: 30 tablet, Rfl: 5  •  Darunavir-Cobicistat (Prezcobix) 800-150 MG TABS, Take 1 tablet by mouth daily, Disp: 30 tablet, Rfl: 5  •  dolutegravir (Tivicay) 50 MG TABS, Take 1 tablet (50 mg total) by mouth 2 (two) times a day before meals before breakfast and dinner, Disp: 60 tablet, Rfl: 5  •  emtricitabine-tenofovir AF (Descovy) 200-25 MG tablet, Take 1 tablet by mouth daily, Disp: 30 tablet, Rfl: 5  •  irbesartan-hydrochlorothiazide (AVALIDE) 300-12.5 MG per tablet, TAKE 1 TABLET BY MOUTH DAILY IN THE MORNING, Disp: 30 tablet, Rfl: 2  •  metoprolol succinate (TOPROL-XL) 25 mg 24 hr tablet, Take 1 tablet (25 mg total) by mouth every morning, Disp: 30 tablet, Rfl: 5  •  naproxen (EC NAPROSYN) 500 MG EC tablet, Take 1 tablet (500 mg total) by mouth 2 (two) times a day with meals, Disp: 60 tablet, Rfl: 1  •  nicotine (NICODERM CQ) 21 mg/24 hr TD 24 hr patch, APPLY 1 PATCH TO THE SKIN DAILY, Disp: 28 patch, Rfl: 2  •  nicotine polacrilex (COMMIT) 4 MG lozenge, DISSOLVE 1 LOZENGE IN MOUTH/THROAT AS NEEDED FOR SMOKING CESSATION, Disp: 72 lozenge, Rfl: 3  •  nitroglycerin (NITROSTAT) 0.4 mg SL tablet, Place 1 tablet (0.4 mg total) under the tongue every 5 (five) minutes as needed for chest pain, Disp: 15 tablet, Rfl: 0  •  pantoprazole (PROTONIX) 40 mg tablet, Take 1 tablet (40 mg total) by mouth daily, Disp: 60 tablet, Rfl: 2  •  rosuvastatin (CRESTOR) 20 MG tablet, Take 1 tablet (20 mg total) by mouth every morning, Disp: 30 tablet, Rfl: 5  •  TiZANidine (ZANAFLEX) 4 MG capsule, Take 1 capsule (4 mg total) by mouth 3 (three) times a day, Disp: 30 capsule, Rfl: 0

## 2023-10-11 DIAGNOSIS — T65.292D TOXIC EFFECT OF TOBACCO, INTENTIONAL SELF-HARM, SUBSEQUENT ENCOUNTER: ICD-10-CM

## 2023-10-11 DIAGNOSIS — B20 HIV DISEASE (HCC): Primary | ICD-10-CM

## 2023-10-16 RX ORDER — NICOTINE 21 MG/24HR
PATCH, TRANSDERMAL 24 HOURS TRANSDERMAL
Qty: 28 PATCH | Refills: 2 | Status: SHIPPED | OUTPATIENT
Start: 2023-10-16

## 2023-10-20 ENCOUNTER — PATIENT OUTREACH (OUTPATIENT)
Dept: SURGERY | Facility: CLINIC | Age: 63
End: 2023-10-20

## 2023-10-20 NOTE — PROGRESS NOTES
Cm received MAWD Oct from ct wife. Cm created check req and sent to supervisor for approval/processing. See media.

## 2023-10-30 ENCOUNTER — PATIENT OUTREACH (OUTPATIENT)
Dept: SURGERY | Facility: CLINIC | Age: 63
End: 2023-10-30

## 2023-10-31 DIAGNOSIS — I25.10 CORONARY ARTERY DISEASE INVOLVING NATIVE CORONARY ARTERY OF NATIVE HEART WITHOUT ANGINA PECTORIS: ICD-10-CM

## 2023-10-31 DIAGNOSIS — E78.2 MIXED HYPERLIPIDEMIA: ICD-10-CM

## 2023-10-31 DIAGNOSIS — I10 PRIMARY HYPERTENSION: ICD-10-CM

## 2023-10-31 DIAGNOSIS — I25.2 HISTORY OF NON-ST ELEVATION MYOCARDIAL INFARCTION (NSTEMI): ICD-10-CM

## 2023-10-31 DIAGNOSIS — I10 ESSENTIAL HYPERTENSION: ICD-10-CM

## 2023-10-31 RX ORDER — ROSUVASTATIN CALCIUM 20 MG/1
20 TABLET, COATED ORAL EVERY MORNING
Qty: 30 TABLET | Refills: 5 | Status: SHIPPED | OUTPATIENT
Start: 2023-10-31

## 2023-10-31 RX ORDER — IRBESARTAN AND HYDROCHLOROTHIAZIDE 300; 12.5 MG/1; MG/1
1 TABLET, FILM COATED ORAL EVERY MORNING
Qty: 30 TABLET | Refills: 2 | Status: SHIPPED | OUTPATIENT
Start: 2023-10-31

## 2023-10-31 RX ORDER — ASPIRIN 81 MG/1
81 TABLET, COATED ORAL EVERY MORNING
Qty: 30 TABLET | Refills: 5 | Status: SHIPPED | OUTPATIENT
Start: 2023-10-31

## 2023-10-31 RX ORDER — METOPROLOL SUCCINATE 25 MG/1
25 TABLET, EXTENDED RELEASE ORAL EVERY MORNING
Qty: 30 TABLET | Refills: 5 | Status: SHIPPED | OUTPATIENT
Start: 2023-10-31

## 2023-11-03 DIAGNOSIS — K21.9 GASTROESOPHAGEAL REFLUX DISEASE WITHOUT ESOPHAGITIS: ICD-10-CM

## 2023-11-06 RX ORDER — PANTOPRAZOLE SODIUM 40 MG/1
40 TABLET, DELAYED RELEASE ORAL EVERY MORNING
Qty: 30 TABLET | Refills: 5 | Status: SHIPPED | OUTPATIENT
Start: 2023-11-06

## 2023-11-08 ENCOUNTER — OFFICE VISIT (OUTPATIENT)
Dept: SURGERY | Facility: CLINIC | Age: 63
End: 2023-11-08

## 2023-11-08 ENCOUNTER — DOCUMENTATION (OUTPATIENT)
Dept: SURGERY | Facility: CLINIC | Age: 63
End: 2023-11-08

## 2023-11-08 VITALS
OXYGEN SATURATION: 96 % | SYSTOLIC BLOOD PRESSURE: 114 MMHG | DIASTOLIC BLOOD PRESSURE: 68 MMHG | HEART RATE: 77 BPM | TEMPERATURE: 98.1 F | BODY MASS INDEX: 31.45 KG/M2 | WEIGHT: 200.4 LBS | HEIGHT: 67 IN

## 2023-11-08 DIAGNOSIS — B20 HIV DISEASE (HCC): Primary | ICD-10-CM

## 2023-11-08 DIAGNOSIS — I10 ESSENTIAL HYPERTENSION: ICD-10-CM

## 2023-11-08 DIAGNOSIS — R05.9 COUGH: ICD-10-CM

## 2023-11-08 DIAGNOSIS — T65.292D TOXIC EFFECT OF TOBACCO, INTENTIONAL SELF-HARM, SUBSEQUENT ENCOUNTER: ICD-10-CM

## 2023-11-08 RX ORDER — FLUTICASONE PROPIONATE 50 MCG
1 SPRAY, SUSPENSION (ML) NASAL DAILY
Qty: 15.8 ML | Refills: 2 | Status: SHIPPED | OUTPATIENT
Start: 2023-11-08

## 2023-11-08 RX ORDER — GUAIFENESIN AND CODEINE PHOSPHATE 100; 10 MG/5ML; MG/5ML
10 SOLUTION ORAL 3 TIMES DAILY PRN
Qty: 237 ML | Refills: 0 | Status: SHIPPED | OUTPATIENT
Start: 2023-11-08 | End: 2023-12-08

## 2023-11-08 RX ORDER — ALBUTEROL SULFATE 90 UG/1
2 AEROSOL, METERED RESPIRATORY (INHALATION) EVERY 4 HOURS PRN
Qty: 18 G | Refills: 2 | Status: SHIPPED | OUTPATIENT
Start: 2023-11-08

## 2023-11-08 NOTE — PROGRESS NOTES
Assessment/Plan:    HIV disease ([address] )  No results found for: "NU8NVGL"  CD4 ABS   Date/Time Value Ref Range Status   10/07/2023 08:52  (L) 359 - 1519 /uL Final     HIV-1 RNA by PCR, Qn   Date/Time Value Ref Range Status   2023 07:21 AM <20 copies/mL Final     Comment:     HIV-1 RNA detected  The reportable range for this assay is 20 to 10,000,000  copies HIV-1 RNA/mL. HIV-1 RNA Viral Load Log   Date/Time Value Ref Range Status   2023 07:21 AM COMMENT [id number]/mL Final     Comment:     Unable to calculate result since non-numeric result obtained for  component test.         ART: Genevive Stack        Denies side effects. Stressed the importance of adherence. Continue follow up with ID clinic. Reviewed most recent labs, including Cd4 and viral load. Discussed the risks and benefits of treatment options, instructions for management, importance of treatment adherence, and reduction of risk factor. Educated on possible  medication side effects. Counseled on routes of HIV transmission, including the risk of  infection. Emphasized that viral suppression is the best method to prevent HIV transmission. At this time pt.denies the need for HIV testing of anyone in their life. Total encounter time was 45 minutes. Greater then 20 minutes were spent on counseling and patient education. Pt voices understanding and agreement with treatment plan. Toxic effect of tobacco and nicotine  Counseled for greater than 15 minutes on the importance of smoking cessation. Advised to quit. Educated on the increased risk of heart and lung disease associated with smoking. Referred to [facility] for enrollment in smoking cessation program.       Essential hypertension  Blood pressure:   BP Readings from Last 3 Encounters:   23 114/68   10/10/23 127/77   23 146/89       Continue current antihypertensive.     Educated on the following lifestyle modifications to lower BP and decrease cardiovascular risk factors. limit alcohol intake, reduce salt in diet, maintain a healthy weight, engage in 30 minutes of cardiovascular exercise daily, and not smoke. Diagnoses and all orders for this visit:    HIV disease ([address])    Cough  -     albuterol (PROVENTIL HFA,VENTOLIN HFA) 90 mcg/act inhaler; Inhale 2 puffs every 4 (four) hours as needed for wheezing  -     guaifenesin-codeine (GUAIFENESIN AC) 100-10 MG/5ML liquid; Take 10 mL by mouth 3 (three) times a day as needed for cough  -     fluticasone (FLONASE) 50 mcg/act nasal spray; 1 spray into each nostril daily    Toxic effect of tobacco, intentional self-harm, subsequent encounter    Essential hypertension          Subjective:      Patient ID: Riky Cage is a 61 y.o. male. Kody Baron is here today for three-month PCP follow-up of chronic conditions. PMHx significant for HIV, HCV SVR s/p treatment, cirrhosis,HTN,hyperlipidemia, and NSTEMI in January 2019 with cardiac intervention and stent placed. Kody Baron is c/o a lingering cough that has been present for two weeks. Cough is worse at night. It is effecting his sleep. He is also congested. Denies fever, chills, dyspnea, or sore throat. His has been using OTC cough medicine with limited improvement. The following portions of the patient's history were reviewed and updated as appropriate: allergies, current medications, past family history, past medical history, past social history, past surgical history, and problem list.    Review of Systems   Constitutional:  Negative for activity change, appetite change, chills, diaphoresis, fatigue, fever and unexpected weight change. HENT:  Positive for congestion and postnasal drip. Negative for dental problem, ear pain, hearing loss, mouth sores, rhinorrhea and sore throat. Eyes:  Negative for pain, redness and visual disturbance. Respiratory:  Positive for cough. Negative for shortness of breath and wheezing.     Cardiovascular:  Negative for chest pain and leg swelling. Gastrointestinal:  Negative for abdominal pain, constipation, diarrhea, nausea and vomiting. Endocrine: Negative for polydipsia, polyphagia and polyuria. Genitourinary:  Negative for difficulty urinating and dysuria. Musculoskeletal:  Negative for back pain, joint swelling and myalgias. Skin:  Negative for rash. Neurological:  Negative for syncope and headaches. Psychiatric/Behavioral:  Negative for behavioral problems and suicidal ideas. Objective:      /68   Pulse 77   Temp 98.1 °F (36.7 °C)   Ht 5' 7" (1.702 m)   Wt 90.9 kg (200 lb 6.4 oz)   SpO2 96%   BMI 31.39 kg/m²       Lab Results   Component Value Date     03/18/2017    K 3.9 10/07/2023     10/07/2023    CO2 24 10/07/2023    BUN 19 10/07/2023    CREATININE 1.01 10/07/2023    GLUF 107 (H) 10/07/2023    CALCIUM 9.2 10/07/2023    AST 29 10/07/2023    ALT 19 10/07/2023    ALKPHOS 93 10/07/2023    PROT 7.4 03/18/2017    BILITOT 1.1 03/18/2017    EGFR 78 10/07/2023     Lab Results   Component Value Date    WBC 5.40 10/07/2023    WBC 5.1 10/07/2023    HGB 17.2 (H) 10/07/2023    HGB 16.7 10/07/2023    HCT 51.5 (H) 10/07/2023    HCT 51.8 (H) 10/07/2023    MCV 87 10/07/2023    MCV 90 10/07/2023     10/07/2023     10/07/2023     No results found for: "HEPCAB"  Lab Results   Component Value Date    HEPBCAB REACTIVE (A) 10/08/2015    HEPBCAB REACTIVE (A) 10/08/2015    HEPBCAB REACTIVE (A) 10/08/2015    HEPBCAB REACTIVE (A) 10/08/2015     Lab Results   Component Value Date    RPR Non-Reactive 05/18/2022            Physical Exam  Vitals and nursing note reviewed. Constitutional:       Appearance: Normal appearance. HENT:      Head: Normocephalic. Nose: Congestion present. No rhinorrhea. Mouth/Throat:      Mouth: Mucous membranes are moist.      Pharynx: Oropharynx is clear. Eyes:      Pupils: Pupils are equal, round, and reactive to light.    Cardiovascular:      Rate and Rhythm: Normal rate and regular rhythm. Pulses: Normal pulses. Heart sounds: Normal heart sounds. Pulmonary:      Effort: Pulmonary effort is normal.      Breath sounds: Normal breath sounds. Abdominal:      General: Abdomen is flat. Bowel sounds are normal.      Palpations: Abdomen is soft. Musculoskeletal:         General: No tenderness or signs of injury. Normal range of motion. Skin:     General: Skin is warm and dry. Neurological:      Mental Status: He is alert and oriented to person, place, and time.

## 2023-11-08 NOTE — ASSESSMENT & PLAN NOTE
Blood pressure:   BP Readings from Last 3 Encounters:   11/08/23 114/68   10/10/23 127/77   07/19/23 146/89       Continue current antihypertensive. Educated on the following lifestyle modifications to lower BP and decrease cardiovascular risk factors. limit alcohol intake, reduce salt in diet, maintain a healthy weight, engage in 30 minutes of cardiovascular exercise daily, and not smoke.

## 2023-11-08 NOTE — ASSESSMENT & PLAN NOTE
Counseled for greater than 15 minutes on the importance of smoking cessation. Advised to quit. Educated on the increased risk of heart and lung disease associated with smoking.   Referred to [address] for enrollment in smoking cessation program.

## 2023-11-08 NOTE — LETTER
November 8, 2023     Patient: Jennifer Shelton  YOB: 1960  Date of Visit: 11/8/2023      To Whom it May Concern:    Merrill Hall is under my professional care. Noéaurelianosergo Crystal was seen in my office on 11/8/2023. Joe Crystal may return to work on 11/9/23 . If you have any questions or concerns, please don't hesitate to call.          Sincerely,          ALLIE Tee        CC: No Recipients (0) independent

## 2023-11-08 NOTE — ASSESSMENT & PLAN NOTE
No results found for: "LU3BMHN"  CD4 ABS   Date/Time Value Ref Range Status   10/07/2023 08:52  (L) 359 - 1519 /uL Final     HIV-1 RNA by PCR, Qn   Date/Time Value Ref Range Status   2023 07:21 AM <20 copies/mL Final     Comment:     HIV-1 RNA detected  The reportable range for this assay is 20 to 10,000,000  copies HIV-1 RNA/mL. HIV-1 RNA Viral Load Log   Date/Time Value Ref Range Status   2023 07:21 AM COMMENT [id number]/mL Final     Comment:     Unable to calculate result since non-numeric result obtained for  component test.         ART: Josefina Mantilla        Denies side effects. Stressed the importance of adherence. Continue follow up with ID clinic. Reviewed most recent labs, including Cd4 and viral load. Discussed the risks and benefits of treatment options, instructions for management, importance of treatment adherence, and reduction of risk factor. Educated on possible  medication side effects. Counseled on routes of HIV transmission, including the risk of  infection. Emphasized that viral suppression is the best method to prevent HIV transmission. At this time pt.denies the need for HIV testing of anyone in their life. Total encounter time was 45 minutes. Greater then 20 minutes were spent on counseling and patient education. Pt voices understanding and agreement with treatment plan.

## 2023-11-10 NOTE — PROGRESS NOTES
[facility] Nutrition Encounter     Mario seen by RD in conjunction with PCP f/u . DAWIT following for elevated A1C. Weight history: Wt Readings from Last 3 Encounters:   11/08/23 90.9 kg (200 lb 6.4 oz)   10/10/23 91.9 kg (202 lb 9.6 oz)   05/23/23 95.7 kg (211 lb)         Nutrition-related labs:    Lab Results   Component Value Date    HGBA1C 6.0 (H) 01/30/2023     Lab Results   Component Value Date    CHOLESTEROL 129 01/30/2023    CHOLESTEROL 125 10/27/2022    CHOLESTEROL 118 06/12/2021     Lab Results   Component Value Date    HDL 35 (L) 01/30/2023    HDL 34 (L) 10/27/2022    HDL 28 (L) 06/12/2021     Lab Results   Component Value Date    TRIG 176 (H) 01/30/2023    TRIG 117 10/27/2022    TRIG 208 (H) 06/12/2021     No results found for: "[address]auf"  Lab Results   Component Value Date    SODIUM 137 10/07/2023    K 3.9 10/07/2023     10/07/2023    CO2 24 10/07/2023    BUN 19 10/07/2023    CREATININE 1.01 10/07/2023    GLUC 101 03/20/2019    CALCIUM 9.2 10/07/2023         Physical findings/skin integrity:  Skin intact     Nutrition Diagnosis    Problem: altered nutrition-related lab values (lipids & A1C)    Related to: history of increased intake     As Evidenced By: patient interview       Nutrition Intervention/Recommendations    Nutrition education intervention: provided and reinforced previous education     Nutrition recommendations: Continue avoiding high sugar snacks, replace sports drinks with flavored water(decrease sodium intake)     Teaching Method: verbal     Person Educated: patient      Comprehension: excellent    Receptivity: excellent    Expected compliance: excellent      Collaboration/referral of nutrition care:    N/A      Goals:  Decrease A1C  Replace sports drinks with water unless exercising   Continue avoiding high calorie snacks     Monitoring/Evaluation:  Will continue to monitor weight trend, labs, appetite and need for nutrition education. Visit Summary  Follow up visit.  Prior education provided. Pt is doing well with lifestyle changes. Gradual weight loss noted -11# x 6 months. RD congratulated pt on progress. RD also provided education on sodium content in sports drinks. Pt reports only drinking Gatorade zero sugar. RD suggested flavored water. All questions and concerns addressed. Will follow.    Jesus Harrell RDN,LDN

## 2023-11-13 ENCOUNTER — TELEPHONE (OUTPATIENT)
Dept: SURGERY | Facility: CLINIC | Age: 63
End: 2023-11-13

## 2023-11-13 NOTE — TELEPHONE ENCOUNTER
Pt called and requested a note to excuse him from work for last Thursday and Friday, the 9th and 10th. Pt said he is feeling a little better.

## 2023-11-15 ENCOUNTER — PATIENT OUTREACH (OUTPATIENT)
Dept: SURGERY | Facility: CLINIC | Age: 63
End: 2023-11-15

## 2023-11-15 NOTE — PROGRESS NOTES
Ct wife called Cm stating ct received letter from [facility] regarding DOUGL[employer] application and she was not sure what to fill out.  Cm asked ct wife to send letter to Cm and asked ct wife if she knows who ct  at the [facility] is to get more information

## 2023-11-16 ENCOUNTER — PATIENT OUTREACH (OUTPATIENT)
Dept: SURGERY | Facility: CLINIC | Age: 63
End: 2023-11-16

## 2023-11-16 NOTE — PROGRESS NOTES
Ct wife emailed Cm letter from [facility] regarding [facility] application. Cm called [facility] and [facility] with ct . Cm then called [facility] general number and spoke with rep who stated ct should fill out application and submit to [facility] for review. Cm notified ct wife of this and suggested if ct wife has any additional questions to either call [facility] or go into the office in person. See media.

## 2023-11-18 ENCOUNTER — APPOINTMENT (OUTPATIENT)
Dept: LAB | Facility: HOSPITAL | Age: 63
End: 2023-11-18
Payer: COMMERCIAL

## 2023-11-18 DIAGNOSIS — B20 HIV DISEASE (HCC): ICD-10-CM

## 2023-11-18 LAB
ALBUMIN SERPL BCP-MCNC: 4.4 G/DL (ref 3.5–5)
ALP SERPL-CCNC: 98 U/L (ref 34–104)
ALT SERPL W P-5'-P-CCNC: 14 U/L (ref 7–52)
ANION GAP SERPL CALCULATED.3IONS-SCNC: 6 MMOL/L
AST SERPL W P-5'-P-CCNC: 18 U/L (ref 13–39)
BASOPHILS # BLD AUTO: 0.06 THOUSANDS/ÂΜL (ref 0–0.1)
BASOPHILS NFR BLD AUTO: 1 % (ref 0–1)
BILIRUB SERPL-MCNC: 0.74 MG/DL (ref 0.2–1)
BUN SERPL-MCNC: 18 MG/DL (ref 5–25)
CALCIUM SERPL-MCNC: 9.4 MG/DL (ref 8.4–10.2)
CHLORIDE SERPL-SCNC: 106 MMOL/L (ref 96–108)
CO2 SERPL-SCNC: 25 MMOL/L (ref 21–32)
CREAT SERPL-MCNC: 1.01 MG/DL (ref 0.6–1.3)
EOSINOPHIL # BLD AUTO: 0.11 THOUSAND/ÂΜL (ref 0–0.61)
EOSINOPHIL NFR BLD AUTO: 2 % (ref 0–6)
ERYTHROCYTE [DISTWIDTH] IN BLOOD BY AUTOMATED COUNT: 14.6 % (ref 11.6–15.1)
GFR SERPL CREATININE-BSD FRML MDRD: 78 ML/MIN/1.73SQ M
GLUCOSE P FAST SERPL-MCNC: 149 MG/DL (ref 65–99)
HCT VFR BLD AUTO: 52.1 % (ref 36.5–49.3)
HGB BLD-MCNC: 17.4 G/DL (ref 12–17)
IMM GRANULOCYTES # BLD AUTO: 0.02 THOUSAND/UL (ref 0–0.2)
IMM GRANULOCYTES NFR BLD AUTO: 0 % (ref 0–2)
LYMPHOCYTES # BLD AUTO: 0.93 THOUSANDS/ÂΜL (ref 0.6–4.47)
LYMPHOCYTES NFR BLD AUTO: 14 % (ref 14–44)
MCH RBC QN AUTO: 28.9 PG (ref 26.8–34.3)
MCHC RBC AUTO-ENTMCNC: 33.4 G/DL (ref 31.4–37.4)
MCV RBC AUTO: 86 FL (ref 82–98)
MONOCYTES # BLD AUTO: 0.71 THOUSAND/ÂΜL (ref 0.17–1.22)
MONOCYTES NFR BLD AUTO: 11 % (ref 4–12)
NEUTROPHILS # BLD AUTO: 4.76 THOUSANDS/ÂΜL (ref 1.85–7.62)
NEUTS SEG NFR BLD AUTO: 72 % (ref 43–75)
NRBC BLD AUTO-RTO: 0 /100 WBCS
PLATELET # BLD AUTO: 225 THOUSANDS/UL (ref 149–390)
PMV BLD AUTO: 9.6 FL (ref 8.9–12.7)
POTASSIUM SERPL-SCNC: 4 MMOL/L (ref 3.5–5.3)
PROT SERPL-MCNC: 7.7 G/DL (ref 6.4–8.4)
RBC # BLD AUTO: 6.03 MILLION/UL (ref 3.88–5.62)
SODIUM SERPL-SCNC: 137 MMOL/L (ref 135–147)
WBC # BLD AUTO: 6.59 THOUSAND/UL (ref 4.31–10.16)

## 2023-11-18 PROCEDURE — 36415 COLL VENOUS BLD VENIPUNCTURE: CPT

## 2023-11-18 PROCEDURE — 85025 COMPLETE CBC W/AUTO DIFF WBC: CPT

## 2023-11-18 PROCEDURE — 87536 HIV-1 QUANT&REVRSE TRNSCRPJ: CPT

## 2023-11-18 PROCEDURE — 80053 COMPREHEN METABOLIC PANEL: CPT

## 2023-11-18 PROCEDURE — 86361 T CELL ABSOLUTE COUNT: CPT

## 2023-11-20 ENCOUNTER — PATIENT OUTREACH (OUTPATIENT)
Dept: SURGERY | Facility: CLINIC | Age: 63
End: 2023-11-20

## 2023-11-20 NOTE — PROGRESS NOTES
Akash rec'd ct November MAWD. Cm created check req and sent to supervisor for approval/processing.     See media

## 2023-11-21 LAB
BASOPHILS # BLD AUTO: ABNORMAL X10E3/UL
BASOPHILS NFR BLD AUTO: ABNORMAL %
CD3+CD4+ CELLS # BLD: ABNORMAL /UL
CD3+CD4+ CELLS NFR BLD: 16.3 % (ref 30.8–58.5)
EOSINOPHIL # BLD AUTO: ABNORMAL X10E3/UL
EOSINOPHIL NFR BLD AUTO: ABNORMAL %
ERYTHROCYTE [DISTWIDTH] IN BLOOD BY AUTOMATED COUNT: ABNORMAL %
HCT VFR BLD AUTO: ABNORMAL %
HGB BLD-MCNC: ABNORMAL G/DL
HIV1 RNA # SERPL NAA+PROBE: <20 COPIES/ML
HIV1 RNA SERPL NAA+PROBE-LOG#: NORMAL LOG10COPY/ML
IMM GRANULOCYTES # BLD: ABNORMAL X10E3/UL
IMM GRANULOCYTES NFR BLD: ABNORMAL %
LYMPHOCYTES # BLD AUTO: ABNORMAL X10E3/UL
LYMPHOCYTES NFR BLD AUTO: ABNORMAL %
MCH RBC QN AUTO: ABNORMAL PG
MCHC RBC AUTO-ENTMCNC: ABNORMAL G/DL
MCV RBC AUTO: ABNORMAL FL
MONOCYTES # BLD AUTO: ABNORMAL X10E3/UL
MONOCYTES NFR BLD AUTO: ABNORMAL %
MORPHOLOGY BLD-IMP: ABNORMAL
NEUTROPHILS # BLD AUTO: ABNORMAL X10E3/UL
NEUTROPHILS NFR BLD AUTO: ABNORMAL %
NRBC BLD AUTO-RTO: ABNORMAL %
PLATELET # BLD AUTO: ABNORMAL X10E3/UL
RBC # BLD AUTO: ABNORMAL X10E6/UL
SL AMB IMMATURE CELLS: ABNORMAL
WBC # BLD AUTO: ABNORMAL X10E3/UL

## 2023-11-28 ENCOUNTER — OFFICE VISIT (OUTPATIENT)
Dept: SURGERY | Facility: CLINIC | Age: 63
End: 2023-11-28
Payer: COMMERCIAL

## 2023-11-28 ENCOUNTER — PATIENT OUTREACH (OUTPATIENT)
Dept: SURGERY | Facility: CLINIC | Age: 63
End: 2023-11-28

## 2023-11-28 ENCOUNTER — DOCUMENTATION (OUTPATIENT)
Dept: SURGERY | Facility: CLINIC | Age: 63
End: 2023-11-28

## 2023-11-28 VITALS
DIASTOLIC BLOOD PRESSURE: 65 MMHG | WEIGHT: 204.8 LBS | HEART RATE: 74 BPM | HEIGHT: 67 IN | OXYGEN SATURATION: 92 % | TEMPERATURE: 97.6 F | SYSTOLIC BLOOD PRESSURE: 113 MMHG | BODY MASS INDEX: 32.15 KG/M2

## 2023-11-28 DIAGNOSIS — Z13.220 ENCOUNTER FOR LIPID SCREENING FOR CARDIOVASCULAR DISEASE: ICD-10-CM

## 2023-11-28 DIAGNOSIS — Z13.6 ENCOUNTER FOR LIPID SCREENING FOR CARDIOVASCULAR DISEASE: ICD-10-CM

## 2023-11-28 DIAGNOSIS — K74.69 OTHER CIRRHOSIS OF LIVER (HCC): ICD-10-CM

## 2023-11-28 DIAGNOSIS — Z11.1 SCREENING-PULMONARY TB: ICD-10-CM

## 2023-11-28 DIAGNOSIS — D75.1 POLYCYTHEMIA: ICD-10-CM

## 2023-11-28 DIAGNOSIS — Z79.899 OTHER LONG TERM (CURRENT) DRUG THERAPY: ICD-10-CM

## 2023-11-28 DIAGNOSIS — T65.292D TOXIC EFFECT OF TOBACCO, INTENTIONAL SELF-HARM, SUBSEQUENT ENCOUNTER: ICD-10-CM

## 2023-11-28 DIAGNOSIS — Z20.2 CONTACT WITH AND (SUSPECTED) EXPOSURE TO INFECTIONS WITH A PREDOMINANTLY SEXUAL MODE OF TRANSMISSION: ICD-10-CM

## 2023-11-28 DIAGNOSIS — Z11.3 ENCOUNTER FOR SCREENING FOR BACTERIAL SEXUALLY TRANSMITTED DISEASE: ICD-10-CM

## 2023-11-28 DIAGNOSIS — D72.89 OTHER SPECIFIED DISORDERS OF WHITE BLOOD CELLS: ICD-10-CM

## 2023-11-28 DIAGNOSIS — Z13.1 SCREENING FOR DIABETES MELLITUS: ICD-10-CM

## 2023-11-28 DIAGNOSIS — B20 HIV DISEASE (HCC): Primary | ICD-10-CM

## 2023-11-28 DIAGNOSIS — I10 ESSENTIAL HYPERTENSION: ICD-10-CM

## 2023-11-28 DIAGNOSIS — Z72.89 OTHER PROBLEMS RELATED TO LIFESTYLE: ICD-10-CM

## 2023-11-28 PROCEDURE — 99215 OFFICE O/P EST HI 40 MIN: CPT | Performed by: INTERNAL MEDICINE

## 2023-11-28 NOTE — PROGRESS NOTES
Progress Note - Infectious Disease   eMlva Gaspar 61 y.o. male MRN: [id number]  Unit/Bed#:  Encounter: [id number]      Impression/Plan:  1. HIV-doing well on Biktarvy with an undetectable viral load after switching from Descovy/Tivicay/Prezcobix. Recheck labs in 2 months and follow-up in 3 months to continue close monitoring for toxicities of the medications. If remains undetectable can go to every 6-month follow-up. Stressed adherence. 2. 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. Right upper quadrant ultrasound negative, and the alpha fetoprotein negative on the last check. Continue Q 6 month hepatocellular carcinoma screening. 3.  Polycythemia-likely secondary to the nicotine dependence. Seen by Hematology Oncology who agree with my assessment. Will continue to monitor the CBC with diff to assess for worsening     4. Hypertension-asymptomatic and stable and well controlled. Continue monitor     5. Coronary artery disease-complicated by acute MI status post PCI. Now asymptomatic. Follow up with Cardiology. 6. Nicotine dependence-patient continues to smoke but has decreased the amount substantially. He is using nicotine patches and working on quitting. 7.  Obesity. He has continued to lose weight. Continue stressed this. Patient was provided medication, adherence and prevention education    Subjective:  Routine follow-up for HIV. Patient claims 100% adherence with Biktarvy. Patient denies any notable side effects. Overall the feeling well. The patient denies any fever chills or sweats, denies any nausea vomiting or diarrhea, denies any cough or shortness of breath. ROS:  A complete review of systems is negative other than that noted above in the subjective    Followup portions patient history reviewed and updated as:   Allergies, current medications, past medical history, past social history, past surgical history, and the problem list    Objective:  Vitals:  Vitals:    11/28/23 1631   BP: 113/65   Pulse: 74   Temp: 97.6 °F (36.4 °C)   SpO2: 92%   Weight: 92.9 kg (204 lb 12.8 oz)   Height: 5' 7" (1.702 m)       Physical Exam:   General Appearance:  Alert, interactive, appearing well,  nontoxic, no acute distress. Neck:   Supple without lymphadenopathy, no thyromegaly or masses   Throat: Oropharynx moist without lesions. Lungs:   Clear to auscultation bilaterally; no wheezes, rhonchi or rales; respirations unlabored   Heart:  RRR; no murmur, rub or gallop   Abdomen:   Soft, non-tender, non-distended, positive bowel sounds. Extremities: No clubbing, cyanosis or edema   Skin: No new rashes or lesions. No draining wounds noted.        Labs, Imaging, & Other studies:   All pertinent labs and imaging studies were personally reviewed    Lab Results   Component Value Date     03/18/2017    K 4.0 11/18/2023     11/18/2023    CO2 25 11/18/2023    BUN 18 11/18/2023    CREATININE 1.01 11/18/2023    GLUF 149 (H) 11/18/2023    CALCIUM 9.4 11/18/2023    AST 18 11/18/2023    ALT 14 11/18/2023    ALKPHOS 98 11/18/2023    PROT 7.4 03/18/2017    BILITOT 1.1 03/18/2017    EGFR 78 11/18/2023     Lab Results   Component Value Date    WBC COMMENT 11/18/2023    WBC 6.59 11/18/2023    HGB COMMENT 11/18/2023    HGB 17.4 (H) 11/18/2023    HCT COMMENT 11/18/2023    HCT 52.1 (H) 11/18/2023    MCV COMMENT 11/18/2023    MCV 86 11/18/2023    PLT COMMENT 11/18/2023     11/18/2023     No results found for: "HEPCAB"  Lab Results   Component Value Date    HEPBCAB REACTIVE (A) 10/08/2015    HEPBCAB REACTIVE (A) 10/08/2015    HEPBCAB REACTIVE (A) 10/08/2015    HEPBCAB REACTIVE (A) 10/08/2015     Lab Results   Component Value Date    RPR Non-Reactive 05/18/2022     CD4 ABS   Date/Time Value Ref Range Status   11/18/2023 07:18 AM COMMENT /uL Final     Comment:     Unable to calculate result since non-numeric result obtained for  component test.     HIV-1 RNA by PCR, Qn   Date/Time Value Ref Range Status   11/18/2023 07:18 AM <20 copies/mL Final     Comment:     HIV-1 RNA detected  The reportable range for this assay is 20 to 10,000,000  copies HIV-1 RNA/mL.      HIV-1 TARGET   Date/Time Value Ref Range Status   10/07/2023 08:52 AM Detected <20 cp/mL (A) Not Detected Final           Current Outpatient Medications:     albuterol (PROVENTIL HFA,VENTOLIN HFA) 90 mcg/act inhaler, Inhale 2 puffs every 4 (four) hours as needed for wheezing, Disp: 18 g, Rfl: 2    Aspirin Low Dose 81 MG EC tablet, TAKE 1 TABLET BY MOUTH IN THE MORNING, Disp: 30 tablet, Rfl: 5    bictegravir-emtricitab-tenofovir alafenamide (BIKTARVY) -25 MG tablet, Take 1 tablet by mouth daily with breakfast, Disp: 30 tablet, Rfl: 2    cetirizine (ZyrTEC) 10 mg tablet, TAKE 1 TABLET BY MOUTH DAILY IN THE MORNING, Disp: 30 tablet, Rfl: 5    fluticasone (FLONASE) 50 mcg/act nasal spray, 1 spray into each nostril daily, Disp: 15.8 mL, Rfl: 2    irbesartan-hydrochlorothiazide (AVALIDE) 300-12.5 MG per tablet, TAKE 1 TABLET BY MOUTH IN THE MORNING, Disp: 30 tablet, Rfl: 2    metoprolol succinate (TOPROL-XL) 25 mg 24 hr tablet, TAKE 1 TABLET BY MOUTH IN THE MORNING, Disp: 30 tablet, Rfl: 5    nicotine (NICODERM CQ) 21 mg/24 hr TD 24 hr patch, APPLY 1 PATCH TO THE SKIN DAILY, Disp: 28 patch, Rfl: 2    nicotine polacrilex (COMMIT) 4 MG lozenge, DISSOLVE 1 LOZENGE IN MOUTH/THROAT AS NEEDED FOR SMOKING CESSATION, Disp: 72 lozenge, Rfl: 3    nitroglycerin (NITROSTAT) 0.4 mg SL tablet, Place 1 tablet (0.4 mg total) under the tongue every 5 (five) minutes as needed for chest pain, Disp: 15 tablet, Rfl: 0    pantoprazole (PROTONIX) 40 mg tablet, TAKE 1 TABLET BY MOUTH DAILY IN THE MORNING, Disp: 30 tablet, Rfl: 5    rosuvastatin (CRESTOR) 20 MG tablet, TAKE 1 TABLET BY MOUTH IN THE MORNING, Disp: 30 tablet, Rfl: 5    TiZANidine (ZANAFLEX) 4 MG capsule, Take 1 capsule (4 mg total) by mouth 3 (three) times a day, Disp: 30 capsule, Rfl: 0    guaifenesin-codeine (GUAIFENESIN AC) 100-10 MG/5ML liquid, Take 10 mL by mouth 3 (three) times a day as needed for cough (Patient not taking: Reported on 11/28/2023), Disp: 237 mL, Rfl: 0    naproxen (EC NAPROSYN) 500 MG EC tablet, Take 1 tablet (500 mg total) by mouth 2 (two) times a day with meals (Patient not taking: Reported on 11/8/2023), Disp: 60 tablet, Rfl: 1

## 2023-11-29 NOTE — PROGRESS NOTES
[facility] Annual Nutrition Assessment    Wendi Resendiz seen by RD in conjunction with ID f/u    pt is established patient (last annual was 02/08/2023)    PMHx:  cirrhosis of the liver, GERD, HTN, HLD, HIV, prediabetes    Clinical Data/Client History  CD4 count:  180  Viral load:  <20  ART:   Biktarvy      , Patient Navigator: [employer] Coordinator: N/A    Food assistance:  N/A    Living situation: House or apartment     Psychosocial factors:  not discussed    Mobility:  self ambulates    Physical activity: walks a lot at work      Oral health concerns: difficulty chewing does not wear dentures missing teeth      Typical food/beverage intake:    Breakfast 2 hard boiled eggs, banana, oatmeal   Lunch tuna sub or sandwich   Dinner pasta, meatballs and salad   Snacks avoids or has rice pudding   Beverages water & coffee    Appetite: Good    OTC vitamin, mineral, herbal supplements: N/A    Oral/enteral nutrition supplements:  N/A    GI problems: denied n/v/d/c    Food allergies/intolerances:  N/A    Weight history:   Wt Readings from Last 3 Encounters:   11/28/23 92.9 kg (204 lb 12.8 oz)   11/08/23 90.9 kg (200 lb 6.4 oz)   10/10/23 91.9 kg (202 lb 9.6 oz)   Last years assessment: 208(11/01/22)  Current body weight:  204  Height:  67  BMI:  32(overweight)   IBW:  148  %IBW  137%    Weight change: No significant weight change    Nutrition-related labs:    Lab Results   Component Value Date    HGBA1C 6.0 (H) 01/30/2023     Lab Results   Component Value Date    SODIUM 137 11/18/2023    K 4.0 11/18/2023     11/18/2023    CO2 25 11/18/2023    BUN 18 11/18/2023    CREATININE 1.01 11/18/2023    GLUC 101 03/20/2019    CALCIUM 9.4 11/18/2023     Lab Results   Component Value Date    CHOLESTEROL 129 01/30/2023    CHOLESTEROL 125 10/27/2022    CHOLESTEROL 118 06/12/2021     Lab Results   Component Value Date    HDL 35 (L) 01/30/2023    HDL 34 (L) 10/27/2022    HDL 28 (L) 06/12/2021     Lab Results   Component Value Date TRIG 176 (H) 01/30/2023    TRIG 117 10/27/2022    TRIG 208 (H) 06/12/2021     No results found for: "[address]"      Current medications:   Protonix, aspirin, toprol, irbesartan/HCTZ    Physical findings/skin integrity:  Skin intact       Nutrition Diagnosis    Problem: altered nutrition-related lab values (A1C)    Related to: energy intake > energy output over time     As Evidenced By: patient interview       Estimated Nutritional Needs    Marvel Bashir REE: CBW    ~2005-500=1506 kcal (based on: Bozena Cadena with 1.2 stress factor)  ~76 protein (based on: .08)  ~2400ml fluid (based on: 25ml/kg/day)      Current intake estimation: exceeds needs       Nutrition Intervention/Recommendations    Nutrition education intervention: reinforced previous education     Nutrition recommendations: Continue Myplate and start exercising     Teaching Method: verbal     Person Educated: patient      Comprehension: excellent    Receptivity: good    Expected compliance: good      Collaboration/referral of nutrition care:    N/A    Goals:  Lower A1c  Start walking on treadmill 3 times per week   Gradual weight loss     Monitoring/Evaluation:  Will continue to monitor labs, weight trend and need for nutrition education. Visit Summary  RD reinforced prior nutrition education on Myplate. Pt had small decline in A1C and reports trying to stay on track. Pt has a treadmill in his home but does not use it. RD recommended exercise to help lower weight & A1C. Pt said he would try. No questions on concerns expressed.   Rafi Buenrostro RDN,LDN

## 2023-11-30 ENCOUNTER — PATIENT OUTREACH (OUTPATIENT)
Dept: SURGERY | Facility: CLINIC | Age: 63
End: 2023-11-30

## 2023-12-06 ENCOUNTER — VBI (OUTPATIENT)
Dept: ADMINISTRATIVE | Facility: OTHER | Age: 63
End: 2023-12-06

## 2023-12-22 ENCOUNTER — PATIENT OUTREACH (OUTPATIENT)
Dept: SURGERY | Facility: CLINIC | Age: 63
End: 2023-12-22

## 2023-12-22 NOTE — PROGRESS NOTES
Cm received ct December [facility] premium and created check req and sent to supervisor for approval/processing.    See media.

## 2023-12-28 DIAGNOSIS — T65.292D TOXIC EFFECT OF TOBACCO, INTENTIONAL SELF-HARM, SUBSEQUENT ENCOUNTER: ICD-10-CM

## 2023-12-28 DIAGNOSIS — B20 HIV DISEASE (HCC): ICD-10-CM

## 2023-12-28 DIAGNOSIS — J30.2 SEASONAL ALLERGIES: ICD-10-CM

## 2024-01-05 RX ORDER — BICTEGRAVIR SODIUM, EMTRICITABINE, AND TENOFOVIR ALAFENAMIDE FUMARATE 50; 200; 25 MG/1; MG/1; MG/1
TABLET ORAL
Qty: 30 TABLET | Refills: 2 | Status: SHIPPED | OUTPATIENT
Start: 2024-01-05

## 2024-01-05 RX ORDER — CETIRIZINE HYDROCHLORIDE 10 MG/1
TABLET ORAL
Qty: 30 TABLET | Refills: 5 | Status: SHIPPED | OUTPATIENT
Start: 2024-01-05

## 2024-01-05 RX ORDER — NICOTINE 21 MG/24HR
PATCH, TRANSDERMAL 24 HOURS TRANSDERMAL
Qty: 28 PATCH | Refills: 2 | Status: SHIPPED | OUTPATIENT
Start: 2024-01-05

## 2024-01-08 ENCOUNTER — OFFICE VISIT (OUTPATIENT)
Dept: DENTISTRY | Facility: CLINIC | Age: 64
End: 2024-01-08

## 2024-01-08 DIAGNOSIS — Z46.3 ENCOUNTER FOR FITTING OF DENTURES: Primary | ICD-10-CM

## 2024-01-08 PROCEDURE — D0191 ASSESSMENT OF A PATIENT: HCPCS | Performed by: DENTIST

## 2024-01-08 NOTE — DENTAL PROCEDURE DETAILS
Patient presents for denture eval and healing after alveoloplasty was done and verbally consents for treatment:  Reviewed health history-  Pt is ASA type II  Treatment consents signed: Yes  Perio: gingivitis   Pain Scale:0  Caries Assessment: moderate  Radiographs: Films are current  Oral Hygiene instruction reviewed and given  Hygiene recall visits recommended to the patient  Oral cancer screening done and no pathology noted on ROM, FOM , Palate,soft tissue or tongue.    Healing looks good. No bumps noticed. Small root tip or bone fragment visible on upper left and told pt we can remove it here.      All questions answered. Pt left satisfied and in good health. New order for dentures placed in work que.     Prognosis is Good.   Referrals Needed: No      Next visit: preliminary impressions    Kalee

## 2024-01-23 ENCOUNTER — OFFICE VISIT (OUTPATIENT)
Dept: DENTISTRY | Facility: CLINIC | Age: 64
End: 2024-01-23

## 2024-01-23 ENCOUNTER — PATIENT OUTREACH (OUTPATIENT)
Dept: SURGERY | Facility: CLINIC | Age: 64
End: 2024-01-23

## 2024-01-23 VITALS — HEART RATE: 79 BPM | SYSTOLIC BLOOD PRESSURE: 131 MMHG | DIASTOLIC BLOOD PRESSURE: 83 MMHG

## 2024-01-23 DIAGNOSIS — K08.109 MISSING TEETH, ACQUIRED: Primary | ICD-10-CM

## 2024-01-23 PROCEDURE — WIS5000 PRELIMINARY IMPRESSIONS

## 2024-01-23 NOTE — PROGRESS NOTES
Akash rec'd ct January [facility] statement. Akash created check req and sent to supervisor for approval/processing.    See media.

## 2024-01-25 NOTE — PROGRESS NOTES
07/28/20 1700   Clinical Encounter Type   Visited With Patient   Routine Visit Follow-up   Continue Visiting Yes   Patient Spiritual Encounters   Spiritual Assessment 5   Fear Level 4   Coping 5   Spiritual Encounter Notes   (See Spiritual Assessment Progress Note  ) [phone]

## 2024-02-05 ENCOUNTER — PATIENT OUTREACH (OUTPATIENT)
Dept: SURGERY | Facility: CLINIC | Age: 64
End: 2024-02-05

## 2024-02-07 ENCOUNTER — OFFICE VISIT (OUTPATIENT)
Dept: DENTISTRY | Facility: CLINIC | Age: 64
End: 2024-02-07

## 2024-02-07 DIAGNOSIS — Z46.3 ENCOUNTER FOR FITTING OF DENTURES: Primary | ICD-10-CM

## 2024-02-07 PROCEDURE — WIS5001 FINAL IMPRESSIONS DENTURE: Performed by: DENTIST

## 2024-02-07 NOTE — DENTAL PROCEDURE DETAILS
Patient presents for a denture final impressions and verbally consents for treatment:  Reviewed health history-  Pt is ASA type III  Treatment consents signed: Yes  Perio: normal  Pain Scale:0  Caries Assessment: moderate  Radiographs: Films are current  Oral Hygiene instruction reviewed and given  Hygiene recall visits recommended to the patient      Mouth preparation done on #22 and final impressions taken of upper and lower with PVS. Case sent to the lab.     All questions answered. Pt left satisfied and in good health.    Prognosis is Good.   Referrals Needed: No      Next visit: elzbieta Granda

## 2024-02-12 ENCOUNTER — PATIENT OUTREACH (OUTPATIENT)
Dept: SURGERY | Facility: CLINIC | Age: 64
End: 2024-02-12

## 2024-02-12 NOTE — PROGRESS NOTES
Cm met with ct in [place] office to complete recert. Ct has a complete understanding of HIV disease process, transmission, and medications. Ct takes Biktarvy. Ct has own vehicle. Ct has active [employer] insurance. Ct is independent and can follow up on referrals as needed. Ct has stable housing and lives with his wife. Ct states he has one sex partner- his wife. Ct is not struggling with mental health or substance use. Ct sees [facility] in [place] as his dentist. Ct works full time. There are no language or cultural barriers. Cm asked ct for ct wife income. Ct stated he will have his wife send Cm her income so Cm can complete sliding fee scale lynda and send to [facility] financial counselor. Ct wife emailed Cm income. Cm finished completing [employer] sliding fee scale application and emailed to [facility] financial counselor    See media

## 2024-02-16 ENCOUNTER — PATIENT OUTREACH (OUTPATIENT)
Dept: SURGERY | Facility: CLINIC | Age: 64
End: 2024-02-16

## 2024-02-21 ENCOUNTER — APPOINTMENT (OUTPATIENT)
Dept: LAB | Facility: HOSPITAL | Age: 64
End: 2024-02-21
Payer: COMMERCIAL

## 2024-02-21 ENCOUNTER — PATIENT OUTREACH (OUTPATIENT)
Dept: SURGERY | Facility: CLINIC | Age: 64
End: 2024-02-21

## 2024-02-21 LAB
ALBUMIN SERPL BCP-MCNC: 4.4 G/DL (ref 3.5–5)
ALP SERPL-CCNC: 88 U/L (ref 34–104)
ALT SERPL W P-5'-P-CCNC: 15 U/L (ref 7–52)
ANION GAP SERPL CALCULATED.3IONS-SCNC: 5 MMOL/L
AST SERPL W P-5'-P-CCNC: 14 U/L (ref 13–39)
BASOPHILS # BLD AUTO: 0.05 THOUSANDS/ÂΜL (ref 0–0.1)
BASOPHILS NFR BLD AUTO: 1 % (ref 0–1)
BILIRUB SERPL-MCNC: 0.68 MG/DL (ref 0.2–1)
BILIRUB UR QL STRIP: NEGATIVE
BUN SERPL-MCNC: 21 MG/DL (ref 5–25)
CALCIUM SERPL-MCNC: 9.6 MG/DL (ref 8.4–10.2)
CHLORIDE SERPL-SCNC: 105 MMOL/L (ref 96–108)
CHOLEST SERPL-MCNC: 127 MG/DL
CLARITY UR: CLEAR
CO2 SERPL-SCNC: 28 MMOL/L (ref 21–32)
COLOR UR: YELLOW
CREAT SERPL-MCNC: 1.12 MG/DL (ref 0.6–1.3)
EOSINOPHIL # BLD AUTO: 0.18 THOUSAND/ÂΜL (ref 0–0.61)
EOSINOPHIL NFR BLD AUTO: 3 % (ref 0–6)
ERYTHROCYTE [DISTWIDTH] IN BLOOD BY AUTOMATED COUNT: 14.5 % (ref 11.6–15.1)
EST. AVERAGE GLUCOSE BLD GHB EST-MCNC: 148 MG/DL
GFR SERPL CREATININE-BSD FRML MDRD: 69 ML/MIN/1.73SQ M
GLUCOSE P FAST SERPL-MCNC: 142 MG/DL (ref 65–99)
GLUCOSE UR STRIP-MCNC: NEGATIVE MG/DL
HBA1C MFR BLD: 6.8 %
HCT VFR BLD AUTO: 53.4 % (ref 36.5–49.3)
HCV AB SER QL: REACTIVE
HDLC SERPL-MCNC: 34 MG/DL
HGB BLD-MCNC: 17.7 G/DL (ref 12–17)
HGB UR QL STRIP.AUTO: NEGATIVE
IMM GRANULOCYTES # BLD AUTO: 0.03 THOUSAND/UL (ref 0–0.2)
IMM GRANULOCYTES NFR BLD AUTO: 1 % (ref 0–2)
KETONES UR STRIP-MCNC: NEGATIVE MG/DL
LDLC SERPL CALC-MCNC: 58 MG/DL (ref 0–100)
LEUKOCYTE ESTERASE UR QL STRIP: NEGATIVE
LYMPHOCYTES # BLD AUTO: 1.09 THOUSANDS/ÂΜL (ref 0.6–4.47)
LYMPHOCYTES NFR BLD AUTO: 19 % (ref 14–44)
MCH RBC QN AUTO: 29.4 PG (ref 26.8–34.3)
MCHC RBC AUTO-ENTMCNC: 33.1 G/DL (ref 31.4–37.4)
MCV RBC AUTO: 89 FL (ref 82–98)
MONOCYTES # BLD AUTO: 0.8 THOUSAND/ÂΜL (ref 0.17–1.22)
MONOCYTES NFR BLD AUTO: 14 % (ref 4–12)
NEUTROPHILS # BLD AUTO: 3.59 THOUSANDS/ÂΜL (ref 1.85–7.62)
NEUTS SEG NFR BLD AUTO: 62 % (ref 43–75)
NITRITE UR QL STRIP: NEGATIVE
NRBC BLD AUTO-RTO: 0 /100 WBCS
PH UR STRIP.AUTO: 5 [PH]
PLATELET # BLD AUTO: 229 THOUSANDS/UL (ref 149–390)
PMV BLD AUTO: 9.8 FL (ref 8.9–12.7)
POTASSIUM SERPL-SCNC: 4.3 MMOL/L (ref 3.5–5.3)
PROT SERPL-MCNC: 7.6 G/DL (ref 6.4–8.4)
PROT UR STRIP-MCNC: NEGATIVE MG/DL
RBC # BLD AUTO: 6.03 MILLION/UL (ref 3.88–5.62)
SODIUM SERPL-SCNC: 138 MMOL/L (ref 135–147)
SP GR UR STRIP.AUTO: 1.03 (ref 1–1.03)
TREPONEMA PALLIDUM IGG+IGM AB [PRESENCE] IN SERUM OR PLASMA BY IMMUNOASSAY: NORMAL
TRIGL SERPL-MCNC: 173 MG/DL
UROBILINOGEN UR STRIP-ACNC: <2 MG/DL
WBC # BLD AUTO: 5.74 THOUSAND/UL (ref 4.31–10.16)

## 2024-02-21 NOTE — PROGRESS NOTES
Akash rec'd ct February [employer] premium. Akash created auth and emailed to supervisor and Brittani Simon for approval.    See media

## 2024-02-22 ENCOUNTER — PATIENT OUTREACH (OUTPATIENT)
Dept: SURGERY | Facility: CLINIC | Age: 64
End: 2024-02-22

## 2024-02-22 LAB
BASOPHILS # BLD AUTO: 0.1 X10E3/UL (ref 0–0.2)
BASOPHILS NFR BLD AUTO: 1 %
C TRACH DNA SPEC QL NAA+PROBE: NEGATIVE
CD3+CD4+ CELLS # BLD: 191 /UL (ref 359–1519)
CD3+CD4+ CELLS NFR BLD: 17.4 % (ref 30.8–58.5)
CD3+CD4+ CELLS/CD3+CD8+ CLL BLD: 0.38 % (ref 0.92–3.72)
CD3+CD8+ CELLS # BLD: 506 /UL (ref 109–897)
CD3+CD8+ CELLS NFR BLD: 46 % (ref 12–35.5)
EOSINOPHIL # BLD AUTO: 0.1 X10E3/UL (ref 0–0.4)
EOSINOPHIL NFR BLD AUTO: 2 %
ERYTHROCYTE [DISTWIDTH] IN BLOOD BY AUTOMATED COUNT: 14.1 % (ref 11.6–15.4)
GAMMA INTERFERON BACKGROUND BLD IA-ACNC: 0.08 IU/ML
HCT VFR BLD AUTO: 44.5 % (ref 37.5–51)
HGB BLD-MCNC: 15 G/DL (ref 13–17.7)
HIV1 RNA # SERPL NAA+PROBE: <20 COPIES/ML
HIV1 RNA SERPL NAA+PROBE-LOG#: NORMAL LOG10COPY/ML
IMM GRANULOCYTES # BLD: 0 X10E3/UL (ref 0–0.1)
IMM GRANULOCYTES NFR BLD: 1 %
LYMPHOCYTES # BLD AUTO: 1.1 X10E3/UL (ref 0.7–3.1)
LYMPHOCYTES NFR BLD AUTO: 19 %
M TB IFN-G BLD-IMP: NEGATIVE
M TB IFN-G CD4+ BCKGRND COR BLD-ACNC: -0.01 IU/ML
M TB IFN-G CD4+ BCKGRND COR BLD-ACNC: -0.02 IU/ML
MCH RBC QN AUTO: 29.7 PG (ref 26.6–33)
MCHC RBC AUTO-ENTMCNC: 33.7 G/DL (ref 31.5–35.7)
MCV RBC AUTO: 88 FL (ref 79–97)
MITOGEN IGNF BCKGRD COR BLD-ACNC: 9.92 IU/ML
MONOCYTES # BLD AUTO: 0.9 X10E3/UL (ref 0.1–0.9)
MONOCYTES NFR BLD AUTO: 15 %
N GONORRHOEA DNA SPEC QL NAA+PROBE: NEGATIVE
NEUTROPHILS # BLD AUTO: 3.6 X10E3/UL (ref 1.4–7)
NEUTROPHILS NFR BLD AUTO: 62 %
PLATELET # BLD AUTO: 294 X10E3/UL (ref 150–450)
RBC # BLD AUTO: 5.05 X10E6/UL (ref 4.14–5.8)
WBC # BLD AUTO: 5.8 X10E3/UL (ref 3.4–10.8)

## 2024-02-22 NOTE — PROGRESS NOTES
Supervisor provided auth number of [id number] for ct [employer] auth. Cm created check req and sent to supervisor for approval/processing.      See media

## 2024-02-27 ENCOUNTER — DOCUMENTATION (OUTPATIENT)
Dept: SURGERY | Facility: CLINIC | Age: 64
End: 2024-02-27

## 2024-02-27 ENCOUNTER — OFFICE VISIT (OUTPATIENT)
Dept: SURGERY | Facility: CLINIC | Age: 64
End: 2024-02-27
Payer: COMMERCIAL

## 2024-02-27 ENCOUNTER — PATIENT OUTREACH (OUTPATIENT)
Dept: SURGERY | Facility: CLINIC | Age: 64
End: 2024-02-27

## 2024-02-27 VITALS
BODY MASS INDEX: 32.68 KG/M2 | WEIGHT: 208.2 LBS | SYSTOLIC BLOOD PRESSURE: 123 MMHG | DIASTOLIC BLOOD PRESSURE: 70 MMHG | HEART RATE: 79 BPM | HEIGHT: 67 IN | OXYGEN SATURATION: 95 % | TEMPERATURE: 97.3 F

## 2024-02-27 DIAGNOSIS — K74.69 OTHER CIRRHOSIS OF LIVER (HCC): ICD-10-CM

## 2024-02-27 DIAGNOSIS — T65.292S TOXIC EFFECT OF TOBACCO, INTENTIONAL SELF-HARM, SEQUELA (HCC): ICD-10-CM

## 2024-02-27 DIAGNOSIS — B20 HIV DISEASE (HCC): Primary | ICD-10-CM

## 2024-02-27 DIAGNOSIS — I25.10 CORONARY ARTERY DISEASE INVOLVING NATIVE CORONARY ARTERY OF NATIVE HEART WITHOUT ANGINA PECTORIS: ICD-10-CM

## 2024-02-27 DIAGNOSIS — I47.29 POLYMORPHIC VENTRICULAR TACHYCARDIA (HCC): ICD-10-CM

## 2024-02-27 DIAGNOSIS — I10 ESSENTIAL HYPERTENSION: ICD-10-CM

## 2024-02-27 DIAGNOSIS — R73.03 PREDIABETES: ICD-10-CM

## 2024-02-27 PROCEDURE — 99215 OFFICE O/P EST HI 40 MIN: CPT | Performed by: INTERNAL MEDICINE

## 2024-02-27 NOTE — PROGRESS NOTES
Progress Note - Infectious Disease   Mario Rodriguez 63 y.o. male MRN: [id number]  Unit/Bed#:  Encounter: [id number]      Impression/Plan:  1.  HIV-doing well on Biktarvy with an undetectable viral load after switching from Descovy/Tivicay/Prezcobix.  Continue ART, recheck CBC with differential, CMP, HIV RNA, and CD4 in 5 months to make sure no developing toxicities or treatment failure, and follow-up in 6 months.  Stressed adherence     2. 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. Right upper quadrant ultrasound negative, and the alpha fetoprotein negative on the last check.  Continue Q 6 month hepatocellular carcinoma screening.     3.  Polycythemia-likely secondary to the nicotine dependence.  Seen by Hematology Oncology who agree with my assessment.  Will continue to monitor the CBC with diff to assess for worsening     4.  Hypertension-asymptomatic and stable and well controlled.  Continue monitor     5. Coronary artery disease-complicated by acute MI status post PCI. Now asymptomatic.  Follow up with Cardiology.     6. Nicotine dependence-patient continues to smoke but has decreased the amount substantially.  He is using nicotine patches and working on quitting.     7. Obesity.  He has gained weight since his last visit.  Continue stressed lifestyle modification    8.  Recent COVID-19.  Symptoms have resolved.  No additional workup or treatment needed.    9.  Prediabetes.  Stressed the importance of diet modification especially stopping all the ice cream he is eating.  Dietary evaluation underway.      Patient was provided medication, adherence and prevention education    Subjective:  Routine follow-up for HIV.  Patient claims 100% adherence with Biktarvy. Patient denies any notable side effects.  Overall the feeling well.  The patient denies any fever chills or sweats, denies any nausea vomiting or diarrhea, denies any cough or shortness of breath.    ROS:  A complete review of  "systems is negative other than that noted above in the subjective    Followup portions patient history reviewed and updated as:  Allergies, current medications, past medical history, past social history, past surgical history, and the problem list    Objective:  Vitals:  Vitals:    02/27/24 1701   BP: 123/70   Pulse: 79   Temp: (!) 97.3 °F (36.3 °C)   SpO2: 95%   Weight: 94.4 kg (208 lb 3.2 oz)   Height: 5' 7\" (1.702 m)       Physical Exam:   General Appearance:  Alert, interactive, appearing well,  nontoxic, no acute distress.   Neck:   Supple without lymphadenopathy, no thyromegaly or masses   Throat: Oropharynx moist without lesions.    Lungs:   Clear to auscultation bilaterally; no wheezes, rhonchi or rales; respirations unlabored   Heart:  RRR; no murmur, rub or gallop   Abdomen:   Soft, non-tender, non-distended, positive bowel sounds.     Extremities: No clubbing, cyanosis or edema   Skin: No new rashes or lesions. No draining wounds noted.       Labs, Imaging, & Other studies:   All pertinent labs and imaging studies were personally reviewed    Lab Results   Component Value Date     03/18/2017    K 4.3 02/21/2024     02/21/2024    CO2 28 02/21/2024    BUN 21 02/21/2024    CREATININE 1.12 02/21/2024    GLUF 142 (H) 02/21/2024    CALCIUM 9.6 02/21/2024    AST 14 02/21/2024    ALT 15 02/21/2024    ALKPHOS 88 02/21/2024    PROT 7.4 03/18/2017    BILITOT 1.1 03/18/2017    EGFR 69 02/21/2024     Lab Results   Component Value Date    WBC 5.74 02/21/2024    WBC 5.8 02/21/2024    HGB 17.7 (H) 02/21/2024    HGB 15.0 02/21/2024    HCT 53.4 (H) 02/21/2024    HCT 44.5 02/21/2024    MCV 89 02/21/2024    MCV 88 02/21/2024     02/21/2024     02/21/2024     Lab Results   Component Value Date    HEPCAB Reactive (A) 02/21/2024     Lab Results   Component Value Date    HEPBCAB REACTIVE (A) 10/08/2015    HEPBCAB REACTIVE (A) 10/08/2015    HEPBCAB REACTIVE (A) 10/08/2015    HEPBCAB REACTIVE (A) " 10/08/2015    HEPCAB Reactive (A) 02/21/2024     Lab Results   Component Value Date    RPR Non-Reactive 05/18/2022     CD4 ABS   Date/Time Value Ref Range Status   02/21/2024 06:45  (L) 359 - 1519 /uL Final     HIV-1 RNA by PCR, Qn   Date/Time Value Ref Range Status   02/21/2024 06:45 AM <20 copies/mL Final     Comment:     HIV-1 RNA not detected  The reportable range for this assay is 20 to 10,000,000  copies HIV-1 RNA/mL.     HIV-1 TARGET   Date/Time Value Ref Range Status   10/07/2023 08:52 AM Detected <20 cp/mL (A) Not Detected Final           Current Outpatient Medications:     albuterol (PROVENTIL HFA,VENTOLIN HFA) 90 mcg/act inhaler, Inhale 2 puffs every 4 (four) hours as needed for wheezing, Disp: 18 g, Rfl: 2    Aspirin Low Dose 81 MG EC tablet, TAKE 1 TABLET BY MOUTH IN THE MORNING, Disp: 30 tablet, Rfl: 5    Biktarvy -25 MG tablet, TAKE 1 TABLET BY MOUTH IN THE EVENING WITH DINNER, Disp: 30 tablet, Rfl: 2    cetirizine (ZyrTEC) 10 mg tablet, TAKE 1 TABLET BY MOUTH DAILY IN THE MORNING, Disp: 30 tablet, Rfl: 5    fluticasone (FLONASE) 50 mcg/act nasal spray, 1 spray into each nostril daily, Disp: 15.8 mL, Rfl: 2    irbesartan-hydrochlorothiazide (AVALIDE) 300-12.5 MG per tablet, TAKE 1 TABLET BY MOUTH IN THE MORNING, Disp: 30 tablet, Rfl: 2    metoprolol succinate (TOPROL-XL) 25 mg 24 hr tablet, TAKE 1 TABLET BY MOUTH IN THE MORNING, Disp: 30 tablet, Rfl: 5    nicotine (NICODERM CQ) 21 mg/24 hr TD 24 hr patch, APPLY 1 PATCH TO THE SKIN DAILY, Disp: 28 patch, Rfl: 2    nicotine polacrilex (COMMIT) 4 MG lozenge, DISSOLVE 1 LOZENGE IN MOUTH/THROAT AS NEEDED FOR SMOKING CESSATION, Disp: 72 lozenge, Rfl: 3    pantoprazole (PROTONIX) 40 mg tablet, TAKE 1 TABLET BY MOUTH DAILY IN THE MORNING, Disp: 30 tablet, Rfl: 5    rosuvastatin (CRESTOR) 20 MG tablet, TAKE 1 TABLET BY MOUTH IN THE MORNING, Disp: 30 tablet, Rfl: 5    TiZANidine (ZANAFLEX) 4 MG capsule, Take 1 capsule (4 mg total) by mouth 3 (three)  times a day, Disp: 30 capsule, Rfl: 0    naproxen (EC NAPROSYN) 500 MG EC tablet, Take 1 tablet (500 mg total) by mouth 2 (two) times a day with meals (Patient not taking: Reported on 11/8/2023), Disp: 60 tablet, Rfl: 1    nitroglycerin (NITROSTAT) 0.4 mg SL tablet, Place 1 tablet (0.4 mg total) under the tongue every 5 (five) minutes as needed for chest pain (Patient not taking: Reported on 2/27/2024), Disp: 15 tablet, Rfl: 0

## 2024-03-05 ENCOUNTER — OFFICE VISIT (OUTPATIENT)
Dept: DENTISTRY | Facility: CLINIC | Age: 64
End: 2024-03-05

## 2024-03-05 DIAGNOSIS — Z46.3 ENCOUNTER FOR FITTING OF DENTURES: Primary | ICD-10-CM

## 2024-03-05 PROCEDURE — D5899 UNSPECIFIED REMOVABLE PROSTHODONTIC PROCEDURE, BY REPORT: HCPCS | Performed by: DENTIST

## 2024-03-05 PROCEDURE — WIS5002 BITE RIMS: Performed by: DENTIST

## 2024-03-06 ENCOUNTER — DOCUMENTATION (OUTPATIENT)
Dept: SURGERY | Facility: CLINIC | Age: 64
End: 2024-03-06

## 2024-03-06 NOTE — PROGRESS NOTES
RD followed up via phone with pt regarding changed to help lower his A1C. Pt has replaced ice cream snacks with fruit & switched from sugar based creamer to a sugar free creamer. He also stated his is staying aware of the foods he is eating. Will continue to monitor labs & goals.     -Buffy Blanc RDN,LDN

## 2024-03-19 ENCOUNTER — PATIENT OUTREACH (OUTPATIENT)
Dept: SURGERY | Facility: CLINIC | Age: 64
End: 2024-03-19

## 2024-03-19 NOTE — PROGRESS NOTES
Cm rec'd ct [employer] premium from March. Cm created check req and sent to supervisor for approval/processing.    See media

## 2024-03-21 DIAGNOSIS — B20 HIV DISEASE (HCC): ICD-10-CM

## 2024-03-21 DIAGNOSIS — T65.292D TOXIC EFFECT OF TOBACCO, INTENTIONAL SELF-HARM, SUBSEQUENT ENCOUNTER: ICD-10-CM

## 2024-03-22 RX ORDER — NICOTINE 21 MG/24HR
PATCH, TRANSDERMAL 24 HOURS TRANSDERMAL
Qty: 28 PATCH | Refills: 2 | Status: SHIPPED | OUTPATIENT
Start: 2024-03-22

## 2024-03-22 RX ORDER — BICTEGRAVIR SODIUM, EMTRICITABINE, AND TENOFOVIR ALAFENAMIDE FUMARATE 50; 200; 25 MG/1; MG/1; MG/1
TABLET ORAL
Qty: 30 TABLET | Refills: 2 | Status: SHIPPED | OUTPATIENT
Start: 2024-03-22

## 2024-03-26 ENCOUNTER — PATIENT OUTREACH (OUTPATIENT)
Dept: SURGERY | Facility: CLINIC | Age: 64
End: 2024-03-26

## 2024-03-28 ENCOUNTER — PATIENT OUTREACH (OUTPATIENT)
Dept: SURGERY | Facility: CLINIC | Age: 64
End: 2024-03-28

## 2024-04-08 ENCOUNTER — OFFICE VISIT (OUTPATIENT)
Dept: DENTISTRY | Facility: CLINIC | Age: 64
End: 2024-04-08

## 2024-04-08 DIAGNOSIS — Z46.3 ENCOUNTER FOR FITTING OF DENTURES: Primary | ICD-10-CM

## 2024-04-08 PROCEDURE — WIS5003 WAX TRY-IN: Performed by: DENTIST

## 2024-04-08 NOTE — DENTAL PROCEDURE DETAILS
Patient presents for wax try in for upper and lower dentures and verbally consents for treatment:  Reviewed health history-  Pt is ASA type III  Treatment consents signed: Yes  Perio: plaque  Pain Scale:0  Caries Assessment: high  Radiographs: Films are current  Oral Hygiene instruction reviewed and given  Hygiene recall visits recommended to the patient    Tried in upper complete denture and lower partial metal denture. Pt very satisfied with fit and esthetic. Pt gave final OK to process. Light body wash done for upper denture. Case sent back to the lab.       All questions answered. Pt left satisfied and in good health.    Prognosis is Good.   Referrals Needed: No      Next visit: Delivery when case is back from lab    Kalee

## 2024-04-18 ENCOUNTER — PATIENT OUTREACH (OUTPATIENT)
Dept: SURGERY | Facility: CLINIC | Age: 64
End: 2024-04-18

## 2024-04-18 DIAGNOSIS — K21.9 GASTROESOPHAGEAL REFLUX DISEASE WITHOUT ESOPHAGITIS: ICD-10-CM

## 2024-04-18 DIAGNOSIS — E78.2 MIXED HYPERLIPIDEMIA: ICD-10-CM

## 2024-04-18 DIAGNOSIS — I10 ESSENTIAL HYPERTENSION: ICD-10-CM

## 2024-04-18 DIAGNOSIS — I25.10 CORONARY ARTERY DISEASE INVOLVING NATIVE CORONARY ARTERY OF NATIVE HEART WITHOUT ANGINA PECTORIS: ICD-10-CM

## 2024-04-18 DIAGNOSIS — I25.2 HISTORY OF NON-ST ELEVATION MYOCARDIAL INFARCTION (NSTEMI): ICD-10-CM

## 2024-04-18 NOTE — PROGRESS NOTES
Cm rec'd ct [employer] premium for April. Cm created check req and sent to supervisor for approval/processing.    See media

## 2024-04-19 RX ORDER — ROSUVASTATIN CALCIUM 20 MG/1
20 TABLET, COATED ORAL EVERY MORNING
Qty: 30 TABLET | Refills: 5 | Status: SHIPPED | OUTPATIENT
Start: 2024-04-19

## 2024-04-19 RX ORDER — PANTOPRAZOLE SODIUM 40 MG/1
40 TABLET, DELAYED RELEASE ORAL EVERY MORNING
Qty: 30 TABLET | Refills: 5 | Status: SHIPPED | OUTPATIENT
Start: 2024-04-19

## 2024-04-19 RX ORDER — ASPIRIN 81 MG/1
81 TABLET, COATED ORAL EVERY MORNING
Qty: 30 TABLET | Refills: 5 | Status: SHIPPED | OUTPATIENT
Start: 2024-04-19

## 2024-04-19 RX ORDER — METOPROLOL SUCCINATE 25 MG/1
25 TABLET, EXTENDED RELEASE ORAL EVERY MORNING
Qty: 30 TABLET | Refills: 5 | Status: SHIPPED | OUTPATIENT
Start: 2024-04-19

## 2024-04-29 ENCOUNTER — OFFICE VISIT (OUTPATIENT)
Dept: DENTISTRY | Facility: CLINIC | Age: 64
End: 2024-04-29

## 2024-04-29 ENCOUNTER — PATIENT OUTREACH (OUTPATIENT)
Dept: SURGERY | Facility: CLINIC | Age: 64
End: 2024-04-29

## 2024-04-29 DIAGNOSIS — Z46.3 ENCOUNTER FOR FITTING OF DENTURES: Primary | ICD-10-CM

## 2024-04-29 PROCEDURE — D5110 COMPLETE DENTURE - MAXILLARY: HCPCS | Performed by: DENTIST

## 2024-04-29 PROCEDURE — D5214 MANDIBULAR PARTIAL DENTURE - CAST METAL FRAMEWORK WITH RESIN DENTURE BASES (INCLUDING RETENTIVE/CLASPING MATERIALS, RESTS AND TEETH): HCPCS | Performed by: DENTIST

## 2024-05-08 ENCOUNTER — OFFICE VISIT (OUTPATIENT)
Dept: DENTISTRY | Facility: CLINIC | Age: 64
End: 2024-05-08

## 2024-05-08 DIAGNOSIS — K12.1 DENTURE SORE MOUTH: Primary | ICD-10-CM

## 2024-05-08 PROCEDURE — WIS5009 POST-OP DENTURE ADJUSTMENT: Performed by: DENTIST

## 2024-05-08 NOTE — DENTAL PROCEDURE DETAILS
Patient presents for post op denture adjustment and verbally consents for treatment:  Reviewed health history-  Pt is ASA type III  Treatment consents signed: Yes  Perio: n/a  Pain Scale:0  Caries Assessment: high  Radiographs: Films are current  Oral Hygiene instruction reviewed and given  Hygiene recall visits recommended to the patient  Oral cancer screening done and no pathology noted on ROM, FOM , Palate,soft tissue or tongue.    Pt disclosed he is very happy with the dentures, the fit and esthetic and everybody loved them. The only thing that its bothering him is the palate of the denture and he wants to know if we could shorten the extension of the palate. Explained to pt that the denture need to be extended where it is now to have a good retention. Explained I could shorten a bit but that might compromise the retention of the denture. Pt is aware and requested to shorten the palate a bit. Palate was shorten by 1mm. Pt said it felt much better. Advised to call us back if any other discomfort develops.   All questions answered. Pt left satisfied and in good health.    Prognosis is Good.   Referrals Needed: No      Next visit: recall    Kalee

## 2024-05-17 ENCOUNTER — PATIENT OUTREACH (OUTPATIENT)
Dept: SURGERY | Facility: CLINIC | Age: 64
End: 2024-05-17

## 2024-05-17 NOTE — PROGRESS NOTES
Cm rec'd ct May [employer] statement. Cm created check req and sent to supervisor for approval/processing.    See media

## 2024-05-21 ENCOUNTER — OFFICE VISIT (OUTPATIENT)
Dept: SURGERY | Facility: CLINIC | Age: 64
End: 2024-05-21
Payer: COMMERCIAL

## 2024-05-21 ENCOUNTER — DOCUMENTATION (OUTPATIENT)
Dept: SURGERY | Facility: CLINIC | Age: 64
End: 2024-05-21

## 2024-05-21 VITALS
TEMPERATURE: 97.6 F | OXYGEN SATURATION: 96 % | SYSTOLIC BLOOD PRESSURE: 128 MMHG | WEIGHT: 200.6 LBS | BODY MASS INDEX: 31.48 KG/M2 | HEIGHT: 67 IN | DIASTOLIC BLOOD PRESSURE: 71 MMHG | HEART RATE: 65 BPM

## 2024-05-21 DIAGNOSIS — I10 ESSENTIAL HYPERTENSION: ICD-10-CM

## 2024-05-21 DIAGNOSIS — I25.2 HISTORY OF NON-ST ELEVATION MYOCARDIAL INFARCTION (NSTEMI): ICD-10-CM

## 2024-05-21 DIAGNOSIS — E78.2 MIXED HYPERLIPIDEMIA: ICD-10-CM

## 2024-05-21 DIAGNOSIS — R76.8 HEPATITIS B CORE ANTIBODY POSITIVE: ICD-10-CM

## 2024-05-21 DIAGNOSIS — J30.2 SEASONAL ALLERGIES: ICD-10-CM

## 2024-05-21 DIAGNOSIS — K21.9 GASTROESOPHAGEAL REFLUX DISEASE WITHOUT ESOPHAGITIS: ICD-10-CM

## 2024-05-21 DIAGNOSIS — R21 RASH: ICD-10-CM

## 2024-05-21 DIAGNOSIS — I10 PRIMARY HYPERTENSION: ICD-10-CM

## 2024-05-21 DIAGNOSIS — D22.9 ATYPICAL MOLE: ICD-10-CM

## 2024-05-21 DIAGNOSIS — R05.9 COUGH: ICD-10-CM

## 2024-05-21 DIAGNOSIS — B20 HIV DISEASE (HCC): Primary | ICD-10-CM

## 2024-05-21 DIAGNOSIS — I25.10 CORONARY ARTERY DISEASE INVOLVING NATIVE CORONARY ARTERY OF NATIVE HEART WITHOUT ANGINA PECTORIS: ICD-10-CM

## 2024-05-21 DIAGNOSIS — T65.292D TOXIC EFFECT OF TOBACCO, INTENTIONAL SELF-HARM, SUBSEQUENT ENCOUNTER: ICD-10-CM

## 2024-05-21 PROCEDURE — 99396 PREV VISIT EST AGE 40-64: CPT | Performed by: NURSE PRACTITIONER

## 2024-05-21 RX ORDER — METOPROLOL SUCCINATE 25 MG/1
25 TABLET, EXTENDED RELEASE ORAL EVERY MORNING
Qty: 30 TABLET | Refills: 5 | Status: SHIPPED | OUTPATIENT
Start: 2024-05-21

## 2024-05-21 RX ORDER — ALBUTEROL SULFATE 90 UG/1
2 AEROSOL, METERED RESPIRATORY (INHALATION) EVERY 4 HOURS PRN
Qty: 18 G | Refills: 2 | Status: SHIPPED | OUTPATIENT
Start: 2024-05-21

## 2024-05-21 RX ORDER — TRIAMCINOLONE ACETONIDE 1 MG/G
CREAM TOPICAL 2 TIMES DAILY
Qty: 45 G | Refills: 0 | Status: SHIPPED | OUTPATIENT
Start: 2024-05-21

## 2024-05-21 RX ORDER — FLUTICASONE PROPIONATE 50 MCG
1 SPRAY, SUSPENSION (ML) NASAL DAILY
Qty: 15.8 ML | Refills: 2 | Status: SHIPPED | OUTPATIENT
Start: 2024-05-21

## 2024-05-21 RX ORDER — PANTOPRAZOLE SODIUM 40 MG/1
40 TABLET, DELAYED RELEASE ORAL EVERY MORNING
Qty: 30 TABLET | Refills: 5 | Status: SHIPPED | OUTPATIENT
Start: 2024-05-21

## 2024-05-21 RX ORDER — ROSUVASTATIN CALCIUM 20 MG/1
20 TABLET, COATED ORAL EVERY MORNING
Qty: 30 TABLET | Refills: 5 | Status: SHIPPED | OUTPATIENT
Start: 2024-05-21

## 2024-05-21 RX ORDER — IRBESARTAN AND HYDROCHLOROTHIAZIDE 300; 12.5 MG/1; MG/1
1 TABLET, FILM COATED ORAL EVERY MORNING
Qty: 30 TABLET | Refills: 2 | Status: SHIPPED | OUTPATIENT
Start: 2024-05-21

## 2024-05-21 RX ORDER — BICTEGRAVIR SODIUM, EMTRICITABINE, AND TENOFOVIR ALAFENAMIDE FUMARATE 50; 200; 25 MG/1; MG/1; MG/1
1 TABLET ORAL
Qty: 30 TABLET | Refills: 2 | Status: SHIPPED | OUTPATIENT
Start: 2024-05-21

## 2024-05-21 RX ORDER — ASPIRIN 81 MG/1
81 TABLET ORAL EVERY MORNING
Qty: 30 TABLET | Refills: 5 | Status: SHIPPED | OUTPATIENT
Start: 2024-05-21

## 2024-05-21 RX ORDER — NICOTINE 21 MG/24HR
1 PATCH, TRANSDERMAL 24 HOURS TRANSDERMAL EVERY 24 HOURS
Qty: 28 PATCH | Refills: 2 | Status: SHIPPED | OUTPATIENT
Start: 2024-05-21

## 2024-05-21 RX ORDER — CETIRIZINE HYDROCHLORIDE 10 MG/1
10 TABLET ORAL EVERY MORNING
Qty: 30 TABLET | Refills: 5 | Status: SHIPPED | OUTPATIENT
Start: 2024-05-21

## 2024-05-21 NOTE — PROGRESS NOTES
"Adult Annual Physical  Name: Mario Rodriguez      : 1960      MRN: [id number]  Encounter Provider: ALLIE Villa  Encounter Date: 2024   Encounter department: [facility] AT [facility]    Assessment & Plan   1. HIV disease (HCC)  Assessment & Plan:  No results found for: \"RU8SSIB\"  CD4 ABS   Date/Time Value Ref Range Status   2024 06:45  (L) 359 - 1519 /uL Final     HIV-1 RNA by PCR, Qn   Date/Time Value Ref Range Status   2024 06:45 AM <20 copies/mL Final     Comment:     HIV-1 RNA not detected  The reportable range for this assay is 20 to 10,000,000  copies HIV-1 RNA/mL.     HIV-1 RNA Viral Load Log   Date/Time Value Ref Range Status   2024 06:45 AM COMMENT [id number]/mL Final     Comment:     Unable to calculate result since non-numeric result obtained for  component test.         ART: Biktarvy        Denies side effects. Stressed the importance of adherence.  Continue follow up with ID clinic.       Reviewed most recent labs, including Cd4 and viral load. Discussed the risks and benefits of treatment options, instructions for management, importance of treatment adherence, and reduction of risk factor.Educated on possible  medication side effects.  Reviewed last ID visit.  Collaborated with ID to optimize medical treatment.    Counseled on routes of HIV transmission, including the risk of  infection. Emphasized that viral suppression is the best method to prevent HIV transmission.  At this time pt.denies the need for HIV testing of anyone in their life.     Total encounter time was 45 minutes. Greater then 20 minutes were spent on counseling and patient education. Pt voices understanding and agreement with treatment plan.      Orders:  -     bictegravir-emtricitab-tenofovir alafenamide (Biktarvy) -25 MG tablet; Take 1 tablet by mouth daily with breakfast  2. Atypical mole  -     Ambulatory Referral to Dermatology; Future  3. Rash  -     triamcinolone " (KENALOG) 0.1 % cream; Apply topically 2 (two) times a day  4. Mixed hyperlipidemia  -     rosuvastatin (CRESTOR) 20 MG tablet; Take 1 tablet (20 mg total) by mouth every morning  5. Gastroesophageal reflux disease without esophagitis  -     pantoprazole (PROTONIX) 40 mg tablet; Take 1 tablet (40 mg total) by mouth every morning  6. Toxic effect of tobacco, intentional self-harm, subsequent encounter  Assessment & Plan:  Working really hard towards smoking cessation.  Has reduced the amount he smokes daily.  Using nicotine patch and nicotine losses to aid in cessation efforts.  Counseled for greater than 15 minutes on the importance of smoking cessation.  Advised to quit.  Educated on the increased risk of heart and lung disease associated with smoking.       Orders:  -     nicotine polacrilex (COMMIT) 4 MG lozenge; Apply 1 lozenge (4 mg total) to the mouth or throat as needed for smoking cessation  -     nicotine (NICODERM CQ) 21 mg/24 hr TD 24 hr patch; Place 1 patch on the skin over 24 hours every 24 hours  -     CT lung screening program; Future; Expected date: 05/21/2024  7. Essential hypertension  Assessment & Plan:  Blood pressure: Blood pressure at goal  BP Readings from Last 3 Encounters:   05/21/24 128/71   02/27/24 123/70   01/23/24 131/83       Continue current antihypertensive.    Educated on the following lifestyle modifications to lower BP and decrease cardiovascular risk factors.  limit alcohol intake, reduce salt in diet, maintain a healthy weight, engage in 30 minutes of cardiovascular exercise daily, and not smoke.     Orders:  -     metoprolol succinate (TOPROL-XL) 25 mg 24 hr tablet; Take 1 tablet (25 mg total) by mouth every morning  8. Primary hypertension  -     irbesartan-hydrochlorothiazide (AVALIDE) 300-12.5 MG per tablet; Take 1 tablet by mouth every morning  9. Cough  -     fluticasone (FLONASE) 50 mcg/act nasal spray; 1 spray into each nostril daily  -     albuterol (PROVENTIL  HFA,VENTOLIN HFA) 90 mcg/act inhaler; Inhale 2 puffs every 4 (four) hours as needed for wheezing  10. Seasonal allergies  -     cetirizine (ZyrTEC) 10 mg tablet; Take 1 tablet (10 mg total) by mouth every morning  11. History of non-ST elevation myocardial infarction (NSTEMI)  Assessment & Plan:  Previously followed by cardiology who recommended a repeat cardiac cath.  Patient declines procedure.  He also declines follow-up with cardiology.  Educated on the importance of reporting any additional chest pain.  Reviewed eating a low-fat low-cholesterol diet, daily exercise, and maintaining a healthy blood pressure.  Orders:  -     aspirin (Aspirin Low Dose) 81 mg EC tablet; Take 1 tablet (81 mg total) by mouth every morning  12. Coronary artery disease involving native coronary artery of native heart without angina pectoris  -     aspirin (Aspirin Low Dose) 81 mg EC tablet; Take 1 tablet (81 mg total) by mouth every morning  13. Hepatitis B core antibody positive  Assessment & Plan:  Due for HCC surveillance.  Right upper quadrant ultrasound and AFP ordered today.  Orders:  -     US right upper quadrant; Future; Expected date: 05/21/2024  -     AFP tumor marker; Future    Immunizations and preventive care screenings were discussed with patient today. Appropriate education was printed on patient's after visit summary.    Discussed risks and benefits of prostate cancer screening. We discussed the controversial history of PSA screening for prostate cancer in the [place] as well as the risk of over detection and over treatment of prostate cancer by way of PSA screening.  The patient understands that PSA blood testing is an imperfect way to screen for prostate cancer and that elevated PSA levels in the blood may also be caused by infection, inflammation, prostatic trauma or manipulation, urological procedures, or by benign prostatic enlargement.    The role of the digital rectal examination in prostate cancer  screening was also discussed and I discussed with him that there is large interobserver variability in the findings of digital rectal examination.    Counseling:  Dental Health: discussed importance of regular tooth brushing, flossing, and dental visits.  Injury prevention: discussed safety/seat belts, safety helmets, smoke detectors, carbon dioxide detectors, and smoking near bedding or upholstery.  Exercise: the importance of regular exercise/physical activity was discussed. Recommend exercise 3-5 times per week for at least 30 minutes.          History of Present Illness     Adult Annual Physical:  Patient presents for annual physical.     Diet and Physical Activity:  - Diet/Nutrition: well balanced diet, consuming 3-5 servings of fruits/vegetables daily and limited junk food.  - Exercise: walking.    General Health:  - Sleep: sleeps well and 7-8 hours of sleep on average.  - Hearing: normal hearing bilateral ears.  - Vision: goes for regular eye exams and wears glasses.    /GYN Health:    - History of STDs: no     Health:  - History of STDs: no.     Advanced Care Planning:  - Has an advanced directive?: yes    - Has a durable medical POA?: yes    - ACP document given to patient?: no      Review of Systems   Constitutional:  Negative for chills and fever.   HENT:  Negative for ear pain and sore throat.    Eyes:  Negative for pain and visual disturbance.   Respiratory:  Negative for cough and shortness of breath.    Cardiovascular:  Negative for chest pain and palpitations.   Gastrointestinal:  Negative for abdominal pain and vomiting.   Genitourinary:  Negative for dysuria and hematuria.   Musculoskeletal:  Negative for arthralgias and back pain.   Skin:  Negative for color change and rash.   Neurological:  Negative for seizures and syncope.   All other systems reviewed and are negative.    Pertinent Medical History   Mario Bowie is a 63-year-old male past medical history significant for HIV, nicotine use,  NSTEMI, hypertension, and prediabetes.  he is concerned about several moles located on his body.  He reports that they are increasing in size.      Medical History Reviewed by provider this encounter:  Tobacco  Allergies  Meds  Problems  Med Hx  Surg Hx  Fam Hx       Past Medical History   Past Medical History:   Diagnosis Date    HIV (human immunodeficiency virus infection) (HCC)     Hypertension     Opioid dependence (HCC)      Past Surgical History:   Procedure Laterality Date    CARDIAC SURGERY      LUMBAR LAMINECTOMY      STOMACH SURGERY       Family History   Problem Relation Age of Onset    Alzheimer's disease Mother     Heart attack Father     Prostate cancer Brother      Current Outpatient Medications on File Prior to Visit   Medication Sig Dispense Refill    [DISCONTINUED] albuterol (PROVENTIL HFA,VENTOLIN HFA) 90 mcg/act inhaler Inhale 2 puffs every 4 (four) hours as needed for wheezing 18 g 2    [DISCONTINUED] Aspirin Low Dose 81 MG EC tablet TAKE 1 TABLET BY MOUTH IN THE MORNING 30 tablet 5    [DISCONTINUED] Biktarvy -25 MG tablet TAKE 1 TABLET BY MOUTH IN THE EVENING WITH DINNER 30 tablet 2    [DISCONTINUED] cetirizine (ZyrTEC) 10 mg tablet TAKE 1 TABLET BY MOUTH DAILY IN THE MORNING 30 tablet 5    [DISCONTINUED] fluticasone (FLONASE) 50 mcg/act nasal spray 1 spray into each nostril daily 15.8 mL 2    [DISCONTINUED] irbesartan-hydrochlorothiazide (AVALIDE) 300-12.5 MG per tablet TAKE 1 TABLET BY MOUTH IN THE MORNING 30 tablet 2    [DISCONTINUED] metoprolol succinate (TOPROL-XL) 25 mg 24 hr tablet TAKE 1 TABLET BY MOUTH IN THE MORNING 30 tablet 5    [DISCONTINUED] nicotine (NICODERM CQ) 21 mg/24 hr TD 24 hr patch APPLY 1 PATCH TO THE SKIN DAILY 28 patch 2    [DISCONTINUED] nicotine polacrilex (COMMIT) 4 MG lozenge DISSOLVE 1 LOZENGE IN MOUTH/THROAT AS NEEDED FOR SMOKING CESSATION 72 lozenge 3    [DISCONTINUED] pantoprazole (PROTONIX) 40 mg tablet TAKE 1 TABLET BY MOUTH DAILY IN THE MORNING  30 tablet 5    [DISCONTINUED] rosuvastatin (CRESTOR) 20 MG tablet TAKE 1 TABLET BY MOUTH IN THE MORNING 30 tablet 5    [DISCONTINUED] TiZANidine (ZANAFLEX) 4 MG capsule Take 1 capsule (4 mg total) by mouth 3 (three) times a day 30 capsule 0    nitroglycerin (NITROSTAT) 0.4 mg SL tablet Place 1 tablet (0.4 mg total) under the tongue every 5 (five) minutes as needed for chest pain (Patient not taking: Reported on 2/27/2024) 15 tablet 0    [DISCONTINUED] naproxen (EC NAPROSYN) 500 MG EC tablet Take 1 tablet (500 mg total) by mouth 2 (two) times a day with meals (Patient not taking: Reported on 11/8/2023) 60 tablet 1     No current facility-administered medications on file prior to visit.   No Known Allergies   Current Outpatient Medications on File Prior to Visit   Medication Sig Dispense Refill    [DISCONTINUED] albuterol (PROVENTIL HFA,VENTOLIN HFA) 90 mcg/act inhaler Inhale 2 puffs every 4 (four) hours as needed for wheezing 18 g 2    [DISCONTINUED] Aspirin Low Dose 81 MG EC tablet TAKE 1 TABLET BY MOUTH IN THE MORNING 30 tablet 5    [DISCONTINUED] Biktarvy -25 MG tablet TAKE 1 TABLET BY MOUTH IN THE EVENING WITH DINNER 30 tablet 2    [DISCONTINUED] cetirizine (ZyrTEC) 10 mg tablet TAKE 1 TABLET BY MOUTH DAILY IN THE MORNING 30 tablet 5    [DISCONTINUED] fluticasone (FLONASE) 50 mcg/act nasal spray 1 spray into each nostril daily 15.8 mL 2    [DISCONTINUED] irbesartan-hydrochlorothiazide (AVALIDE) 300-12.5 MG per tablet TAKE 1 TABLET BY MOUTH IN THE MORNING 30 tablet 2    [DISCONTINUED] metoprolol succinate (TOPROL-XL) 25 mg 24 hr tablet TAKE 1 TABLET BY MOUTH IN THE MORNING 30 tablet 5    [DISCONTINUED] nicotine (NICODERM CQ) 21 mg/24 hr TD 24 hr patch APPLY 1 PATCH TO THE SKIN DAILY 28 patch 2    [DISCONTINUED] nicotine polacrilex (COMMIT) 4 MG lozenge DISSOLVE 1 LOZENGE IN MOUTH/THROAT AS NEEDED FOR SMOKING CESSATION 72 lozenge 3    [DISCONTINUED] pantoprazole (PROTONIX) 40 mg tablet TAKE 1 TABLET BY MOUTH  "DAILY IN THE MORNING 30 tablet 5    [DISCONTINUED] rosuvastatin (CRESTOR) 20 MG tablet TAKE 1 TABLET BY MOUTH IN THE MORNING 30 tablet 5    [DISCONTINUED] TiZANidine (ZANAFLEX) 4 MG capsule Take 1 capsule (4 mg total) by mouth 3 (three) times a day 30 capsule 0    nitroglycerin (NITROSTAT) 0.4 mg SL tablet Place 1 tablet (0.4 mg total) under the tongue every 5 (five) minutes as needed for chest pain (Patient not taking: Reported on 2/27/2024) 15 tablet 0    [DISCONTINUED] naproxen (EC NAPROSYN) 500 MG EC tablet Take 1 tablet (500 mg total) by mouth 2 (two) times a day with meals (Patient not taking: Reported on 11/8/2023) 60 tablet 1     No current facility-administered medications on file prior to visit.      Social History     Tobacco Use    Smoking status: Every Day     Types: Cigars, Cigarettes    Smokeless tobacco: Never    Tobacco comments:     2 cigars/week   Vaping Use    Vaping status: Never Used   Substance and Sexual Activity    Alcohol use: Not Currently     Comment: rarely    Drug use: No    Sexual activity: Yes     Partners: Female     Birth control/protection: Condom Male       Objective     /71   Pulse 65   Temp 97.6 °F (36.4 °C)   Ht 5' 7\" (1.702 m)   Wt 91 kg (200 lb 9.6 oz)   SpO2 96%   BMI 31.42 kg/m²     Physical Exam  Vitals and nursing note reviewed.   Constitutional:       General: He is not in acute distress.     Appearance: He is well-developed.   HENT:      Head: Normocephalic and atraumatic.   Eyes:      Conjunctiva/sclera: Conjunctivae normal.   Cardiovascular:      Rate and Rhythm: Normal rate and regular rhythm.      Heart sounds: No murmur heard.  Pulmonary:      Effort: Pulmonary effort is normal. No respiratory distress.      Breath sounds: Normal breath sounds.   Abdominal:      Palpations: Abdomen is soft.      Tenderness: There is no abdominal tenderness.   Musculoskeletal:         General: No swelling.      Cervical back: Neck supple.   Skin:     General: Skin is " warm and dry.      Capillary Refill: Capillary refill takes less than 2 seconds.      Comments: Multiple hyperpigmented irregularly shaped scaling lesions.  Located on neck, and calf,.  Also area of scaling rash posterior to knee.  Consistent with fungal rash.   Neurological:      Mental Status: He is alert.   Psychiatric:         Mood and Affect: Mood normal.       Administrative Statements   I have spent a total time of 35 minutes on 05/21/24 In caring for this patient including Diagnostic results, Prognosis, Risks and benefits of tx options, Instructions for management, Patient and family education, Importance of tx compliance, Counseling / Coordination of care, and Obtaining or reviewing history  .

## 2024-05-21 NOTE — ASSESSMENT & PLAN NOTE
"No results found for: \"XV3NLAO\"  CD4 ABS   Date/Time Value Ref Range Status   2024 06:45  (L) 359 - 1519 /uL Final     HIV-1 RNA by PCR, Qn   Date/Time Value Ref Range Status   2024 06:45 AM <20 copies/mL Final     Comment:     HIV-1 RNA not detected  The reportable range for this assay is 20 to 10,000,000  copies HIV-1 RNA/mL.     HIV-1 RNA Viral Load Log   Date/Time Value Ref Range Status   2024 06:45 AM COMMENT [id number]/mL Final     Comment:     Unable to calculate result since non-numeric result obtained for  component test.         ART: Biktarvy        Denies side effects. Stressed the importance of adherence.  Continue follow up with ID clinic.       Reviewed most recent labs, including Cd4 and viral load. Discussed the risks and benefits of treatment options, instructions for management, importance of treatment adherence, and reduction of risk factor.Educated on possible  medication side effects.  Reviewed last ID visit.  Collaborated with ID to optimize medical treatment.    Counseled on routes of HIV transmission, including the risk of  infection. Emphasized that viral suppression is the best method to prevent HIV transmission.  At this time pt.denies the need for HIV testing of anyone in their life.     Total encounter time was 45 minutes. Greater then 20 minutes were spent on counseling and patient education. Pt voices understanding and agreement with treatment plan.      "

## 2024-05-21 NOTE — PATIENT INSTRUCTIONS
Heart Healthy Diet   AMBULATORY CARE:   A heart healthy diet  is an eating plan low in unhealthy fats and sodium (salt). The plan is high in healthy fats and fiber. A heart healthy diet helps improve your cholesterol levels and lowers your risk for heart disease and stroke. A dietitian will teach you how to read and understand food labels.  Heart healthy diet guidelines to follow:   Choose foods that contain healthy fats:      Unsaturated fats  include monounsaturated and polyunsaturated fats. Unsaturated fat is found in foods such as soybean, canola, olive, corn, and safflower oils. It is also found in soft tub margarine that is made with liquid vegetable oil.    Omega-3 fat  is found in certain fish, such as salmon, tuna, and trout, and in walnuts and flaxseed. Eat fish high in omega-3 fats at least 2 times a week.       Limit or do not have unhealthy fats:      Cholesterol  is found in animal foods, such as eggs and lobster, and in dairy products made from whole milk. Limit cholesterol to less than 200 mg each day.    Saturated fat  is found in meats, such as newman and hamburger. It is also found in chicken or turkey skin, whole milk, and butter. Limit saturated fat to less than 7% of your total daily calories.    Trans fat  is found in packaged foods, such as potato chips and cookies. It is also in hard margarine, some fried foods, and shortening. Do not eat foods that contain trans fats.    Get 20 to 30 grams of fiber each day.  Fruits, vegetables, whole-grain foods, and legumes (cooked beans) are good sources of fiber.         Limit sodium as directed.  You may be told to limit sodium, such as to 2,000 mg or less each day. Choose low-sodium or no-salt-added foods. Add little or no salt to food you prepare. Use herbs and spices in place of salt.       Include the following in your heart healthy plan:  Ask your dietitian or healthcare provider how many servings to have each day from the following food  groups:  Grains:      Whole-wheat breads, cereals, and pastas, and brown rice    Low-fat, low-sodium crackers and chips    Vegetables:      Broccoli, green beans, green peas, and spinach    Collards, kale, and lima beans    Carrots, sweet potatoes, tomatoes, and peppers    Canned vegetables with no salt added    Fruits:      Bananas, peaches, pears, and pineapple    Grapes, raisins, and dates    Oranges, tangerines, grapefruit, orange juice, and grapefruit juice    Apricots, mangoes, melons, and papaya    Raspberries and strawberries    Canned fruit with no added sugar    Low-fat dairy:      Nonfat (skim) milk, 1% milk, and low-fat almond, cashew, or soy milks fortified with calcium    Low-fat cheese, regular or frozen yogurt, and cottage cheese    Meats and proteins:      Lean cuts of beef and pork (loin, leg, round), skinless chicken and turkey    Legumes, soy products, egg whites, or nuts    Limit or do not include the following in your heart healthy plan:   Foods and liquids that contain unhealthy fats and oils:      Whole or 2% milk, cream cheese, sour cream, or cheese    High-fat cuts of beef (T-bone steaks, ribs), chicken or turkey with skin, and organ meats such as liver    Butter, stick margarine, shortening, and cooking oils such as coconut or palm oil    Foods and liquids high in sodium:      Packaged foods, such as frozen dinners, cookies, macaroni and cheese, and cereals with more than 300 mg of sodium per serving    Vegetables with added sodium, such as instant potatoes, vegetables with added sauces, or regular canned vegetables    Cured or smoked meats, such as hot dogs, newman, and sausage    High-sodium ketchup, barbecue sauce, salad dressing, pickles, olives, soy sauce, or miso    Foods and liquids high in sugar:      Candy, cake, cookies, pies, or doughnuts    Soft drinks (soda), sports drinks, or sweetened tea    Canned or dry mixes for cakes, soups, sauces, or gravies    Other healthy heart  guidelines:   Do not smoke.  Nicotine and other chemicals in cigarettes and cigars can cause lung and heart damage. Ask your healthcare provider for information if you currently smoke and need help to quit. E-cigarettes or smokeless tobacco still contain nicotine. Talk to your provider before you use these products.    Limit or do not drink alcohol as directed.  Alcohol can damage your heart and raise your blood pressure. Your healthcare provider may give you specific daily and weekly limits. The general recommended limit is 1 drink a day for women 21 or older and for men 65 or older. Do not have more than 3 drinks within 24 hours or 7 within a week. The recommended limit is 2 drinks a day for men 21 to 64 years of age. Do not have more than 4 drinks within 24 hours or 14 within a week. A drink of alcohol is 12 ounces of beer, 5 ounces of wine, or 1½ ounces of liquor.    Maintain a healthy weight.  Extra body weight makes your heart work harder. Ask your provider what a healthy weight is for you. He or she can help you create a safe weight loss plan, if needed.    Exercise regularly.  Exercise can help you maintain a healthy weight and improve your blood pressure and cholesterol levels. Regular exercise can also decrease your risk for heart problems. Ask your provider about the best exercise plan for you. Do not start an exercise program without asking your provider.          Follow up with your doctor or cardiologist as directed:  Write down your questions so you remember to ask them during your visits.  © Copyright [employer] 2023 Information is for End User's use only and may not be sold, redistributed or otherwise used for commercial purposes.  The above information is an  only. It is not intended as medical advice for individual conditions or treatments. Talk to your doctor, nurse or pharmacist before following any medical regimen to see if it is safe and effective for you.

## 2024-05-21 NOTE — ASSESSMENT & PLAN NOTE
Working really hard towards smoking cessation.  Has reduced the amount he smokes daily.  Using nicotine patch and nicotine losses to aid in cessation efforts.  Counseled for greater than 15 minutes on the importance of smoking cessation.  Advised to quit.  Educated on the increased risk of heart and lung disease associated with smoking.

## 2024-05-21 NOTE — ASSESSMENT & PLAN NOTE
Blood pressure: Blood pressure at goal  BP Readings from Last 3 Encounters:   05/21/24 128/71   02/27/24 123/70   01/23/24 131/83       Continue current antihypertensive.    Educated on the following lifestyle modifications to lower BP and decrease cardiovascular risk factors.  limit alcohol intake, reduce salt in diet, maintain a healthy weight, engage in 30 minutes of cardiovascular exercise daily, and not smoke.

## 2024-05-21 NOTE — PROGRESS NOTES
Mario was in office for PCP visit and I was able to meet with him to complete sexual health assessment. He reports he is happily  for 37 years with one current sexual partner. He reports he uses protection in his relationship. Has been  to his wife since a year after his diagnosis and he feels like she has always been super supportive in his care. Mario reports he feels so tyron to be getting the great health care he has access to and is so grateful for the improvement in medications that he has seen over the years. He was very appreciative of visit today.

## 2024-05-21 NOTE — ASSESSMENT & PLAN NOTE
Previously followed by cardiology who recommended a repeat cardiac cath.  Patient declines procedure.  He also declines follow-up with cardiology.  Educated on the importance of reporting any additional chest pain.  Reviewed eating a low-fat low-cholesterol diet, daily exercise, and maintaining a healthy blood pressure.

## 2024-06-10 DIAGNOSIS — R21 RASH: ICD-10-CM

## 2024-06-11 ENCOUNTER — APPOINTMENT (EMERGENCY)
Dept: RADIOLOGY | Facility: HOSPITAL | Age: 64
End: 2024-06-11
Payer: COMMERCIAL

## 2024-06-11 ENCOUNTER — HOSPITAL ENCOUNTER (EMERGENCY)
Facility: HOSPITAL | Age: 64
Discharge: HOME | End: 2024-06-11
Attending: EMERGENCY MEDICINE
Payer: COMMERCIAL

## 2024-06-11 VITALS
OXYGEN SATURATION: 92 % | SYSTOLIC BLOOD PRESSURE: 121 MMHG | RESPIRATION RATE: 16 BRPM | DIASTOLIC BLOOD PRESSURE: 63 MMHG | TEMPERATURE: 98.7 F | HEART RATE: 103 BPM

## 2024-06-11 DIAGNOSIS — N39.0 UTI (URINARY TRACT INFECTION): Primary | ICD-10-CM

## 2024-06-11 LAB
ALBUMIN SERPL BCP-MCNC: 3.9 G/DL (ref 3.5–5)
ALP SERPL-CCNC: 139 U/L (ref 34–104)
ALT SERPL W P-5'-P-CCNC: 89 U/L (ref 7–52)
ANION GAP SERPL CALCULATED.3IONS-SCNC: 8 MMOL/L (ref 4–13)
AST SERPL W P-5'-P-CCNC: 100 U/L (ref 13–39)
BACTERIA UR QL AUTO: ABNORMAL /HPF
BASOPHILS # BLD AUTO: 0.03 THOUSANDS/ÂΜL (ref 0–0.1)
BASOPHILS NFR BLD AUTO: 1 % (ref 0–1)
BILIRUB SERPL-MCNC: 1.61 MG/DL (ref 0.2–1)
BILIRUB UR QL STRIP: ABNORMAL
BUN SERPL-MCNC: 19 MG/DL (ref 5–25)
CALCIUM SERPL-MCNC: 8.7 MG/DL (ref 8.4–10.2)
CHLORIDE SERPL-SCNC: 103 MMOL/L (ref 96–108)
CLARITY UR: ABNORMAL
CO2 SERPL-SCNC: 22 MMOL/L (ref 21–32)
COLOR UR: ABNORMAL
CREAT SERPL-MCNC: 1.11 MG/DL (ref 0.6–1.3)
EOSINOPHIL # BLD AUTO: 0.08 THOUSAND/ÂΜL (ref 0–0.61)
EOSINOPHIL NFR BLD AUTO: 1 % (ref 0–6)
ERYTHROCYTE [DISTWIDTH] IN BLOOD BY AUTOMATED COUNT: 14.6 % (ref 11.6–15.1)
FLUAV RNA RESP QL NAA+PROBE: NEGATIVE
FLUBV RNA RESP QL NAA+PROBE: NEGATIVE
GFR SERPL CREATININE-BSD FRML MDRD: 70 ML/MIN/1.73SQ M
GLUCOSE SERPL-MCNC: 128 MG/DL (ref 65–140)
GLUCOSE UR STRIP-MCNC: NEGATIVE MG/DL
GRAN CASTS #/AREA URNS LPF: ABNORMAL /[LPF]
HCT VFR BLD AUTO: 47.2 % (ref 36.5–49.3)
HGB BLD-MCNC: 16.2 G/DL (ref 12–17)
HGB UR QL STRIP.AUTO: NEGATIVE
HYALINE CASTS #/AREA URNS LPF: ABNORMAL /LPF
IMM GRANULOCYTES # BLD AUTO: 0.04 THOUSAND/UL (ref 0–0.2)
IMM GRANULOCYTES NFR BLD AUTO: 1 % (ref 0–2)
KETONES UR STRIP-MCNC: ABNORMAL MG/DL
LEUKOCYTE ESTERASE UR QL STRIP: ABNORMAL
LIPASE SERPL-CCNC: 20 U/L (ref 11–82)
LYMPHOCYTES # BLD AUTO: 0.35 THOUSANDS/ÂΜL (ref 0.6–4.47)
LYMPHOCYTES NFR BLD AUTO: 6 % (ref 14–44)
MCH RBC QN AUTO: 30.2 PG (ref 26.8–34.3)
MCHC RBC AUTO-ENTMCNC: 34.3 G/DL (ref 31.4–37.4)
MCV RBC AUTO: 88 FL (ref 82–98)
MONOCYTES # BLD AUTO: 0.76 THOUSAND/ÂΜL (ref 0.17–1.22)
MONOCYTES NFR BLD AUTO: 13 % (ref 4–12)
MUCOUS THREADS UR QL AUTO: ABNORMAL
NEUTROPHILS # BLD AUTO: 4.75 THOUSANDS/ÂΜL (ref 1.85–7.62)
NEUTS SEG NFR BLD AUTO: 78 % (ref 43–75)
NITRITE UR QL STRIP: NEGATIVE
NON-SQ EPI CELLS URNS QL MICRO: ABNORMAL /HPF
NRBC BLD AUTO-RTO: 0 /100 WBCS
PH UR STRIP.AUTO: 5.5 [PH]
PLATELET # BLD AUTO: 145 THOUSANDS/UL (ref 149–390)
PMV BLD AUTO: 9.7 FL (ref 8.9–12.7)
POTASSIUM SERPL-SCNC: 3.7 MMOL/L (ref 3.5–5.3)
PROT SERPL-MCNC: 7 G/DL (ref 6.4–8.4)
PROT UR STRIP-MCNC: ABNORMAL MG/DL
RBC # BLD AUTO: 5.36 MILLION/UL (ref 3.88–5.62)
RBC #/AREA URNS AUTO: ABNORMAL /HPF
RSV RNA RESP QL NAA+PROBE: NEGATIVE
SARS-COV-2 RNA RESP QL NAA+PROBE: NEGATIVE
SODIUM SERPL-SCNC: 133 MMOL/L (ref 135–147)
SP GR UR STRIP.AUTO: 1.04 (ref 1–1.03)
UROBILINOGEN UR STRIP-ACNC: 12 MG/DL
WBC # BLD AUTO: 6.01 THOUSAND/UL (ref 4.31–10.16)
WBC #/AREA URNS AUTO: ABNORMAL /HPF

## 2024-06-11 PROCEDURE — 83690 ASSAY OF LIPASE: CPT | Performed by: EMERGENCY MEDICINE

## 2024-06-11 PROCEDURE — 99285 EMERGENCY DEPT VISIT HI MDM: CPT | Performed by: EMERGENCY MEDICINE

## 2024-06-11 PROCEDURE — 80053 COMPREHEN METABOLIC PANEL: CPT | Performed by: EMERGENCY MEDICINE

## 2024-06-11 PROCEDURE — 0241U HB NFCT DS VIR RESP RNA 4 TRGT: CPT | Performed by: EMERGENCY MEDICINE

## 2024-06-11 PROCEDURE — 36415 COLL VENOUS BLD VENIPUNCTURE: CPT

## 2024-06-11 PROCEDURE — 99283 EMERGENCY DEPT VISIT LOW MDM: CPT

## 2024-06-11 PROCEDURE — 85025 COMPLETE CBC W/AUTO DIFF WBC: CPT | Performed by: EMERGENCY MEDICINE

## 2024-06-11 PROCEDURE — 81001 URINALYSIS AUTO W/SCOPE: CPT | Performed by: EMERGENCY MEDICINE

## 2024-06-11 PROCEDURE — 71046 X-RAY EXAM CHEST 2 VIEWS: CPT

## 2024-06-11 PROCEDURE — 87086 URINE CULTURE/COLONY COUNT: CPT | Performed by: EMERGENCY MEDICINE

## 2024-06-11 RX ORDER — SULFAMETHOXAZOLE AND TRIMETHOPRIM 800; 160 MG/1; MG/1
1 TABLET ORAL 2 TIMES DAILY
Qty: 28 TABLET | Refills: 0 | Status: SHIPPED | OUTPATIENT
Start: 2024-06-11 | End: 2024-06-25

## 2024-06-11 RX ORDER — SULFAMETHOXAZOLE AND TRIMETHOPRIM 800; 160 MG/1; MG/1
1 TABLET ORAL ONCE
Status: COMPLETED | OUTPATIENT
Start: 2024-06-11 | End: 2024-06-11

## 2024-06-11 RX ADMIN — SULFAMETHOXAZOLE AND TRIMETHOPRIM 1 TABLET: 800; 160 TABLET ORAL at 16:38

## 2024-06-11 NOTE — ED PROVIDER NOTES
"History  Chief Complaint   Patient presents with    Fever     Fever of 102 orally since this past Friday, seen at urgent care for same and was told it was viral. Patient reports advil last taken at 1310. Patient reports difficulty urinating for the past 3 days, reports \"trickling\". Hx of HIV, reports not missing any of their medications.      63-year-old male, presents with fever.  Reports intermittent fevers for the past 5 days, improved after taking ibuprofen.  Reports associated chills.  Patient states he noted today some difficulty urinating, denies any pain with urination or blood in urine.  Denies any congestion or sore throat, no vomiting or diarrhea.  Has chronic cough from smoking.  Patient states he does have some intermittent, mild pain in lower back, no flank pain.  Patient reports history of HIV, has been on medication for years.       History provided by:  Patient   used: No        Prior to Admission Medications   Prescriptions Last Dose Informant Patient Reported? Taking?   albuterol (PROVENTIL HFA,VENTOLIN HFA) 90 mcg/act inhaler   No No   Sig: Inhale 2 puffs every 4 (four) hours as needed for wheezing   aspirin (Aspirin Low Dose) 81 mg EC tablet   No No   Sig: Take 1 tablet (81 mg total) by mouth every morning   bictegravir-emtricitab-tenofovir alafenamide (Biktarvy) -25 MG tablet   No No   Sig: Take 1 tablet by mouth daily with breakfast   cetirizine (ZyrTEC) 10 mg tablet   No No   Sig: Take 1 tablet (10 mg total) by mouth every morning   fluticasone (FLONASE) 50 mcg/act nasal spray   No No   Si spray into each nostril daily   irbesartan-hydrochlorothiazide (AVALIDE) 300-12.5 MG per tablet   No No   Sig: Take 1 tablet by mouth every morning   metoprolol succinate (TOPROL-XL) 25 mg 24 hr tablet   No No   Sig: Take 1 tablet (25 mg total) by mouth every morning   nicotine (NICODERM CQ) 21 mg/24 hr TD 24 hr patch   No No   Sig: Place 1 patch on the skin over 24 hours every " 24 hours   nicotine polacrilex (COMMIT) 4 MG lozenge   No No   Sig: Apply 1 lozenge (4 mg total) to the mouth or throat as needed for smoking cessation   nitroglycerin (NITROSTAT) 0.4 mg SL tablet   No No   Sig: Place 1 tablet (0.4 mg total) under the tongue every 5 (five) minutes as needed for chest pain   Patient not taking: Reported on 2/27/2024   pantoprazole (PROTONIX) 40 mg tablet   No No   Sig: Take 1 tablet (40 mg total) by mouth every morning   rosuvastatin (CRESTOR) 20 MG tablet   No No   Sig: Take 1 tablet (20 mg total) by mouth every morning   triamcinolone (KENALOG) 0.1 % cream   No No   Sig: Apply topically 2 (two) times a day      Facility-Administered Medications: None       Past Medical History:   Diagnosis Date    HIV (human immunodeficiency virus infection) (HCC)     Hypertension     Opioid dependence (HCC)        Past Surgical History:   Procedure Laterality Date    CARDIAC SURGERY      LUMBAR LAMINECTOMY      STOMACH SURGERY         Family History   Problem Relation Age of Onset    Alzheimer's disease Mother     Heart attack Father     Prostate cancer Brother      I have reviewed and agree with the history as documented.    E-Cigarette/Vaping    E-Cigarette Use Never User      E-Cigarette/Vaping Substances    Nicotine No     THC No     CBD No     Flavoring No     Other No     Unknown No      Social History     Tobacco Use    Smoking status: Every Day     Types: Cigars, Cigarettes    Smokeless tobacco: Never    Tobacco comments:     2 cigars/week   Vaping Use    Vaping status: Never Used   Substance Use Topics    Alcohol use: Not Currently     Comment: rarely    Drug use: No       Review of Systems   Constitutional:  Positive for chills and fever.   HENT: Negative.     Respiratory:  Positive for cough.    Cardiovascular: Negative.    Gastrointestinal: Negative.    Genitourinary:  Negative for hematuria.   Neurological: Negative.        Physical Exam  Physical Exam  Vitals and nursing note  reviewed.   Constitutional:       General: He is not in acute distress.  HENT:      Head: Normocephalic and atraumatic.      Mouth/Throat:      Mouth: Mucous membranes are moist.      Pharynx: Oropharynx is clear. No oropharyngeal exudate.   Eyes:      Extraocular Movements: Extraocular movements intact.      Conjunctiva/sclera: Conjunctivae normal.      Pupils: Pupils are equal, round, and reactive to light.   Cardiovascular:      Rate and Rhythm: Normal rate and regular rhythm.   Pulmonary:      Effort: Pulmonary effort is normal.      Breath sounds: Normal breath sounds.   Abdominal:      Palpations: Abdomen is soft.      Tenderness: There is no abdominal tenderness.   Musculoskeletal:         General: Normal range of motion.      Cervical back: Normal range of motion and neck supple. No rigidity.   Skin:     General: Skin is warm and dry.   Neurological:      General: No focal deficit present.      Mental Status: He is alert and oriented to person, place, and time.      Motor: No weakness.      Gait: Gait normal.         Vital Signs  ED Triage Vitals [06/11/24 1435]   Temperature Pulse Respirations Blood Pressure SpO2   98.7 °F (37.1 °C) 103 16 121/63 92 %      Temp Source Heart Rate Source Patient Position - Orthostatic VS BP Location FiO2 (%)   Oral -- Sitting Left arm --      Pain Score       --           Vitals:    06/11/24 1435   BP: 121/63   Pulse: 103   Patient Position - Orthostatic VS: Sitting         Visual Acuity      ED Medications  Medications   sulfamethoxazole-trimethoprim (BACTRIM DS) 800-160 mg per tablet 1 tablet (1 tablet Oral Given 6/11/24 1638)       Diagnostic Studies  Results Reviewed       Procedure Component Value Units Date/Time    CBC and differential [[id number]]  (Abnormal) Collected: 06/11/24 1536    Lab Status: Final result Specimen: Blood from Arm, Right Updated: 06/11/24 1543     WBC 6.01 Thousand/uL      RBC 5.36 Million/uL      Hemoglobin 16.2 g/dL      Hematocrit 47.2 %       MCV 88 fL      MCH 30.2 pg      MCHC 34.3 g/dL      RDW 14.6 %      MPV 9.7 fL      Platelets 145 Thousands/uL      nRBC 0 /100 WBCs      Segmented % 78 %      Immature Grans % 1 %      Lymphocytes % 6 %      Monocytes % 13 %      Eosinophils Relative 1 %      Basophils Relative 1 %      Absolute Neutrophils 4.75 Thousands/µL      Absolute Immature Grans 0.04 Thousand/uL      Absolute Lymphocytes 0.35 Thousands/µL      Absolute Monocytes 0.76 Thousand/µL      Eosinophils Absolute 0.08 Thousand/µL      Basophils Absolute 0.03 Thousands/µL     Urine Microscopic [[id number]]  (Abnormal) Collected: 06/11/24 1516    Lab Status: Final result Specimen: Urine, Clean Catch Updated: 06/11/24 1536     RBC, UA 4-10 /hpf      WBC, UA 10-20 /hpf      Epithelial Cells Occasional /hpf      Bacteria, UA None Seen /hpf      MUCUS THREADS Innumerable     Hyaline Casts, UA 0-3 /lpf      Granular Casts, UA 5-10    Urine culture [[id number]] Collected: 06/11/24 1516    Lab Status: In process Specimen: Urine, Clean Catch Updated: 06/11/24 1536    UA w Reflex to Microscopic w Reflex to Culture [[id number]]  (Abnormal) Collected: 06/11/24 1516    Lab Status: Final result Specimen: Urine, Clean Catch Updated: 06/11/24 1530     Color, UA Dark Yellow     Clarity, UA Turbid     Specific Gravity, UA 1.039     pH, UA 5.5     Leukocytes, UA Trace     Nitrite, UA Negative     Protein, UA 70 (1+) mg/dl      Glucose, UA Negative mg/dl      Ketones, UA Trace mg/dl      Urobilinogen, UA 12.0 mg/dl      Bilirubin, UA Small     Occult Blood, UA Negative    COVID/FLU/RSV [[id number]]  (Normal) Collected: 06/11/24 1441    Lab Status: Final result Specimen: Nares from Nose Updated: 06/11/24 1527     SARS-CoV-2 Negative     INFLUENZA A PCR Negative     INFLUENZA B PCR Negative     RSV PCR Negative    Narrative:      FOR PEDIATRIC PATIENTS - copy/paste COVID Guidelines URL to browser:  [email or web address]    SARS-CoV-2 assay is a Nucleic Acid Amplification assay intended for the  qualitative detection of nucleic acid from SARS-CoV-2 in nasopharyngeal  swabs. Results are for the presumptive identification of SARS-CoV-2 RNA.    Positive results are indicative of infection with SARS-CoV-2, the virus  causing COVID-19, but do not rule out bacterial infection or co-infection  with other viruses. Laboratories within the [place] and its  territories are required to report all positive results to the appropriate  public health authorities. Negative results do not preclude SARS-CoV-2  infection and should not be used as the sole basis for treatment or other  patient management decisions. Negative results must be combined with  clinical observations, patient history, and epidemiological information.  This test has not been FDA cleared or approved.    This test has been authorized by FDA under an Emergency Use Authorization  (EUA). This test is only authorized for the duration of time the  declaration that circumstances exist justifying the authorization of the  emergency use of an in vitro diagnostic tests for detection of SARS-CoV-2  virus and/or diagnosis of COVID-19 infection under section 564(b)(1) of  the Act, 21 U.S.C. 360bbb-3(b)(1), unless the authorization is terminated  or revoked sooner. The test has been validated but independent review by FDA  and CLIA is pending.    Test performed using [employer] Gene[employer]: This RT-PCR assay targets N2,  a region unique to SARS-CoV-2. A conserved region in the E-gene was chosen  for pan-Sarbecovirus detection which includes SARS-CoV-2.    According to CMS-2020-01-R, this platform meets the definition of high-throughput technology.    Comprehensive metabolic panel [[id number]]  (Abnormal) Collected: 06/11/24 1441    Lab Status: Final result Specimen: Blood from Arm, Right Updated: 06/11/24 4843      Sodium 133 mmol/L      Potassium 3.7 mmol/L      Chloride 103 mmol/L      CO2 22 mmol/L      ANION GAP 8 mmol/L      BUN 19 mg/dL      Creatinine 1.11 mg/dL      Glucose 128 mg/dL      Calcium 8.7 mg/dL       U/L      ALT 89 U/L      Alkaline Phosphatase 139 U/L      Total Protein 7.0 g/dL      Albumin 3.9 g/dL      Total Bilirubin 1.61 mg/dL      eGFR 70 ml/min/1.73sq m     Narrative:      National Kidney Disease Foundation guidelines for Chronic Kidney Disease (CKD):     Stage 1 with normal or high GFR (GFR > 90 mL/min/1.73 square meters)    Stage 2 Mild CKD (GFR = 60-89 mL/min/1.73 square meters)    Stage 3A Moderate CKD (GFR = 45-59 mL/min/1.73 square meters)    Stage 3B Moderate CKD (GFR = 30-44 mL/min/1.73 square meters)    Stage 4 Severe CKD (GFR = 15-29 mL/min/1.73 square meters)    Stage 5 End Stage CKD (GFR <15 mL/min/1.73 square meters)  Note: GFR calculation is accurate only with a steady state creatinine    Lipase [[id number]]  (Normal) Collected: 06/11/24 1441    Lab Status: Final result Specimen: Blood from Arm, Right Updated: 06/11/24 1507     Lipase 20 u/L                    XR chest 2 views   Final Result by Suzy Kumar MD (06/11 1639)      No acute consolidation or congestion            Workstation performed: [id number]                    Procedures  Procedures         ED Course  ED Course as of 06/11/24 1644   Tue Jun 11, 2024   1623 Chest x-ray dependently reviewed myself, no infiltrate or effusion, no acute findings.   1641 Lab results reviewed and discussed with patient.  Patient has mildly elevated LFTs, patient reports that he has had elevated LFTs in the past.  Patient has no abdominal pain, no right upper quadrant tenderness on exam, negative Sarmiento sign.   1643 Urinalysis shows white blood cells, concern for possible urinary tract infection or early prostatitis.  Urine culture pending, will start on oral antibiotics.  Patient looks well, afebrile in ED, not elevated white  blood cell count.  Patient feels well being discharged, instructed to stay well-hydrated, continue ibuprofen or acetaminophen for fevers.  Discussed with patient return to emergency department for any worsening or new concerning symptoms.                               SBIRT 20yo+      Flowsheet Row Most Recent Value   Initial Alcohol Screen: US AUDIT-C     1. How often do you have a drink containing alcohol? 0 Filed at: 06/11/2024 1540   2. How many drinks containing alcohol do you have on a typical day you are drinking?  0 Filed at: 06/11/2024 1540   3a. Male UNDER 65: How often do you have five or more drinks on one occasion? 0 Filed at: 06/11/2024 1540   Audit-C Score 0 Filed at: 06/11/2024 1540   ASA: How many times in the past year have you...    Used an illegal drug or used a prescription medication for non-medical reasons? Never Filed at: 06/11/2024 1540                      Medical Decision Making  63-year-old male, presents with several days of fevers.  Differential diagnosis includes viral illness, urinary tract infection, prostatitis, pneumonia among other diagnoses.  Patient looks well no distress, reports taking ibuprofen 1 hour prior to arrival.  Chest x-ray, labs were urinalysis ordered.  Will continue to monitor in ED and reevaluate.    Amount and/or Complexity of Data Reviewed  Labs: ordered. Decision-making details documented in ED Course.  Radiology: ordered and independent interpretation performed. Decision-making details documented in ED Course.    Risk  Prescription drug management.           I have reviewed test results and diagnosis with patient.  Follow-up plan reviewed.  Precautions for acute return for re-evaluation are reviewed.  Opportunity to ask questions was provided.  Patient verbalizes understanding.    Disposition  Final diagnoses:   UTI (urinary tract infection)     Time reflects when diagnosis was documented in both MDM as applicable and the Disposition within this note        Time User Action Codes Description Comment    6/11/2024  4:31 PM Jarrett Trejo Add [N39.0] UTI (urinary tract infection)           ED Disposition       ED Disposition   Discharge    Condition   Stable    Date/Time   Tue Jun 11, 2024 1631    Comment   Mario NEIDA Rodriguez discharge to home/self care.                   Follow-up Information       Follow up With Specialties Details Why Contact Info    [facility], Nurse Practitioner   [address] [place] [address]  [place] [address]  [phone]              Patient's Medications   Discharge Prescriptions    SULFAMETHOXAZOLE-TRIMETHOPRIM (BACTRIM DS) 800-160 MG PER TABLET    Take 1 tablet by mouth 2 (two) times a day for 14 days smx-tmp DS (BACTRIM) 800-160 mg tabs (1tab q12 D10)       Start Date: 6/11/2024 End Date: 6/25/2024       Order Dose: 1 tablet       Quantity: 28 tablet    Refills: 0       No discharge procedures on file.    PDMP Review         Value Time User    PDMP Reviewed  Yes 11/8/2023  2:16 PM ALLIE Villa            ED Provider  Electronically Signed by             Jarrett Trejo MD  06/11/24 4606

## 2024-06-11 NOTE — Clinical Note
Mario Rodriguez was seen and treated in our emergency department on 6/11/2024.                Diagnosis:     Mario  .    He may return on this date: 06/13/2024         If you have any questions or concerns, please don't hesitate to call.      Olga Lidia Elliott RN    ______________________________           _______________          _______________  Hospital Representative                              Date                                Time

## 2024-06-12 LAB — BACTERIA UR CULT: NORMAL

## 2024-06-14 RX ORDER — TRIAMCINOLONE ACETONIDE 1 MG/G
1 CREAM TOPICAL 2 TIMES DAILY
Qty: 45 G | Refills: 0 | Status: SHIPPED | OUTPATIENT
Start: 2024-06-14

## 2024-06-24 ENCOUNTER — PATIENT OUTREACH (OUTPATIENT)
Dept: SURGERY | Facility: CLINIC | Age: 64
End: 2024-06-24

## 2024-06-24 NOTE — PROGRESS NOTES
Cm rec'd ct June [facility] premium. Cm created check req and sent to supervisor for approval/processing.    See media

## 2024-07-18 DIAGNOSIS — R21 RASH: ICD-10-CM

## 2024-07-22 ENCOUNTER — PATIENT OUTREACH (OUTPATIENT)
Dept: SURGERY | Facility: CLINIC | Age: 64
End: 2024-07-22

## 2024-07-22 NOTE — PROGRESS NOTES
Ct wife emailed Cm MAWD statement from July. Cm created check req and sent to supervisor for approval/processing. Cm mailed ct CAB flyer    See media

## 2024-07-24 RX ORDER — TRIAMCINOLONE ACETONIDE 1 MG/G
1 CREAM TOPICAL 2 TIMES DAILY
Qty: 45 G | Refills: 0 | OUTPATIENT
Start: 2024-07-24

## 2024-07-30 NOTE — DENTAL PROCEDURE DETAILS
Patient presents for denture delivery and verbally consents for treatment:  Reviewed health history-  Pt is ASA type III  Treatment consents signed: Yes  Perio: plaque  Pain Scale:0  Caries Assessment: high  Radiographs: Films are current  Oral Hygiene instruction reviewed and given  Hygiene recall visits recommended to the patient  Oral cancer screening done and no pathology noted on ROM, FOM , Palate,soft tissue or tongue.    Tried in upper complete and lower partial denture. Fit and esthetic was good. Pt very satisfied and happy. Went over the hygiene and how to take care of the dentures. Recommended to remove at night time. Pt showed ability to insert and remove them without any problem.    All questions answered. Pt left satisfied and in good health.    Prognosis is Good.   Referrals Needed: No      Next visit: denture adjustments     Kalee

## 2024-07-30 NOTE — DENTAL PROCEDURE DETAILS
Patient presents for bite rims and verbally consents for treatment:  Reviewed health history-  Pt is ASA type III  Treatment consents signed: Yes  Perio: plaque  Pain Scale:0  Caries Assessment: high  Radiographs: Films are current  Oral Hygiene instruction reviewed and given  Hygiene recall visits recommended to the patient  Oral cancer screening done and no pathology noted on ROM, FOM , Palate,soft tissue or tongue.      VDO recorded and bite rims done. Pt chose Mold 3N and shade A1.Case sent to the lab.    All questions answered. Pt left satisfied and in good health.    Prognosis is Good.   Referrals Needed: No      Next visit: wax try in    Kalee

## 2024-08-04 ENCOUNTER — HOSPITAL ENCOUNTER (OUTPATIENT)
Dept: CT IMAGING | Facility: HOSPITAL | Age: 64
Discharge: HOME | End: 2024-08-04
Payer: COMMERCIAL

## 2024-08-04 ENCOUNTER — HOSPITAL ENCOUNTER (OUTPATIENT)
Dept: ULTRASOUND IMAGING | Facility: HOSPITAL | Age: 64
Discharge: HOME | End: 2024-08-04
Payer: COMMERCIAL

## 2024-08-04 DIAGNOSIS — T65.292D TOXIC EFFECT OF TOBACCO, INTENTIONAL SELF-HARM, SUBSEQUENT ENCOUNTER: ICD-10-CM

## 2024-08-04 DIAGNOSIS — R76.8 HEPATITIS B CORE ANTIBODY POSITIVE: ICD-10-CM

## 2024-08-04 PROCEDURE — 76705 ECHO EXAM OF ABDOMEN: CPT

## 2024-08-04 PROCEDURE — 71271 CT THORAX LUNG CANCER SCR C-: CPT

## 2024-08-12 ENCOUNTER — PATIENT OUTREACH (OUTPATIENT)
Dept: SURGERY | Facility: CLINIC | Age: 64
End: 2024-08-12

## 2024-08-12 NOTE — PROGRESS NOTES
Cm completed ct 6 mo assessment and updated ct goals. Ct stated he is still getting used to using his dentures, other than that everything is going well. Ct states he has an upcoming appt with Dr Miranda and will be getting his labs done this weekend.

## 2024-08-14 DIAGNOSIS — B20 HIV DISEASE (HCC): ICD-10-CM

## 2024-08-14 DIAGNOSIS — T65.292D TOXIC EFFECT OF TOBACCO, INTENTIONAL SELF-HARM, SUBSEQUENT ENCOUNTER: ICD-10-CM

## 2024-08-14 RX ORDER — BICTEGRAVIR SODIUM, EMTRICITABINE, AND TENOFOVIR ALAFENAMIDE FUMARATE 50; 200; 25 MG/1; MG/1; MG/1
TABLET ORAL
Qty: 30 TABLET | Refills: 2 | Status: SHIPPED | OUTPATIENT
Start: 2024-08-14

## 2024-08-14 RX ORDER — NICOTINE 21 MG/24HR
PATCH, TRANSDERMAL 24 HOURS TRANSDERMAL
Qty: 28 PATCH | Refills: 2 | Status: SHIPPED | OUTPATIENT
Start: 2024-08-14

## 2024-08-15 ENCOUNTER — TELEPHONE (OUTPATIENT)
Dept: SURGERY | Facility: CLINIC | Age: 64
End: 2024-08-15

## 2024-08-20 ENCOUNTER — APPOINTMENT (OUTPATIENT)
Dept: LAB | Facility: HOSPITAL | Age: 64
End: 2024-08-20
Payer: COMMERCIAL

## 2024-08-20 DIAGNOSIS — R76.8 HEPATITIS B CORE ANTIBODY POSITIVE: ICD-10-CM

## 2024-08-20 LAB
AFP-TM SERPL-MCNC: 4.28 NG/ML (ref 0–9)
ALBUMIN SERPL BCG-MCNC: 4.1 G/DL (ref 3.5–5)
ALP SERPL-CCNC: 99 U/L (ref 34–104)
ALT SERPL W P-5'-P-CCNC: 19 U/L (ref 7–52)
ANION GAP SERPL CALCULATED.3IONS-SCNC: 7 MMOL/L (ref 4–13)
AST SERPL W P-5'-P-CCNC: 17 U/L (ref 13–39)
BASOPHILS # BLD AUTO: 0.07 THOUSANDS/ÂΜL (ref 0–0.1)
BASOPHILS NFR BLD AUTO: 1 % (ref 0–1)
BILIRUB SERPL-MCNC: 0.67 MG/DL (ref 0.2–1)
BUN SERPL-MCNC: 21 MG/DL (ref 5–25)
CALCIUM SERPL-MCNC: 9.2 MG/DL (ref 8.4–10.2)
CHLORIDE SERPL-SCNC: 104 MMOL/L (ref 96–108)
CO2 SERPL-SCNC: 26 MMOL/L (ref 21–32)
CREAT SERPL-MCNC: 1.01 MG/DL (ref 0.6–1.3)
EOSINOPHIL # BLD AUTO: 0.32 THOUSAND/ÂΜL (ref 0–0.61)
EOSINOPHIL NFR BLD AUTO: 4 % (ref 0–6)
ERYTHROCYTE [DISTWIDTH] IN BLOOD BY AUTOMATED COUNT: 15.4 % (ref 11.6–15.1)
GFR SERPL CREATININE-BSD FRML MDRD: 78 ML/MIN/1.73SQ M
GLUCOSE P FAST SERPL-MCNC: 133 MG/DL (ref 65–99)
HCT VFR BLD AUTO: 52.4 % (ref 36.5–49.3)
HGB BLD-MCNC: 17.2 G/DL (ref 12–17)
IMM GRANULOCYTES # BLD AUTO: 0.05 THOUSAND/UL (ref 0–0.2)
IMM GRANULOCYTES NFR BLD AUTO: 1 % (ref 0–2)
LYMPHOCYTES # BLD AUTO: 1.13 THOUSANDS/ÂΜL (ref 0.6–4.47)
LYMPHOCYTES NFR BLD AUTO: 15 % (ref 14–44)
MCH RBC QN AUTO: 29.8 PG (ref 26.8–34.3)
MCHC RBC AUTO-ENTMCNC: 32.8 G/DL (ref 31.4–37.4)
MCV RBC AUTO: 91 FL (ref 82–98)
MONOCYTES # BLD AUTO: 1.03 THOUSAND/ÂΜL (ref 0.17–1.22)
MONOCYTES NFR BLD AUTO: 14 % (ref 4–12)
NEUTROPHILS # BLD AUTO: 4.95 THOUSANDS/ÂΜL (ref 1.85–7.62)
NEUTS SEG NFR BLD AUTO: 65 % (ref 43–75)
NRBC BLD AUTO-RTO: 0 /100 WBCS
PLATELET # BLD AUTO: 237 THOUSANDS/UL (ref 149–390)
PMV BLD AUTO: 9.9 FL (ref 8.9–12.7)
POTASSIUM SERPL-SCNC: 4.5 MMOL/L (ref 3.5–5.3)
PROT SERPL-MCNC: 7.3 G/DL (ref 6.4–8.4)
RBC # BLD AUTO: 5.77 MILLION/UL (ref 3.88–5.62)
SODIUM SERPL-SCNC: 137 MMOL/L (ref 135–147)
WBC # BLD AUTO: 7.55 THOUSAND/UL (ref 4.31–10.16)

## 2024-08-20 PROCEDURE — 82105 ALPHA-FETOPROTEIN SERUM: CPT

## 2024-08-20 PROCEDURE — 36415 COLL VENOUS BLD VENIPUNCTURE: CPT

## 2024-08-21 DIAGNOSIS — D72.89 OTHER SPECIFIED DISORDERS OF WHITE BLOOD CELLS: ICD-10-CM

## 2024-08-21 DIAGNOSIS — B20 HUMAN IMMUNODEFICIENCY VIRUS (HIV) DISEASE (HCC): ICD-10-CM

## 2024-08-21 DIAGNOSIS — Z11.1 SCREENING-PULMONARY TB: ICD-10-CM

## 2024-08-21 DIAGNOSIS — Z79.899 OTHER LONG TERM (CURRENT) DRUG THERAPY: Primary | ICD-10-CM

## 2024-08-21 DIAGNOSIS — Z11.3 ENCOUNTER FOR SCREENING FOR BACTERIAL SEXUALLY TRANSMITTED DISEASE: ICD-10-CM

## 2024-08-21 DIAGNOSIS — Z72.89 OTHER PROBLEMS RELATED TO LIFESTYLE: ICD-10-CM

## 2024-08-21 DIAGNOSIS — Z13.220 ENCOUNTER FOR LIPID SCREENING FOR CARDIOVASCULAR DISEASE: ICD-10-CM

## 2024-08-21 DIAGNOSIS — Z20.2 CONTACT WITH AND (SUSPECTED) EXPOSURE TO INFECTIONS WITH A PREDOMINANTLY SEXUAL MODE OF TRANSMISSION: ICD-10-CM

## 2024-08-21 DIAGNOSIS — Z13.1 SCREENING FOR DIABETES MELLITUS: ICD-10-CM

## 2024-08-21 DIAGNOSIS — Z13.6 ENCOUNTER FOR LIPID SCREENING FOR CARDIOVASCULAR DISEASE: ICD-10-CM

## 2024-08-21 LAB
BASOPHILS # BLD AUTO: 0.1 X10E3/UL (ref 0–0.2)
BASOPHILS NFR BLD AUTO: 1 %
CD3+CD4+ CELLS # BLD: 228 /UL (ref 359–1519)
CD3+CD4+ CELLS NFR BLD: 20.7 % (ref 30.8–58.5)
CD3+CD4+ CELLS/CD3+CD8+ CLL BLD: 0.49 % (ref 0.92–3.72)
CD3+CD8+ CELLS # BLD: 462 /UL (ref 109–897)
CD3+CD8+ CELLS NFR BLD: 42 % (ref 12–35.5)
EOSINOPHIL # BLD AUTO: 0.3 X10E3/UL (ref 0–0.4)
EOSINOPHIL NFR BLD AUTO: 4 %
ERYTHROCYTE [DISTWIDTH] IN BLOOD BY AUTOMATED COUNT: 14.4 % (ref 11.6–15.4)
HCT VFR BLD AUTO: 50.7 % (ref 37.5–51)
HGB BLD-MCNC: 17.2 G/DL (ref 13–17.7)
HIV1 RNA # PLAS NAA DL=20: NOT DETECTED {COPIES}/ML
IMM GRANULOCYTES # BLD: 0 X10E3/UL (ref 0–0.1)
IMM GRANULOCYTES NFR BLD: 0 %
LYMPHOCYTES # BLD AUTO: 1.1 X10E3/UL (ref 0.7–3.1)
LYMPHOCYTES NFR BLD AUTO: 16 %
MCH RBC QN AUTO: 30.6 PG (ref 26.6–33)
MCHC RBC AUTO-ENTMCNC: 33.9 G/DL (ref 31.5–35.7)
MCV RBC AUTO: 90 FL (ref 79–97)
MONOCYTES # BLD AUTO: 0.9 X10E3/UL (ref 0.1–0.9)
MONOCYTES NFR BLD AUTO: 13 %
NEUTROPHILS # BLD AUTO: 4.8 X10E3/UL (ref 1.4–7)
NEUTROPHILS NFR BLD AUTO: 66 %
PLATELET # BLD AUTO: 233 X10E3/UL (ref 150–450)
RBC # BLD AUTO: 5.62 X10E6/UL (ref 4.14–5.8)
WBC # BLD AUTO: 7.3 X10E3/UL (ref 3.4–10.8)

## 2024-08-22 DIAGNOSIS — B20 HUMAN IMMUNODEFICIENCY VIRUS (HIV) DISEASE (HCC): Primary | ICD-10-CM

## 2024-08-26 ENCOUNTER — PATIENT OUTREACH (OUTPATIENT)
Dept: SURGERY | Facility: CLINIC | Age: 64
End: 2024-08-26

## 2024-08-26 NOTE — PROGRESS NOTES
Akash rec'd ct MAWD August statement. Akash created check req and sent to supervisor for approval/processing.    See media

## 2024-08-27 ENCOUNTER — OFFICE VISIT (OUTPATIENT)
Dept: SURGERY | Facility: CLINIC | Age: 64
End: 2024-08-27
Payer: COMMERCIAL

## 2024-08-27 ENCOUNTER — DOCUMENTATION (OUTPATIENT)
Dept: SURGERY | Facility: CLINIC | Age: 64
End: 2024-08-27

## 2024-08-27 VITALS
BODY MASS INDEX: 31.23 KG/M2 | SYSTOLIC BLOOD PRESSURE: 113 MMHG | HEART RATE: 82 BPM | OXYGEN SATURATION: 92 % | HEIGHT: 67 IN | TEMPERATURE: 99 F | DIASTOLIC BLOOD PRESSURE: 59 MMHG | WEIGHT: 199 LBS

## 2024-08-27 DIAGNOSIS — T65.292D TOXIC EFFECT OF TOBACCO, INTENTIONAL SELF-HARM, SUBSEQUENT ENCOUNTER: ICD-10-CM

## 2024-08-27 DIAGNOSIS — R73.03 PREDIABETES: ICD-10-CM

## 2024-08-27 DIAGNOSIS — I10 ESSENTIAL HYPERTENSION: ICD-10-CM

## 2024-08-27 DIAGNOSIS — K74.69 OTHER CIRRHOSIS OF LIVER (HCC): ICD-10-CM

## 2024-08-27 DIAGNOSIS — B20 HIV DISEASE (HCC): Primary | ICD-10-CM

## 2024-08-27 DIAGNOSIS — E66.9 OBESITY (BMI 30-39.9): ICD-10-CM

## 2024-08-27 PROCEDURE — 99214 OFFICE O/P EST MOD 30 MIN: CPT | Performed by: INTERNAL MEDICINE

## 2024-08-27 NOTE — ASSESSMENT & PLAN NOTE
Well-controlled on Avalide and metoprolol.  No concerning drug interaction.  Will continue to monitor.  Discussed with the primary

## 2024-08-27 NOTE — PROGRESS NOTES
Progress Note - Infectious Disease   Mario Rodriguez 64 y.o. male MRN: [id number]  Unit/Bed#:  Encounter: [id number]      Impression/Plan:  HIV disease (HCC)  Doing reasonably well on Biktarvy with undetectable viral load and a CD4 count of 228.  Continue ART, recheck CBC with differential, CMP, HIV RNA, and CD4 in 5 months to make sure no developing toxicity or treatment failure and follow-up in 6 months.  Stressed adherence.    Other cirrhosis of liver (HCC)  Remains up-to-date with every 6-month right upper quadrant ultrasound and alpha-fetoprotein for hepatocellular carcinoma surveillance.  Follow-up with GI.    Essential hypertension  Well-controlled on Avalide and metoprolol.  No concerning drug interaction.  Will continue to monitor.  Discussed with the primary    Toxic effect of tobacco and nicotine  On nicotine replacement therapy with patches and continues to try to quit.  Stressed the importance of complete tobacco cessation.    Obesity (BMI 30-39.9)  Has lost weight since the last visit.  Continue to stressed the importance of lifestyle modification.    Prediabetes  Focusing on lifestyle modification.  Weight loss noted.  Recheck hemoglobin A1c next visit.    Patient was provided medication, adherence and prevention education    Subjective:  Routine follow-up for HIV.  Patient claims 100% adherence with Biktarvy. Patient denies any notable side effects.  Overall the feeling well.  The patient denies any fever chills or sweats, denies any nausea vomiting or diarrhea, denies any cough or shortness of breath.    ROS:  A complete review of systems is negative other than that noted above in the subjective    Followup portions patient history reviewed and updated as:  Allergies, current medications, past medical history, past social history, past surgical history, and the problem list    Objective:  Vitals:  Vitals:    08/27/24 1648   BP: 113/59   Pulse: 82   Temp: 99 °F (37.2 °C)   SpO2: 92%   Weight: 90.3 kg  "(199 lb)   Height: 5' 7\" (1.702 m)       Physical Exam:   General Appearance:  Alert, interactive, appearing well,  nontoxic, no acute distress.   Neck:   Supple without lymphadenopathy, no thyromegaly or masses   Throat: Oropharynx moist without lesions.    Lungs:   Clear to auscultation bilaterally; no wheezes, rhonchi or rales; respirations unlabored   Heart:  RRR; no murmur, rub or gallop   Abdomen:   Soft, non-tender, non-distended, positive bowel sounds.     Extremities: No clubbing, cyanosis or edema   Skin: No new rashes or lesions. No draining wounds noted.       Labs, Imaging, & Other studies:   All pertinent labs and imaging studies were personally reviewed    Lab Results   Component Value Date     03/18/2017    K 4.5 08/20/2024     08/20/2024    CO2 26 08/20/2024    BUN 21 08/20/2024    CREATININE 1.01 08/20/2024    GLUF 133 (H) 08/20/2024    CALCIUM 9.2 08/20/2024    AST 17 08/20/2024    ALT 19 08/20/2024    ALKPHOS 99 08/20/2024    PROT 7.4 03/18/2017    BILITOT 1.1 03/18/2017    EGFR 78 08/20/2024     Lab Results   Component Value Date    WBC 7.55 08/20/2024    WBC 7.3 08/20/2024    HGB 17.2 (H) 08/20/2024    HGB 17.2 08/20/2024    HCT 52.4 (H) 08/20/2024    HCT 50.7 08/20/2024    MCV 91 08/20/2024    MCV 90 08/20/2024     08/20/2024     08/20/2024     Lab Results   Component Value Date    HEPCAB Reactive (A) 02/21/2024     Lab Results   Component Value Date    HEPBCAB REACTIVE (A) 10/08/2015    HEPBCAB REACTIVE (A) 10/08/2015    HEPBCAB REACTIVE (A) 10/08/2015    HEPBCAB REACTIVE (A) 10/08/2015    HEPCAB Reactive (A) 02/21/2024     Lab Results   Component Value Date    RPR Non-Reactive 05/18/2022     CD4 ABS   Date/Time Value Ref Range Status   08/20/2024 06:39  (L) 359 - 1519 /uL Final     HIV-1 RNA by PCR, Qn   Date/Time Value Ref Range Status   02/21/2024 06:45 AM <20 copies/mL Final     Comment:     HIV-1 RNA not detected  The reportable range for this assay is 20 " to 10,000,000  copies HIV-1 RNA/mL.     HIV-1 TARGET   Date/Time Value Ref Range Status   08/20/2024 06:39 AM Not Detected Not Detected Final           Current Outpatient Medications:     albuterol (PROVENTIL HFA,VENTOLIN HFA) 90 mcg/act inhaler, Inhale 2 puffs every 4 (four) hours as needed for wheezing, Disp: 18 g, Rfl: 2    aspirin (Aspirin Low Dose) 81 mg EC tablet, Take 1 tablet (81 mg total) by mouth every morning, Disp: 30 tablet, Rfl: 5    Biktarvy -25 MG tablet, TAKE 1 TABLET BY MOUTH IN THE EVENING WITH DINNER, Disp: 30 tablet, Rfl: 2    cetirizine (ZyrTEC) 10 mg tablet, Take 1 tablet (10 mg total) by mouth every morning, Disp: 30 tablet, Rfl: 5    fluticasone (FLONASE) 50 mcg/act nasal spray, 1 spray into each nostril daily, Disp: 15.8 mL, Rfl: 2    irbesartan-hydrochlorothiazide (AVALIDE) 300-12.5 MG per tablet, Take 1 tablet by mouth every morning, Disp: 30 tablet, Rfl: 2    metoprolol succinate (TOPROL-XL) 25 mg 24 hr tablet, Take 1 tablet (25 mg total) by mouth every morning, Disp: 30 tablet, Rfl: 5    nicotine (NICODERM CQ) 21 mg/24 hr TD 24 hr patch, PLACE 1 PATCH ON THE SKIN OVER 24HRS EVERY 24HRS, Disp: 28 patch, Rfl: 2    nicotine polacrilex (COMMIT) 4 MG lozenge, Apply 1 lozenge (4 mg total) to the mouth or throat as needed for smoking cessation, Disp: 72 lozenge, Rfl: 3    pantoprazole (PROTONIX) 40 mg tablet, Take 1 tablet (40 mg total) by mouth every morning, Disp: 30 tablet, Rfl: 5    rosuvastatin (CRESTOR) 20 MG tablet, Take 1 tablet (20 mg total) by mouth every morning, Disp: 30 tablet, Rfl: 5    triamcinolone (KENALOG) 0.1 % cream, APPLY TOPICALLY TWICE A DAY, Disp: 45 g, Rfl: 0    nitroglycerin (NITROSTAT) 0.4 mg SL tablet, Place 1 tablet (0.4 mg total) under the tongue every 5 (five) minutes as needed for chest pain (Patient not taking: Reported on 8/27/2024), Disp: 15 tablet, Rfl: 0

## 2024-08-27 NOTE — ASSESSMENT & PLAN NOTE
Remains up-to-date with every 6-month right upper quadrant ultrasound and alpha-fetoprotein for hepatocellular carcinoma surveillance.  Follow-up with GI.

## 2024-08-27 NOTE — ASSESSMENT & PLAN NOTE
Has lost weight since the last visit.  Continue to stressed the importance of lifestyle modification.

## 2024-08-27 NOTE — PROGRESS NOTES
Assessment/Plan:   [facility] met with pt [facility] [place] ID clinic to complete annual PHQ-9 assessment.  Pt stated he is not having any MH issues and is doing well.  Pt was educated on BHS services available and stated he was not in need.   [facility]     Today patient present with   Chief Complaint   Patient presents with    Follow-up     Pt offers no c/o at this time     Patient would likely benefit from annual PHQ-9 assessments  Consider/focus/continue Monitoring of pts emotional , cognitive and behavioral displays of stabilty  Stage of change:   Plan/ Behavioral Recommendations: Ongoing BH interventions as per pt's coordinated care and/or pt's request for services.       There are no diagnoses linked to this encounter.      Subjective:     Patient ID: Mario Rodriguez is a 64 y.o. male.    HPI    History of Present Illness:      Patient is being seen for annual behavioral health assessment.   Patient denies current behavioral health concerns.    [unfilled]       Review of Systems      Objective:     Physical Exam      [facility]    Orientation     Person: yes    Place: yes    Time: yes    Appearance    Well Developed: yes healthy    Uncomfortable: no    Normal Body Odor: yes    Smells of Feces: no    Smells of Urine: no    Disheveled: no    Well Nourished: yes     Grooming Unkempt: no    Poor Eye Contact: no    Hirsute: no    Looks Tired: no    Acutely Exhausted: noappearance reflects stated age    Mood and Affect:     Appropriate: yes    Euthymicyes    Irritable: no    Angry: no    Anxious: no    Depressed:no    Blunted:no    Labile: no  Restricted: no    Harm to Self or Others: No SI/HI noted no signs nor symtoms noted Pt denied SI/HI    Substance Abuse: pt denies current use

## 2024-08-27 NOTE — ASSESSMENT & PLAN NOTE
Doing reasonably well on Biktarvy with undetectable viral load and a CD4 count of 228.  Continue ART, recheck CBC with differential, CMP, HIV RNA, and CD4 in 5 months to make sure no developing toxicity or treatment failure and follow-up in 6 months.  Stressed adherence.

## 2024-08-28 NOTE — PROGRESS NOTES
"[facility] Annual Nutrition Assessment    Mario seen by RD in conjunction with ID f/u    Mario  is established patient (last annual was 11/28/2023)    PMHx:  cirrhosis of the liver, GERD, HTN, HLD, HIV, prediabetes       Clinical Data/Client History    CD4 count:  228  Viral load:  <20  ART:   Biktarvy    , Patient Navigator: Praveena Hunt: N/A    Food assistance:  N/A    Living situation: House or apartment     Psychosocial factors:  Not discussed during session     Mobility:  self ambulates    Physical activity: golf's often & has physical job      Oral health concerns: wears dentures denied oral health concerns       Typical food/beverage intake:    Breakfast boiled eggs, banana, 1/2 bagel   Lunch ham sandwich with vegetables   Dinner chicken, rice, zucchini   Snacks fruit  Beverages water & coffee    Appetite: Excellent    OTC vitamin, mineral, herbal supplements: N/A    Oral/enteral nutrition supplements:  N/A    GI problems: denied n/v/d/c    Food allergies/intolerances:  N/A    Weight history:  Wt Readings from Last 3 Encounters:   08/27/24 90.3 kg (199 lb)   05/21/24 91 kg (200 lb 9.6 oz)   02/27/24 94.4 kg (208 lb 3.2 oz)     Last years annual weight 204(11/28/23)    Current body weight:  199  Height:  67\"  BMI:  31(overweight)  IBW:  148  %IBW  134%    Weight change: Weight is with in normal trend for client     Nutrition-related labs:    Lab Results   Component Value Date    HGBA1C 6.8 (H) 02/21/2024     Lab Results   Component Value Date    SODIUM 137 08/20/2024    K 4.5 08/20/2024     08/20/2024    CO2 26 08/20/2024    BUN 21 08/20/2024    CREATININE 1.01 08/20/2024    GLUC 128 06/11/2024    CALCIUM 9.2 08/20/2024      Lab Results   Component Value Date    CHOLESTEROL 127 02/21/2024    CHOLESTEROL 129 01/30/2023    CHOLESTEROL 125 10/27/2022     Lab Results   Component Value Date    HDL 34 (L) 02/21/2024    HDL 35 (L) 01/30/2023    HDL 34 (L) 10/27/2022     Lab Results " "  Component Value Date    TRIG 173 (H) 02/21/2024    TRIG 176 (H) 01/30/2023    TRIG 117 10/27/2022     No results found for: \"NONHDLC\"      Current medications:   Nicoderm, crestor, protonix, irbesartan    Physical findings/skin integrity:  Skin intact       Nutrition Diagnosis    Problem: inconsistent CHO intake     Related to: food and nutrition related knowledge deficit     As Evidenced By: abnormal labs (A1C)      Estimated Nutritional Needs    [facility] REE: CBW     ~2005-500=1506 kcal (based on: [place] with 1.2 stress factor)  ~76 protein (based on: .08)  ~2400ml fluid (based on: 25ml/kg/day)      Current intake estimation: exceeds needs       Nutrition Intervention/Recommendations    Nutrition education intervention: reinforced previous education     Nutrition recommendations: continue to replace high calorie/sugar snacks with fruit & healthy alternatives     Supplement recommendations: No supplement recommendations at this time      Teaching Method: verbal     Person Educated: patient      Comprehension: excellent    Receptivity: excellent    Expected compliance: excellent      Collaboration/referral of nutrition care:   N/A      Goals:  Continue to lower A1C  Replace high calorie/sugar snacks with healthy options   No significant weight changes     Monitoring/Evaluation:  Will continue to monitor labs, weight trend, appetite, goals and need for nutrition education.     Visit Summary  Education provided in the past on diabetic diet. No new A1C since education provided. Ct has made healthy snack changes & feels good about his diet. Weight is stable. Ct has no nutrition questions or concerns.   -Buffy Blanc RDN,LDN     "

## 2024-09-12 DIAGNOSIS — T65.292D TOXIC EFFECT OF TOBACCO, INTENTIONAL SELF-HARM, SUBSEQUENT ENCOUNTER: ICD-10-CM

## 2024-09-19 ENCOUNTER — PATIENT OUTREACH (OUTPATIENT)
Dept: SURGERY | Facility: CLINIC | Age: 64
End: 2024-09-19

## 2024-09-23 ENCOUNTER — PATIENT OUTREACH (OUTPATIENT)
Dept: SURGERY | Facility: CLINIC | Age: 64
End: 2024-09-23

## 2024-09-23 NOTE — PROGRESS NOTES
Ct wife emailed Akash ct September premium. Cm created check req and sent to supervisor for approval/processing.    See media

## 2024-10-01 ENCOUNTER — TELEPHONE (OUTPATIENT)
Dept: SURGERY | Facility: CLINIC | Age: 64
End: 2024-10-01

## 2024-10-01 NOTE — TELEPHONE ENCOUNTER
I left pt a message because we need to change is 11/19 appointment with Angie. I offered him 12/17 because he needs the late Tuesday.

## 2024-10-02 DIAGNOSIS — R05.9 COUGH: ICD-10-CM

## 2024-10-04 ENCOUNTER — PATIENT OUTREACH (OUTPATIENT)
Dept: SURGERY | Facility: CLINIC | Age: 64
End: 2024-10-04

## 2024-10-08 RX ORDER — FLUTICASONE PROPIONATE 50 MCG
1 SPRAY, SUSPENSION (ML) NASAL DAILY
Qty: 16 G | Refills: 2 | Status: SHIPPED | OUTPATIENT
Start: 2024-10-08

## 2024-10-21 ENCOUNTER — PATIENT OUTREACH (OUTPATIENT)
Dept: SURGERY | Facility: CLINIC | Age: 64
End: 2024-10-21

## 2024-10-21 NOTE — PROGRESS NOTES
Akash rec'd ct October [facility] premium. Cm created check req and sent to supervisor for approval/processing.    See media

## 2024-10-23 ENCOUNTER — CLINICAL SUPPORT (OUTPATIENT)
Dept: SURGERY | Facility: CLINIC | Age: 64
End: 2024-10-23
Payer: COMMERCIAL

## 2024-10-23 DIAGNOSIS — Z29.11 NEED FOR RSV IMMUNIZATION: ICD-10-CM

## 2024-10-23 DIAGNOSIS — Z23 NEED FOR COVID-19 VACCINE: Primary | ICD-10-CM

## 2024-10-23 DIAGNOSIS — Z23 NEED FOR INFLUENZA VACCINATION: ICD-10-CM

## 2024-10-23 PROCEDURE — 90673 RIV3 VACCINE NO PRESERV IM: CPT

## 2024-10-23 PROCEDURE — 90471 IMMUNIZATION ADMIN: CPT

## 2024-10-23 PROCEDURE — 91320 SARSCV2 VAC 30MCG TRS-SUC IM: CPT

## 2024-10-23 PROCEDURE — 90480 ADMN SARSCOV2 VAC 1/ONLY CMP: CPT

## 2024-11-05 DIAGNOSIS — T65.292D TOXIC EFFECT OF TOBACCO, INTENTIONAL SELF-HARM, SUBSEQUENT ENCOUNTER: ICD-10-CM

## 2024-11-05 DIAGNOSIS — I25.10 CORONARY ARTERY DISEASE INVOLVING NATIVE CORONARY ARTERY OF NATIVE HEART WITHOUT ANGINA PECTORIS: ICD-10-CM

## 2024-11-05 DIAGNOSIS — J30.2 SEASONAL ALLERGIES: ICD-10-CM

## 2024-11-05 DIAGNOSIS — E78.2 MIXED HYPERLIPIDEMIA: ICD-10-CM

## 2024-11-05 DIAGNOSIS — K21.9 GASTROESOPHAGEAL REFLUX DISEASE WITHOUT ESOPHAGITIS: ICD-10-CM

## 2024-11-05 DIAGNOSIS — I10 ESSENTIAL HYPERTENSION: ICD-10-CM

## 2024-11-05 DIAGNOSIS — B20 HIV DISEASE (HCC): ICD-10-CM

## 2024-11-05 DIAGNOSIS — I25.2 HISTORY OF NON-ST ELEVATION MYOCARDIAL INFARCTION (NSTEMI): ICD-10-CM

## 2024-11-06 RX ORDER — ROSUVASTATIN CALCIUM 20 MG/1
20 TABLET, COATED ORAL EVERY MORNING
Qty: 30 TABLET | Refills: 5 | Status: SHIPPED | OUTPATIENT
Start: 2024-11-06

## 2024-11-06 RX ORDER — BICTEGRAVIR SODIUM, EMTRICITABINE, AND TENOFOVIR ALAFENAMIDE FUMARATE 50; 200; 25 MG/1; MG/1; MG/1
TABLET ORAL
Qty: 30 TABLET | Refills: 2 | Status: SHIPPED | OUTPATIENT
Start: 2024-11-06

## 2024-11-06 RX ORDER — NICOTINE 21 MG/24HR
PATCH, TRANSDERMAL 24 HOURS TRANSDERMAL
Qty: 28 PATCH | Refills: 2 | Status: SHIPPED | OUTPATIENT
Start: 2024-11-06

## 2024-11-06 RX ORDER — METOPROLOL SUCCINATE 25 MG/1
25 TABLET, EXTENDED RELEASE ORAL EVERY MORNING
Qty: 30 TABLET | Refills: 5 | Status: SHIPPED | OUTPATIENT
Start: 2024-11-06

## 2024-11-06 RX ORDER — CETIRIZINE HYDROCHLORIDE 10 MG/1
10 TABLET ORAL EVERY MORNING
Qty: 30 TABLET | Refills: 5 | Status: SHIPPED | OUTPATIENT
Start: 2024-11-06

## 2024-11-06 RX ORDER — PANTOPRAZOLE SODIUM 40 MG/1
40 TABLET, DELAYED RELEASE ORAL EVERY MORNING
Qty: 30 TABLET | Refills: 5 | Status: SHIPPED | OUTPATIENT
Start: 2024-11-06

## 2024-11-06 RX ORDER — ASPIRIN 81 MG/1
81 TABLET, COATED ORAL EVERY MORNING
Qty: 30 TABLET | Refills: 5 | Status: SHIPPED | OUTPATIENT
Start: 2024-11-06

## 2024-11-22 ENCOUNTER — PATIENT OUTREACH (OUTPATIENT)
Dept: SURGERY | Facility: CLINIC | Age: 64
End: 2024-11-22

## 2024-11-22 NOTE — PROGRESS NOTES
Ct called Cm and LVM stating he has been receiving mail stating he needs to enroll into Medicare by December and ct wanted to know if he would still qualify for MAWD and have Medicare and ct wanted to know if it was possible to have Medicare part A without part B as part B has the premium. Cm called Medicare s/w rep- no pt identifying information disclosed- who stated ct should contact [employer] to determine if [employer] would pay for part B premium and then call SS [phone] to enroll into Medicare as ct needs initial enrollment through  for Medicare. Ct stated he will work on this and provide Cm with update.     Ct wife emailed Cm ct MAWD premium for November. Cm created check req and sent to supervisor for approval/processing.    See media

## 2024-11-26 ENCOUNTER — PATIENT OUTREACH (OUTPATIENT)
Dept: SURGERY | Facility: CLINIC | Age: 64
End: 2024-11-26

## 2024-12-05 ENCOUNTER — PATIENT OUTREACH (OUTPATIENT)
Dept: SURGERY | Facility: CLINIC | Age: 64
End: 2024-12-05

## 2024-12-05 NOTE — PROGRESS NOTES
Cm s/w ct who stated his plan is to apply for Medicare part A this year and once he retires, he will get a part B and part C plan.     Cm mailed ct no show/missed appointment policy for [facility]

## 2024-12-16 NOTE — PATIENT INSTRUCTIONS
Patient Education     Heart Healthy Diet   General   With a heart healthy food plan, you will learn to make better food choices. This diet may help you lower your blood cholesterol level, manage your blood pressure, and lower your risk for heart problems. Smaller portions may also be helpful.  Sodium is a type of mineral found in many foods. It helps keep the balance of fluids in your body. Too much sodium can raise your blood pressure. It can also make you take on extra water. This is called edema. Pay careful attention to how much salt or sodium is in your food. You may need to avoid salt or eat foods with less sodium.  Cholesterol is a fat-like, waxy substance in your blood. It is normal to have some cholesterol in your blood because your body makes it. You also get extra cholesterol from all animal products. These are foods like meats, eggs, and dairy products. Too much cholesterol in your blood can block or damage your blood vessels. This can lead to a heart attack or stroke.  Fats in your food have calories which give energy. Not all fats are bad. Some fats are healthy, like the fat found in fish, nuts, and olive oil. These are called unsaturated fats. They help manage body functions and lower cholesterol levels. Learn about the best fats to use in your diet and where to use them. Eating too much fat may make you more likely to weigh more than is healthy. This raises your risk of many heart problems.  Fiber is found in plants. Meat and dairy products do not have fiber in them. Fiber can help you lower your unhealthy cholesterol level. You may need more water as you eat more fiber so you do not get hard stools.  What lifestyle changes are needed?   Eat a healthy diet and workout often. Try to use as many calories as you take in each day.  What changes to diet are needed?   Eat oily fish at least 2 times a week. These are fish like tuna, salmon, and mackerel.  Limit sodium to no more than 2,300 mg of sodium per  day. This is about 1 teaspoon (5 grams) of table salt. Use little or no salt when making food. Try other spices or seasoning instead.  Limit how much cholesterol you eat to less than 300 mg per day. You can do this by having lean meats. Also eat lots of fruits, vegetables, and fat-free and low-fat dairy products.  Limit how much trans fats you eat. Trans fats are found in many processed foods like stick margarine, shortening, and some fried foods. Also, lower how much hydrogenated fats you eat. They are used to make pastries, biscuits, cookies, crackers, chips, and many snack foods.  Have no more than 1 drink per day of beer, wine, and mixed drinks (alcohol).  Who should use this diet?   A heart healthy diet is good for everyone.  What foods are good to eat?   Grains: Try to eat 6 to 8 servings of whole grain, high fiber foods each day. These are whole grain bread, cereals, brown rice, or pasta.  Fruits and vegetables: Eat 4 to 5 servings each day. Try to pick many kinds and colors. Try to eat more that are fresh or frozen. Look for low sodium or salt-free if you choose canned. Rinse canned items before cooking or eating. Dried peas, beans, and lentils are also good.  Dairy: Choose low fat (1%) or fat-free milk. Eat nonfat or low-fat products.  Protein: Try to eat more low fat or lean meats like chicken and turkey. Eat less red meat and eat more fish, eggs, egg whites, and beans instead.  Fats: Use good fats found in fish, nuts, and avocados. Try using olive oil, canola oil, and low-sodium and low-fat salad dressing and mayonnaise. Use corn, safflower, sunflower, and soybean oils.  Condiments: Use low-sodium or salt-free broths, soups, soy sauce, and condiments. Pepper, herbs, spices, vinegar, lemon or lime juices are great for seasoning. Sugar, cocoa powder, honey, syrup, and jams may be eaten in small amounts.  Sweets: Low-fat, trans fat-free cookies, cakes, and pies; singh crackers; animal crackers; low-fat fig  bars; and preethi snaps.     What foods should be limited or avoided?   Grains: Salted breads, rolls, crackers, quick breads, self-rising flours, biscuit mixes, regular bread crumbs, instant hot cereals, commercially-prepared rice, pasta, stuffing mixes  Fruits and vegetables: Commercially-prepared potatoes and vegetable mixes, regular canned vegetables and juices, vegetables frozen with sauce or pickled vegetables, processed fruits with added sugar or salt  Dairy: Whole milk, malted milk, chocolate milk, buttermilk  Protein: Smoked, cured, salted, or canned meat, fish, or poultry such as newman and sausages  Fats: Cut back on solid fats like butter, lard, and margarine.  Condiments and snacks: Salted and canned peas, beans, and olives; salted snack foods; fried foods; soda, juices, or other sweetened drinks; commercially-softened water. Miso, salsa, ketchup, barbecue sauce, Worcestershire sauce, soy sauce, and teriyaki sauce are also high in salt.  Sweets: High-fat baked goods such as muffins, donuts, pastries, commercial baked goods  Helpful tips   When you go to a grocery store, have a list or a meal plan. Do not shop when you are hungry to avoid cravings for foods.  You need to know about the sodium and fat content of the food you eat. Read food labels with care. They will show you how much of each is in a serving. This amount is given as a percentage of the total amount you need each day. Reading the labels will help you make healthy food choices.     Avoid fast foods.  Watch your portions when eating out. Split an order or bring home half for another meal.     Talk to a dietitian for help.  Last Reviewed Date   2020-03-27  Consumer Information Use and Disclaimer   This generalized information is a limited summary of diagnosis, treatment, and/or medication information. It is not meant to be comprehensive and should be used as a tool to help the user understand and/or assess potential diagnostic and treatment  options. It does NOT include all information about conditions, treatments, medications, side effects, or risks that may apply to a specific patient. It is not intended to be medical advice or a substitute for the medical advice, diagnosis, or treatment of a health care provider based on the health care provider's examination and assessment of a patient’s specific and unique circumstances. Patients must speak with a health care provider for complete information about their health, medical questions, and treatment options, including any risks or benefits regarding use of medications. This information does not endorse any treatments or medications as safe, effective, or approved for treating a specific patient. UpToDate, Inc. and its affiliates disclaim any warranty or liability relating to this information or the use thereof. The use of this information is governed by the Terms of Use, available at https://www.[employer] Agencyuwer.com/en/know/clinical-effectiveness-terms   Copyright   Copyright © 2024 UpToDate, Inc. and its affiliates and/or licensors. All rights reserved.

## 2024-12-17 ENCOUNTER — OFFICE VISIT (OUTPATIENT)
Dept: SURGERY | Facility: CLINIC | Age: 64
End: 2024-12-17
Payer: COMMERCIAL

## 2024-12-17 VITALS
DIASTOLIC BLOOD PRESSURE: 64 MMHG | TEMPERATURE: 96.9 F | OXYGEN SATURATION: 96 % | SYSTOLIC BLOOD PRESSURE: 127 MMHG | HEART RATE: 96 BPM | BODY MASS INDEX: 32.46 KG/M2 | HEIGHT: 67 IN | WEIGHT: 206.8 LBS

## 2024-12-17 DIAGNOSIS — B20 HIV DISEASE (HCC): Primary | ICD-10-CM

## 2024-12-17 DIAGNOSIS — I10 ESSENTIAL HYPERTENSION: ICD-10-CM

## 2024-12-17 DIAGNOSIS — Z29.11 NEED FOR RSV IMMUNIZATION: ICD-10-CM

## 2024-12-17 DIAGNOSIS — R05.3 CHRONIC COUGH: ICD-10-CM

## 2024-12-17 PROCEDURE — 90471 IMMUNIZATION ADMIN: CPT

## 2024-12-17 PROCEDURE — 99214 OFFICE O/P EST MOD 30 MIN: CPT | Performed by: NURSE PRACTITIONER

## 2024-12-17 PROCEDURE — 90678 RSV VACC PREF BIVALENT IM: CPT

## 2024-12-17 NOTE — PROGRESS NOTES
"Name: Mario Rodriguez      : 1960      MRN: [id number]  Encounter Provider: ALLIE Villa  Encounter Date: 2024   Encounter department: [facility]  :  Assessment & Plan  HIV disease (HCC)  No results found for: \"XQ6FCDY\"  CD4 ABS   Date/Time Value Ref Range Status   2024 06:39  (L) 359 - 1519 /uL Final     HIV-1 RNA by PCR, Qn   Date/Time Value Ref Range Status   2024 06:45 AM <20 copies/mL Final     Comment:     HIV-1 RNA not detected  The reportable range for this assay is 20 to 10,000,000  copies HIV-1 RNA/mL.     HIV-1 RNA Viral Load Log   Date/Time Value Ref Range Status   2024 06:45 AM COMMENT [id number]/mL Final     Comment:     Unable to calculate result since non-numeric result obtained for  component test.         ART: Biktarvy        Denies side effects. Stressed the importance of adherence.  Continue follow up with ID clinic.       Reviewed most recent labs, including Cd4 and viral load. Discussed the risks and benefits of treatment options, instructions for management, importance of treatment adherence, and reduction of risk factor.Educated on possible  medication side effects.  Reviewed last ID visit.  Collaborated with ID to optimize medical treatment.    Counseled on routes of HIV transmission, including the risk of  infection. Emphasized that viral suppression is the best method to prevent HIV transmission.  At this time pt.denies the need for HIV testing of anyone in their life.     Total encounter time was 45 minutes. Greater then 20 minutes were spent on counseling and patient education. Pt voices understanding and agreement with treatment plan.           Need for RSV immunization    Orders:    Respiratory Syncytial Virus (RSV) vaccine (recombinant) (Abrysvo)    Essential hypertension  Hypertension Assessment  See encounter diagnosis  Discussion: stage 2  Cardiovascular risk factors: advanced age (older than 55 for men, 65 for " women), diabetes mellitus, hypertension, male gender, obesity (BMI >= 30 kg/m2), and smoking/ tobacco exposure    Hypertension Plan  Continue current treatment regimen.  Dietary sodium restriction.  Stop smoking.  Patient Education: Reviewed risks of hypertension and principles of   treatment.  Blood pressure:   BP Readings from Last 3 Encounters:   12/17/24 127/64   08/27/24 113/59   06/11/24 121/63       Continue current antihypertensive.    Educated on the following lifestyle modifications to lower BP and decrease cardiovascular risk factors.  limit alcohol intake, reduce salt in diet, maintain a healthy weight, engage in 30 minutes of cardiovascular exercise daily, and not smoke.            Chronic cough  Continues to have a chronic cough in the morning and at night.  Wheezes present in the lungs.  O2 sat 96%.  Denies dyspnea with exertion or at rest.  Previous CT showed emphysema.  Patient continues to smoke.  He is using nicotine patch but continues to struggle with cessation.  Failed therapy with Chantix due to side effects.  Discussed using Wellbutrin, but patient wishes to hold off for now.  Refer to pulmonology for additional workup.  Check chest x-ray.  Orders:    XR chest 4 + vw; Future    Ambulatory Referral to Pulmonology; Future        History of Present Illness   Mario is here today for six-month PCP follow-up of chronic conditions.  PMHx significant for HIV, HCV SVR s/p treatment, cirrhosis,HTN,hyperlipidemia, and NSTEMI in January 2019 with cardiac intervention and stent placed.  Mario is c/o continued follow-up call.  Cough is worse at night.  He continues to smoke cigarettes.  Emphysema was present on CT.  He denies fever, congestion, dyspnea on exertion or at rest.      Mario CARRASQUILLO Michael is a 64 y.o. male who presents for follow-up with primary care  History obtained from: patient    Review of Systems   Constitutional:  Negative for chills and fever.   HENT:  Negative for ear pain and sore throat.     Eyes:  Negative for pain and visual disturbance.   Respiratory:  Negative for cough and shortness of breath.    Cardiovascular:  Negative for chest pain and palpitations.   Gastrointestinal:  Negative for abdominal pain and vomiting.   Genitourinary:  Negative for dysuria and hematuria.   Musculoskeletal:  Negative for arthralgias and back pain.   Skin:  Negative for color change and rash.   Neurological:  Negative for seizures and syncope.   All other systems reviewed and are negative.    Medical History Reviewed by provider this encounter:  Tobacco  Allergies  Meds  Problems  Med Hx  Surg Hx  Fam Hx     .  Past Medical History   Past Medical History:   Diagnosis Date    HIV (human immunodeficiency virus infection) (HCC)     Hypertension     Opioid dependence (HCC)      Past Surgical History:   Procedure Laterality Date    CARDIAC SURGERY      LUMBAR LAMINECTOMY      STOMACH SURGERY       Family History   Problem Relation Age of Onset    Alzheimer's disease Mother     Heart attack Father     Prostate cancer Brother       reports that he has been smoking cigars. He has never used smokeless tobacco. He reports that he does not currently use alcohol. He reports that he does not use drugs.  Current Outpatient Medications on File Prior to Visit   Medication Sig Dispense Refill    albuterol (PROVENTIL HFA,VENTOLIN HFA) 90 mcg/act inhaler Inhale 2 puffs every 4 (four) hours as needed for wheezing 18 g 2    Aspirin Low Dose 81 MG EC tablet TAKE 1 TABLET BY MOUTH IN THE MORNING 30 tablet 5    Biktarvy -25 MG tablet TAKE 1 TABLET BY MOUTH IN THE EVENING WITH DINNER 30 tablet 2    cetirizine (ZyrTEC) 10 mg tablet TAKE 1 TABLET BY MOUTH IN THE MORNING 30 tablet 5    fluticasone (FLONASE) 50 mcg/act nasal spray USE 1 SPRAY IN EACH NOSTRIL DAILY 16 g 2    irbesartan-hydrochlorothiazide (AVALIDE) 300-12.5 MG per tablet Take 1 tablet by mouth every morning 30 tablet 2    metoprolol succinate (TOPROL-XL) 25 mg 24 hr  tablet TAKE 1 TABLET BY MOUTH IN THE MORNING 30 tablet 5    nicotine (NICODERM CQ) 21 mg/24 hr TD 24 hr patch PLACE 1 PATCH ON THE SKIN OVER 24HRS EVERY 24HRS 28 patch 2    nicotine polacrilex (COMMIT) 4 MG lozenge APPLY 1 NII TO MOUTH/THROAT AS NEEDED FOR SMOKING CESSATION 72 lozenge 3    nitroglycerin (NITROSTAT) 0.4 mg SL tablet Place 1 tablet (0.4 mg total) under the tongue every 5 (five) minutes as needed for chest pain 15 tablet 0    pantoprazole (PROTONIX) 40 mg tablet TAKE 1 TABLET BY MOUTH IN THE MORNING 30 tablet 5    rosuvastatin (CRESTOR) 20 MG tablet TAKE 1 TABLET BY MOUTH IN THE MORNING 30 tablet 5    triamcinolone (KENALOG) 0.1 % cream APPLY TOPICALLY TWICE A DAY 45 g 0     No current facility-administered medications on file prior to visit.   No Known Allergies   Current Outpatient Medications on File Prior to Visit   Medication Sig Dispense Refill    albuterol (PROVENTIL HFA,VENTOLIN HFA) 90 mcg/act inhaler Inhale 2 puffs every 4 (four) hours as needed for wheezing 18 g 2    Aspirin Low Dose 81 MG EC tablet TAKE 1 TABLET BY MOUTH IN THE MORNING 30 tablet 5    Biktarvy -25 MG tablet TAKE 1 TABLET BY MOUTH IN THE EVENING WITH DINNER 30 tablet 2    cetirizine (ZyrTEC) 10 mg tablet TAKE 1 TABLET BY MOUTH IN THE MORNING 30 tablet 5    fluticasone (FLONASE) 50 mcg/act nasal spray USE 1 SPRAY IN EACH NOSTRIL DAILY 16 g 2    irbesartan-hydrochlorothiazide (AVALIDE) 300-12.5 MG per tablet Take 1 tablet by mouth every morning 30 tablet 2    metoprolol succinate (TOPROL-XL) 25 mg 24 hr tablet TAKE 1 TABLET BY MOUTH IN THE MORNING 30 tablet 5    nicotine (NICODERM CQ) 21 mg/24 hr TD 24 hr patch PLACE 1 PATCH ON THE SKIN OVER 24HRS EVERY 24HRS 28 patch 2    nicotine polacrilex (COMMIT) 4 MG lozenge APPLY 1 NII TO MOUTH/THROAT AS NEEDED FOR SMOKING CESSATION 72 lozenge 3    nitroglycerin (NITROSTAT) 0.4 mg SL tablet Place 1 tablet (0.4 mg total) under the tongue every 5 (five) minutes as needed for chest  "pain 15 tablet 0    pantoprazole (PROTONIX) 40 mg tablet TAKE 1 TABLET BY MOUTH IN THE MORNING 30 tablet 5    rosuvastatin (CRESTOR) 20 MG tablet TAKE 1 TABLET BY MOUTH IN THE MORNING 30 tablet 5    triamcinolone (KENALOG) 0.1 % cream APPLY TOPICALLY TWICE A DAY 45 g 0     No current facility-administered medications on file prior to visit.      Social History     Tobacco Use    Smoking status: Every Day     Types: Cigars, Cigarettes    Smokeless tobacco: Never    Tobacco comments:     2 cigars/week   Vaping Use    Vaping status: Never Used   Substance and Sexual Activity    Alcohol use: Not Currently     Comment: rarely    Drug use: No    Sexual activity: Yes     Partners: Female     Birth control/protection: Condom Male        Objective   /64 (BP Location: Right arm, Patient Position: Sitting, Cuff Size: Standard)   Pulse 96   Temp (!) 96.9 °F (36.1 °C)   Ht 5' 7\" (1.702 m)   Wt 93.8 kg (206 lb 12.8 oz)   SpO2 96%   BMI 32.39 kg/m²      Physical Exam  Vitals and nursing note reviewed.   Constitutional:       General: He is not in acute distress.     Appearance: He is well-developed.   HENT:      Head: Normocephalic and atraumatic.   Eyes:      Conjunctiva/sclera: Conjunctivae normal.   Cardiovascular:      Rate and Rhythm: Normal rate and regular rhythm.      Heart sounds: No murmur heard.  Pulmonary:      Effort: Pulmonary effort is normal. No respiratory distress.      Breath sounds: Normal breath sounds.   Abdominal:      Palpations: Abdomen is soft.      Tenderness: There is no abdominal tenderness.   Musculoskeletal:         General: No swelling.      Cervical back: Neck supple.   Skin:     General: Skin is warm and dry.      Capillary Refill: Capillary refill takes less than 2 seconds.   Neurological:      Mental Status: He is alert.   Psychiatric:         Mood and Affect: Mood normal.           "

## 2024-12-17 NOTE — ASSESSMENT & PLAN NOTE
Hypertension Assessment  See encounter diagnosis  Discussion: stage 2  Cardiovascular risk factors: advanced age (older than 55 for men, 65 for women), diabetes mellitus, hypertension, male gender, obesity (BMI >= 30 kg/m2), and smoking/ tobacco exposure    Hypertension Plan  Continue current treatment regimen.  Dietary sodium restriction.  Stop smoking.  Patient Education: Reviewed risks of hypertension and principles of   treatment.  Blood pressure:   BP Readings from Last 3 Encounters:   12/17/24 127/64   08/27/24 113/59   06/11/24 121/63       Continue current antihypertensive.    Educated on the following lifestyle modifications to lower BP and decrease cardiovascular risk factors.  limit alcohol intake, reduce salt in diet, maintain a healthy weight, engage in 30 minutes of cardiovascular exercise daily, and not smoke.

## 2024-12-17 NOTE — ASSESSMENT & PLAN NOTE
"No results found for: \"[id number]\"  CD4 ABS   Date/Time Value Ref Range Status   2024 06:39  (L) 359 - 1519 /uL Final     HIV-1 RNA by PCR, Qn   Date/Time Value Ref Range Status   2024 06:45 AM <20 copies/mL Final     Comment:     HIV-1 RNA not detected  The reportable range for this assay is 20 to 10,000,000  copies HIV-1 RNA/mL.     HIV-1 RNA Viral Load Log   Date/Time Value Ref Range Status   2024 06:45 AM COMMENT [id number]/mL Final     Comment:     Unable to calculate result since non-numeric result obtained for  component test.         ART: Biktarvy        Denies side effects. Stressed the importance of adherence.  Continue follow up with ID clinic.       Reviewed most recent labs, including Cd4 and viral load. Discussed the risks and benefits of treatment options, instructions for management, importance of treatment adherence, and reduction of risk factor.Educated on possible  medication side effects.  Reviewed last ID visit.  Collaborated with ID to optimize medical treatment.    Counseled on routes of HIV transmission, including the risk of  infection. Emphasized that viral suppression is the best method to prevent HIV transmission.  At this time pt.denies the need for HIV testing of anyone in their life.     Total encounter time was 45 minutes. Greater then 20 minutes were spent on counseling and patient education. Pt voices understanding and agreement with treatment plan.           "

## 2024-12-24 ENCOUNTER — PATIENT OUTREACH (OUTPATIENT)
Dept: SURGERY | Facility: CLINIC | Age: 64
End: 2024-12-24

## 2024-12-24 NOTE — PROGRESS NOTES
Ct wife emailed Akash ct December MAWD premium. Cm created check req and sent to supervisor for approval/processing.      See media

## 2024-12-26 ENCOUNTER — PATIENT OUTREACH (OUTPATIENT)
Dept: SURGERY | Facility: CLINIC | Age: 64
End: 2024-12-26

## 2024-12-26 NOTE — PROGRESS NOTES
Cm texted ct to confirm he rec'd new [facility] no show/missed appt policy and Cm asked ct if he had any questions regarding new policy. Ct confirmed he rec'd new policy and ct states he has no questions regarding new policy.

## 2025-01-08 ENCOUNTER — TELEPHONE (OUTPATIENT)
Dept: SURGERY | Facility: CLINIC | Age: 65
End: 2025-01-08

## 2025-01-08 NOTE — TELEPHONE ENCOUNTER
They will not accept a letter unless he has a medical reason that prevents him from serving. Unfortunately he does not have one.

## 2025-01-08 NOTE — TELEPHONE ENCOUNTER
Pt called and asked if we would be able to assist him in getting excused from jury duty. 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

## 2025-01-20 DIAGNOSIS — I10 PRIMARY HYPERTENSION: ICD-10-CM

## 2025-01-20 RX ORDER — IRBESARTAN AND HYDROCHLOROTHIAZIDE 300; 12.5 MG/1; MG/1
1 TABLET, FILM COATED ORAL EVERY MORNING
Qty: 30 TABLET | Refills: 2 | Status: SHIPPED | OUTPATIENT
Start: 2025-01-20

## 2025-01-21 ENCOUNTER — TELEPHONE (OUTPATIENT)
Age: 65
End: 2025-01-21

## 2025-01-21 ENCOUNTER — PATIENT OUTREACH (OUTPATIENT)
Dept: SURGERY | Facility: CLINIC | Age: 65
End: 2025-01-21

## 2025-01-21 NOTE — TELEPHONE ENCOUNTER
Patient is returning Keke's call from 12/31/2024 about his referral and getting him scheduled. I advised patient of this. Patient asked if he should get the chest x-ray done ordered by his PCP. I advised that he could if he wanted to but it is not needed. Once he meets with the pulmonary provider, t hey will order any testing they need for the patient to complete.      Thank you.

## 2025-01-21 NOTE — PROGRESS NOTES
Cm rec'd ct [employer]WD January premium. Cm created check req and sent to supervisor for approval/processing.    See media

## 2025-01-28 ENCOUNTER — PATIENT OUTREACH (OUTPATIENT)
Dept: SURGERY | Facility: CLINIC | Age: 65
End: 2025-01-28

## 2025-02-05 ENCOUNTER — PATIENT OUTREACH (OUTPATIENT)
Dept: SURGERY | Facility: CLINIC | Age: 65
End: 2025-02-05

## 2025-02-11 ENCOUNTER — PATIENT OUTREACH (OUTPATIENT)
Dept: SURGERY | Facility: CLINIC | Age: 65
End: 2025-02-11

## 2025-02-11 NOTE — PROGRESS NOTES
Ct called Akash stating he rec'd letter in the mail from [facility] stating they never rec'd his DELISA with updated income. Ct stated DELISA was due 1/22 and ct went in person to [facility] to hand them DELISA with supporting docs. Cm and ct called [facility] and s/w rep who confirmed DELISA was rec'd along with updated income (December pay stubs) and [facility] has not yet processed DELISA.     Ct met with Akash in [place] office to complete recert. Ct has a complete understanding of HIV disease process, transmission, and medications. Ct takes Biktarvy daily. Ct states he has an upcoming appt with Dr Miranda next month. Ct is able to meet his own basic needs. Ct has consistent and reliable transportation.Ct has active [employer] insurance. Ct is independent and can fu on referrals as needed. Ct has stable housing. Ct states he currently has one sex partner and uses protection. Ct has dental insurance coverage and ct dentist is [facility] in [place]. Ct states his dentures are working well and he has one tooth that he was told would eventually need a root canal for, but at this time ct is not experiencing any tooth pain. Ct is not struggling with BLANCA and MH. Ct is employed full time and plans to retire next year. 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 Akash emailed BRIAN sliding fee scale to [facility] financial counselor.     See media

## 2025-02-12 ENCOUNTER — PATIENT OUTREACH (OUTPATIENT)
Dept: SURGERY | Facility: CLINIC | Age: 65
End: 2025-02-12

## 2025-02-14 DIAGNOSIS — T65.292D TOXIC EFFECT OF TOBACCO, INTENTIONAL SELF-HARM, SUBSEQUENT ENCOUNTER: ICD-10-CM

## 2025-02-14 DIAGNOSIS — B20 HIV DISEASE (HCC): ICD-10-CM

## 2025-02-19 ENCOUNTER — PATIENT OUTREACH (OUTPATIENT)
Dept: SURGERY | Facility: CLINIC | Age: 65
End: 2025-02-19

## 2025-02-20 ENCOUNTER — TELEPHONE (OUTPATIENT)
Dept: SURGERY | Facility: CLINIC | Age: 65
End: 2025-02-20

## 2025-02-21 RX ORDER — NICOTINE 21 MG/24HR
PATCH, TRANSDERMAL 24 HOURS TRANSDERMAL
Qty: 28 PATCH | Refills: 2 | Status: SHIPPED | OUTPATIENT
Start: 2025-02-21

## 2025-02-21 RX ORDER — BICTEGRAVIR SODIUM, EMTRICITABINE, AND TENOFOVIR ALAFENAMIDE FUMARATE 50; 200; 25 MG/1; MG/1; MG/1
TABLET ORAL
Qty: 30 TABLET | Refills: 2 | Status: SHIPPED | OUTPATIENT
Start: 2025-02-21

## 2025-02-24 ENCOUNTER — APPOINTMENT (OUTPATIENT)
Dept: LAB | Facility: HOSPITAL | Age: 65
End: 2025-02-24
Payer: COMMERCIAL

## 2025-02-24 LAB
ALBUMIN SERPL BCG-MCNC: 4.3 G/DL (ref 3.5–5)
ALP SERPL-CCNC: 80 U/L (ref 34–104)
ALT SERPL W P-5'-P-CCNC: 16 U/L (ref 7–52)
ANION GAP SERPL CALCULATED.3IONS-SCNC: 8 MMOL/L (ref 4–13)
AST SERPL W P-5'-P-CCNC: 17 U/L (ref 13–39)
BASOPHILS # BLD AUTO: 0.06 THOUSANDS/ÂΜL (ref 0–0.1)
BASOPHILS NFR BLD AUTO: 1 % (ref 0–1)
BILIRUB SERPL-MCNC: 1.01 MG/DL (ref 0.2–1)
BUN SERPL-MCNC: 18 MG/DL (ref 5–25)
CALCIUM SERPL-MCNC: 9.4 MG/DL (ref 8.4–10.2)
CHLORIDE SERPL-SCNC: 105 MMOL/L (ref 96–108)
CHOLEST SERPL-MCNC: 140 MG/DL (ref ?–200)
CO2 SERPL-SCNC: 26 MMOL/L (ref 21–32)
CREAT SERPL-MCNC: 1.12 MG/DL (ref 0.6–1.3)
EOSINOPHIL # BLD AUTO: 0.17 THOUSAND/ÂΜL (ref 0–0.61)
EOSINOPHIL NFR BLD AUTO: 2 % (ref 0–6)
ERYTHROCYTE [DISTWIDTH] IN BLOOD BY AUTOMATED COUNT: 14.6 % (ref 11.6–15.1)
EST. AVERAGE GLUCOSE BLD GHB EST-MCNC: 148 MG/DL
GFR SERPL CREATININE-BSD FRML MDRD: 69 ML/MIN/1.73SQ M
GLUCOSE P FAST SERPL-MCNC: 172 MG/DL (ref 65–99)
HBA1C MFR BLD: 6.8 %
HCT VFR BLD AUTO: 55.5 % (ref 36.5–49.3)
HDLC SERPL-MCNC: 34 MG/DL
HGB BLD-MCNC: 18.6 G/DL (ref 12–17)
IMM GRANULOCYTES # BLD AUTO: 0.03 THOUSAND/UL (ref 0–0.2)
IMM GRANULOCYTES NFR BLD AUTO: 0 % (ref 0–2)
LDLC SERPL CALC-MCNC: 82 MG/DL (ref 0–100)
LYMPHOCYTES # BLD AUTO: 2.05 THOUSANDS/ÂΜL (ref 0.6–4.47)
LYMPHOCYTES NFR BLD AUTO: 29 % (ref 14–44)
MCH RBC QN AUTO: 30.1 PG (ref 26.8–34.3)
MCHC RBC AUTO-ENTMCNC: 33.5 G/DL (ref 31.4–37.4)
MCV RBC AUTO: 90 FL (ref 82–98)
MONOCYTES # BLD AUTO: 0.81 THOUSAND/ÂΜL (ref 0.17–1.22)
MONOCYTES NFR BLD AUTO: 12 % (ref 4–12)
NEUTROPHILS # BLD AUTO: 3.95 THOUSANDS/ÂΜL (ref 1.85–7.62)
NEUTS SEG NFR BLD AUTO: 56 % (ref 43–75)
NRBC BLD AUTO-RTO: 0 /100 WBCS
PLATELET # BLD AUTO: 213 THOUSANDS/UL (ref 149–390)
PMV BLD AUTO: 9.5 FL (ref 8.9–12.7)
POTASSIUM SERPL-SCNC: 3.9 MMOL/L (ref 3.5–5.3)
PROT SERPL-MCNC: 7.4 G/DL (ref 6.4–8.4)
RBC # BLD AUTO: 6.18 MILLION/UL (ref 3.88–5.62)
SODIUM SERPL-SCNC: 139 MMOL/L (ref 135–147)
TREPONEMA PALLIDUM IGG+IGM AB [PRESENCE] IN SERUM OR PLASMA BY IMMUNOASSAY: NORMAL
TRIGL SERPL-MCNC: 122 MG/DL (ref ?–150)
WBC # BLD AUTO: 7.07 THOUSAND/UL (ref 4.31–10.16)

## 2025-02-25 LAB
BASOPHILS # BLD AUTO: 0.1 X10E3/UL (ref 0–0.2)
BASOPHILS NFR BLD AUTO: 1 %
CD3+CD4+ CELLS # BLD: 279 /UL (ref 359–1519)
CD3+CD4+ CELLS NFR BLD: 14.7 % (ref 30.8–58.5)
CD3+CD4+ CELLS/CD3+CD8+ CLL BLD: 0.25 % (ref 0.92–3.72)
CD3+CD8+ CELLS # BLD: 1112 /UL (ref 109–897)
CD3+CD8+ CELLS NFR BLD: 58.5 % (ref 12–35.5)
EOSINOPHIL # BLD AUTO: 0.2 X10E3/UL (ref 0–0.4)
EOSINOPHIL NFR BLD AUTO: 2 %
ERYTHROCYTE [DISTWIDTH] IN BLOOD BY AUTOMATED COUNT: 13.9 % (ref 11.6–15.4)
GAMMA INTERFERON BACKGROUND BLD IA-ACNC: 0.06 IU/ML
HCT VFR BLD AUTO: 52 % (ref 37.5–51)
HGB BLD-MCNC: 17.4 G/DL (ref 13–17.7)
IMM GRANULOCYTES # BLD: 0 X10E3/UL (ref 0–0.1)
IMM GRANULOCYTES NFR BLD: 0 %
LYMPHOCYTES # BLD AUTO: 1.9 X10E3/UL (ref 0.7–3.1)
LYMPHOCYTES NFR BLD AUTO: 26 %
M TB IFN-G BLD-IMP: NEGATIVE
M TB IFN-G CD4+ BCKGRND COR BLD-ACNC: 0 IU/ML
M TB IFN-G CD4+ BCKGRND COR BLD-ACNC: 0.01 IU/ML
MCH RBC QN AUTO: 30 PG (ref 26.6–33)
MCHC RBC AUTO-ENTMCNC: 33.5 G/DL (ref 31.5–35.7)
MCV RBC AUTO: 90 FL (ref 79–97)
MITOGEN IGNF BCKGRD COR BLD-ACNC: 9.94 IU/ML
MONOCYTES # BLD AUTO: 0.8 X10E3/UL (ref 0.1–0.9)
MONOCYTES NFR BLD AUTO: 12 %
NEUTROPHILS # BLD AUTO: 4.2 X10E3/UL (ref 1.4–7)
NEUTROPHILS NFR BLD AUTO: 59 %
PLATELET # BLD AUTO: 230 X10E3/UL (ref 150–450)
RBC # BLD AUTO: 5.8 X10E6/UL (ref 4.14–5.8)
WBC # BLD AUTO: 7.1 X10E3/UL (ref 3.4–10.8)

## 2025-02-26 LAB — HIV1 RNA # PLAS NAA DL=20: NOT DETECTED {COPIES}/ML

## 2025-03-03 DIAGNOSIS — B20 HUMAN IMMUNODEFICIENCY VIRUS (HIV) DISEASE (HCC): Primary | ICD-10-CM

## 2025-03-04 ENCOUNTER — OFFICE VISIT (OUTPATIENT)
Dept: SURGERY | Facility: CLINIC | Age: 65
End: 2025-03-04
Payer: COMMERCIAL

## 2025-03-04 VITALS
SYSTOLIC BLOOD PRESSURE: 134 MMHG | HEART RATE: 77 BPM | WEIGHT: 206 LBS | TEMPERATURE: 96 F | BODY MASS INDEX: 32.26 KG/M2 | OXYGEN SATURATION: 95 % | DIASTOLIC BLOOD PRESSURE: 71 MMHG

## 2025-03-04 DIAGNOSIS — B20 HIV DISEASE (HCC): ICD-10-CM

## 2025-03-04 DIAGNOSIS — T65.292D TOXIC EFFECT OF TOBACCO, INTENTIONAL SELF-HARM, SUBSEQUENT ENCOUNTER: ICD-10-CM

## 2025-03-04 DIAGNOSIS — R73.03 PREDIABETES: ICD-10-CM

## 2025-03-04 DIAGNOSIS — K74.69 OTHER CIRRHOSIS OF LIVER (HCC): ICD-10-CM

## 2025-03-04 DIAGNOSIS — E66.9 OBESITY (BMI 30-39.9): ICD-10-CM

## 2025-03-04 DIAGNOSIS — I10 ESSENTIAL HYPERTENSION: ICD-10-CM

## 2025-03-04 DIAGNOSIS — Z23 NEED FOR SHINGLES VACCINE: Primary | ICD-10-CM

## 2025-03-04 DIAGNOSIS — D75.1 POLYCYTHEMIA: ICD-10-CM

## 2025-03-04 PROCEDURE — 99215 OFFICE O/P EST HI 40 MIN: CPT | Performed by: INTERNAL MEDICINE

## 2025-03-04 NOTE — ASSESSMENT & PLAN NOTE
Doing well on Biktarvy with undetectable viral load and a CD4 count of 279. Continue ART, recheck CBC with differential, CMP, HIV RNA, and CD4 in 5 months to make sure no developing toxicity or treatment failure and follow-up in 6 months.  Stressed adherence.

## 2025-03-04 NOTE — ASSESSMENT & PLAN NOTE
Continues to be polycythemic but followed by hematology oncology.  Likely all related to the patient's ongoing tobacco use.

## 2025-03-04 NOTE — ASSESSMENT & PLAN NOTE
Continue stressed the importance of complete tobacco cessation.  On nicotine replacement.  Check CT of the chest for lung cancer screening.

## 2025-03-04 NOTE — PROGRESS NOTES
Name: Mario Rodriguez      : 1960      MRN: [id number]  Encounter Provider: Jim Miranda MD  Encounter Date: 3/4/2025   Encounter department: ASC AT [facility]  :  Assessment & Plan  HIV disease (HCC)  Doing well on Biktarvy with undetectable viral load and a CD4 count of 279. Continue ART, recheck CBC with differential, CMP, HIV RNA, and CD4 in 5 months to make sure no developing toxicity or treatment failure and follow-up in 6 months.  Stressed adherence.        Other cirrhosis of liver (HCC)  Continuing every 6 months hepatocellular carcinoma with right upper quadrant ultrasound and alpha-fetoprotein.  Last time negative in August.  Recheck right upper quadrant ultrasound and alpha-fetoprotein.       Toxic effect of tobacco, intentional self-harm, subsequent encounter  Continue stressed the importance of complete tobacco cessation.  On nicotine replacement.  Check CT of the chest for lung cancer screening.       Obesity (BMI 30-39.9)  Continue stressed the importance of weight loss through lifestyle modification.       Essential hypertension  On Avalide and metoprolol without any concerning drug interactions.       Polycythemia  Continues to be polycythemic but followed by hematology oncology.  Likely all related to the patient's ongoing tobacco use.       Prediabetes  Most recent hemoglobin A1c of 6.8 which is consistent with diabetes mellitus.  Discussed with the primary who will address.       Need for shingles vaccine    Orders:    Zoster Vaccine Recombinant IM      Patient was provided medication, adherence and prevention education.    History of Present Illness   Routine follow-up for HIV.  Patient claims 100% adherence with Biktarvy. Patient denies any notable side effects.  Overall the feeling well.  The patient denies any fever chills or sweats, denies any nausea vomiting or diarrhea, denies any cough or shortness of breath.    ROS: A complete review of systems are negative other than that  noted in the HPI        Objective   /71   Pulse 77   Temp (!) 96 °F (35.6 °C)   Wt 93.4 kg (206 lb)   SpO2 95%   BMI 32.26 kg/m²     General: Alert, interactive, appearing well, nontoxic, no acute distress.  Neck: Supple without lymphadenopathy, no thyromegaly or masses  Throat: Oropharynx moist without lesions.   Lungs: Clear to auscultation bilaterally; no wheezes, rhonchi or rales; respirations unlabored  Heart: RRR; no murmur, rub or gallop  Abdomen: Soft, non-tender, non-distended, positive bowel sounds.    Extremities: No clubbing, cyanosis or edema  Skin: No new rashes or lesions. No draining wounds noted.    Lab Results: I have personally reviewed pertinent labs.  Lab Results   Component Value Date     03/18/2017    K 3.9 02/24/2025     02/24/2025    CO2 26 02/24/2025    BUN 18 02/24/2025    CREATININE 1.12 02/24/2025    GLUF 172 (H) 02/24/2025    CALCIUM 9.4 02/24/2025    AST 17 02/24/2025    ALT 16 02/24/2025    ALKPHOS 80 02/24/2025    PROT 7.4 03/18/2017    BILITOT 1.1 03/18/2017    EGFR 69 02/24/2025     Lab Results   Component Value Date    WBC 7.07 02/24/2025    WBC 7.1 02/24/2025    HGB 18.6 (H) 02/24/2025    HGB 17.4 02/24/2025    HCT 55.5 (H) 02/24/2025    HCT 52.0 (H) 02/24/2025    MCV 90 02/24/2025    MCV 90 02/24/2025     02/24/2025     02/24/2025     Lab Results   Component Value Date    HEPCAB Reactive (A) 02/21/2024     Lab Results   Component Value Date    HEPBCAB REACTIVE (A) 10/08/2015    HEPBCAB REACTIVE (A) 10/08/2015    HEPBCAB REACTIVE (A) 10/08/2015    HEPBCAB REACTIVE (A) 10/08/2015    HEPCAB Reactive (A) 02/21/2024     Lab Results   Component Value Date    RPR Non-Reactive 05/18/2022     CD4 ABS   Date/Time Value Ref Range Status   02/24/2025 07:05  (L) 359 - 1519 /uL Final     HIV-1 RNA by PCR, Qn   Date/Time Value Ref Range Status   02/21/2024 06:45 AM <20 copies/mL Final     Comment:     HIV-1 RNA not detected  The reportable range for  this assay is 20 to 10,000,000  copies HIV-1 RNA/mL.     HIV-1 TARGET   Date/Time Value Ref Range Status   02/24/2025 07:05 AM Not Detected Not Detected Final

## 2025-03-04 NOTE — ASSESSMENT & PLAN NOTE
Continuing every 6 months hepatocellular carcinoma with right upper quadrant ultrasound and alpha-fetoprotein.  Last time negative in August.  Recheck right upper quadrant ultrasound and alpha-fetoprotein.

## 2025-03-04 NOTE — ASSESSMENT & PLAN NOTE
Most recent hemoglobin A1c of 6.8 which is consistent with diabetes mellitus.  Discussed with the primary who will address.

## 2025-03-21 ENCOUNTER — PATIENT OUTREACH (OUTPATIENT)
Dept: SURGERY | Facility: CLINIC | Age: 65
End: 2025-03-21

## 2025-03-21 NOTE — PROGRESS NOTES
Ct wife emailed Akash ct March MAWD premium. Cm created referral for service and sent to supervisor. Supervisor approved referral for service and provided number [id number]. Cm then created check req and sent to supervisor for approval/processing.    See media

## 2025-03-25 ENCOUNTER — PATIENT OUTREACH (OUTPATIENT)
Dept: SURGERY | Facility: CLINIC | Age: 65
End: 2025-03-25

## 2025-03-25 NOTE — PROGRESS NOTES
Ct wife-Poly- called ct stating she rec'd call from MAWD stating ct will no longer receive MAWD once he turns 65 (in June). Cm called Medicare-no pt identifying information disclosed- ct will enroll into Medicare by going through [facility]. Ct can apply online, in person, or have paper application mailed to him. Poly stated she will plan to go with ct to [facility] office to s/w someone directly to ask all questions. Cm then explained that once there is a paper notice of ct no longer receiving MAWD, Cm can apply ct for SPBP which will cover medications and labs. Cm also notified Poly that [facility]- - is a resource if ct decides to enroll in advantage plan to supplement standard Medicare and Cm texted Poly phone number for Louise (Ne's assistant). Cm notified Poly that [facility] is not associated with [facility] or [facility]. Poly understood and stated she will continue to update Cm through this process.

## 2025-03-31 ENCOUNTER — PATIENT OUTREACH (OUTPATIENT)
Dept: SURGERY | Facility: CLINIC | Age: 65
End: 2025-03-31

## 2025-03-31 ENCOUNTER — PATIENT OUTREACH (OUTPATIENT)
Dept: SURGERY | Facility: OTHER | Age: 65
End: 2025-03-31

## 2025-03-31 NOTE — PROGRESS NOTES
Ct stated he and his wife went in person to [facility] to s/w someone directly regarding ct enrolling into Medicare A/B for the first time. Ct states he still has time to enroll into Medicare A/B. Ct confirmed with Cm that he will still be able to receive services from [facility] once he turns 65, Cm stated yes. Ct asked Cm if [facility] is able to assist with Medicare part B premium ($500 every 3 months) Cm stated no. Cm stated ct can apply for assistance through [facility] for part B premium. Cm stated once ct is enrolled into Medicare A/B, ct can apply for SPBP as well for assistance with medications and labs. Ct stated he believes he has had SPBP in the past. Ct stated he will notify Cm once he enrolls into Medicare A/B.

## 2025-03-31 NOTE — PROGRESS NOTES
Cm had Ct discussion with assigned Cm regarding Ct applying for help paying Medicare Part B premiums through [facility], once Ct transitions to Medicare.

## 2025-04-08 ENCOUNTER — PATIENT OUTREACH (OUTPATIENT)
Dept: SURGERY | Facility: CLINIC | Age: 65
End: 2025-04-08

## 2025-04-14 ENCOUNTER — OFFICE VISIT (OUTPATIENT)
Dept: SURGERY | Facility: CLINIC | Age: 65
End: 2025-04-14
Payer: COMMERCIAL

## 2025-04-14 VITALS
HEIGHT: 67 IN | WEIGHT: 206.01 LBS | SYSTOLIC BLOOD PRESSURE: 117 MMHG | OXYGEN SATURATION: 93 % | BODY MASS INDEX: 32.33 KG/M2 | DIASTOLIC BLOOD PRESSURE: 66 MMHG | HEART RATE: 80 BPM | TEMPERATURE: 96.7 F

## 2025-04-14 DIAGNOSIS — Z12.5 SCREENING FOR PROSTATE CANCER: ICD-10-CM

## 2025-04-14 DIAGNOSIS — Z00.00 ANNUAL PHYSICAL EXAM: ICD-10-CM

## 2025-04-14 DIAGNOSIS — H61.23 IMPACTED CERUMEN OF BOTH EARS: Primary | ICD-10-CM

## 2025-04-14 DIAGNOSIS — E66.9 OBESITY (BMI 30-39.9): ICD-10-CM

## 2025-04-14 DIAGNOSIS — I10 ESSENTIAL HYPERTENSION: ICD-10-CM

## 2025-04-14 DIAGNOSIS — B20 HIV DISEASE (HCC): ICD-10-CM

## 2025-04-14 DIAGNOSIS — R73.03 PREDIABETES: ICD-10-CM

## 2025-04-14 DIAGNOSIS — T65.292D TOXIC EFFECT OF TOBACCO, INTENTIONAL SELF-HARM, SUBSEQUENT ENCOUNTER: ICD-10-CM

## 2025-04-14 PROCEDURE — 99396 PREV VISIT EST AGE 40-64: CPT | Performed by: NURSE PRACTITIONER

## 2025-04-14 NOTE — ASSESSMENT & PLAN NOTE
Laboratory:  Lab Results   Component Value Date    HGBA1C 6.8 (H) 02/24/2025    HGBA1C 5.4 03/11/2016    HGBA1C 5.4 03/11/2016    HGBA1C 5.4 03/11/2016    HGBA1C 5.4 03/11/2016       Assessment & Plan     Prediabetes.    Discussed general issues about diabetes pathophysiology and management.  Educational material distributed.  Addressed ADA diet.  Suggested low cholesterol diet.  Encouraged aerobic exercise.  Discussed adding GLP-1 if HgbA1C does not improve in 3 months..

## 2025-04-14 NOTE — ASSESSMENT & PLAN NOTE
Hypertension Assessment  See encounter diagnosis  Discussion: stage 2  Cardiovascular risk factors: advanced age (older than 55 for men, 65 for women), diabetes mellitus, dyslipidemia, hypertension, male gender, obesity (BMI >= 30 kg/m2), and smoking/ tobacco exposure    Hypertension Plan  Continue current treatment regimen.  Continue current medications.  Dietary sodium restriction.  Regular aerobic exercise.  Weight loss.  Stop smoking.  Patient Education: Reviewed risks of hypertension and principles of   treatment.  .Blood pressure:   BP Readings from Last 3 Encounters:   04/14/25 117/66   03/04/25 134/71   12/17/24 127/64       Continue current antihypertensive.    Educated on the following lifestyle modifications to lower BP and decrease cardiovascular risk factors.  limit alcohol intake, reduce salt in diet, maintain a healthy weight, engage in 30 minutes of cardiovascular exercise daily, and not smoke.

## 2025-04-14 NOTE — ASSESSMENT & PLAN NOTE
Assessment  Using NRT consistently but still struggling with complete cessation.   Plan  Counseled for greater than 15 minutes on the importance of smoking cessation.  Advised to quit.  Educated on the increased risk of heart and lung disease associated with smoking.

## 2025-04-14 NOTE — PROGRESS NOTES
"Adult Annual Physical  Name: Mario Rodriguez      : 1960      MRN: [id number]  Encounter Provider: ALLIE Villa  Encounter Date: 2025   Encounter department: ASC AT [facility]    :  Assessment & Plan  Impacted cerumen of both ears    Orders:    Ambulatory Referral to Otolaryngology; Future    Annual physical exam         Screening for prostate cancer    Orders:    PSA, Total Screen; Future    BMI 32.0-32.9,adult         HIV disease (HCC)  No results found for: \"[id number]\"  CD4 ABS   Date/Time Value Ref Range Status   2025 07:05  (L) 359 - 1519 /uL Final     HIV-1 RNA by PCR, Qn   Date/Time Value Ref Range Status   2024 06:45 AM <20 copies/mL Final     Comment:     HIV-1 RNA not detected  The reportable range for this assay is 20 to 10,000,000  copies HIV-1 RNA/mL.     HIV-1 RNA Viral Load Log   Date/Time Value Ref Range Status   2024 06:45 AM COMMENT [id number]/mL Final     Comment:     Unable to calculate result since non-numeric result obtained for  component test.         ART: Biktarvy        Denies side effects. Stressed the importance of adherence.  Continue follow up with ID clinic.       Reviewed most recent labs, including Cd4 and viral load. Discussed the risks and benefits of treatment options, instructions for management, importance of treatment adherence, and reduction of risk factor.Educated on possible  medication side effects.  Reviewed last ID visit.  Collaborated with ID to optimize medical treatment.    Counseled on routes of HIV transmission, including the risk of  infection. Emphasized that viral suppression is the best method to prevent HIV transmission.  At this time pt.denies the need for HIV testing of anyone in their life.     Total encounter time was 45 minutes. Greater then 20 minutes were spent on counseling and patient education. Pt voices understanding and agreement with treatment plan.             Essential " hypertension  Hypertension Assessment  See encounter diagnosis  Discussion: stage 2  Cardiovascular risk factors: advanced age (older than 55 for men, 65 for women), diabetes mellitus, dyslipidemia, hypertension, male gender, obesity (BMI >= 30 kg/m2), and smoking/ tobacco exposure    Hypertension Plan  Continue current treatment regimen.  Continue current medications.  Dietary sodium restriction.  Regular aerobic exercise.  Weight loss.  Stop smoking.  Patient Education: Reviewed risks of hypertension and principles of   treatment.  .Blood pressure:   BP Readings from Last 3 Encounters:   04/14/25 117/66   03/04/25 134/71   12/17/24 127/64       Continue current antihypertensive.    Educated on the following lifestyle modifications to lower BP and decrease cardiovascular risk factors.  limit alcohol intake, reduce salt in diet, maintain a healthy weight, engage in 30 minutes of cardiovascular exercise daily, and not smoke.            Toxic effect of tobacco, intentional self-harm, subsequent encounter  Assessment  Using NRT consistently but still struggling with complete cessation.   Plan  Counseled for greater than 15 minutes on the importance of smoking cessation.  Advised to quit.  Educated on the increased risk of heart and lung disease associated with smoking.           Prediabetes      Laboratory:  Lab Results   Component Value Date    HGBA1C 6.8 (H) 02/24/2025    HGBA1C 5.4 03/11/2016    HGBA1C 5.4 03/11/2016    HGBA1C 5.4 03/11/2016    HGBA1C 5.4 03/11/2016       Assessment & Plan     Prediabetes .    Discussed general issues about diabetes pathophysiology and management.  Educational material distributed.  Addressed ADA diet.  Suggested low cholesterol diet.  Encouraged aerobic exercise.  Discussed adding GLP-1 if HgbA1C does not improve in 3 months. .                 Obesity (BMI 30-39.9)         Impacted cerumen of both ears         Annual physical exam         Screening for prostate cancer         BMI  "32.0-32.9,adult                   Immunizations:  - Immunizations due: Prevnar 20 and Zoster (Shingrix)         History of Present Illness     Adult Annual Physical:  Patient presents for annual physical.     Diet and Physical Activity:  - Diet/Nutrition: frequent junk food, high fat diet and consuming 3-5 servings of fruits/vegetables daily.  - Exercise: walking.    General Health:  - Sleep: sleeps well and 4-6 hours of sleep on average.  - Hearing: normal hearing bilateral ears.  - Vision: no vision problems.  - Dental: brushes teeth twice daily.     Health:  - History of STDs: no.   - Urinary symptoms: none.     Advanced Care Planning:  - Has an advanced directive?: yes    - Has a durable medical POA?: yes      Review of Systems   Constitutional:  Negative for chills and fever.   HENT:  Negative for ear pain and sore throat.    Eyes:  Negative for pain and visual disturbance.   Respiratory:  Negative for cough and shortness of breath.    Cardiovascular:  Negative for chest pain and palpitations.   Gastrointestinal:  Negative for abdominal pain and vomiting.   Genitourinary:  Negative for dysuria and hematuria.   Musculoskeletal:  Negative for arthralgias and back pain.   Skin:  Negative for color change and rash.   Neurological:  Negative for seizures and syncope.   All other systems reviewed and are negative.        Objective   /66 (BP Location: Right arm, Patient Position: Sitting, Cuff Size: Large)   Pulse 80   Temp (!) 96.7 °F (35.9 °C) (Tympanic)   Ht 5' 7\" (1.702 m)   Wt 93.4 kg (206 lb 0.2 oz)   SpO2 93%   BMI 32.27 kg/m²     Physical Exam  Vitals and nursing note reviewed.   Constitutional:       General: He is not in acute distress.     Appearance: He is well-developed.   HENT:      Head: Normocephalic and atraumatic.   Eyes:      Conjunctiva/sclera: Conjunctivae normal.   Cardiovascular:      Rate and Rhythm: Normal rate and regular rhythm.      Heart sounds: No murmur heard.  Pulmonary: "      Effort: Pulmonary effort is normal. No respiratory distress.      Breath sounds: Normal breath sounds.   Abdominal:      Palpations: Abdomen is soft.      Tenderness: There is no abdominal tenderness.   Musculoskeletal:         General: No swelling.      Cervical back: Neck supple.   Skin:     General: Skin is warm and dry.      Capillary Refill: Capillary refill takes less than 2 seconds.   Neurological:      Mental Status: He is alert.   Psychiatric:         Mood and Affect: Mood normal.

## 2025-04-14 NOTE — ASSESSMENT & PLAN NOTE
"No results found for: \"[id number]\"  CD4 ABS   Date/Time Value Ref Range Status   2025 07:05  (L) 359 - 1519 /uL Final     HIV-1 RNA by PCR, Qn   Date/Time Value Ref Range Status   2024 06:45 AM <20 copies/mL Final     Comment:     HIV-1 RNA not detected  The reportable range for this assay is 20 to 10,000,000  copies HIV-1 RNA/mL.     HIV-1 RNA Viral Load Log   Date/Time Value Ref Range Status   2024 06:45 AM COMMENT [id number]/mL Final     Comment:     Unable to calculate result since non-numeric result obtained for  component test.         ART: Biktarvy        Denies side effects. Stressed the importance of adherence.  Continue follow up with ID clinic.       Reviewed most recent labs, including Cd4 and viral load. Discussed the risks and benefits of treatment options, instructions for management, importance of treatment adherence, and reduction of risk factor.Educated on possible  medication side effects.  Reviewed last ID visit.  Collaborated with ID to optimize medical treatment.    Counseled on routes of HIV transmission, including the risk of  infection. Emphasized that viral suppression is the best method to prevent HIV transmission.  At this time pt.denies the need for HIV testing of anyone in their life.     Total encounter time was 45 minutes. Greater then 20 minutes were spent on counseling and patient education. Pt voices understanding and agreement with treatment plan.             "

## 2025-04-14 NOTE — PATIENT INSTRUCTIONS
"Patient Education     Routine physical for adults   The Basics   Written by the doctors and editors at [facility]   What is a physical? -- A physical is a routine visit, or \"check-up,\" with your doctor. You might also hear it called a \"wellness visit\" or \"preventive visit.\"  During each visit, the doctor will:   Ask about your physical and mental health   Ask about your habits, behaviors, and lifestyle   Do an exam   Give you vaccines if needed   Talk to you about any medicines you take   Give advice about your health   Answer your questions  Getting regular check-ups is an important part of taking care of your health. It can help your doctor find and treat any problems you have. But it's also important for preventing health problems.  A routine physical is different from a \"sick visit.\" A sick visit is when you see a doctor because of a health concern or problem. Since physicals are scheduled ahead of time, you can think about what you want to ask the doctor.  How often should I get a physical? -- It depends on your age and health. In general, for people age 21 years and older:   If you are younger than 50 years, you might be able to get a physical every 3 years.   If you are 50 years or older, your doctor might recommend a physical every year.  If you have an ongoing health condition, like diabetes or high blood pressure, your doctor will probably want to see you more often.  What happens during a physical? -- In general, each visit will include:   Physical exam - The doctor or nurse will check your height, weight, heart rate, and blood pressure. They will also look at your eyes and ears. They will ask about how you are feeling and whether you have any symptoms that bother you.   Medicines - It's a good idea to bring a list of all the medicines you take to each doctor visit. Your doctor will talk to you about your medicines and answer any questions. Tell them if you are having any side effects that bother you. You " "should also tell them if you are having trouble paying for any of your medicines.   Habits and behaviors - This includes:   Your diet   Your exercise habits   Whether you smoke, drink alcohol, or use drugs   Whether you are sexually active   Whether you feel safe at home  Your doctor will talk to you about things you can do to improve your health and lower your risk of health problems. They will also offer help and support. For example, if you want to quit smoking, they can give you advice and might prescribe medicines. If you want to improve your diet or get more physical activity, they can help you with this, too.   Lab tests, if needed - The tests you get will depend on your age and situation. For example, your doctor might want to check your:   Cholesterol   Blood sugar   Iron level   Vaccines - The recommended vaccines will depend on your age, health, and what vaccines you already had. Vaccines are very important because they can prevent certain serious or deadly infections.   Discussion of screening - \"Screening\" means checking for diseases or other health problems before they cause symptoms. Your doctor can recommend screening based on your age, risk, and preferences. This might include tests to check for:   Cancer, such as breast, prostate, cervical, ovarian, colorectal, prostate, lung, or skin cancer   Sexually transmitted infections, such as chlamydia and gonorrhea   Mental health conditions like depression and anxiety  Your doctor will talk to you about the different types of screening tests. They can help you decide which screenings to have. They can also explain what the results might mean.   Answering questions - The physical is a good time to ask the doctor or nurse questions about your health. If needed, they can refer you to other doctors or specialists, too.  Adults older than 65 years often need other care, too. As you get older, your doctor will talk to you about:   How to prevent falling at " home   Hearing or vision tests   Memory testing   How to take your medicines safely   Making sure that you have the help and support you need at home  All topics are updated as new evidence becomes available and our peer review process is complete.  This topic retrieved from [employer] on: May 02, 2024.  Topic 667739 Version 1.0  Release: 32.4.3 - C32.122  © 2024 UpToDate, Inc. and/or its affiliates. All rights reserved.  Consumer Information Use and Disclaimer   Disclaimer: This generalized information is a limited summary of diagnosis, treatment, and/or medication information. It is not meant to be comprehensive and should be used as a tool to help the user understand and/or assess potential diagnostic and treatment options. It does NOT include all information about conditions, treatments, medications, side effects, or risks that may apply to a specific patient. It is not intended to be medical advice or a substitute for the medical advice, diagnosis, or treatment of a health care provider based on the health care provider's examination and assessment of a patient's specific and unique circumstances. Patients must speak with a health care provider for complete information about their health, medical questions, and treatment options, including any risks or benefits regarding use of medications. This information does not endorse any treatments or medications as safe, effective, or approved for treating a specific patient. UpToDate, Inc. and its affiliates disclaim any warranty or liability relating to this information or the use thereof.The use of this information is governed by the Terms of Use, available at https://www.woltersDudauwer.com/en/know/clinical-effectiveness-terms. 2024© UpToDate, Inc. and its affiliates and/or licensors. All rights reserved.  Copyright   © 2024 UpToDate, Inc. and/or its affiliates. All rights reserved.

## 2025-04-17 ENCOUNTER — PATIENT OUTREACH (OUTPATIENT)
Dept: SURGERY | Facility: CLINIC | Age: 65
End: 2025-04-17

## 2025-04-17 NOTE — PROGRESS NOTES
Caller: Mc Barros    [facility] [employer] [phone]      Cm rec'd email from ct [id number] \"Good morning Hilda I have a question. I found this on the Internet and I'm wondering if this makes any sense to you. It looks like they're saying Mario can continue MAWD as long as he's still working past 65 does this make any sense to you?\" Cm called [employer] and s/w rep who stated MAWD eligibility is based on age only and not on employment status, therefore ct will no longer receive MAWD once he turns 65. [employer] rep stated ct may receive MAWD one month following his 65th birthday. Cm notified Poly.  "

## 2025-04-22 ENCOUNTER — PATIENT OUTREACH (OUTPATIENT)
Dept: SURGERY | Facility: CLINIC | Age: 65
End: 2025-04-22

## 2025-04-22 NOTE — PROGRESS NOTES
Cm rec'd ct MAWD April premium. Cm created check req and sent to supervisor for approval/processing.    See media

## 2025-04-25 ENCOUNTER — PATIENT OUTREACH (OUTPATIENT)
Dept: SURGERY | Facility: CLINIC | Age: 65
End: 2025-04-25

## 2025-04-25 NOTE — PROGRESS NOTES
Ct wife texted Cm to confirm ct MAWD premium was rec'd. Cm stated yes and Cm notified ct wife she will be out of office from 5/6-5/18.

## 2025-05-06 DIAGNOSIS — I10 PRIMARY HYPERTENSION: ICD-10-CM

## 2025-05-06 RX ORDER — IRBESARTAN AND HYDROCHLOROTHIAZIDE 300; 12.5 MG/1; MG/1
1 TABLET, FILM COATED ORAL EVERY MORNING
Qty: 30 TABLET | Refills: 2 | Status: SHIPPED | OUTPATIENT
Start: 2025-05-06

## 2025-05-20 ENCOUNTER — PATIENT OUTREACH (OUTPATIENT)
Dept: SURGERY | Facility: CLINIC | Age: 65
End: 2025-05-20

## 2025-05-20 NOTE — PROGRESS NOTES
Cm texted ct needs assessment survey. Ct notified Cm he completed survey.    Ct called Cm stating he rec'd information in the mail regarding community health insurance that is supposed to start 6/1. Ct stated he will have his wife send Cm letter he rec'd in the mail.

## 2025-05-21 ENCOUNTER — PATIENT OUTREACH (OUTPATIENT)
Dept: SURGERY | Facility: CLINIC | Age: 65
End: 2025-05-21

## 2025-05-21 NOTE — PROGRESS NOTES
Cm had ct discussion with supervisor regarding ct insurance update.     Akash rec'd ct insurance letter in the mail stating that as of 6/1/25 ct will have MA ([employer])- not MAWD. Ct also stated he applied for Medicare part A and declined part B enrollment at this time and ct stated he was notified by  that as long as he has secondary insurance, he will not be penalized for not enrolling in Medicare part B. Akash asked ct to send her his Medicare card and updated MA insurance card if he receives one in the mail.    See media

## 2025-05-23 ENCOUNTER — PATIENT OUTREACH (OUTPATIENT)
Dept: SURGERY | Facility: CLINIC | Age: 65
End: 2025-05-23

## 2025-05-23 NOTE — PROGRESS NOTES
Cm rec'd ct MAWD premium. Cm created check req and sent to supervisor for approval/processing.    Cm texted ct to request a picture of front and back of Medicare card.    See media

## 2025-05-27 ENCOUNTER — PATIENT OUTREACH (OUTPATIENT)
Dept: SURGERY | Facility: CLINIC | Age: 65
End: 2025-05-27

## 2025-05-27 NOTE — PROGRESS NOTES
Cm rec'd picture of front and back of ct Medicare card from ct wife. Cm notified [facility] via in basket of ct Medicare part A enrollment as of 6/1 as well as ct continued MA coverage.    See media

## 2025-06-11 DIAGNOSIS — J30.2 SEASONAL ALLERGIES: ICD-10-CM

## 2025-06-11 DIAGNOSIS — T65.292D TOXIC EFFECT OF TOBACCO, INTENTIONAL SELF-HARM, SUBSEQUENT ENCOUNTER: ICD-10-CM

## 2025-06-11 DIAGNOSIS — I10 ESSENTIAL HYPERTENSION: ICD-10-CM

## 2025-06-11 DIAGNOSIS — I25.10 CORONARY ARTERY DISEASE INVOLVING NATIVE CORONARY ARTERY OF NATIVE HEART WITHOUT ANGINA PECTORIS: ICD-10-CM

## 2025-06-11 DIAGNOSIS — E78.2 MIXED HYPERLIPIDEMIA: ICD-10-CM

## 2025-06-11 DIAGNOSIS — K21.9 GASTROESOPHAGEAL REFLUX DISEASE WITHOUT ESOPHAGITIS: ICD-10-CM

## 2025-06-11 DIAGNOSIS — I25.2 HISTORY OF NON-ST ELEVATION MYOCARDIAL INFARCTION (NSTEMI): ICD-10-CM

## 2025-06-11 DIAGNOSIS — B20 HIV DISEASE (HCC): ICD-10-CM

## 2025-06-12 ENCOUNTER — PATIENT OUTREACH (OUTPATIENT)
Dept: SURGERY | Facility: CLINIC | Age: 65
End: 2025-06-12

## 2025-06-12 NOTE — PROGRESS NOTES
Cm called ct stating he rec'd call from Elian [employer] stating he will no longer have MA coverage beginning 7/1/25. Cm and ct called general [employer] number who confirmed this and stated ct will not receive secondary MA coverage due to being over income to receive this. Ct states he will s/w wife to s/w insurance rep to get a Medicare Advantage plan that is in network with ct providers. Cm asked [employer] rep if ct would receive letter stating he will be losing coverage as of 7/1 to supplement ct SPBP lynda- [employer] rep stated there was a letter mailed to ct home yesterday stating ct will not receive MA coverage as of 7/1 due to not paying MAWD premiums since February 14. Cm stated payment has been sent every month for ct and [employer] rep stated it was never rec'd. Cm s/w supervisor who contacted billing to determine if checks that were sent for ct premium have been cashed. Billing Marifer to fu.     Ct wife emailed Cm contact for Ms Davdi Plummer RONAK- [phone]

## 2025-06-13 ENCOUNTER — PATIENT OUTREACH (OUTPATIENT)
Dept: SURGERY | Facility: CLINIC | Age: 65
End: 2025-06-13

## 2025-06-13 RX ORDER — BICTEGRAVIR SODIUM, EMTRICITABINE, AND TENOFOVIR ALAFENAMIDE FUMARATE 50; 200; 25 MG/1; MG/1; MG/1
TABLET ORAL
Qty: 30 TABLET | Refills: 2 | Status: SHIPPED | OUTPATIENT
Start: 2025-06-13

## 2025-06-13 RX ORDER — PANTOPRAZOLE SODIUM 40 MG/1
40 TABLET, DELAYED RELEASE ORAL EVERY MORNING
Qty: 30 TABLET | Refills: 5 | Status: SHIPPED | OUTPATIENT
Start: 2025-06-13

## 2025-06-13 RX ORDER — NICOTINE 21 MG/24HR
PATCH, TRANSDERMAL 24 HOURS TRANSDERMAL
Qty: 28 PATCH | Refills: 2 | Status: SHIPPED | OUTPATIENT
Start: 2025-06-13

## 2025-06-13 RX ORDER — ASPIRIN 81 MG/1
81 TABLET, COATED ORAL EVERY MORNING
Qty: 30 TABLET | Refills: 5 | Status: SHIPPED | OUTPATIENT
Start: 2025-06-13

## 2025-06-13 RX ORDER — METOPROLOL SUCCINATE 25 MG/1
25 TABLET, EXTENDED RELEASE ORAL EVERY MORNING
Qty: 30 TABLET | Refills: 5 | Status: SHIPPED | OUTPATIENT
Start: 2025-06-13

## 2025-06-13 RX ORDER — CETIRIZINE HYDROCHLORIDE 10 MG/1
10 TABLET ORAL EVERY MORNING
Qty: 30 TABLET | Refills: 5 | Status: SHIPPED | OUTPATIENT
Start: 2025-06-13

## 2025-06-13 RX ORDER — ROSUVASTATIN CALCIUM 20 MG/1
20 TABLET, COATED ORAL EVERY MORNING
Qty: 30 TABLET | Refills: 5 | Status: SHIPPED | OUTPATIENT
Start: 2025-06-13

## 2025-06-13 NOTE — PROGRESS NOTES
Cm notified ct she is waiting to hear from billing dept regarding confirmation of ct MAWD payments.    Cm requested ct SS card, proof of address, one month of pay stubs to supplement ct SPBP lynda. Cm emailed ct wife ct signature page to sign and send back to Cm. Cm also asked ct for updated [employer] card to provide to [facility] as ct has upcoming appt with Angie towards the end of the month. Cm also asked ct to send Cm letter from [employer] once rec'd in the mail and Cm will then send ct SPBP lynda. Ct stated he will have wife come to [facility] office Monday to drop off requested docs.

## 2025-06-16 ENCOUNTER — PATIENT OUTREACH (OUTPATIENT)
Dept: SURGERY | Facility: CLINIC | Age: 65
End: 2025-06-16

## 2025-06-16 NOTE — PROGRESS NOTES
"Cm rec'd email from Marifer TORRES billing stating \"I have January which was paid.  No Feb or March April and May are still outstanding.\" Then BRIAN Caicedo stated she found check for Feb/March and she was not sure if this check was outstanding or not. Check numbers are as follows:  April - [id number]  May - [id number]  Feb/March- [id number]  Cm called ct to ask if ct had time to s/w [employer]- ct texted Cm stating he is driving for work today and asked Cm if we can call tomorrow between 10a and 11a- Cm stated yes. Ct wife-Poly- provided Cm docs to supplement ct SPBP lynda-only doc required is the one that states ct will no longer be eligible for MAWD as of 7/1- ct wife stated this letter has not yet been rec'd. Ct wife stated in order for ct to have Medicare Advantage plan- ct would need to be enrolled into Medicare part B. In order for ct to enroll into Medicare part B, ct would need to pay over $600 as this is the amount of money to be paid quarterly- ct wife states they are not able to afford this. Ct wife stated ct may be eligible for insurance through employer and this would be around $80/paycheck and it is not great insurance. When Cm and ct s/w [employer]- Cm to inquire about [employer] assisting with Medicare part B premium, MA eligibility per letter ct rec'd, MAWD outstanding checks. Cm to ask ct if [facility] is able to assist with first quarterly premium for Medicare part B, if ct would be able to pay fully for all upcoming premiums.     "

## 2025-06-17 ENCOUNTER — PATIENT OUTREACH (OUTPATIENT)
Dept: SURGERY | Facility: CLINIC | Age: 65
End: 2025-06-17

## 2025-06-17 NOTE — PROGRESS NOTES
Cm s/w [employer] [employer] who stated ct will no longer have MAWD or MA coverage as of 7/1/25. Ct rec'd two letters from [employer]-one stating he will no longer be active for MAWD due to not paying premiums and one stating ct qualifies for MA as of 6/1/25 but will no longer have [facility] coverage as of 6/10/25. Plummer RONAK rep stated ct is over income for MA, however if ct appeals either decision by [employer]- [employer] will review ct for any services ct would be eligible for-including possibility of [employer] paying for ct Medicare part B premium. Cm s/w ct to discuss these options and stated ct other option is to enroll in health insurance coverage through employer. Ct states he will think about his options and fu Cm to notify what he decides. Ct wife also emailed Cm letters stating ct was enrolled into Medicare part D prescription plan and Cm s/w SPBP who stated ct medications will go through both plans and whichever plan has the lower cost- pharmacy will use to bill ct. Cm applied ct for SPBP via online portal. Cm called [facility] general number to provide check numbers for payments made for ct MAWD premiums- [employer] rep stated she does not see release on file for Cm- Cm attempted to contact ct to give verbal consent- ct was not available. Cm and ct scheduled time tomorrow to contact [employer] to provide check numbers.    See media

## 2025-06-18 ENCOUNTER — PATIENT OUTREACH (OUTPATIENT)
Dept: SURGERY | Facility: CLINIC | Age: 65
End: 2025-06-18

## 2025-06-18 ENCOUNTER — APPOINTMENT (OUTPATIENT)
Dept: LAB | Facility: HOSPITAL | Age: 65
End: 2025-06-18
Payer: COMMERCIAL

## 2025-06-18 LAB
ALBUMIN SERPL BCG-MCNC: 4.2 G/DL (ref 3.5–5)
ALP SERPL-CCNC: 86 U/L (ref 34–104)
ALT SERPL W P-5'-P-CCNC: 20 U/L (ref 7–52)
ANION GAP SERPL CALCULATED.3IONS-SCNC: 8 MMOL/L (ref 4–13)
AST SERPL W P-5'-P-CCNC: 19 U/L (ref 13–39)
BASOPHILS # BLD AUTO: 0.06 THOUSANDS/ÂΜL (ref 0–0.1)
BASOPHILS NFR BLD AUTO: 1 % (ref 0–1)
BILIRUB SERPL-MCNC: 0.71 MG/DL (ref 0.2–1)
BILIRUB UR QL STRIP: NEGATIVE
BUN SERPL-MCNC: 21 MG/DL (ref 5–25)
CALCIUM SERPL-MCNC: 9.7 MG/DL (ref 8.4–10.2)
CHLORIDE SERPL-SCNC: 106 MMOL/L (ref 96–108)
CLARITY UR: CLEAR
CO2 SERPL-SCNC: 25 MMOL/L (ref 21–32)
COLOR UR: NORMAL
CREAT SERPL-MCNC: 0.89 MG/DL (ref 0.6–1.3)
EOSINOPHIL # BLD AUTO: 0.25 THOUSAND/ÂΜL (ref 0–0.61)
EOSINOPHIL NFR BLD AUTO: 4 % (ref 0–6)
ERYTHROCYTE [DISTWIDTH] IN BLOOD BY AUTOMATED COUNT: 14.5 % (ref 11.6–15.1)
GFR SERPL CREATININE-BSD FRML MDRD: 90 ML/MIN/1.73SQ M
GLUCOSE P FAST SERPL-MCNC: 120 MG/DL (ref 65–99)
GLUCOSE UR STRIP-MCNC: NEGATIVE MG/DL
HCT VFR BLD AUTO: 56.1 % (ref 36.5–49.3)
HGB BLD-MCNC: 18.6 G/DL (ref 12–17)
HGB UR QL STRIP.AUTO: NEGATIVE
IMM GRANULOCYTES # BLD AUTO: 0.02 THOUSAND/UL (ref 0–0.2)
IMM GRANULOCYTES NFR BLD AUTO: 0 % (ref 0–2)
KETONES UR STRIP-MCNC: NEGATIVE MG/DL
LEUKOCYTE ESTERASE UR QL STRIP: NEGATIVE
LYMPHOCYTES # BLD AUTO: 1.51 THOUSANDS/ÂΜL (ref 0.6–4.47)
LYMPHOCYTES NFR BLD AUTO: 23 % (ref 14–44)
MCH RBC QN AUTO: 30 PG (ref 26.8–34.3)
MCHC RBC AUTO-ENTMCNC: 33.2 G/DL (ref 31.4–37.4)
MCV RBC AUTO: 91 FL (ref 82–98)
MONOCYTES # BLD AUTO: 0.74 THOUSAND/ÂΜL (ref 0.17–1.22)
MONOCYTES NFR BLD AUTO: 11 % (ref 4–12)
NEUTROPHILS # BLD AUTO: 4.01 THOUSANDS/ÂΜL (ref 1.85–7.62)
NEUTS SEG NFR BLD AUTO: 61 % (ref 43–75)
NITRITE UR QL STRIP: NEGATIVE
NRBC BLD AUTO-RTO: 0 /100 WBCS
PH UR STRIP.AUTO: 5 [PH]
PLATELET # BLD AUTO: 227 THOUSANDS/UL (ref 149–390)
PMV BLD AUTO: 9.8 FL (ref 8.9–12.7)
POTASSIUM SERPL-SCNC: 4.1 MMOL/L (ref 3.5–5.3)
PROT SERPL-MCNC: 7.5 G/DL (ref 6.4–8.4)
PROT UR STRIP-MCNC: NEGATIVE MG/DL
RBC # BLD AUTO: 6.19 MILLION/UL (ref 3.88–5.62)
SODIUM SERPL-SCNC: 139 MMOL/L (ref 135–147)
SP GR UR STRIP.AUTO: 1.02 (ref 1–1.03)
UROBILINOGEN UR STRIP-ACNC: <2 MG/DL
WBC # BLD AUTO: 6.59 THOUSAND/UL (ref 4.31–10.16)

## 2025-06-18 NOTE — PROGRESS NOTES
Cm checked SPBP online portal- ct was denied. Akash s/w SPBP rep who stated ct needs to re-apply once ct MA is no longer active. Cm to notify ct.    Cm rec'd email from ct wife stated ct forgot his phone at home today and will need to reschedule [employer] call with Cm.     Cm had ct discussion with supervisor regarding ct insurance.

## 2025-06-19 ENCOUNTER — PATIENT OUTREACH (OUTPATIENT)
Dept: SURGERY | Facility: CLINIC | Age: 65
End: 2025-06-19

## 2025-06-19 ENCOUNTER — CONSULT (OUTPATIENT)
Age: 65
End: 2025-06-19

## 2025-06-19 VITALS
WEIGHT: 198 LBS | BODY MASS INDEX: 31.08 KG/M2 | OXYGEN SATURATION: 97 % | TEMPERATURE: 98.5 F | HEART RATE: 58 BPM | DIASTOLIC BLOOD PRESSURE: 82 MMHG | HEIGHT: 67 IN | SYSTOLIC BLOOD PRESSURE: 118 MMHG

## 2025-06-19 DIAGNOSIS — L73.8 PERFORATING FOLLICULITIS: ICD-10-CM

## 2025-06-19 DIAGNOSIS — Z13.89 SCREENING FOR SKIN CONDITION: Primary | ICD-10-CM

## 2025-06-19 LAB
BASOPHILS # BLD AUTO: 0.1 X10E3/UL (ref 0–0.2)
BASOPHILS NFR BLD AUTO: 1 %
C TRACH DNA SPEC QL NAA+PROBE: NEGATIVE
CD3+CD4+ CELLS # BLD: 232 /UL (ref 359–1519)
CD3+CD4+ CELLS NFR BLD: 16.6 % (ref 30.8–58.5)
CD3+CD4+ CELLS/CD3+CD8+ CLL BLD: 0.3 % (ref 0.92–3.72)
CD3+CD8+ CELLS # BLD: 787 /UL (ref 109–897)
CD3+CD8+ CELLS NFR BLD: 56.2 % (ref 12–35.5)
EOSINOPHIL # BLD AUTO: 0.3 X10E3/UL (ref 0–0.4)
EOSINOPHIL NFR BLD AUTO: 4 %
ERYTHROCYTE [DISTWIDTH] IN BLOOD BY AUTOMATED COUNT: 13.9 % (ref 11.6–15.4)
HCT VFR BLD AUTO: 56.6 % (ref 37.5–51)
HGB BLD-MCNC: 19.5 G/DL (ref 13–17.7)
IMM GRANULOCYTES # BLD: 0 X10E3/UL (ref 0–0.1)
IMM GRANULOCYTES NFR BLD: 1 %
LYMPHOCYTES # BLD AUTO: 1.4 X10E3/UL (ref 0.7–3.1)
LYMPHOCYTES NFR BLD AUTO: 22 %
MCH RBC QN AUTO: 31 PG (ref 26.6–33)
MCHC RBC AUTO-ENTMCNC: 34.5 G/DL (ref 31.5–35.7)
MCV RBC AUTO: 90 FL (ref 79–97)
MONOCYTES # BLD AUTO: 0.7 X10E3/UL (ref 0.1–0.9)
MONOCYTES NFR BLD AUTO: 11 %
N GONORRHOEA DNA SPEC QL NAA+PROBE: NEGATIVE
NEUTROPHILS # BLD AUTO: 4.1 X10E3/UL (ref 1.4–7)
NEUTROPHILS NFR BLD AUTO: 61 %
PLATELET # BLD AUTO: 222 X10E3/UL (ref 150–450)
RBC # BLD AUTO: 6.3 X10E6/UL (ref 4.14–5.8)
WBC # BLD AUTO: 6.6 X10E3/UL (ref 3.4–10.8)

## 2025-06-19 NOTE — PATIENT INSTRUCTIONS
PERFORATING FOLLICULITIS    Physical Exam:  Anatomic Location Affected:  Right Medial Knee  Assessment and Plan:  Based on a thorough discussion of this condition and the management approach to it (including a comprehensive discussion of the known risks, side effects and potential benefits of treatment), the patient (family) agrees to implement the following specific plan:  Adapalene Gel OTC apply nightly    What is folliculitis?  Folliculitis is the name given to a group of skin conditions in which there are inflamed hair follicles. The result is a tender red spot, often with a surface pustule.  Folliculitis may be superficial or deep. It can affect anywhere there are hairs, including chest, back, buttocks, arms and legs. Acne and its variants are also types of folliculitis.    What causes folliculitis?  Folliculitis can be due to infection, occlusion (blockage), irritation and various skin diseases.    Folliculitis due to infection  To determine if folliculitis is due to an infection, swabs should be taken from the pustules for cytology and culture in the laboratory.    Bacteria  Bacterial folliculitis is commonly due to Staphylococcus aureus. If the infection involves the deep part of the follicle, it results in a painful boil. Recommended treatment includes careful hygiene, antiseptic cleanser or cream, antibiotic ointment, and/or oral antibiotics.    Spa pool folliculitis is due to infection with Pseudomonas aeruginosa, which thrives in inadequately chlorinated warm water. Gram negative folliculitis is a pustular facial eruption also due to infection with Pseudomonas aeruginosa or other similar organisms. When it appears, it usually follows tetracycline treatment of acne, but is quite rare.    Yeasts  The most common yeast to cause a folliculitis is Pityrosporum ovale, also known as Malassezia. Malassezia folliculitis (Pityrosporum folliculitis) is an itchy acne-like condition usually affecting the upper trunk  of a young adult. Treatment includes avoiding moisturisers, stopping any antibiotics and topical antifungal or oral antifungal medication for several weeks.    Candida albicans can also provoke a folliculitis in skin folds (intertrigo) or in the beard area. It is treated with topical or oral antifungal agents.    Fungi  Ringworm of the scalp (tinea capitis) usually results in scaling and hair loss, but sometimes results in folliculitis. In [place], cat ringworm (Microsporum canis) is the commonest organism causing scalp fungal infection. Other fungi such as Trichophyton tonsurans are increasingly reported. Treatment is with oral antifungal agents for several months.    Viral infections  Folliculitis may caused by herpes simplex virus. This tends to be tender, and resolves without treatment in around 10 days. Severe recurrent attacks may be treated with acyclovir and other antiviral agents.    Herpes zoster (the cause of shingles) may also present as folliculitis with painful pustules and crusted spots within a dermatome (an area of skin supplied by a single nerve). It is treated with hihg-dose acyclovir.  Molluscum contagiosum, common in young children, may also cause follicular umbilicated papules, usually clustered in and around a body fold. Molluscum may provoke dermatitis.    Parasitic infection  Folliculitis on the face or scalp of older or immunosuppressed adults may be due to colonisation by hair follicle mites (demodex). This is known as demodicosis.  The human infestation, scabies, often provokes folliculitis, as well as non-follicular papules, vesicles and pustules.    Folliculitis due to irritation from regrowing hairs  Folliculitis may arise as hairs regrow after shaving, waxing, electrolysis or plucking. Swabs taken from the pustules are sterile ie there is no growth of bacteria or other organisms. In the beard area irritant folliculitis is known as pseudofolliculitis barbae.  Irritant folliculitis  is also common on the lower legs of women (shaving rash). It is frequently very itchy. Treatment is by stopping hair removal, and not beginning again for about three months after the folliculitis has settled. To prevent reoccurring irritant folliculitis, use a gentle hair removal method, such as a lady's electric razor. Avoid soap and apply plenty of shaving gel, if using a blade shaver.    Folliculitis due to contact reactions  Occlusion  Paraffin-based ointments, moisturisers, and adhesive plasters may all result in a sterile folliculitis. If a moisturiser is needed, choose an oil-free product, as it is less likely to cause occlusion.    Chemicals  Coal tar, cutting oils and other chemicals may cause an irritant folliculitis. Avoid contact with the causative product.    Topical steroids  Overuse of topical steroids may produce a folliculitis. Perioral dermatitis is a facial folliculitis provoked by moisturisers and topical steroids. Perioral dermatitis is treated with tetracycline antibiotics for six weeks or so.    Folliculitis due to immunosuppression  Eosinophilic folliculitis is a specific type of folliculitis that may arise in some immune suppressed individuals such as those infected by human immunodeficiency virus (HIV) or those who have cancer.    Folliculitis due to drugs  Folliculitis may be due to drugs, particularly corticosteroids (steroid acne), androgens (male hormones), ACTH, lithium, isoniazid (INH), phenytoin and B-complex vitamins. Protein kinase inhibitors (epidermal growth factor receptor inhibitors) and targeted therapy for metastatic melanoma (vemurafenib, dabrafenib) nearly always result in folliculitis.    Folliculitis due to inflammatory skin diseases  Certain uncommon inflammatory skin diseases may cause permanent hair loss and scarring because of deep seated sterile folliculitis. These include:  Lichen planus  Discoid lupus erythematosus  Folliculitis decalvans  Folliculitis keloidalis  "    Treatment depends on the underlying condition and its severity. A skin biopsy is often necessary to establish the diagnosis.    Acne variants   Acne and acne-like or acneform disorders are also forms of folliculitis. These include:  Acne vulgaris  Nodulocystic acne  Rosacea  Scalp folliculitis  Chloracne    The follicular occlusion syndrome refers to:  Hidradenitis suppurativa (acne inversa)  Acne conglobata (a severe form of nodulocystic acne)  Dissecting cellulitis (perifolliculitis capitis abscedens et suffodiens)  Pilonidal sinus.    Treatment of the acne variants may include topical therapy as well as long courses of tetracycline antibiotics, isotretinoin (vitamin-A derivative) and in women, antiandrogenic therapy.    Buttock folliculitis  Folliculitis affecting the buttocks is quite common and is often nonspecific, ie no specific cause is found. Buttock folliculitis is equally common in males and females.  Acute buttock folliculitis is usually bacterial in origin (like boils), resulting in red painful papules and pustules. It clears with antibiotics.  Chronic buttock folliculitis does not often cause significant symptoms but it can be very persistent. Although antiseptics, topical acne treatments, peeling agents such as alphahydroxy acids, long courses of oral antibiotics and isotretinoin can help buttock folliculitis, they are not always effective. Hair removal might be worth trying if the affected area is hairy. As regrowth of hair can make it worse, permanent hair reduction by laser or intense pulsed light (IPL) is best.    SEBORRHEIC KERATOSIS; NON-INFLAMED    Physical Exam:  Anatomic Location Affected:  Left Clavicle  Morphological Description:  Flat and raised, waxy, smooth to warty textured, yellow to brownish-grey to dark brown to blackish, discrete, \"stuck-on\" appearing papules.  Pertinent Positives:  Pertinent Negatives:    Additional History of Present Condition:  Patient reports new bumps on " "the skin.  Denies itch, burn, pain, bleeding or ulceration.  Present constantly; nothing seems to make it worse or better.  No prior treatment.      Assessment and Plan:  Based on a thorough discussion of this condition and the management approach to it (including a comprehensive discussion of the known risks, side effects and potential benefits of treatment), the patient (family) agrees to implement the following specific plan:  Benign and reassured    Seborrheic Keratosis  A seborrheic keratosis is a harmless warty spot that appears during adult life as a common sign of skin aging.  Seborrheic keratoses can arise on any area of skin, covered or uncovered, with the usual exception of the palms and soles. They do not arise from mucous membranes. Seborrheic keratoses can have highly variable appearance.      Seborrheic keratoses are extremely common. It has been estimated that over 90% of adults over the age of 60 years have one or more of them. They occur in males and females of all races, typically beginning to erupt in the 30s or 40s. They are uncommon under the age of 20 years.  The precise cause of seborrhoeic keratoses is not known.  Seborrhoeic keratoses are considered degenerative in nature. As time goes by, seborrheic keratoses tend to become more numerous. Some people inherit a tendency to develop a very large number of them; some people may have hundreds of them.    The name \"seborrheic keratosis\" is misleading, because these lesions are not limited to a seborrhoeic distribution (scalp, mid-face, chest, upper back), nor are they formed from sebaceous glands, nor are they associated with sebum -- which is greasy.  Seborrheic keratosis may also be called \"SK,\" \"Seb K,\" \"basal cell papilloma,\" \"senile wart,\" or \"barnacle.\"      Researchers have noted:  Eruptive seborrhoeic keratoses can follow sunburn or dermatitis  Skin friction may be the reason they appear in body folds  Viral cause (e.g., human " "papillomavirus) seems unlikely  Stable and clonal mutations or activation of FRFR3, PIK3CA, RANCHO, AKT1 and EGFR genes are found in seborrhoeic keratoses  Seborrhoeic keratosis can arise from solar lentigo  FRFR3 mutations also arise in solar lentigines. These mutations are associated with increased age and location on the head and neck, suggesting a role of ultraviolet radiation in these lesions  Seborrheic keratoses do not harbour tumour suppressor gene mutations  Epidermal growth factor receptor inhibitors, which are used to treat some cancers, often result in an increase in verrucal (warty) keratoses.    There is no easy way to remove multiple lesions on a single occasion.  Unless a specific lesion is \"inflamed\" and is causing pain or stinging/burning or is bleeding, most insurance companies do not authorize treatment.  "

## 2025-06-19 NOTE — PROGRESS NOTES
"[facility] Note     Patient Name: Mario Rodriguez  Encounter Date: 06/19/25       Have you been cared for by a [facility] Dermatologist in the last 3 years and, if so, which description applies to you? NO. I am considered a \"new\" patient and must complete all patient intake questions. I am of child-bearing potential.     REVIEW OF SYSTEMS:  Have you recently had or currently have any of the following? Recent fever or chills? No  Any non-healing wound? No  Are you pregnant or planning to become pregnant? No  Are you currently or planning to be nursing or breast feeding? No   PAST MEDICAL HISTORY:  Have you personally ever had or currently have any of the following?  If \"YES,\" then please provide more detail. Skin cancer (such as Melanoma, Basal Cell Carcinoma, Squamous Cell Carcinoma?  No  Tuberculosis, HIV/AIDS, Hepatitis B or C: YES, HIV  Radiation Treatment No   HISTORY OF IMMUNOSUPPRESSION:   Do you have a history of any of the following:  Systemic Immunosuppression such as Diabetes, Biologic or Immunotherapy, Chemotherapy, Organ Transplantation, Bone Marrow Transplantation or Prednsione?  No    Answering \"YES\" requires the addition of the dotphrase \"IMMUNOSUPPRESSED\" as the first diagnosis of the patient's visit.   FAMILY HISTORY:  Any \"first degree relatives\" (parent, brother, sister, or child) with the following?    Skin Cancer, Pancreatic or Other Cancer? No   PATIENT EXPERIENCE:    Do you want the Dermatologist to perform a COMPLETE skin exam today including a clinical examination under the \"bra and underwear\" areas?  NO  If necessary, do we have your permission to call and leave a detailed message on your Preferred Phone number that includes your specific medical information?  Yes      Allergies[1] Current Medications[2]              Whom besides the patient is providing clinical information about today's encounter?   NO ADDITIONAL HISTORIAN (patient alone provided history)    Physical Exam " "and Assessment/Plan by Diagnosis:    Chief complaint: Patient is a 65 y/o male present for rash and pimples on the right medial knee which started a 2yrs ago and tried TAC Cream (used 3 tubes and finished a month ago) which has not help. Also has SOC on the Left Clavicle and Neck.    PERFORATING FOLLICULITIS    Physical Exam:  Anatomic Location Affected:  Right Medial Knee  Morphological Description:  perifollicular papules with keratotic plug  Pertinent Positives:  Pertinent Negatives:    Additional History of Present Condition:  Present for 2yrs. Reports he has tried triamcinolone cream for 1 year w/o improvement. Occasionally itchy.     Assessment and Plan:  Based on a thorough discussion of this condition and the management approach to it (including a comprehensive discussion of the known risks, side effects and potential benefits of treatment), the patient (family) agrees to implement the following specific plan:  History of hepatitis, HIV, and pre-diabetic. These are common associations with perforating folliculitis.   Adapalene Gel OTC apply nightly  Amlactin lotion daily   Discussed biopsy if fails to improve or worsens. Patient is understanding and agrees to plan.       SEBORRHEIC KERATOSIS; NON-INFLAMED    Physical Exam:  Anatomic Location Affected:  Left Clavicle  Morphological Description:  Flat and raised, waxy, smooth to warty textured, yellow to brownish-grey to dark brown to blackish, discrete, \"stuck-on\" appearing papules.  Pertinent Positives:  Pertinent Negatives:    Additional History of Present Condition:  Patient reports new bumps on the skin.  Denies itch, burn, pain, bleeding or ulceration.  Present constantly; nothing seems to make it worse or better.  No prior treatment.      Assessment and Plan:  Based on a thorough discussion of this condition and the management approach to it (including a comprehensive discussion of the known risks, side effects and potential benefits of treatment), the " patient (family) agrees to implement the following specific plan:  Benign and reassured      Carlos Pires MD  PGY-III Dermatology Resident       Also examined by Dr. Pires  Scribe Attestation    I,:  Wendi Ariza MA am acting as a scribe while in the presence of the attending physician.:       I,:  Warren Rubio DO personally performed the services described in this documentation    as scribed in my presence.:                    [1]  No Known Allergies[2]    Current Outpatient Medications:   •  albuterol (PROVENTIL HFA,VENTOLIN HFA) 90 mcg/act inhaler, Inhale 2 puffs every 4 (four) hours as needed for wheezing, Disp: 18 g, Rfl: 2  •  Aspirin Low Dose 81 MG EC tablet, TAKE 1 TABLET BY MOUTH IN THE MORNING, Disp: 30 tablet, Rfl: 5  •  Biktarvy -25 MG tablet, TAKE 1 TABLET BY MOUTH IN THE EVENING WITH DINNER, Disp: 30 tablet, Rfl: 2  •  cetirizine (ZyrTEC) 10 mg tablet, TAKE 1 TABLET BY MOUTH IN THE MORNING, Disp: 30 tablet, Rfl: 5  •  fluticasone (FLONASE) 50 mcg/act nasal spray, USE 1 SPRAY IN EACH NOSTRIL DAILY, Disp: 16 g, Rfl: 2  •  irbesartan-hydrochlorothiazide (AVALIDE) 300-12.5 MG per tablet, TAKE 1 TABLET BY MOUTH IN THE MORNING, Disp: 30 tablet, Rfl: 2  •  metoprolol succinate (TOPROL-XL) 25 mg 24 hr tablet, TAKE 1 TABLET BY MOUTH IN THE MORNING, Disp: 30 tablet, Rfl: 5  •  nicotine (NICODERM CQ) 21 mg/24 hr TD 24 hr patch, PLACE 1 PATCH ON THE SKIN OVER 24HRS EVERY 24HRS, Disp: 28 patch, Rfl: 2  •  nicotine polacrilex (COMMIT) 4 MG lozenge, APPLY 1 NII TO MOUTH/THROAT AS NEEDED FOR SMOKING CESSATION, Disp: 72 lozenge, Rfl: 3  •  nitroglycerin (NITROSTAT) 0.4 mg SL tablet, Place 1 tablet (0.4 mg total) under the tongue every 5 (five) minutes as needed for chest pain, Disp: 15 tablet, Rfl: 0  •  pantoprazole (PROTONIX) 40 mg tablet, TAKE 1 TABLET BY MOUTH IN THE MORNING, Disp: 30 tablet, Rfl: 5  •  rosuvastatin (CRESTOR) 20 MG tablet, TAKE 1 TABLET BY MOUTH IN THE MORNING, Disp: 30 tablet, Rfl: 5  •   triamcinolone (KENALOG) 0.1 % cream, APPLY TOPICALLY TWICE A DAY (Patient not taking: Reported on 3/4/2025), Disp: 45 g, Rfl: 0

## 2025-06-20 LAB — HIV1 RNA # PLAS NAA DL=20: NOT DETECTED {COPIES}/ML

## 2025-06-23 ENCOUNTER — OFFICE VISIT (OUTPATIENT)
Dept: SURGERY | Facility: CLINIC | Age: 65
End: 2025-06-23
Payer: COMMERCIAL

## 2025-06-23 ENCOUNTER — PATIENT OUTREACH (OUTPATIENT)
Dept: SURGERY | Facility: CLINIC | Age: 65
End: 2025-06-23

## 2025-06-23 VITALS
HEART RATE: 85 BPM | OXYGEN SATURATION: 95 % | HEIGHT: 67 IN | DIASTOLIC BLOOD PRESSURE: 65 MMHG | WEIGHT: 199.2 LBS | BODY MASS INDEX: 31.27 KG/M2 | TEMPERATURE: 97.9 F | SYSTOLIC BLOOD PRESSURE: 114 MMHG

## 2025-06-23 DIAGNOSIS — R73.03 PREDIABETES: ICD-10-CM

## 2025-06-23 DIAGNOSIS — B20 HIV DISEASE (HCC): ICD-10-CM

## 2025-06-23 DIAGNOSIS — K21.9 GASTROESOPHAGEAL REFLUX DISEASE WITHOUT ESOPHAGITIS: ICD-10-CM

## 2025-06-23 DIAGNOSIS — E78.5 DYSLIPIDEMIA: ICD-10-CM

## 2025-06-23 DIAGNOSIS — J44.9 CHRONIC OBSTRUCTIVE PULMONARY DISEASE, UNSPECIFIED COPD TYPE (HCC): Primary | ICD-10-CM

## 2025-06-23 DIAGNOSIS — R76.8 HEPATITIS B CORE ANTIBODY POSITIVE: ICD-10-CM

## 2025-06-23 DIAGNOSIS — T65.292D TOXIC EFFECT OF TOBACCO, INTENTIONAL SELF-HARM, SUBSEQUENT ENCOUNTER: ICD-10-CM

## 2025-06-23 DIAGNOSIS — I25.10 CORONARY ARTERY DISEASE INVOLVING NATIVE CORONARY ARTERY OF NATIVE HEART WITHOUT ANGINA PECTORIS: ICD-10-CM

## 2025-06-23 DIAGNOSIS — E66.9 OBESITY (BMI 30-39.9): ICD-10-CM

## 2025-06-23 DIAGNOSIS — I10 ESSENTIAL HYPERTENSION: ICD-10-CM

## 2025-06-23 PROCEDURE — 99214 OFFICE O/P EST MOD 30 MIN: CPT | Performed by: NURSE PRACTITIONER

## 2025-06-23 RX ORDER — FLUTICASONE FUROATE, UMECLIDINIUM BROMIDE AND VILANTEROL TRIFENATATE 100; 62.5; 25 UG/1; UG/1; UG/1
1 POWDER RESPIRATORY (INHALATION) DAILY
Qty: 60 BLISTER | Refills: 0 | Status: SHIPPED | OUTPATIENT
Start: 2025-06-23 | End: 2025-07-23

## 2025-06-23 NOTE — ASSESSMENT & PLAN NOTE
"No results found for: \"HY6IYTE\"  CD4 ABS   Date/Time Value Ref Range Status   2025 06:48  (L) 359 - 1519 /uL Final     HIV-1 RNA by PCR, Qn   Date/Time Value Ref Range Status   2024 06:45 AM <20 copies/mL Final     Comment:     HIV-1 RNA not detected  The reportable range for this assay is 20 to 10,000,000  copies HIV-1 RNA/mL.     HIV-1 RNA Viral Load Log   Date/Time Value Ref Range Status   2024 06:45 AM COMMENT [id number]/mL Final     Comment:     Unable to calculate result since non-numeric result obtained for  component test.         ART: Biktarvy        Denies side effects. Stressed the importance of adherence.  Continue follow up with ID clinic.       Reviewed most recent labs, including Cd4 and viral load. Discussed the risks and benefits of treatment options, instructions for management, importance of treatment adherence, and reduction of risk factor.Educated on possible  medication side effects.  Reviewed last ID visit.  Collaborated with ID to optimize medical treatment.    Counseled on routes of HIV transmission, including the risk of  infection. Emphasized that viral suppression is the best method to prevent HIV transmission.  At this time pt.denies the need for HIV testing of anyone in their life.     Total encounter time was 45 minutes. Greater then 20 minutes were spent on counseling and patient education. Pt voices understanding and agreement with treatment plan.           "

## 2025-06-23 NOTE — ASSESSMENT & PLAN NOTE
Laboratory:  Lab Results   Component Value Date    HGBA1C 6.8 (H) 02/24/2025    HGBA1C 5.4 03/11/2016    HGBA1C 5.4 03/11/2016    HGBA1C 5.4 03/11/2016    HGBA1C 5.4 03/11/2016       Assessment & Plan     Prediabetes.    Discussed general issues about diabetes pathophysiology and management.  Counseling at today's visit: focused on the need to adhere to the prescribed ADA diet, discussed the advantages of a diet low in carbohydrates, and discussed DASH diet.  Addressed ADA diet.  Suggested low cholesterol diet.  Encouraged aerobic exercise..

## 2025-06-23 NOTE — PROGRESS NOTES
Cm scheduled appt with ct 6/26 at 2PM as ct stated he rec'd more papers from [facility] and rec'd doc from [facility] regarding prescription insurance.

## 2025-06-23 NOTE — ASSESSMENT & PLAN NOTE
Assessment & Plan     Gastroesophageal Reflux Disease,  Stable     Nonpharmacologic treatments were discussed including: eating smaller meals, elevation of the head of bed at night, avoidance of caffeine, chocolate, nicotine and peppermint, and avoiding tight fitting clothing.  Continue follow up with GI. Continue pantoprazole. Educated on long term risk vs' benefit.     Educated on lifestyle modifications to improve GERD.  Encouraged avoidance of acidic foods including citrus,onions, coffee, carbonated beverages, mint ,chocolate and fried foods.  Encouraged abstinence from caffeine, tobacco, and alcohol.  Educated to avoid laying down directly after eating a large meal  and consume smaller more frequent meals.  Recommend a healthy body weight to decrease symptoms.

## 2025-06-23 NOTE — ASSESSMENT & PLAN NOTE
Counseled for greater than 15 minutes on the importance of smoking cessation.  Advised to quit.  Educated on the increased risk of heart and lung disease associated with smoking.

## 2025-06-23 NOTE — PATIENT INSTRUCTIONS
Patient Education     Pantoprazole (pan TOE pra zole)   Brand Names: US Protonix   Brand Names: Zac AG-Pantoprazole; AG-Pantoprazole Sodium; APO-Pantoprazole; Auro-Pantoprazole; BIO-Pantoprazole; JAMP-Pantoprazole; JAMP-Pantoprazole Sodium; M-Pantoprazole; Mar-Pantoprazole; MINT-Pantoprazole; MYLAN-Pantoprazole T; NRA-Pantoprazole; Pantoloc; Pantoprazole; Pantoprazole T; PMS-Pantoprazole; Priva-Pantoprazole [DSC]; PETRONA-Pantoprazole; SANDOZ Pantoprazole; TARO-Pantoprazole; Tecta; TEVA-Pantoprazole; TEVA-Pantoprazole Magnesium   What is this drug used for?   It is used to treat gastroesophageal reflux disease (GERD; acid reflux).  It is used to treat syndromes caused by lots of stomach acid.  It may be given to you for other reasons. Talk with the doctor.  What do I need to tell my doctor BEFORE I take this drug?   If you are allergic to this drug; any part of this drug; or any other drugs, foods, or substances. Tell your doctor about the allergy and what signs you had.  If you are taking any of these drugs: Atazanavir, methotrexate, nelfinavir, rilpivirine, or warfarin.  This is not a list of all drugs or health problems that interact with this drug.  Tell your doctor and pharmacist about all of your drugs (prescription or OTC, natural products, vitamins) and health problems. You must check to make sure that it is safe for you to take this drug with all of your drugs and health problems. Do not start, stop, or change the dose of any drug without checking with your doctor.  What are some things I need to know or do while I take this drug?   All products:   Tell all of your health care providers that you take this drug. This includes your doctors, nurses, pharmacists, and dentists.  Do not take this drug for longer than you were told by your doctor.  This drug may affect certain lab tests. Tell all of your health care providers and lab workers that you take this drug.  Call your doctor if you have throat pain, chest  pain, very bad belly pain, trouble swallowing, or signs of a bleeding ulcer like black, tarry, or bloody stools, throwing up blood, or throw up that looks like coffee grounds. These may be signs of a worse health problem.  This drug may raise the chance of hip, spine, and wrist fractures in people with weak bones (osteoporosis). The chance may be higher if you take this drug in high doses or for longer than a year, or if you are older than 50 years old.  Use care if you have risks for soft, brittle bones (osteoporosis). Some of these risks include drinking alcohol, smoking, taking steroids, taking drugs to treat seizures, or having family members with osteoporosis. Talk with your doctor about your risks of osteoporosis.  Rarely, low magnesium levels have happened in people taking drugs like this one for at least 3 months. Most of the time, this happened after 1 year of treatment. Low magnesium levels may lead to other electrolyte problems. You may need to have your blood work checked as your doctor has told you.  The risk of stomach growths called fundic gland polyps may be higher in people who take this drug for more than 1 year. If you have questions, talk with the doctor.  Tell your doctor if you are pregnant, plan on getting pregnant, or are breast-feeding. You will need to talk about the benefits and risks to you and the baby.  Injection:   You may need to take zinc while you take this drug. Talk with your doctor.  What are some side effects that I need to call my doctor about right away?   WARNING/CAUTION: Even though it may be rare, some people may have very bad and sometimes deadly side effects when taking a drug. Tell your doctor or get medical help right away if you have any of the following signs or symptoms that may be related to a very bad side effect:  All products:   Signs of an allergic reaction, like rash; hives; itching; red, swollen, blistered, or peeling skin with or without fever; wheezing;  tightness in the chest or throat; trouble breathing, swallowing, or talking; unusual hoarseness; or swelling of the mouth, face, lips, tongue, or throat.  Signs of electrolyte problems like mood changes; confusion; muscle pain, cramps, or spasms; weakness; shakiness; change in balance; an abnormal heartbeat; seizures; loss of appetite; or severe upset stomach or throwing up.  Signs of kidney problems like unable to pass urine, change in how much urine is passed, blood in the urine, or a big weight gain.  Bone pain.  Fever.  Lupus has happened with this drug, as well as lupus that has gotten worse in people who already have it. Tell your doctor if you have lupus. Call your doctor right away if you have signs of lupus like a rash on the cheeks or other body parts, change in skin color, sunburn easy, muscle or joint pain, chest pain or shortness of breath, or swelling in the arms or legs.  This drug may raise the chance of a severe form of diarrhea called C diff-associated diarrhea (CDAD). Call your doctor right away if you have stomach pain or cramps, very loose or watery stools, or bloody stools. Do not try to treat diarrhea without first checking with your doctor.  Severe skin reactions may happen with this drug. These include Saavedra-Jerald syndrome (SJS), toxic epidermal necrolysis (TEN), and other serious reactions. Sometimes, body organs may also be affected. These reactions can be deadly. Get medical help right away if you have signs like red, swollen, blistered, or peeling skin; red or irritated eyes; sores in your mouth, throat, nose, eyes, genitals, or any areas of skin; fever; chills; body aches; shortness of breath; or swollen glands.  Rarely, long-term treatment (for instance longer than 3 years) with drugs like this one has caused low vitamin B-12 levels. Call your doctor right away if you have signs of low vitamin B-12 levels like shortness of breath, dizziness, abnormal heartbeat, muscle weakness,  pale skin, tiredness, mood changes, or numbness or tingling in the arms or legs.  Injection:   Irritation or swelling where this drug goes into the body.  What are some other side effects of this drug?   All drugs may cause side effects. However, many people have no side effects or only have minor side effects. Call your doctor or get medical help if any of these side effects or any other side effects bother you or do not go away:  Dizziness or headache.  Stomach pain or diarrhea.  Upset stomach or throwing up.  Gas.  Joint pain.  Signs of a common cold.  These are not all of the side effects that may occur. If you have questions about side effects, call your doctor. Call your doctor for medical advice about side effects.  You may report side effects to your national health agency.  You may report side effects to the FDA at 1-692.138.6659. You may also report side effects at https://www.fda.gov/medwatch.  How is this drug best taken?   Use this drug as ordered by your doctor. Read all information given to you. Follow all instructions closely.  Tablets:   Take with or without food, unless your doctor tells you to take it another way.  Swallow whole. Do not chew, break, or crush.  Keep taking this drug as you have been told by your doctor or other health care provider, even if you feel well.  If you have trouble swallowing, talk with your doctor.  Granules:   Take 30 minutes before a meal.  Mix granules with 1 teaspoon of applesauce or 1 teaspoon (5 mL) of apple juice. Do not mix with other foods or liquids. Take the dose within 10 minutes of mixing. Do not chew or crush the granules.  Keep taking this drug as you have been told by your doctor or other health care provider, even if you feel well.  Those who have feeding tubes may use this drug. Use as you have been told. Flush the feeding tube after this drug is given.  Injection:   It is given as an infusion into a vein over a period of time.  What do I do if I miss  a dose?   Tablets and granules:   Take a missed dose as soon as you think about it.  If it is close to the time for your next dose, skip the missed dose and go back to your normal time.  Do not take 2 doses at the same time or extra doses.  Injection:   Call your doctor to find out what to do.  How do I store and/or throw out this drug?   Tablets and granules:   Store at room temperature in a dry place. Do not store in a bathroom.  Injection:   If you need to store this drug at home, talk with your doctor, nurse, or pharmacist about how to store it.  All products:   Keep all drugs in a safe place. Keep all drugs out of the reach of children and pets.  Throw away unused or  drugs. Do not flush down a toilet or pour down a drain unless you are told to do so. Check with your pharmacist if you have questions about the best way to throw out drugs. There may be drug take-back programs in your area.  General drug facts   If your symptoms or health problems do not get better or if they become worse, call your doctor.  Do not share your drugs with others and do not take anyone else's drugs.  Some drugs may have another patient information leaflet. If you have any questions about this drug, please talk with your doctor, nurse, pharmacist, or other health care provider.  This drug comes with an extra patient fact sheet called a Medication Guide. Read it with care. Read it again each time this drug is refilled. If you have any questions about this drug, please talk with the doctor, pharmacist, or other health care provider.  If you think there has been an overdose, call your poison control center or get medical care right away. Be ready to tell or show what was taken, how much, and when it happened.  Consumer Information Use and Disclaimer   This generalized information is a limited summary of diagnosis, treatment, and/or medication information. It is not meant to be comprehensive and should be used as a tool to help the  user understand and/or assess potential diagnostic and treatment options. It does NOT include all information about conditions, treatments, medications, side effects, or risks that may apply to a specific patient. It is not intended to be medical advice or a substitute for the medical advice, diagnosis, or treatment of a health care provider based on the health care provider's examination and assessment of a patient's specific and unique circumstances. Patients must speak with a health care provider for complete information about their health, medical questions, and treatment options, including any risks or benefits regarding use of medications. This information does not endorse any treatments or medications as safe, effective, or approved for treating a specific patient. Up[employer], Inc[employer] and its affiliates disclaim any warranty or liability relating to this information or the use thereof. The use of this information is governed by the Terms of Use, available at https://www.[employer].com/en/know/clinical-effectiveness-terms.  Last Reviewed Date   2024-03-18  Copyright   © 2024 UpToDate, Inc[employer] and its affiliates and/or licensors. All rights reserved.

## 2025-06-23 NOTE — PROGRESS NOTES
"Name: Mario Rodriguez      : 1960      MRN: [id number]  Encounter Provider: ALLIE Villa  Encounter Date: 2025   Encounter department: [facility]  :  Assessment & Plan  HIV disease (HCC)  No results found for: \"IS3BUUL\"  CD4 ABS   Date/Time Value Ref Range Status   2025 06:48  (L) 359 - 1519 /uL Final     HIV-1 RNA by PCR, Qn   Date/Time Value Ref Range Status   2024 06:45 AM <20 copies/mL Final     Comment:     HIV-1 RNA not detected  The reportable range for this assay is 20 to 10,000,000  copies HIV-1 RNA/mL.     HIV-1 RNA Viral Load Log   Date/Time Value Ref Range Status   2024 06:45 AM COMMENT [id number]/mL Final     Comment:     Unable to calculate result since non-numeric result obtained for  component test.         ART: Biktarvy        Denies side effects. Stressed the importance of adherence.  Continue follow up with ID clinic.       Reviewed most recent labs, including Cd4 and viral load. Discussed the risks and benefits of treatment options, instructions for management, importance of treatment adherence, and reduction of risk factor.Educated on possible  medication side effects.  Reviewed last ID visit.  Collaborated with ID to optimize medical treatment.    Counseled on routes of HIV transmission, including the risk of  infection. Emphasized that viral suppression is the best method to prevent HIV transmission.  At this time pt.denies the need for HIV testing of anyone in their life.     Total encounter time was 45 minutes. Greater then 20 minutes were spent on counseling and patient education. Pt voices understanding and agreement with treatment plan.           Toxic effect of tobacco, intentional self-harm, subsequent encounter  Counseled for greater than 15 minutes on the importance of smoking cessation.  Advised to quit.  Educated on the increased risk of heart and lung disease associated with smoking.       Hepatitis B core antibody " positive    Orders:    US right upper quadrant; Future    Essential hypertension  Hypertension Assessment  See encounter diagnosis  Discussion: stage 2  Cardiovascular risk factors: advanced age (older than 55 for men, 65 for women), dyslipidemia, hypertension, male gender, obesity (BMI >= 30 kg/m2), and smoking/ tobacco exposure    Hypertension Plan  Continue current treatment regimen.  Continue current medications.  Patient Education: Reviewed risks of hypertension and principles of   treatment.  Counseling time: counseling time more than 50% of visit: 30 minutes.Blood pressure:   BP Readings from Last 3 Encounters:   06/23/25 114/65   06/19/25 118/82   04/14/25 117/66       Continue current antihypertensive.    Educated on the following lifestyle modifications to lower BP and decrease cardiovascular risk factors.  limit alcohol intake, reduce salt in diet, maintain a healthy weight, engage in 30 minutes of cardiovascular exercise daily, and not smoke.            Coronary artery disease involving native coronary artery of native heart without angina pectoris         Dyslipidemia         Gastroesophageal reflux disease without esophagitis  Assessment & Plan     Gastroesophageal Reflux Disease,  Stable     Nonpharmacologic treatments were discussed including: eating smaller meals, elevation of the head of bed at night, avoidance of caffeine, chocolate, nicotine and peppermint, and avoiding tight fitting clothing.  Continue follow up with GI. Continue pantoprazole. Educated on long term risk vs' benefit.     Educated on lifestyle modifications to improve GERD.  Encouraged avoidance of acidic foods including citrus,onions, coffee, carbonated beverages, mint ,chocolate and fried foods.  Encouraged abstinence from caffeine, tobacco, and alcohol.  Educated to avoid laying down directly after eating a large meal  and consume smaller more frequent meals.  Recommend a healthy body weight to decrease symptoms.            Prediabetes      Laboratory:  Lab Results   Component Value Date    HGBA1C 6.8 (H) 02/24/2025    HGBA1C 5.4 03/11/2016    HGBA1C 5.4 03/11/2016    HGBA1C 5.4 03/11/2016    HGBA1C 5.4 03/11/2016       Assessment & Plan     Prediabetes.    Discussed general issues about diabetes pathophysiology and management.  Counseling at today's visit: focused on the need to adhere to the prescribed ADA diet, discussed the advantages of a diet low in carbohydrates, and discussed DASH diet.  Addressed ADA diet.  Suggested low cholesterol diet.  Encouraged aerobic exercise..                 Obesity (BMI 30-39.9)         Chronic obstructive pulmonary disease, unspecified COPD type (HCC)    Orders:    fluticasone-umeclidinium-vilanterol (Trelegy Ellipta) 100-62.5-25 mcg/actuation inhaler; Inhale 1 puff in the morning. Rinse mouth after use.        History of Present Illness   Mario is here today for three-month PCP follow-up of chronic conditions.  PMHx significant for HIV, HCV SVR s/p treatment, cirrhosis,HTN,hyperlipidemia, and NSTEMI in January 2019 with cardiac intervention and stent placed. He is doing well and has no acute complaints.       Mario Rodriguez is a 64 y.o. male who presents for PCP follow up  History obtained from: patient    Review of Systems   Constitutional:  Negative for chills and fever.   HENT:  Negative for ear pain and sore throat.    Eyes:  Negative for pain and visual disturbance.   Respiratory:  Negative for cough and shortness of breath.    Cardiovascular:  Negative for chest pain and palpitations.   Gastrointestinal:  Negative for abdominal pain and vomiting.   Genitourinary:  Negative for dysuria and hematuria.   Musculoskeletal:  Negative for arthralgias and back pain.   Skin:  Negative for color change and rash.   Neurological:  Negative for seizures and syncope.   All other systems reviewed and are negative.    Medical History Reviewed by provider this encounter:  Tobacco  Allergies  Meds   "Problems  Med Hx  Surg Hx  Fam Hx     .  Past Medical History   Past Medical History[1]  Past Surgical History[2]  Family History[3]   reports that he has been smoking cigars. He has never used smokeless tobacco. He reports that he does not currently use alcohol. He reports that he does not use drugs.  Current Outpatient Medications   Medication Instructions    albuterol (PROVENTIL HFA,VENTOLIN HFA) 90 mcg/act inhaler 2 puffs, Inhalation, Every 4 hours PRN    Aspirin Low Dose 81 mg, Oral, Every morning    Biktarvy -25 MG tablet TAKE 1 TABLET BY MOUTH IN THE EVENING WITH DINNER    cetirizine (ZYRTEC) 10 mg, Oral, Every morning    fluticasone (FLONASE) 50 mcg/act nasal spray 1 spray, Daily    fluticasone-umeclidinium-vilanterol (Trelegy Ellipta) 100-62.5-25 mcg/actuation inhaler 1 puff, Inhalation, Daily, Rinse mouth after use.    irbesartan-hydrochlorothiazide (AVALIDE) 300-12.5 MG per tablet 1 tablet, Oral, Every morning    metoprolol succinate (TOPROL-XL) 25 mg, Oral, Every morning    nicotine (NICODERM CQ) 21 mg/24 hr TD 24 hr patch PLACE 1 PATCH ON THE SKIN OVER 24HRS EVERY 24HRS    nicotine polacrilex (COMMIT) 4 MG lozenge APPLY 1 NII TO MOUTH/THROAT AS NEEDED FOR SMOKING CESSATION    nitroglycerin (NITROSTAT) 0.4 mg, Sublingual, Every 5 minutes PRN    pantoprazole (PROTONIX) 40 mg, Oral, Every morning    rosuvastatin (CRESTOR) 20 mg, Oral, Every morning    triamcinolone (KENALOG) 0.1 % cream 1 Application, Topical, 2 times daily   Allergies[4]   Medications Ordered Prior to Encounter[5]   Social History[6]     Objective   /65   Pulse 85   Temp 97.9 °F (36.6 °C)   Ht 5' 7\" (1.702 m)   Wt 90.4 kg (199 lb 3.2 oz)   SpO2 95%   BMI 31.20 kg/m²      Physical Exam  Vitals and nursing note reviewed.   Constitutional:       General: He is not in acute distress.     Appearance: He is well-developed.   HENT:      Head: Normocephalic and atraumatic.     Eyes:      Conjunctiva/sclera: Conjunctivae " normal.       Cardiovascular:      Rate and Rhythm: Normal rate and regular rhythm.      Heart sounds: No murmur heard.  Pulmonary:      Effort: Pulmonary effort is normal. No respiratory distress.      Breath sounds: Normal breath sounds.   Abdominal:      Palpations: Abdomen is soft.      Tenderness: There is no abdominal tenderness.     Musculoskeletal:         General: No swelling.      Cervical back: Neck supple.     Skin:     General: Skin is warm and dry.      Capillary Refill: Capillary refill takes less than 2 seconds.     Neurological:      Mental Status: He is alert.     Psychiatric:         Mood and Affect: Mood normal.         Administrative Statements   I have spent a total time of 45 minutes in caring for this patient on the day of the visit/encounter including Diagnostic results, Prognosis, Risks and benefits of tx options, Instructions for management, Patient and family education, Importance of tx compliance, Risk factor reductions, Impressions, Counseling / Coordination of care, and Documenting in the medical record.       [1]   Past Medical History:  Diagnosis Date    HIV (human immunodeficiency virus infection) (HCC)     Hypertension     Opioid dependence (HCC)    [2]   Past Surgical History:  Procedure Laterality Date    CARDIAC SURGERY      LUMBAR LAMINECTOMY      STOMACH SURGERY     [3]   Family History  Problem Relation Name Age of Onset    Alzheimer's disease Mother      Heart attack Father      Prostate cancer Brother     [4] No Known Allergies  [5]   Current Outpatient Medications on File Prior to Visit   Medication Sig Dispense Refill    albuterol (PROVENTIL HFA,VENTOLIN HFA) 90 mcg/act inhaler Inhale 2 puffs every 4 (four) hours as needed for wheezing 18 g 2    Aspirin Low Dose 81 MG EC tablet TAKE 1 TABLET BY MOUTH IN THE MORNING 30 tablet 5    Biktarvy -25 MG tablet TAKE 1 TABLET BY MOUTH IN THE EVENING WITH DINNER 30 tablet 2    cetirizine (ZyrTEC) 10 mg tablet TAKE 1 TABLET BY  MOUTH IN THE MORNING 30 tablet 5    fluticasone (FLONASE) 50 mcg/act nasal spray USE 1 SPRAY IN EACH NOSTRIL DAILY 16 g 2    irbesartan-hydrochlorothiazide (AVALIDE) 300-12.5 MG per tablet TAKE 1 TABLET BY MOUTH IN THE MORNING 30 tablet 2    metoprolol succinate (TOPROL-XL) 25 mg 24 hr tablet TAKE 1 TABLET BY MOUTH IN THE MORNING 30 tablet 5    nicotine (NICODERM CQ) 21 mg/24 hr TD 24 hr patch PLACE 1 PATCH ON THE SKIN OVER 24HRS EVERY 24HRS 28 patch 2    nicotine polacrilex (COMMIT) 4 MG lozenge APPLY 1 NII TO MOUTH/THROAT AS NEEDED FOR SMOKING CESSATION 72 lozenge 3    nitroglycerin (NITROSTAT) 0.4 mg SL tablet Place 1 tablet (0.4 mg total) under the tongue every 5 (five) minutes as needed for chest pain 15 tablet 0    pantoprazole (PROTONIX) 40 mg tablet TAKE 1 TABLET BY MOUTH IN THE MORNING 30 tablet 5    rosuvastatin (CRESTOR) 20 MG tablet TAKE 1 TABLET BY MOUTH IN THE MORNING 30 tablet 5    triamcinolone (KENALOG) 0.1 % cream APPLY TOPICALLY TWICE A DAY 45 g 0     No current facility-administered medications on file prior to visit.   [6]   Social History  Tobacco Use    Smoking status: Some Days     Types: Cigars    Smokeless tobacco: Never    Tobacco comments:     2 cigars/week   Vaping Use    Vaping status: Never Used   Substance and Sexual Activity    Alcohol use: Not Currently     Comment: rarely    Drug use: No    Sexual activity: Yes     Partners: Female     Birth control/protection: Condom Male

## 2025-06-23 NOTE — ASSESSMENT & PLAN NOTE
"No results found for: \"[id number]\"  CD4 ABS   Date/Time Value Ref Range Status   2025 06:48  (L) 359 - 1519 /uL Final     HIV-1 RNA by PCR, Qn   Date/Time Value Ref Range Status   2024 06:45 AM <20 copies/mL Final     Comment:     HIV-1 RNA not detected  The reportable range for this assay is 20 to 10,000,000  copies HIV-1 RNA/mL.     HIV-1 RNA Viral Load Log   Date/Time Value Ref Range Status   2024 06:45 AM COMMENT [id number]/mL Final     Comment:     Unable to calculate result since non-numeric result obtained for  component test.         ART: Biktarvy        Denies side effects. Stressed the importance of adherence.  Continue follow up with ID clinic.       Reviewed most recent labs, including Cd4 and viral load. Discussed the risks and benefits of treatment options, instructions for management, importance of treatment adherence, and reduction of risk factor.Educated on possible  medication side effects.  Reviewed last ID visit.  Collaborated with ID to optimize medical treatment.    Counseled on routes of HIV transmission, including the risk of  infection. Emphasized that viral suppression is the best method to prevent HIV transmission.  At this time pt.denies the need for HIV testing of anyone in their life.     Total encounter time was 45 minutes. Greater then 20 minutes were spent on counseling and patient education. Pt voices understanding and agreement with treatment plan.      "

## 2025-06-24 NOTE — ASSESSMENT & PLAN NOTE
Hypertension Assessment  See encounter diagnosis  Discussion: stage 2  Cardiovascular risk factors: advanced age (older than 55 for men, 65 for women), dyslipidemia, hypertension, male gender, obesity (BMI >= 30 kg/m2), and smoking/ tobacco exposure    Hypertension Plan  Continue current treatment regimen.  Continue current medications.  Patient Education: Reviewed risks of hypertension and principles of   treatment.  Counseling time: counseling time more than 50% of visit: 30 minutes.Blood pressure:   BP Readings from Last 3 Encounters:   06/23/25 114/65   06/19/25 118/82   04/14/25 117/66       Continue current antihypertensive.    Educated on the following lifestyle modifications to lower BP and decrease cardiovascular risk factors.  limit alcohol intake, reduce salt in diet, maintain a healthy weight, engage in 30 minutes of cardiovascular exercise daily, and not smoke.

## 2025-06-26 ENCOUNTER — PATIENT OUTREACH (OUTPATIENT)
Dept: SURGERY | Facility: CLINIC | Age: 65
End: 2025-06-26

## 2025-06-26 NOTE — PROGRESS NOTES
Cm and ct completed fair hearing form x2  for ct to appeal [employer] decision regarding ct MA/MAWD and Cm emailed to Elian [employer].     Cm took copy of ct [employer] card that will be active as of 8/1/25.     Ct notified Cm that he went to pharmacy to pu prescription and pharmacist saw that ct has [employer] Rx and ct paid $4.90 for prescription. Ct stated he tried to provide new [employer] card to pharmacist; however pharmacist stated [employer] Rx took care of prescritpion and ct has copay. Ct stated he will go to [facility] pharmacy tomorrow to provide new [employer] card. Cm to call [employer] to confirm it is still active.     See media

## 2025-06-27 ENCOUNTER — PATIENT OUTREACH (OUTPATIENT)
Dept: SURGERY | Facility: CLINIC | Age: 65
End: 2025-06-27

## 2025-06-27 NOTE — PROGRESS NOTES
Cm called [facility] and s/w rep who confirmed ct will have coverage until the end of this month. Cm called ct and LVM requesting c/b to notify and to ask if ct ever rec'd MAWD from June. Ct returned Cm phone call and stated he will ask his wife if MAWD premium for June was rec'd and then fu Cm    Akash sent in basket to [facility] with ct insurance update.    Cm mailed ct CAB flyer    See media

## 2025-07-01 ENCOUNTER — PATIENT OUTREACH (OUTPATIENT)
Dept: SURGERY | Facility: CLINIC | Age: 65
End: 2025-07-01

## 2025-07-01 NOTE — PROGRESS NOTES
Cm asked ct if he rec'd mail/call from [employer]. Ct stated he did not. Per [facility] report- ct MA is not active. Cm called Elian [facility] rep who stated appeals were not rec'd. Cm re-emailed appeals and faxed to Elian [facility].    Akash applied ct for [facility].

## 2025-07-02 ENCOUNTER — PATIENT OUTREACH (OUTPATIENT)
Dept: SURGERY | Facility: CLINIC | Age: 65
End: 2025-07-02

## 2025-07-02 NOTE — PROGRESS NOTES
Cm called [facility] who stated ct was approved through 8/31/26. Ct ID is [id number]. Cm also rec'd email from [employer] rep stating appeals were rec'd and were sent to appeals dept. Cm notified ct and [facility] of above. Ct asked Cm if [facility] would cover labs- [facility] rep stated since ct has Medicare A and D, labs would not be covered. Cm confirmed with  that as long as labs were related to ct diagnosis, ct labs would fall under sliding fee scale. Cm notified ct.

## 2025-07-03 ENCOUNTER — TELEPHONE (OUTPATIENT)
Dept: SURGERY | Facility: CLINIC | Age: 65
End: 2025-07-03

## 2025-07-07 DIAGNOSIS — T65.292D TOXIC EFFECT OF TOBACCO, INTENTIONAL SELF-HARM, SUBSEQUENT ENCOUNTER: Primary | ICD-10-CM

## 2025-07-07 RX ORDER — NICOTINE 21 MG/24HR
1 PATCH, TRANSDERMAL 24 HOURS TRANSDERMAL EVERY 24 HOURS
Qty: 28 PATCH | Refills: 0 | Status: SHIPPED | OUTPATIENT
Start: 2025-07-07

## 2025-07-08 ENCOUNTER — PATIENT OUTREACH (OUTPATIENT)
Dept: SURGERY | Facility: CLINIC | Age: 65
End: 2025-07-08

## 2025-07-08 NOTE — PROGRESS NOTES
Ct wife emailed Cm letters ct rec'd in the mail.    First letter is from [employer] asking ct if he wants to enroll in Medicare part D plan- Cm stated ct is already enrolled in Medicare part D prescription plan    Second letter is from [facility] stating visits that were submitted to insurance- no action required    Third letter is from appeals office. Cm called Emmanuelle Quezada [phone] as this is ct appeals  and LVM requesting c/b notifying that ct would like to meet with someone from [employer] prior to hearing to discuss [employer] decision to terminate benefits. Cm notified ct wife via email that Cm will fu once she receives c/b from appeals.    See media

## 2025-07-09 ENCOUNTER — PATIENT OUTREACH (OUTPATIENT)
Dept: SURGERY | Facility: CLINIC | Age: 65
End: 2025-07-09

## 2025-07-09 NOTE — PROGRESS NOTES
Cm rec'd call from Mrs. Quezada [employer] appeals office stating ct is over income for assistance with paying part B premium and any other assistance [employer] has. Mrs Quezada stating ct options would be to apply for [employer] insurance, insurance through employer, or paying for part B premium. Mrs Quezada asked for Cm to fax over check numbers for MAWD premiums that were sent by [facility] and she will send them to MAWD office to determine why checks were not rec'd. Cm called ct to discuss above and ct stated he will s/w wife to discuss his options and fu Cm once he makes a decision. Ct asked for Cm to withdrawal hearing request. Cm called Mrs Quezada to withdraw hearing request and Mrs Quezada stated ct should receive letter in the mail to reflect this. Cm notified ct via text. Cm faxed over check numbers for ct MAWD premiums to appeals office.

## 2025-07-14 ENCOUNTER — PATIENT OUTREACH (OUTPATIENT)
Dept: SURGERY | Facility: CLINIC | Age: 65
End: 2025-07-14

## 2025-07-17 ENCOUNTER — PATIENT OUTREACH (OUTPATIENT)
Dept: SURGERY | Facility: CLINIC | Age: 65
End: 2025-07-17

## 2025-07-17 NOTE — PROGRESS NOTES
Ct sent Cm [employer] pt satisfaction survey and asked ct to notify Cm if he chooses to complete this.     Cm also asked ct if he has made a decision regarding his insurance.

## 2025-07-18 ENCOUNTER — PATIENT OUTREACH (OUTPATIENT)
Dept: SURGERY | Facility: CLINIC | Age: 65
End: 2025-07-18

## 2025-07-18 NOTE — PROGRESS NOTES
Ct stated he is s/w HR dept regarding insurance and finding best plan that works for ct- however HR person ct s/w is on vacation. Ct stated he will notify Cm once an insurance plan is picked.

## 2025-07-21 ENCOUNTER — PATIENT OUTREACH (OUTPATIENT)
Dept: SURGERY | Facility: CLINIC | Age: 65
End: 2025-07-21

## 2025-07-22 ENCOUNTER — PATIENT OUTREACH (OUTPATIENT)
Dept: SURGERY | Facility: CLINIC | Age: 65
End: 2025-07-22

## 2025-07-22 NOTE — PROGRESS NOTES
Cm rec'd a July MAWD premium for ct from ct wife. Cm asked ct when he would have time to call [employer] to determine why July MAWD premium was rec'd as ct no longer has MAWD coverage and to fu regarding overdue payments. Ct stated he should have time this Thursday 7/24 to call [employer].    See media

## 2025-07-23 DIAGNOSIS — J44.9 CHRONIC OBSTRUCTIVE PULMONARY DISEASE, UNSPECIFIED COPD TYPE (HCC): ICD-10-CM

## 2025-07-24 ENCOUNTER — PATIENT OUTREACH (OUTPATIENT)
Dept: SURGERY | Facility: CLINIC | Age: 65
End: 2025-07-24

## 2025-07-24 RX ORDER — FLUTICASONE FUROATE, UMECLIDINIUM BROMIDE AND VILANTEROL TRIFENATATE 100; 62.5; 25 UG/1; UG/1; UG/1
POWDER RESPIRATORY (INHALATION)
Qty: 60 EACH | Refills: 5 | Status: SHIPPED | OUTPATIENT
Start: 2025-07-24

## 2025-07-24 NOTE — PROGRESS NOTES
Akash called [employer] s/w rep who stated she would send a note to ct CW, GRISEL stating ct should have never rec'd July premium as ct was no longer active with [facility] as of 7/1/25. [employer] rep sent check numbers for February-May to [facility] dept to confirm receipt for ct premiums. Akash s/w ct wife who sent Cm June [facility] premium. Akash created check req and sent to supervisor for approval/processing. Cm texted ct to notify of above.    See media

## 2025-07-29 ENCOUNTER — PATIENT OUTREACH (OUTPATIENT)
Dept: SURGERY | Facility: CLINIC | Age: 65
End: 2025-07-29

## 2025-07-30 DIAGNOSIS — I10 PRIMARY HYPERTENSION: ICD-10-CM

## 2025-07-30 DIAGNOSIS — T65.292D TOXIC EFFECT OF TOBACCO, INTENTIONAL SELF-HARM, SUBSEQUENT ENCOUNTER: ICD-10-CM

## 2025-07-30 RX ORDER — IRBESARTAN AND HYDROCHLOROTHIAZIDE 300; 12.5 MG/1; MG/1
1 TABLET, FILM COATED ORAL EVERY MORNING
Qty: 30 TABLET | Refills: 2 | Status: SHIPPED | OUTPATIENT
Start: 2025-07-30

## 2025-07-30 RX ORDER — NICOTINE 21 MG/24HR
1 PATCH, TRANSDERMAL 24 HOURS TRANSDERMAL EVERY 24 HOURS
Qty: 28 PATCH | Refills: 0 | Status: SHIPPED | OUTPATIENT
Start: 2025-07-30

## 2025-08-11 ENCOUNTER — PATIENT OUTREACH (OUTPATIENT)
Dept: SURGERY | Facility: CLINIC | Age: 65
End: 2025-08-11

## 2025-08-18 ENCOUNTER — PATIENT OUTREACH (OUTPATIENT)
Dept: SURGERY | Facility: CLINIC | Age: 65
End: 2025-08-18

## 2025-08-18 DIAGNOSIS — T65.292D TOXIC EFFECT OF TOBACCO, INTENTIONAL SELF-HARM, SUBSEQUENT ENCOUNTER: ICD-10-CM

## 2025-08-18 DIAGNOSIS — B20 HIV DISEASE (HCC): ICD-10-CM

## 2025-08-19 RX ORDER — NICOTINE 21 MG/24HR
1 PATCH, TRANSDERMAL 24 HOURS TRANSDERMAL EVERY 24 HOURS
Qty: 28 PATCH | Refills: 0 | OUTPATIENT
Start: 2025-08-19

## 2025-08-19 RX ORDER — BICTEGRAVIR SODIUM, EMTRICITABINE, AND TENOFOVIR ALAFENAMIDE FUMARATE 50; 200; 25 MG/1; MG/1; MG/1
TABLET ORAL
Qty: 30 TABLET | Refills: 2 | Status: SHIPPED | OUTPATIENT
Start: 2025-08-19

## 2025-08-20 ENCOUNTER — PATIENT OUTREACH (OUTPATIENT)
Dept: SURGERY | Facility: CLINIC | Age: 65
End: 2025-08-20